# Patient Record
Sex: MALE | Race: WHITE | NOT HISPANIC OR LATINO | Employment: OTHER | URBAN - METROPOLITAN AREA
[De-identification: names, ages, dates, MRNs, and addresses within clinical notes are randomized per-mention and may not be internally consistent; named-entity substitution may affect disease eponyms.]

---

## 2017-01-17 DIAGNOSIS — I10 ESSENTIAL HYPERTENSION: ICD-10-CM

## 2017-01-17 DIAGNOSIS — M54.10 DIABETIC RADICULOPATHY: ICD-10-CM

## 2017-01-17 DIAGNOSIS — E11.49 DIABETIC RADICULOPATHY: ICD-10-CM

## 2017-01-17 RX ORDER — LISINOPRIL 10 MG/1
10 TABLET ORAL DAILY
Qty: 90 TABLET | Refills: 0 | Status: SHIPPED | OUTPATIENT
Start: 2017-01-17 | End: 2017-05-15

## 2017-01-17 RX ORDER — GLIMEPIRIDE 4 MG/1
4 TABLET ORAL
Qty: 90 TABLET | Refills: 3 | Status: SHIPPED | OUTPATIENT
Start: 2017-01-17 | End: 2018-03-10 | Stop reason: SDUPTHER

## 2017-02-08 ENCOUNTER — DOCUMENTATION ONLY (OUTPATIENT)
Dept: FAMILY MEDICINE | Facility: CLINIC | Age: 57
End: 2017-02-08

## 2017-02-08 NOTE — PROGRESS NOTES
Health Maintenance Due   Topic Date Due    Colonoscopy  10/31/2008    Foot Exam  12/04/2016

## 2017-02-09 ENCOUNTER — OFFICE VISIT (OUTPATIENT)
Dept: FAMILY MEDICINE | Facility: CLINIC | Age: 57
End: 2017-02-09
Payer: COMMERCIAL

## 2017-02-09 VITALS
DIASTOLIC BLOOD PRESSURE: 80 MMHG | RESPIRATION RATE: 16 BRPM | TEMPERATURE: 98 F | OXYGEN SATURATION: 100 % | SYSTOLIC BLOOD PRESSURE: 119 MMHG | WEIGHT: 288.38 LBS | BODY MASS INDEX: 40.37 KG/M2 | HEIGHT: 71 IN | HEART RATE: 82 BPM

## 2017-02-09 DIAGNOSIS — F32.A ANXIETY AND DEPRESSION: ICD-10-CM

## 2017-02-09 DIAGNOSIS — F41.9 ANXIETY AND DEPRESSION: ICD-10-CM

## 2017-02-09 DIAGNOSIS — E11.59 HYPERTENSION ASSOCIATED WITH DIABETES: ICD-10-CM

## 2017-02-09 DIAGNOSIS — I15.2 HYPERTENSION ASSOCIATED WITH DIABETES: ICD-10-CM

## 2017-02-09 DIAGNOSIS — E78.5 HYPERLIPIDEMIA ASSOCIATED WITH TYPE 2 DIABETES MELLITUS: ICD-10-CM

## 2017-02-09 DIAGNOSIS — E11.69 HYPERLIPIDEMIA ASSOCIATED WITH TYPE 2 DIABETES MELLITUS: ICD-10-CM

## 2017-02-09 DIAGNOSIS — Z12.11 COLON CANCER SCREENING: ICD-10-CM

## 2017-02-09 PROCEDURE — 3045F PR MOST RECENT HEMOGLOBIN A1C LEVEL 7.0-9.0%: CPT | Mod: S$GLB,,, | Performed by: INTERNAL MEDICINE

## 2017-02-09 PROCEDURE — 99214 OFFICE O/P EST MOD 30 MIN: CPT | Mod: S$GLB,,, | Performed by: INTERNAL MEDICINE

## 2017-02-09 PROCEDURE — 3079F DIAST BP 80-89 MM HG: CPT | Mod: S$GLB,,, | Performed by: INTERNAL MEDICINE

## 2017-02-09 PROCEDURE — 4010F ACE/ARB THERAPY RXD/TAKEN: CPT | Mod: S$GLB,,, | Performed by: INTERNAL MEDICINE

## 2017-02-09 PROCEDURE — 3074F SYST BP LT 130 MM HG: CPT | Mod: S$GLB,,, | Performed by: INTERNAL MEDICINE

## 2017-02-09 PROCEDURE — 2022F DILAT RTA XM EVC RTNOPTHY: CPT | Mod: S$GLB,,, | Performed by: INTERNAL MEDICINE

## 2017-02-09 RX ORDER — DULOXETIN HYDROCHLORIDE 60 MG/1
60 CAPSULE, DELAYED RELEASE ORAL DAILY
Qty: 30 CAPSULE | Refills: 11 | Status: SHIPPED | OUTPATIENT
Start: 2017-02-09 | End: 2017-05-11

## 2017-02-09 NOTE — PROGRESS NOTES
Subjective:       Patient ID: Sarwat Colunga is a 56 y.o. male.    Chief Complaint: Diabetes (labs); Neck Pain; and Foot Pain    HPI       CHIEF COMPLAINT: Depression: yes.  . Anxiety: yes.   HPI:     ONSET/TIMING:  .  ago.  Similar problems in past: .yes.   . # of episodes  of panic per week      0. .    DURATION:  constant    QUALITY/COURSE: unchanged    INTENSITY/SEVERITY: .Severity is # 5 (10 point scale)    CONTEXT/WHEN:  Postpartum: no..  Personal loss: no ..  Stress: yes..    MODIFIERS/TREATMENTS: . Taking medications: yes.  Compliance with taking prescribed medications: yes.  alcohol abuse: no ...  Drug abuse: no .  Chronic disease: no.    SYMPTOMS/RELATED: Possible medication side effects include:  none.    The following symptoms are positive if BOLD, negative otherwise.      REVIEW OF SYSTEMS: Sadness . Weakness . Fatigue . Lack_of _enjoyment_in_life . Sleep_disturbances . Anorexia .  Increased_appetite .  Irritability . Crying_spells .  Guilt .  Lost_concentration . Suicidal_ideation .   During panic attacks:  Chest_pain  . Shortness_of_breath  . dizziness . tingling . palpitations .          CHIEF COMPLAINT: Diabetes.  HPI:     ONSET/TIMING:     QUALITY/COURSE:   unchanged..  Controlled: yes.     INTENSITY/SEVERITY: . Measures blood sugar :  3 times per day.        Lowest recent . Average      CONTEXT/WHEN: last HgbA1c    Lab Results   Component Value Date    HGBA1C 8.1 (H) 12/01/2016      weights:    Wt Readings from Last 1 Encounters:   02/09/17 0852 130.8 kg (288 lb 5.8 oz)         SYMPTOMS/RELATED: . . Possible medication side effects include:     The following symptoms/statements  are present IF IN BOLD, negative otherwise.         MODIFIERS/TREATMENTS: Not_taking_medications:  .  Non-compliance_with_Diabetic_diet  .  No_eye_exam_within_last_year.  Not_practicing_good_foot_care.    REVIEW OF SYMPTOMS: . Weight_gain. Weight_loss. Neuropathy. Recurrent_infections.  Skin_ulcers            CHIEF COMPLAINT: Hypertension  HPI:     ONSET:      QUALITY/COURSE:   Controlled:  yes     INTENSITY/SEVERITY:  Average blood pressure is ? .     MODIFIERS/TREATMENTS:  Taking medications: yes. .High sodium intake: no. alcohol: no      The following symptoms are positive only if BOLDED, otherwise are negative.      SYMPTOMS/RELATED: Possible medication side effects include:   Depression..  . Cough. . Constipation.    REVIEW OF SYMPTOMS: . Weight_loss . Weight_gain . Leg_cramps .Potency_problems .    TARGET ORGAN DAMAGE:: angina/ prior myocardial infarction, chronic kidney disease, heart failure, left ventricular hypertrophy, peripheral artery disease, prior coronary revascularization, retinopathy, stroke. transient ischemic attack.      CHIEF COMPLAINT: Hyperlipidemia. cholesterol screening: no.   HPI:     ONSET:    MODIFIERS/TREATMENTS: . Taking medications: yes. . Non-compliance with following diet: no. .     SYMPTOMS/RELATED:Possible medication side effects include:   Myalgia: no.  .     REVIEW OF SYMPTOMS: past weights:   Wt Readings from Last 1 Encounters:   02/09/17 0852 130.8 kg (288 lb 5.8 oz)                                                     Last lipids: total   Lab Results   Component Value Date    CHOL 145 12/01/2016    CHOL 135 05/30/2016    CHOL 153 02/26/2016                                                                     HDL   Lab Results   Component Value Date    HDL 35 (L) 12/01/2016    HDL 33 (L) 05/30/2016    HDL 32 (L) 02/26/2016                                                                     LDL   Lab Results   Component Value Date    LDLCALC 90.2 12/01/2016    LDLCALC 79.6 05/30/2016    LDLCALC 98.6 02/26/2016                                                                     TRIG   Lab Results   Component Value Date    TRIG 99 12/01/2016    TRIG 112 05/30/2016    TRIG 112 02/26/2016                                                                         Review  "of Systems   Constitutional: Positive for fatigue. Negative for diaphoresis and unexpected weight change.   Eyes: Negative for visual disturbance.   Respiratory: Negative for chest tightness and shortness of breath.    Cardiovascular: Negative for chest pain and leg swelling.   Endocrine: Positive for polyphagia. Negative for polydipsia and polyuria.   Skin: Negative for pallor.   Neurological: Negative for dizziness, tremors, seizures, syncope, speech difficulty, weakness, numbness and headaches.   Psychiatric/Behavioral: Negative for confusion and dysphoric mood. The patient is not nervous/anxious.        Objective:      Vitals:    02/09/17 0852   BP: 119/80   Pulse: 82   Resp: 16   Temp: 97.9 °F (36.6 °C)   TempSrc: Oral   SpO2: 100%   Weight: 130.8 kg (288 lb 5.8 oz)   Height: 5' 11" (1.803 m)   PainSc:   7   PainLoc: Neck     Physical Exam   Constitutional: He appears well-developed and well-nourished.   Cardiovascular: Normal rate, regular rhythm and normal heart sounds.    Pulses:       Dorsalis pedis pulses are 2+ on the right side, and 2+ on the left side.   Pulmonary/Chest: Effort normal and breath sounds normal. No respiratory distress. He has no wheezes.   Abdominal: Soft. Bowel sounds are normal. There is no tenderness.   Musculoskeletal:        Right foot: There is normal range of motion and no deformity.        Left foot: There is normal range of motion and no deformity.   Feet:   Right Foot:   Protective Sensation: 5 sites tested. 5 sites sensed.   Skin Integrity: Negative for ulcer, blister, skin breakdown, erythema, warmth, callus or dry skin.   Left Foot:   Protective Sensation: 5 sites tested. 5 sites sensed.   Skin Integrity: Negative for ulcer, blister, skin breakdown, erythema, warmth, callus or dry skin.         Assessment:       1. Type 2 diabetes, uncontrolled, with neuropathy    2. Anxiety and depression    3. Hypertension associated with diabetes    4. Hyperlipidemia associated with type " 2 diabetes mellitus    5. Colon cancer screening          Plan:     Type 2 diabetes, uncontrolled, with neuropathy  -     canagliflozin (INVOKANA) 100 mg Tab; Take 1 tablet (100 mg total) by mouth once daily.  Dispense: 30 tablet; Refill: 0  -     canagliflozin (INVOKANA) 300 mg Tab tablet; Take 1 tablet (300 mg total) by mouth once daily.  Dispense: 90 tablet; Refill: 1  -     CBC auto differential; Future; Expected date: 2/9/17  -     Hemoglobin A1c; Future; Expected date: 2/9/17    Anxiety and depression  -     duloxetine (CYMBALTA) 60 MG capsule; Take 1 capsule (60 mg total) by mouth once daily.  Dispense: 30 capsule; Refill: 11    Hypertension associated with diabetes  -     Comprehensive metabolic panel; Future; Expected date: 2/9/17    Hyperlipidemia associated with type 2 diabetes mellitus  -     Lipid panel; Future; Expected date: 2/9/17    Colon cancer screening  -     Ambulatory Referral to Gastroenterology      Return in about 3 months (around 5/9/2017).

## 2017-02-09 NOTE — MR AVS SNAPSHOT
[facility]  [address]  [place] [address]  Phone: [phone]  Fax: [phone]                  Sarwat Colunga   2017 8:40 AM   Office Visit    Description:  Male : 1960   Provider:  Rosales Page MD   Department:  [facility]           Reason for Visit     Diabetes     Neck Pain     Foot Pain           Diagnoses this Visit        Comments    Type 2 diabetes, uncontrolled, with neuropathy    -  Primary     Anxiety and depression         Hypertension associated with diabetes         Hyperlipidemia associated with type 2 diabetes mellitus         Colon cancer screening                To Do List           Future Appointments        Provider Department Dept Phone    2017 8:00 AM DEN Darnell [facility] [phone]    2017 8:40 AM Rosales Page MD [facility] [phone]      Goals (5 Years of Data)     None      Follow-Up and Disposition     Return in about 3 months (around 2017).       These Medications        Disp Refills Start End    canagliflozin (INVOKANA) 100 mg Tab 30 tablet 0 2017     Take 1 tablet (100 mg total) by mouth once daily. - Oral    [facility]  [address]  [facility] #: [phone]       duloxetine (CYMBALTA) 60 MG capsule 30 capsule 11 2017 3/11/2017    Take 1 capsule (60 mg total) by mouth once daily. - Oral    Pharmacy: [facility] [address]  [place], [place] -  [address]  [facility] #: [phone]       canagliflozin (INVOKANA) 300 mg Tab tablet 90 tablet 1 3/9/2017     Take 1 tablet (300 mg total) by mouth once daily. - Oral    [facility]: [facility] [address] [facility] [place], [place] -  [address]  [facility] Ph #: [phone]         [facility]     [facility] -  Assistance  Registered nurses in the [facility] On Call Center provide clinical advisement, health  "education, appointment booking, and other advisory services.  Call for this free service at [phone].             Medications           Message regarding Medications     Verify the changes and/or additions to your medication regime listed below are the same as discussed with your clinician today.  If any of these changes or additions are incorrect, please notify your healthcare provider.        START taking these NEW medications        Refills    canagliflozin (INVOKANA) 100 mg Tab 0    Sig: Take 1 tablet (100 mg total) by mouth once daily.    Class: Normal    Route: Oral    duloxetine (CYMBALTA) 60 MG capsule 11    Sig: Take 1 capsule (60 mg total) by mouth once daily.    Class: Normal    Route: Oral    canagliflozin (INVOKANA) 300 mg Tab tablet 1    Starting on: 3/9/2017    Sig: Take 1 tablet (300 mg total) by mouth once daily.    Class: Normal    Route: Oral           Verify that the below list of medications is an accurate representation of the medications you are currently taking.  If none reported, the list may be blank. If incorrect, please contact your healthcare provider. Carry this list with you in case of emergency.           Current Medications     aspirin (ECOTRIN) 81 MG EC tablet Take 81 mg by mouth once daily.    atorvastatin (LIPITOR) 40 MG tablet Take 1 tablet (40 mg total) by mouth nightly.    BD INSULIN PEN NEEDLE UF MINI 31 x 3/16 " Ndle     benzonatate (TESSALON) 200 MG capsule Take 1 capsule (200 mg total) by mouth 3 (three) times daily as needed for Cough.    blood sugar diagnostic Strp 2 strips by Misc.(Non-Drug; Combo Route) route 2 (two) times daily.    chlorthalidone (HYGROTEN) 25 MG Tab Take 1 tablet (25 mg total) by mouth once daily.    esomeprazole (NEXIUM) 40 MG capsule Take 1 capsule (40 mg total) by mouth before breakfast.    exenatide (BYETTA) 10 mcg/dose(250 mcg/mL) 2.4 mL PnIj Inject 0.04 mLs (10 mcg total) into the skin 2 (two) times daily before meals.    finasteride " "(PROSCAR) 5 mg tablet Take 1 tablet (5 mg total) by mouth once daily.    glimepiride (AMARYL) 4 MG tablet Take 1 tablet (4 mg total) by mouth before breakfast.    insulin glargine (LANTUS SOLOSTAR) 100 unit/mL (3 mL) InPn pen 50 u sq qhs, then increase by 2 units every 4 days until fasting sugars are below 130    lamotrigine (LAMICTAL) 150 MG Tab take 1 tablet by mouth twice a day    lisinopril 10 MG tablet Take 1 tablet (10 mg total) by mouth once daily.    meclizine (ANTIVERT) 25 mg tablet Take 1 tablet (25 mg total) by mouth 3 (three) times daily as needed.    metformin (GLUCOPHAGE) 1000 MG tablet Take 1 tablet (1,000 mg total) by mouth 2 (two) times daily with meals.    naproxen sodium (ANAPROX) 550 MG tablet Take 550 mg by mouth 2 (two) times daily.    tamsulosin (FLOMAX) 0.4 mg Cp24 Take 1 capsule (0.4 mg total) by mouth once daily.    canagliflozin (INVOKANA) 100 mg Tab Take 1 tablet (100 mg total) by mouth once daily.    canagliflozin (INVOKANA) 300 mg Tab tablet Starting on Mar 09, 2017. Take 1 tablet (300 mg total) by mouth once daily.    duloxetine (CYMBALTA) 60 MG capsule Take 1 capsule (60 mg total) by mouth once daily.           Clinical Reference Information           Your Vitals Were     BP Pulse Temp Resp    119/80 (BP Location: Right arm, Patient Position: Sitting, BP Method: Automatic) 82 97.9 °F (36.6 °C) (Oral) 16    Height Weight SpO2 BMI    5' 11" (1.803 m) 130.8 kg (288 lb 5.8 oz) 100% 40.22 kg/m2      Blood Pressure          Most Recent Value    BP  119/80      Allergies as of 2/9/2017     Bactrim [Sulfamethoxazole-trimethoprim]    Ciprofloxacin    Doxycycline      Immunizations Administered on Date of Encounter - 2/9/2017     None      Orders Placed During Today's Visit      Normal Orders This Visit    Ambulatory Referral to Gastroenterology     Future Labs/Procedures Expected by Expires    CBC auto differential  2/9/2017 2/10/2018    Comprehensive metabolic panel  2/9/2017 2/10/2018    " Hemoglobin A1c  2/9/2017 2/10/2018    Lipid panel  2/9/2017 2/10/2018      Instructions    Lose 5 pounds.  Suggest [facility]       Language Assistance Services     ATTENTION: Language assistance services are available, free of charge. Please call [phone].      ATENCIÓN: Si habla michelle, tiene a morales disposición servicios gratuitos de asistencia lingüística. Llame al [phone].     CHÚ Ý: N?u b?n nói Ti?ng Vi?t, có các d?ch v? h? tr? ngôn ng? mi?n phí dành cho b?n. G?i s? [phone].         [facility] complies with applicable Federal civil rights laws and does not discriminate on the basis of race, color, national origin, age, disability, or sex.

## 2017-02-18 ENCOUNTER — PATIENT MESSAGE (OUTPATIENT)
Dept: FAMILY MEDICINE | Facility: CLINIC | Age: 57
End: 2017-02-18

## 2017-02-20 DIAGNOSIS — E66.9 DIABETES MELLITUS TYPE 2 IN OBESE: ICD-10-CM

## 2017-02-20 DIAGNOSIS — E11.69 DIABETES MELLITUS TYPE 2 IN OBESE: ICD-10-CM

## 2017-02-20 NOTE — TELEPHONE ENCOUNTER
PA was needed for byetta.  Insurance DENIED this PA.    Reason:  Product is not eligible for coverage under pt benefits.         Also, per pt. Please advise:    Original authorizing provider: MD Sarwat Thompson would like a refill of the following medications:   exenatide (BYETTA) 10 mcg/dose(250 mcg/mL) 2.4 mL PnIj [Rosales Page MD]     Preferred pharmacy: [facility] [address] [place], [place] [address]  [facility]     Comment:   1. Am I supposed to be taking the Byetta now that I started the Invokana?     2. If I am supposed to stay on Byetta then I need a refill.     3. Want to try a visit with an endocrinologist for the diabetes, could I get a referral?   Would like to see:                                Virgilio Gomez MD (in particular)                        [facility], [facility]                             Phone:  [phone].                               Fax: [phone]

## 2017-02-20 NOTE — TELEPHONE ENCOUNTER
PA was needed for byetta.  Insurance DENIED this PA.    Reason:  Product is not eligible for coverage under pt benefits.         Also, per pt. Please advise:      Comment:   1. Am I supposed to be taking the Byetta now that I started the Invokana?     2. If I am supposed to stay on Byetta then I need a refill.     3. Want to try a visit with an endocrinologist for the diabetes, could I get a referral?   Would like to see:                                Virgilio Gomez MD (in particular)                        [facility], [facility]                             Phone:  [phone].                               Fax: [phone]       PLEASE ADVISE

## 2017-03-13 DIAGNOSIS — I10 ESSENTIAL HYPERTENSION: ICD-10-CM

## 2017-03-13 RX ORDER — CHLORTHALIDONE 25 MG/1
25 TABLET ORAL DAILY
Qty: 90 TABLET | Refills: 1 | Status: SHIPPED | OUTPATIENT
Start: 2017-03-13 | End: 2018-02-24 | Stop reason: SDUPTHER

## 2017-03-19 ENCOUNTER — PATIENT MESSAGE (OUTPATIENT)
Dept: FAMILY MEDICINE | Facility: CLINIC | Age: 57
End: 2017-03-19

## 2017-03-20 ENCOUNTER — TELEPHONE (OUTPATIENT)
Dept: FAMILY MEDICINE | Facility: CLINIC | Age: 57
End: 2017-03-20

## 2017-03-20 NOTE — TELEPHONE ENCOUNTER
The rash is getting better.  Contacted the patient is off Lamictal now.  He will see his psychiatrist in 3 days.

## 2017-04-06 ENCOUNTER — DOCUMENTATION ONLY (OUTPATIENT)
Dept: FAMILY MEDICINE | Facility: CLINIC | Age: 57
End: 2017-04-06

## 2017-04-06 NOTE — PROGRESS NOTES
Pre-Visit Chart Review  For Appointment Scheduled on 04/07/2017    Health Maintenance Due   Topic Date Due    Colonoscopy  10/31/2008    Hemoglobin A1c  03/01/2017

## 2017-04-07 ENCOUNTER — OFFICE VISIT (OUTPATIENT)
Dept: FAMILY MEDICINE | Facility: CLINIC | Age: 57
End: 2017-04-07
Payer: COMMERCIAL

## 2017-04-07 VITALS
SYSTOLIC BLOOD PRESSURE: 114 MMHG | TEMPERATURE: 98 F | HEIGHT: 71 IN | HEART RATE: 84 BPM | BODY MASS INDEX: 40.52 KG/M2 | DIASTOLIC BLOOD PRESSURE: 76 MMHG | WEIGHT: 289.44 LBS

## 2017-04-07 DIAGNOSIS — L28.2 PRURITIC RASH: ICD-10-CM

## 2017-04-07 DIAGNOSIS — R23.8 VESICULAR RASH: Primary | ICD-10-CM

## 2017-04-07 DIAGNOSIS — T07.XXXA MULTIPLE EXCORIATIONS: ICD-10-CM

## 2017-04-07 PROCEDURE — 99213 OFFICE O/P EST LOW 20 MIN: CPT | Mod: S$GLB,,, | Performed by: PHYSICIAN ASSISTANT

## 2017-04-07 PROCEDURE — 3074F SYST BP LT 130 MM HG: CPT | Mod: S$GLB,,, | Performed by: PHYSICIAN ASSISTANT

## 2017-04-07 PROCEDURE — 99999 PR PBB SHADOW E&M-EST. PATIENT-LVL III: CPT | Mod: PBBFAC,,, | Performed by: PHYSICIAN ASSISTANT

## 2017-04-07 PROCEDURE — 3078F DIAST BP <80 MM HG: CPT | Mod: S$GLB,,, | Performed by: PHYSICIAN ASSISTANT

## 2017-04-07 RX ORDER — LORAZEPAM 1 MG/1
1 TABLET ORAL 2 TIMES DAILY
COMMUNITY
Start: 2017-02-06 | End: 2021-08-17

## 2017-04-07 RX ORDER — LURASIDONE HYDROCHLORIDE 80 MG/1
80 TABLET, FILM COATED ORAL NIGHTLY
Refills: 0 | COMMUNITY
Start: 2017-04-03 | End: 2020-06-20 | Stop reason: CLARIF

## 2017-04-07 RX ORDER — HYDROXYZINE HYDROCHLORIDE 25 MG/1
25 TABLET, FILM COATED ORAL 2 TIMES DAILY PRN
Qty: 30 TABLET | Refills: 0 | Status: SHIPPED | OUTPATIENT
Start: 2017-04-07 | End: 2017-05-15

## 2017-04-07 NOTE — PATIENT INSTRUCTIONS
Weight Management: Getting Started  Healthy bodies come in all shapes and sizes. Not all bodies are made to be thin. For some people, a healthy weight is higher than the average weight listed on weight charts. Your healthcare provider can help you decide on a healthy weight for you.    Reasons to lose weight  Losing weight can help with some health problems, such as high blood pressure, heart disease, diabetes, sleep apnea, and arthritis. You may also feel more energy.  Set your long-term goal  Your goal doesn't even have to be a specific weight. You may decide on a fitness goal (such as being able to walk 10 miles a week), or a health goal (such as lowering your blood pressure). Choose a goal that is measurable and reasonable, so you know when you've reached it. A goal of reaching a BMI of less than 25 is not always reasonable (or possible).   Make an action plan  Habits dont change overnight. Setting your goals too high can leave you feeling discouraged if you cant reach them. Be realistic. Choose one or two small changes you can make now. Set an action plan for how you are going to make these changes. When you can stick to this plan, keep making a few more small changes. Taking small steps will help you stay on the path to success.  Track your progress  Write down your goals. Then, keep a daily record of your progress. Write down what you eat and how active you are. This record lets you look back on how much youve done. It may also help when youre feeling frustrated. Reward yourself for success. Even if you dont reach every goal, give yourself credit for what you do get done.  Get support  Encouragement from others can help make losing weight easier. Ask your family members and friends for support. They may even want to join you. Also look to your healthcare provider, registered dietitian, and  for help. Your local hospital can give you more information about nutrition, exercise, and  weight loss.  Date Last Reviewed: 1/31/2016 © 2000-2016 [employer]. [address] [place] [address], [place] [address]. All rights reserved. This information is not intended as a substitute for professional medical care. Always follow your healthcare professional's instructions.        Walking for Fitness  Fitness walking has something for everyone, even people who are already fit. Walking is one of the safest ways to condition your body aerobically. It can boost energy, help you lose weight, and reduce stress.    Physical benefits  · Walking strengthens your heart and lungs, and tones your muscles.  · When walking, your feet land with less impact than in other sports. This reduces chances of muscle, bone, and joint injury.  · Regular walking improves your cholesterol levels and lowers your risk of heart disease. And it helps you control your blood sugar if you have diabetes.  · Walking is a weight-bearing activity, which helps maintain bone density. This can help prevent osteoporosis.  Personal rewards  · Taking walks can help you relax and manage stress. And fitness walking may make you feel better about yourself.  · Walking can help you sleep better at night and make you less likely to be depressed.  · Regular walking may help maintain your memory as you get older.  · Walking is a great way to spend extra time with friends and family members. Be sure to invite your dog along!  Q&A about fitness walking  Q: Will walking keep me fit?  A: Yes. Regular walking at the right pace gives you all the benefits of other aerobic activities, such as jogging and swimming.  Q: Will walking help me lose weight and keep it off?  A: Yes. Per mile, walking can burn as many calories as jogging. Your health care provider can help work walking into your weight-loss plan.  Q: Is walking safe for my health?  A: Yes. Walking is safe if you have high blood pressure, diabetes, heart disease, or other conditions. Talk to your health  care provider before you start.  Date Last Reviewed: 5/9/2015  © 7449-3129 [employer]. [address] [place] [address], [place] [address]. All rights reserved. This information is not intended as a substitute for professional medical care. Always follow your healthcare professional's instructions.        Diet: Diabetes  Food is an important tool that you can use to control diabetes and stay healthy. Eating well-balanced meals in the correct amounts will help you control your blood glucose levels and prevent low blood sugar reactions. It will also help you reduce the health risks of diabetes. There is no one specific diet that is right for everyone with diabetes. But there are general guidelines to follow. A registered dietitian (RD) will create a tailored diet approach thats just right for you. He or she will also help you plan healthy meals and snacks. If you have any questions, call your dietitian for advice.    Guidelines for success  Talk with your healthcare provider before starting a diabetes diet or weight loss program. If you haven't talked with a dietitian yet, ask your provider for a referral. The following guidelines can help you succeed:  · Select foods from the 6 food groups below. Your dietitian will help you find food choices within each group. He or she will also show you serving sizes and how many servings you can have at each meal.  ¨ Grains, beans, and starchy vegetables  ¨ Vegetables  ¨ Fruit  ¨ Milk or yogurt  ¨ Meat, poultry, fish, or tofu  ¨ Healthy fats  · Check your blood sugar levels as directed by your provider. Take any medicine as prescribed by your provider.  · Learn to read food labels and pick the right portion sizes.  · Eat only the amount of food in your meal plan. Eat about the same amount of food at regular times each day. Dont skip meals. Eat meals 4 to 5 hours apart, with snacks in between.  · Limit alcohol. It raises blood sugar levels. Drink water or calorie-free diet  drinks that use safe sweeteners.  · Eat less fat to help lower your risk of heart disease. Use nonfat or low-fat dairy products and lean meats. Avoid fried foods. Use cooking oils that are unsaturated, such as olive, canola, or peanut oil.  · Talk with your dietitian about safe sugar substitutes.  · Avoid added salt. It can contribute to high blood pressure, which can cause heart disease. People with diabetes already have a risk of high blood pressure and heart disease.  · Stay at a healthy weight. If you need to lose weight, cut down on your portion sizes. But dont skip meals. Exercise is an important part of any weight management program. Talk with your provider about an exercise program thats right for you.  · For more information about the best diet plan for you, talk with a registered dietitian (RD). To find an RD in your area, contact:  ¨ Academy of Nutrition and Dietetics www.eatright.org  ¨ The American Diabetes Association [phone] www.diabetes.org  Date Last Reviewed: 8/1/2016  © 2691-3588 [employer]. [address] [place] [address]. All rights reserved. This information is not intended as a substitute for professional medical care. Always follow your healthcare professional's instructions.        Nonspecific Dermatitis  Dermatitis is a skin rash caused by something that touches the skin and makes it irritated and inflamed.  Your skin may be red, swollen, dry, and may be cracked. Blisters may form and ooze. The rash will itch.  Dermatitis can form on the face and neck, backs of hands, forearms, genitals, and lower legs. Dermatitis is not passed from person to person.  Talk with your health care provider about what may have caused the rash. Common things that cause skin allergies are metal in jewelry, plants like poison ivy or poison oak, and certain skin care products. You will need to avoid the source of your rash in the future to prevent it from coming back. In some cases,  the cause of the dermatitis may not be found.  Treatment is done to relieve itching and prevent the rash from coming back. The rash should go away in a few days to a few weeks.  Home care  The health care provider may prescribe medications to relieve swelling and itching. Follow all instructions when using these medications.  · Avoid anything that heats up your skin, such as hot showers or baths, or direct sunlight. This can make itching worse.  · Stay away from whatever you think caused the rash.  · Bathe in warm, not hot, water. Apply a moisturizing lotion after bathing to prevent dry skin.  · Avoid skin irritants such as wool or silk clothing, grease, oils, harsh soaps, and detergents.  · Apply cold compresses to soothe your sores to help relieve your symptoms. Do this for 30 minutes 3 to 4 times a day. You can make a cold compress by soaking a cloth in cold water. Squeeze out excess water. You can add colloidal oatmeal to the water to help reduce itching. For severe itching in a small area, apply an ice pack wrapped in a thin towel. Do this for 20 minutes 3 to 4 times a day.  · You can also help relieve large areas of itching by taking a lukewarm bath with colloidal oatmeal added to the water.  · Use hydrocortisone cream for redness and irritation, unless another medicine was prescribed. You can also use benzocaine anesthetic cream or spray.  · Use oral diphenhydramine to help reduce itching. This is an antihistamine you can buy at drug and grocery stores. It can make you sleepy, so use lower doses during the daytime. Or you can use loratadine. This is an antihistamine that will not make you sleepy. Dont use diphenhydramine if you have glaucoma or have trouble urinating because of an enlarged prostate.  · Wash your hands or use an antibacterial gel often to prevent the spread of the rash.  Follow-up care  Follow up with your health care provider. Make an appointment with your health care provider if your  symptoms do not get better in the next 1 to 2 weeks.  When to seek medical advice  Call your health care provider right away if any of these occur:  · Spreading of the rash to other parts of your body  · Severe swelling of your face, eyelids, mouth, throat or tongue  · Trouble urinating due to swelling in the genital area  · Fever of 100.4°F (38°C) or higher  · Redness or swelling that gets worse  · Pain that gets worse  · Foul-smelling fluid leaking from the skin  · Yellow-brown crusts on the open blisters  · Joint pain   Date Last Reviewed: 7/23/2014  © 4915-9143 [employer]. [address] [place] [address], [place] [address]. All rights reserved. This information is not intended as a substitute for professional medical care. Always follow your healthcare professional's instructions.

## 2017-04-07 NOTE — MR AVS SNAPSHOT
[facility]  [address] [facility] [address]  Phone: [phone]  Fax: [phone]                  Sarwat Colunga   2017 8:20 AM   Office Visit    Description:  Male : 1960   Provider:  ALEJANDRINA Navarro   Department:  [facility] Medicine           Reason for Visit     Rash           Diagnoses this Visit        Comments    Vesicular rash    -  Primary     Pruritic rash         Multiple excoriations                To Do List           Future Appointments        Provider Department Dept Phone    2017 8:40 AM Rosales Page MD [facility] [phone]    2017 9:30 AM Jacqueline Haddad MD [facility] [phone]      Goals (5 Years of Data)     None      Follow-Up and Disposition     Return for Follow-Up with PCP.       These Medications        Disp Refills Start End    hydrocortisone-pramoxine (PRAMOSONE) 2.5-1 % Lotn lotion 118 mL 0 2017     Apply topically 3 (three) times daily. - Topical (Top)    Pharmacy: [facility] [place] [address] [facility] Ph #: [phone]       hydrOXYzine HCl (ATARAX) 25 MG tablet 30 tablet 0 2017     Take 1 tablet (25 mg total) by mouth 2 (two) times daily as needed for Itching. - Oral    Ph[facility] [address]  [facility] Ph #: [phone]         [facility]     [facility] Nurse Care Line -  Assistance  Unless otherwise directed by your provider, please contact [facility], our nurse care line that is available for  assistance.     Registered nurses in the [facility] provide: appointment scheduling, clinical advisement, health education, and other advisory services.  Call: [phone] (toll free)               Medications           Message regarding Medications     Verify the changes and/or additions to your medication regime listed below are the same as discussed  with your clinician today.  If any of these changes or additions are incorrect, please notify your healthcare provider.        START taking these NEW medications        Refills    hydrocortisone-pramoxine (PRAMOSONE) 2.5-1 % Lotn lotion 0    Sig: Apply topically 3 (three) times daily.    Class: Normal    Route: Topical (Top)    hydrOXYzine HCl (ATARAX) 25 MG tablet 0    Sig: Take 1 tablet (25 mg total) by mouth 2 (two) times daily as needed for Itching.    Class: Normal    Route: Oral           Verify that the below list of medications is an accurate representation of the medications you are currently taking.  If none reported, the list may be blank. If incorrect, please contact your healthcare provider. Carry this list with you in case of emergency.           Current Medications     aspirin (ECOTRIN) 81 MG EC tablet Take 81 mg by mouth once daily.    atorvastatin (LIPITOR) 40 MG tablet Take 1 tablet (40 mg total) by mouth nightly.    canagliflozin (INVOKANA) 300 mg Tab tablet Take 1 tablet (300 mg total) by mouth once daily.    chlorthalidone (HYGROTEN) 25 MG Tab Take 1 tablet (25 mg total) by mouth once daily.    esomeprazole (NEXIUM) 40 MG capsule Take 1 capsule (40 mg total) by mouth before breakfast.    finasteride (PROSCAR) 5 mg tablet Take 1 tablet (5 mg total) by mouth once daily.    glimepiride (AMARYL) 4 MG tablet Take 1 tablet (4 mg total) by mouth before breakfast.    insulin glargine (LANTUS SOLOSTAR) 100 unit/mL (3 mL) InPn pen 50 u sq qhs, then increase by 2 units every 4 days until fasting sugars are below 130    LATUDA 80 mg Tab tablet     lisinopril 10 MG tablet Take 1 tablet (10 mg total) by mouth once daily.    lorazepam (ATIVAN) 1 MG tablet     meclizine (ANTIVERT) 25 mg tablet Take 1 tablet (25 mg total) by mouth 3 (three) times daily as needed.    metformin (GLUCOPHAGE) 1000 MG tablet Take 1 tablet (1,000 mg total) by mouth 2 (two) times daily with meals.    naproxen sodium (ANAPROX) 550 MG  "tablet Take 550 mg by mouth 2 (two) times daily.    tamsulosin (FLOMAX) 0.4 mg Cp24 Take 1 capsule (0.4 mg total) by mouth once daily.    BD INSULIN PEN NEEDLE UF MINI 31 x 3/16 " Ndle     benzonatate (TESSALON) 200 MG capsule Take 1 capsule (200 mg total) by mouth 3 (three) times daily as needed for Cough.    blood sugar diagnostic Strp 2 strips by Misc.(Non-Drug; Combo Route) route 2 (two) times daily.    duloxetine (CYMBALTA) 60 MG capsule Take 1 capsule (60 mg total) by mouth once daily.    exenatide (BYETTA) 10 mcg/dose(250 mcg/mL) 2.4 mL PnIj Inject 0.04 mLs (10 mcg total) into the skin 2 (two) times daily before meals.    hydrocortisone-pramoxine (PRAMOSONE) 2.5-1 % Lotn lotion Apply topically 3 (three) times daily.    hydrOXYzine HCl (ATARAX) 25 MG tablet Take 1 tablet (25 mg total) by mouth 2 (two) times daily as needed for Itching.    lamotrigine (LAMICTAL) 150 MG Tab take 1 tablet by mouth twice a day           Clinical Reference Information           Your Vitals Were     BP Pulse Temp    114/76 (BP Location: Left arm, Patient Position: Sitting, BP Method: Automatic) 84 98.2 °F (36.8 °C) (Oral)    Height Weight BMI    5' 11" (1.803 m) 131.3 kg (289 lb 7.4 oz) 40.37 kg/m2      Blood Pressure          Most Recent Value    BP  114/76      Allergies as of 4/7/2017     Bactrim [Sulfamethoxazole-trimethoprim]    Ciprofloxacin    Doxycycline    Lamictal [Lamotrigine]      Immunizations Administered on Date of Encounter - 4/7/2017     None      Instructions      Weight Management: Getting Started  Healthy bodies come in all shapes and sizes. Not all bodies are made to be thin. For some people, a healthy weight is higher than the average weight listed on weight charts. Your healthcare provider can help you decide on a healthy weight for you.    Reasons to lose weight  Losing weight can help with some health problems, such as high blood pressure, heart disease, diabetes, sleep apnea, and arthritis. You may also " feel more energy.  Set your long-term goal  Your goal doesn't even have to be a specific weight. You may decide on a fitness goal (such as being able to walk 10 miles a week), or a health goal (such as lowering your blood pressure). Choose a goal that is measurable and reasonable, so you know when you've reached it. A goal of reaching a BMI of less than 25 is not always reasonable (or possible).   Make an action plan  Habits dont change overnight. Setting your goals too high can leave you feeling discouraged if you cant reach them. Be realistic. Choose one or two small changes you can make now. Set an action plan for how you are going to make these changes. When you can stick to this plan, keep making a few more small changes. Taking small steps will help you stay on the path to success.  Track your progress  Write down your goals. Then, keep a daily record of your progress. Write down what you eat and how active you are. This record lets you look back on how much youve done. It may also help when youre feeling frustrated. Reward yourself for success. Even if you dont reach every goal, give yourself credit for what you do get done.  Get support  Encouragement from others can help make losing weight easier. Ask your family members and friends for support. They may even want to join you. Also look to your healthcare provider, registered dietitian, and  for help. Your local hospital can give you more information about nutrition, exercise, and weight loss.  Date Last Reviewed: 1/31/2016 © 2000-2016 [employer]. [address] [place] [address], [place] [address]. All rights reserved. This information is not intended as a substitute for professional medical care. Always follow your healthcare professional's instructions.        Walking for Fitness  Fitness walking has something for everyone, even people who are already fit. Walking is one of the safest ways to condition your body  aerobically. It can boost energy, help you lose weight, and reduce stress.    Physical benefits  · Walking strengthens your heart and lungs, and tones your muscles.  · When walking, your feet land with less impact than in other sports. This reduces chances of muscle, bone, and joint injury.  · Regular walking improves your cholesterol levels and lowers your risk of heart disease. And it helps you control your blood sugar if you have diabetes.  · Walking is a weight-bearing activity, which helps maintain bone density. This can help prevent osteoporosis.  Personal rewards  · Taking walks can help you relax and manage stress. And fitness walking may make you feel better about yourself.  · Walking can help you sleep better at night and make you less likely to be depressed.  · Regular walking may help maintain your memory as you get older.  · Walking is a great way to spend extra time with friends and family members. Be sure to invite your dog along!  Q&A about fitness walking  Q: Will walking keep me fit?  A: Yes. Regular walking at the right pace gives you all the benefits of other aerobic activities, such as jogging and swimming.  Q: Will walking help me lose weight and keep it off?  A: Yes. Per mile, walking can burn as many calories as jogging. Your health care provider can help work walking into your weight-loss plan.  Q: Is walking safe for my health?  A: Yes. Walking is safe if you have high blood pressure, diabetes, heart disease, or other conditions. Talk to your health care provider before you start.  Date Last Reviewed: 5/9/2015  © 9214-0599 [employer]. [address] [place] [address], [place] [address]. All rights reserved. This information is not intended as a substitute for professional medical care. Always follow your healthcare professional's instructions.        Diet: Diabetes  Food is an important tool that you can use to control diabetes and stay healthy. Eating well-balanced meals in the  correct amounts will help you control your blood glucose levels and prevent low blood sugar reactions. It will also help you reduce the health risks of diabetes. There is no one specific diet that is right for everyone with diabetes. But there are general guidelines to follow. A registered dietitian (ZACARIAS) will create a tailored diet approach thats just right for you. He or she will also help you plan healthy meals and snacks. If you have any questions, call your dietitian for advice.    Guidelines for success  Talk with your healthcare provider before starting a diabetes diet or weight loss program. If you haven't talked with a dietitian yet, ask your provider for a referral. The following guidelines can help you succeed:  · Select foods from the 6 food groups below. Your dietitian will help you find food choices within each group. He or she will also show you serving sizes and how many servings you can have at each meal.  ¨ Grains, beans, and starchy vegetables  ¨ Vegetables  ¨ Fruit  ¨ Milk or yogurt  ¨ Meat, poultry, fish, or tofu  ¨ Healthy fats  · Check your blood sugar levels as directed by your provider. Take any medicine as prescribed by your provider.  · Learn to read food labels and pick the right portion sizes.  · Eat only the amount of food in your meal plan. Eat about the same amount of food at regular times each day. Dont skip meals. Eat meals 4 to 5 hours apart, with snacks in between.  · Limit alcohol. It raises blood sugar levels. Drink water or calorie-free diet drinks that use safe sweeteners.  · Eat less fat to help lower your risk of heart disease. Use nonfat or low-fat dairy products and lean meats. Avoid fried foods. Use cooking oils that are unsaturated, such as olive, canola, or peanut oil.  · Talk with your dietitian about safe sugar substitutes.  · Avoid added salt. It can contribute to high blood pressure, which can cause heart disease. People with diabetes already have a risk of high  blood pressure and heart disease.  · Stay at a healthy weight. If you need to lose weight, cut down on your portion sizes. But dont skip meals. Exercise is an important part of any weight management program. Talk with your provider about an exercise program thats right for you.  · For more information about the best diet plan for you, talk with a registered dietitian (RD). To find an RD in your area, contact:  ¨ Academy of Nutrition and Dietetics www.eatright.org  ¨ The American Diabetes Association [phone] www.diabetes.org  Date Last Reviewed: 8/1/2016 © 2000-2016 [employer]. [address] [place] [address], [place] [address]. All rights reserved. This information is not intended as a substitute for professional medical care. Always follow your healthcare professional's instructions.        Nonspecific Dermatitis  Dermatitis is a skin rash caused by something that touches the skin and makes it irritated and inflamed.  Your skin may be red, swollen, dry, and may be cracked. Blisters may form and ooze. The rash will itch.  Dermatitis can form on the face and neck, backs of hands, forearms, genitals, and lower legs. Dermatitis is not passed from person to person.  Talk with your health care provider about what may have caused the rash. Common things that cause skin allergies are metal in jewelry, plants like poison ivy or poison oak, and certain skin care products. You will need to avoid the source of your rash in the future to prevent it from coming back. In some cases, the cause of the dermatitis may not be found.  Treatment is done to relieve itching and prevent the rash from coming back. The rash should go away in a few days to a few weeks.  Home care  The health care provider may prescribe medications to relieve swelling and itching. Follow all instructions when using these medications.  · Avoid anything that heats up your skin, such as hot showers or baths, or direct sunlight. This can make itching  worse.  · Stay away from whatever you think caused the rash.  · Bathe in warm, not hot, water. Apply a moisturizing lotion after bathing to prevent dry skin.  · Avoid skin irritants such as wool or silk clothing, grease, oils, harsh soaps, and detergents.  · Apply cold compresses to soothe your sores to help relieve your symptoms. Do this for 30 minutes 3 to 4 times a day. You can make a cold compress by soaking a cloth in cold water. Squeeze out excess water. You can add colloidal oatmeal to the water to help reduce itching. For severe itching in a small area, apply an ice pack wrapped in a thin towel. Do this for 20 minutes 3 to 4 times a day.  · You can also help relieve large areas of itching by taking a lukewarm bath with colloidal oatmeal added to the water.  · Use hydrocortisone cream for redness and irritation, unless another medicine was prescribed. You can also use benzocaine anesthetic cream or spray.  · Use oral diphenhydramine to help reduce itching. This is an antihistamine you can buy at drug and grocery stores. It can make you sleepy, so use lower doses during the daytime. Or you can use loratadine. This is an antihistamine that will not make you sleepy. Dont use diphenhydramine if you have glaucoma or have trouble urinating because of an enlarged prostate.  · Wash your hands or use an antibacterial gel often to prevent the spread of the rash.  Follow-up care  Follow up with your health care provider. Make an appointment with your health care provider if your symptoms do not get better in the next 1 to 2 weeks.  When to seek medical advice  Call your health care provider right away if any of these occur:  · Spreading of the rash to other parts of your body  · Severe swelling of your face, eyelids, mouth, throat or tongue  · Trouble urinating due to swelling in the genital area  · Fever of 100.4°F (38°C) or higher  · Redness or swelling that gets worse  · Pain that gets worse  · Foul-smelling fluid  leaking from the skin  · Yellow-brown crusts on the open blisters  · Joint pain   Date Last Reviewed: 7/23/2014  © 4648-3056 [employer], [employer]. [address] [place] [address], [place] [address]. All rights reserved. This information is not intended as a substitute for professional medical care. Always follow your healthcare professional's instructions.             Language Assistance Services     ATTENTION: Language assistance services are available, free of charge. Please call [phone].      ATENCIÓN: Si zoraida martinez, tiene a morales disposición servicios gratuitos de asistencia lingüística. Llame al [phone].     LAYLA Ý: N?u b?n nói Ti?ng Vi?t, có các d?ch v? h? tr? ngôn ng? mi?n phí dành cho b?n. G?i s? [phone].         [facility] complies with applicable Federal civil rights laws and does not discriminate on the basis of race, color, national origin, age, disability, or sex.

## 2017-04-07 NOTE — PROGRESS NOTES
Subjective:       Patient ID: Sarwat Colunga is a 56 y.o. male.    Chief Complaint: Rash (medication reaction  x 1 month)    Rash   This is a new problem. The current episode started 1 to 4 weeks ago. The problem has been gradually worsening since onset. The affected locations include the left armleft arm. The rash is characterized by blistering, swelling, itchiness, peeling and scaling. He was exposed to a new medication. Pertinent negatives include no anorexia, congestion, cough, diarrhea, eye pain, facial edema, fatigue, fever, joint pain, nail changes, rhinorrhea, shortness of breath, sore throat or vomiting. Past treatments include anti-itch cream and topical steroids. The treatment provided no relief. There is no history of allergies, asthma, eczema or varicella.     Review of Systems   Constitutional: Negative for fatigue and fever.   HENT: Negative for congestion, rhinorrhea and sore throat.    Eyes: Negative for pain.   Respiratory: Negative for cough and shortness of breath.    Gastrointestinal: Negative for anorexia, diarrhea and vomiting.   Musculoskeletal: Negative for joint pain.   Skin: Positive for rash. Negative for nail changes.       Objective:      Physical Exam   Skin:   Numerous excoriations on the left forearm  No blisters seen few pink papules noted     Vitals reviewed.      Assessment:       1. Vesicular rash    2. Pruritic rash    3. Multiple excoriations        Plan:       Sarwat was seen today for rash.    Diagnoses and all orders for this visit:    Vesicular rash    Pruritic rash    Multiple excoriations    -     hydrocortisone-pramoxine (PRAMOSONE) 2.5-1 % Lotn lotion; Apply topically 3 (three) times daily.  -     hydrOXYzine HCl (ATARAX) 25 MG tablet; Take 1 tablet (25 mg total) by mouth 2 (two) times daily as needed for Itching.    Follow up with derm as scheduled

## 2017-04-24 ENCOUNTER — PATIENT MESSAGE (OUTPATIENT)
Dept: FAMILY MEDICINE | Facility: CLINIC | Age: 57
End: 2017-04-24

## 2017-04-25 ENCOUNTER — TELEPHONE (OUTPATIENT)
Dept: FAMILY MEDICINE | Facility: CLINIC | Age: 57
End: 2017-04-25

## 2017-04-25 RX ORDER — PREGABALIN 25 MG/1
25 CAPSULE ORAL 2 TIMES DAILY
Qty: 60 CAPSULE | Refills: 6 | Status: SHIPPED | OUTPATIENT
Start: 2017-04-25 | End: 2017-05-11 | Stop reason: DRUGHIGH

## 2017-05-03 ENCOUNTER — PATIENT MESSAGE (OUTPATIENT)
Dept: FAMILY MEDICINE | Facility: CLINIC | Age: 57
End: 2017-05-03

## 2017-05-08 ENCOUNTER — TELEPHONE (OUTPATIENT)
Dept: FAMILY MEDICINE | Facility: CLINIC | Age: 57
End: 2017-05-08

## 2017-05-08 DIAGNOSIS — G62.9 PERIPHERAL POLYNEUROPATHY: Primary | ICD-10-CM

## 2017-05-08 RX ORDER — PREGABALIN 50 MG/1
50 CAPSULE ORAL 3 TIMES DAILY
Qty: 90 CAPSULE | Refills: 6 | Status: SHIPPED | OUTPATIENT
Start: 2017-05-08 | End: 2017-11-04 | Stop reason: SDUPTHER

## 2017-05-11 ENCOUNTER — OFFICE VISIT (OUTPATIENT)
Dept: DERMATOLOGY | Facility: CLINIC | Age: 57
End: 2017-05-11
Payer: COMMERCIAL

## 2017-05-11 VITALS — BODY MASS INDEX: 40.46 KG/M2 | HEIGHT: 71 IN | WEIGHT: 289 LBS

## 2017-05-11 DIAGNOSIS — L30.9 ECZEMA, UNSPECIFIED TYPE: ICD-10-CM

## 2017-05-11 DIAGNOSIS — L85.3 XEROSIS CUTIS: ICD-10-CM

## 2017-05-11 DIAGNOSIS — L28.2 PRURIGO: Primary | ICD-10-CM

## 2017-05-11 PROCEDURE — 99999 PR PBB SHADOW E&M-EST. PATIENT-LVL II: CPT | Mod: PBBFAC,,, | Performed by: DERMATOLOGY

## 2017-05-11 PROCEDURE — 1160F RVW MEDS BY RX/DR IN RCRD: CPT | Mod: S$GLB,,, | Performed by: DERMATOLOGY

## 2017-05-11 PROCEDURE — 99203 OFFICE O/P NEW LOW 30 MIN: CPT | Mod: S$GLB,,, | Performed by: DERMATOLOGY

## 2017-05-11 RX ORDER — BUPROPION HYDROCHLORIDE 100 MG/1
100 TABLET, EXTENDED RELEASE ORAL 2 TIMES DAILY
Refills: 0 | COMMUNITY
Start: 2017-05-03 | End: 2020-06-20 | Stop reason: CLARIF

## 2017-05-11 RX ORDER — DIPHENHYDRAMINE HCL 50 MG
50 CAPSULE ORAL 3 TIMES DAILY PRN
Refills: 0 | COMMUNITY
Start: 2017-04-09 | End: 2017-05-15

## 2017-05-11 RX ORDER — OXCARBAZEPINE 300 MG/1
TABLET, FILM COATED ORAL
Refills: 0 | COMMUNITY
Start: 2017-04-17 | End: 2017-05-11

## 2017-05-11 RX ORDER — DEXTROAMPHETAMINE SACCHARATE, AMPHETAMINE ASPARTATE, DEXTROAMPHETAMINE SULFATE AND AMPHETAMINE SULFATE 2.5; 2.5; 2.5; 2.5 MG/1; MG/1; MG/1; MG/1
1 TABLET ORAL 2 TIMES DAILY
Refills: 0 | COMMUNITY
Start: 2017-04-12 | End: 2018-11-07 | Stop reason: ALTCHOICE

## 2017-05-11 RX ORDER — MOMETASONE FUROATE 1 MG/G
CREAM TOPICAL
Qty: 45 G | Refills: 1 | Status: SHIPPED | OUTPATIENT
Start: 2017-05-11 | End: 2017-09-22

## 2017-05-11 NOTE — PROGRESS NOTES
Subjective:       Patient ID:  Sarwat Colunga is a 56 y.o. male who presents for No chief complaint on file.    HPI     Pt here for IV, c/o 2 month history rash on forearms and legs. Attributes to lamictal. + itching after increased dose. Stopped lamictal 2 months ago. Rash has improved. Still with a few itchy areas- predominantly right leg, left forearm. Admits to scratching. Takes hot showers. Trunk clear/uninvolved.     No family or personal history skin cancer     Review of Systems   Skin: Positive for itching, rash and dry skin.   Hematologic/Lymphatic: Does not bruise/bleed easily.        Objective:    Physical Exam   Constitutional: He appears well-developed and well-nourished. He is obese.  No distress.   HENT:   Mouth/Throat: Lips normal.    Eyes: Lids are normal.  No conjunctival no injection.   Cardiovascular: There is no local extremity swelling and no dependent edema.     Neurological: He is alert and oriented to person, place, and time. He is not disoriented.   Psychiatric: He has a normal mood and affect.   Skin:   Areas Examined (abnormalities noted in diagram):   Head / Face Inspection Performed  Neck Inspection Performed  Chest / Axilla Inspection Performed  Abdomen Inspection Performed  Back Inspection Performed  RUE Inspected  LUE Inspection Performed  RLE Inspected  LLE Inspection Performed              Diagram Legend     Erythematous scaling macule/papule c/w actinic keratosis       Vascular papule c/w angioma      Pigmented verrucoid papule/plaque c/w seborrheic keratosis      Yellow umbilicated papule c/w sebaceous hyperplasia      Irregularly shaped tan macule c/w lentigo     1-2 mm smooth white papules consistent with Milia      Movable subcutaneous cyst with punctum c/w epidermal inclusion cyst      Subcutaneous movable cyst c/w pilar cyst      Firm pink to brown papule c/w dermatofibroma      Pedunculated fleshy papule(s) c/w skin tag(s)      Evenly pigmented macule c/w junctional  nevus     Mildly variegated pigmented, slightly irregular-bordered macule c/w mildly atypical nevus      Flesh colored to evenly pigmented papule c/w intradermal nevus       Pink pearly papule/plaque c/w basal cell carcinoma      Erythematous hyperkeratotic cursted plaque c/w SCC      Surgical scar with no sign of skin cancer recurrence      Open and closed comedones      Inflammatory papules and pustules      Verrucoid papule consistent consistent with wart     Erythematous eczematous patches and plaques     Dystrophic onycholytic nail with subungual debris c/w onychomycosis     Umbilicated papule    Erythematous-base heme-crusted tan verrucoid plaque consistent with inflamed seborrheic keratosis     Erythematous Silvery Scaling Plaque c/w Psoriasis     See annotation      Assessment / Plan:        Prurigo  Discussed with patient the importance of not manipulating skin lesions. Trauma often exacerbates condition. Trimming nails is recommended to avoid puncturing skin.       -     mometasone 0.1% (ELOCON) 0.1 % cream; aaa bid prn itching and scaling. Avoid chronic use  Dispense: 45 g; Refill: 1    Xerosis cutis  Discussed the following dry skin tips:    Avoid hot baths and showers, keep showers or baths brief (10-15 min), use a mild soap or cleanser, pat skin dry after showering and apply a moisturizer within 3 minutes of getting out of bath (cetaphil cream, ceraVe, aquaphor) and reapply moisturizers 2-4 times/day.     -     mometasone 0.1% (ELOCON) 0.1 % cream; aaa bid prn itching and scaling. Avoid chronic use  Dispense: 45 g; Refill: 1    Eczema, unspecified type  No evidence drug reaction today  -     mometasone 0.1% (ELOCON) 0.1 % cream; aaa bid prn itching and scaling. Avoid chronic use  Dispense: 45 g; Refill: 1  discussed avoiding chronic use and to use only on affected areas- risk atrophy, striae, ecchymoses, hypopigmentation               Return in about 6 weeks (around 6/22/2017).

## 2017-05-12 ENCOUNTER — LAB VISIT (OUTPATIENT)
Dept: LAB | Facility: HOSPITAL | Age: 57
End: 2017-05-12
Attending: INTERNAL MEDICINE
Payer: COMMERCIAL

## 2017-05-12 ENCOUNTER — OFFICE VISIT (OUTPATIENT)
Dept: GASTROENTEROLOGY | Facility: CLINIC | Age: 57
End: 2017-05-12
Payer: COMMERCIAL

## 2017-05-12 VITALS
WEIGHT: 281.06 LBS | RESPIRATION RATE: 20 BRPM | SYSTOLIC BLOOD PRESSURE: 110 MMHG | HEART RATE: 87 BPM | BODY MASS INDEX: 39.35 KG/M2 | HEIGHT: 71 IN | DIASTOLIC BLOOD PRESSURE: 78 MMHG

## 2017-05-12 DIAGNOSIS — E11.69 HYPERLIPIDEMIA ASSOCIATED WITH TYPE 2 DIABETES MELLITUS: ICD-10-CM

## 2017-05-12 DIAGNOSIS — E11.59 HYPERTENSION ASSOCIATED WITH DIABETES: ICD-10-CM

## 2017-05-12 DIAGNOSIS — E78.5 HYPERLIPIDEMIA ASSOCIATED WITH TYPE 2 DIABETES MELLITUS: ICD-10-CM

## 2017-05-12 DIAGNOSIS — K63.5 POLYP OF COLON, UNSPECIFIED PART OF COLON, UNSPECIFIED TYPE: Primary | ICD-10-CM

## 2017-05-12 DIAGNOSIS — Z12.11 COLON CANCER SCREENING: Primary | ICD-10-CM

## 2017-05-12 DIAGNOSIS — I15.2 HYPERTENSION ASSOCIATED WITH DIABETES: ICD-10-CM

## 2017-05-12 LAB
ALBUMIN SERPL BCP-MCNC: 4.1 G/DL
ALP SERPL-CCNC: 56 U/L
ALT SERPL W/O P-5'-P-CCNC: 39 U/L
ANION GAP SERPL CALC-SCNC: 14 MMOL/L
AST SERPL-CCNC: 28 U/L
BASOPHILS # BLD AUTO: 0 K/UL
BASOPHILS NFR BLD: 0.4 %
BILIRUB SERPL-MCNC: 0.9 MG/DL
BUN SERPL-MCNC: 26 MG/DL
CALCIUM SERPL-MCNC: 9.9 MG/DL
CHLORIDE SERPL-SCNC: 99 MMOL/L
CHOLEST/HDLC SERPL: 5.5 {RATIO}
CO2 SERPL-SCNC: 25 MMOL/L
CREAT SERPL-MCNC: 1.5 MG/DL
DIFFERENTIAL METHOD: ABNORMAL
EOSINOPHIL # BLD AUTO: 0.2 K/UL
EOSINOPHIL NFR BLD: 2 %
ERYTHROCYTE [DISTWIDTH] IN BLOOD BY AUTOMATED COUNT: 13.4 %
EST. GFR  (AFRICAN AMERICAN): 58 ML/MIN/1.73 M^2
EST. GFR  (NON AFRICAN AMERICAN): 51 ML/MIN/1.73 M^2
GLUCOSE SERPL-MCNC: 173 MG/DL
HCT VFR BLD AUTO: 44.6 %
HDL/CHOLESTEROL RATIO: 18.2 %
HDLC SERPL-MCNC: 143 MG/DL
HDLC SERPL-MCNC: 26 MG/DL
HGB BLD-MCNC: 14.9 G/DL
LDLC SERPL CALC-MCNC: 76.4 MG/DL
LYMPHOCYTES # BLD AUTO: 2.3 K/UL
LYMPHOCYTES NFR BLD: 26.7 %
MCH RBC QN AUTO: 30 PG
MCHC RBC AUTO-ENTMCNC: 33.3 %
MCV RBC AUTO: 90 FL
MONOCYTES # BLD AUTO: 0.6 K/UL
MONOCYTES NFR BLD: 7.1 %
NEUTROPHILS # BLD AUTO: 5.6 K/UL
NEUTROPHILS NFR BLD: 63.8 %
NONHDLC SERPL-MCNC: 117 MG/DL
PLATELET # BLD AUTO: 198 K/UL
PMV BLD AUTO: 7.5 FL
POTASSIUM SERPL-SCNC: 4.2 MMOL/L
PROT SERPL-MCNC: 7.1 G/DL
RBC # BLD AUTO: 4.94 M/UL
SODIUM SERPL-SCNC: 138 MMOL/L
TRIGL SERPL-MCNC: 203 MG/DL
WBC # BLD AUTO: 8.7 K/UL

## 2017-05-12 PROCEDURE — 3078F DIAST BP <80 MM HG: CPT | Mod: S$GLB,,, | Performed by: INTERNAL MEDICINE

## 2017-05-12 PROCEDURE — 99999 PR PBB SHADOW E&M-EST. PATIENT-LVL III: CPT | Mod: PBBFAC,,, | Performed by: INTERNAL MEDICINE

## 2017-05-12 PROCEDURE — 1160F RVW MEDS BY RX/DR IN RCRD: CPT | Mod: S$GLB,,, | Performed by: INTERNAL MEDICINE

## 2017-05-12 PROCEDURE — 36415 COLL VENOUS BLD VENIPUNCTURE: CPT

## 2017-05-12 PROCEDURE — 99214 OFFICE O/P EST MOD 30 MIN: CPT | Mod: S$GLB,,, | Performed by: INTERNAL MEDICINE

## 2017-05-12 PROCEDURE — 3074F SYST BP LT 130 MM HG: CPT | Mod: S$GLB,,, | Performed by: INTERNAL MEDICINE

## 2017-05-12 PROCEDURE — 83036 HEMOGLOBIN GLYCOSYLATED A1C: CPT

## 2017-05-12 PROCEDURE — 80053 COMPREHEN METABOLIC PANEL: CPT

## 2017-05-12 PROCEDURE — 85025 COMPLETE CBC W/AUTO DIFF WBC: CPT

## 2017-05-12 PROCEDURE — 80061 LIPID PANEL: CPT

## 2017-05-12 NOTE — PROGRESS NOTES
"[facility]    CC: polyps    HPI 56 y.o. male presents for initial evaluation due to a personal history of small, flat benign colon polyps that were removed on his initial colonoscopy.  His last colonoscopy was 6 years ago and he was given a 5 year follow up surveillance interval.  He denies any abdominal pain, rectal bleeding or unintentional weight loss.  No FH of colon cancer.    Past Medical History:   Diagnosis Date    Arthritis     Benign localized hyperplasia of prostate without urinary obstruction and other lower urinary tract symptoms (LUTS)     Body mass index 37.0-37.9, adult     Diabetes mellitus     Esophageal reflux     Hypertension     Other and unspecified hyperlipidemia     Other testicular hypofunction     Polyneuropathy in diabetes     PONV (postoperative nausea and vomiting)     Rosacea     Sleep apnea     uses CPAP    Type II or unspecified type diabetes mellitus with neurological manifestations, uncontrolled          Review of Systems  General ROS: negative for - chills, fever or weight loss  Cardiovascular ROS: no chest pain or dyspnea on exertion  Gastrointestinal ROS: No abdominal pain, nausea or diarrhea    Physical Examination  /78  Pulse 87  Resp 20  Ht 5' 11" (1.803 m)  Wt 127.5 kg (281 lb 1.4 oz)  BMI 39.2 kg/m2  General appearance: alert, cooperative, no distress  HENT: Normocephalic, atraumatic, neck symmetrical, no nasal discharge   Lungs: clear to auscultation bilaterally, symmetric chest wall expansion bilaterally  Heart: regular rate and rhythm without rub; no displacement of the PMI   Abdomen: soft, NT ND BS present no organomegaly  Extremities: extremities symmetric; no clubbing, cyanosis, or edema  Neurologic: Alert and oriented X 3, normal strength, normal coordination and gait    Labs:  H/H 12/35    Assessment:   55 yo male with a personal h/o colon polyps - due for surveillance colonoscopy.  Last exam 6 years ago with Dr." Paulette.    Plan:  Schedule surveillance colonoscopy.    Carlos Hess MD  [facility]  [address], [address]  [place] [address]  Office: [phone]  Fax: [phone]

## 2017-05-13 LAB
ESTIMATED AVG GLUCOSE: 209 MG/DL
HBA1C MFR BLD HPLC: 8.9 %

## 2017-05-15 ENCOUNTER — OFFICE VISIT (OUTPATIENT)
Dept: FAMILY MEDICINE | Facility: CLINIC | Age: 57
End: 2017-05-15
Payer: COMMERCIAL

## 2017-05-15 VITALS
WEIGHT: 283.94 LBS | HEART RATE: 88 BPM | SYSTOLIC BLOOD PRESSURE: 100 MMHG | RESPIRATION RATE: 16 BRPM | DIASTOLIC BLOOD PRESSURE: 67 MMHG | HEIGHT: 71 IN | OXYGEN SATURATION: 96 % | TEMPERATURE: 98 F | BODY MASS INDEX: 39.75 KG/M2

## 2017-05-15 DIAGNOSIS — I15.2 HYPERTENSION ASSOCIATED WITH DIABETES: ICD-10-CM

## 2017-05-15 DIAGNOSIS — E11.59 HYPERTENSION ASSOCIATED WITH DIABETES: ICD-10-CM

## 2017-05-15 DIAGNOSIS — E78.2 MIXED DIABETIC HYPERLIPIDEMIA ASSOCIATED WITH TYPE 2 DIABETES MELLITUS: ICD-10-CM

## 2017-05-15 DIAGNOSIS — E11.69 MIXED DIABETIC HYPERLIPIDEMIA ASSOCIATED WITH TYPE 2 DIABETES MELLITUS: ICD-10-CM

## 2017-05-15 DIAGNOSIS — M54.10 DIABETIC RADICULOPATHY: ICD-10-CM

## 2017-05-15 DIAGNOSIS — F31.9 BIPOLAR 1 DISORDER: ICD-10-CM

## 2017-05-15 DIAGNOSIS — E11.49 DIABETIC RADICULOPATHY: ICD-10-CM

## 2017-05-15 PROCEDURE — 4010F ACE/ARB THERAPY RXD/TAKEN: CPT | Mod: S$GLB,,, | Performed by: INTERNAL MEDICINE

## 2017-05-15 PROCEDURE — 99214 OFFICE O/P EST MOD 30 MIN: CPT | Mod: S$GLB,,, | Performed by: INTERNAL MEDICINE

## 2017-05-15 PROCEDURE — 3045F PR MOST RECENT HEMOGLOBIN A1C LEVEL 7.0-9.0%: CPT | Mod: S$GLB,,, | Performed by: INTERNAL MEDICINE

## 2017-05-15 PROCEDURE — 1160F RVW MEDS BY RX/DR IN RCRD: CPT | Mod: S$GLB,,, | Performed by: INTERNAL MEDICINE

## 2017-05-15 PROCEDURE — 3078F DIAST BP <80 MM HG: CPT | Mod: S$GLB,,, | Performed by: INTERNAL MEDICINE

## 2017-05-15 PROCEDURE — 3074F SYST BP LT 130 MM HG: CPT | Mod: S$GLB,,, | Performed by: INTERNAL MEDICINE

## 2017-05-15 RX ORDER — LISINOPRIL 5 MG/1
5 TABLET ORAL DAILY
Qty: 90 TABLET | Refills: 3 | Status: SHIPPED | OUTPATIENT
Start: 2017-05-15 | End: 2018-04-22 | Stop reason: SDUPTHER

## 2017-05-15 RX ORDER — INSULIN GLARGINE 100 [IU]/ML
INJECTION, SOLUTION SUBCUTANEOUS
Qty: 36 ML | Refills: 3 | Status: SHIPPED | OUTPATIENT
Start: 2017-05-15 | End: 2017-12-28 | Stop reason: SDUPTHER

## 2017-05-15 RX ORDER — INSULIN LISPRO 100 [IU]/ML
INJECTION, SOLUTION INTRAVENOUS; SUBCUTANEOUS
Qty: 1 BOX | Refills: 5 | Status: SHIPPED | OUTPATIENT
Start: 2017-05-15 | End: 2017-09-22

## 2017-05-15 NOTE — MR AVS SNAPSHOT
[facility]  [address]  [place] [address]  Phone: [phone]  Fax: [phone]                  Sarwat Colunga   5/15/2017 11:20 AM   Office Visit    Description:  Male : 1960   Provider:  Rosales Page MD   Department:  [facility]           Reason for Visit     Diabetes           Diagnoses this Visit        Comments    Type 2 diabetes, uncontrolled, with neuropathy    -  Primary     Diabetic radiculopathy         Hypertension associated with diabetes         Mixed diabetic hyperlipidemia associated with type 2 diabetes mellitus         Bipolar 1 disorder                To Do List           Future Appointments        Provider Department Dept Phone    2017 8:00 AM DEN Darnell [facility] [phone]    2017 11:15 AM Jacqueline Haddad MD [facility] [phone]    8/15/2017 8:00 AM LAB, MOB [address] [facility] [phone]    8/15/2017 8:10 AM LAB, MOB [address] [facility] [phone]    2017 8:00 AM Rosales Page MD [facility] [phone]      Your Future Surgeries/Procedures     May 17, 2017   Surgery with Carlos Hess MD   [facility] ([facility])    [address]  [place] [address] [phone]              Goals (5 Years of Data)     None      Follow-Up and Disposition     Return in about 3 months (around 8/15/2017).    Follow-up and Disposition History       These Medications        Disp Refills Start End    insulin glargine (LANTUS SOLOSTAR) 100 unit/mL (3 mL) InPn pen 36 mL 3 5/15/2017     65 u sq qhs, then increase by 1 units every 4 days until fasting sugars are below 130    Pharmacy: [facility] [facility] [place] [address]  [facility] Ph #: [phone]       lisinopril (PRINIVIL,ZESTRIL) 5 MG tablet 90 tablet 3 5/15/2017  5/15/2018    Take 1 tablet (5 mg total) by mouth once daily. - Oral    Pharmacy: [facility] [place] [address]  [facility] Ph #: [phone]       insulin lispro (HUMALOG KWIKPEN) 100 unit/mL InPn pen 1 Box 5 5/15/2017     10 units subcutaneous before supper    Pharmacy: [facility] [place] [place] [address]  [facility] Ph #: [phone]         [facility]     [facility] Care Line - 24/7 Assistance  Unless otherwise directed by your provider, please contact [facility], our nurse care line that is available for 24/7 assistance.     Registered nurses in the [facility] provide: appointment scheduling, clinical advisement, health education, and other advisory services.  Call: [phone] (toll free)               Medications           Message regarding Medications     Verify the changes and/or additions to your medication regime listed below are the same as discussed with your clinician today.  If any of these changes or additions are incorrect, please notify your healthcare provider.        START taking these NEW medications        Refills    lisinopril (PRINIVIL,ZESTRIL) 5 MG tablet 3    Sig: Take 1 tablet (5 mg total) by mouth once daily.    Class: Normal    Route: Oral    insulin lispro (HUMALOG KWIKPEN) 100 unit/mL InPn pen 5    Sig: 10 units subcutaneous before supper    Class: Normal      CHANGE how you are taking these medications     Start Taking Instead of    insulin glargine (LANTUS SOLOSTAR) 100 unit/mL (3 mL) InPn pen insulin glargine (LANTUS SOLOSTAR) 100 unit/mL (3 mL) InPn pen    Dosage:  65 u sq qhs, then increase by 1 units every 4 days until fasting sugars are below 130 Dosage:  50 u sq qhs, then increase by 2 units every 4 days until fasting sugars are below 130    Reason for Change:  Reorder       STOP taking these medications     benzonatate (TESSALON) 200 MG capsule Take 1 capsule (200 mg total) by mouth 3  "(three) times daily as needed for Cough.    diphenhydrAMINE (BENADRYL) 50 MG capsule Take 50 mg by mouth 3 (three) times daily as needed.    exenatide (BYETTA) 10 mcg/dose(250 mcg/mL) 2.4 mL PnIj Inject 0.04 mLs (10 mcg total) into the skin 2 (two) times daily before meals.    hydrOXYzine HCl (ATARAX) 25 MG tablet Take 1 tablet (25 mg total) by mouth 2 (two) times daily as needed for Itching.           Verify that the below list of medications is an accurate representation of the medications you are currently taking.  If none reported, the list may be blank. If incorrect, please contact your healthcare provider. Carry this list with you in case of emergency.           Current Medications     aspirin (ECOTRIN) 81 MG EC tablet Take 81 mg by mouth once daily.    atorvastatin (LIPITOR) 40 MG tablet Take 1 tablet (40 mg total) by mouth nightly.    BD INSULIN PEN NEEDLE UF MINI 31 x 3/16 " Ndle     blood sugar diagnostic Strp 2 strips by Misc.(Non-Drug; Combo Route) route 2 (two) times daily.    buPROPion (WELLBUTRIN SR) 100 MG TBSR 12 hr tablet Take 100 mg by mouth 2 (two) times daily.     canagliflozin (INVOKANA) 300 mg Tab tablet Take 1 tablet (300 mg total) by mouth once daily.    chlorthalidone (HYGROTEN) 25 MG Tab Take 1 tablet (25 mg total) by mouth once daily.    dextroamphetamine-amphetamine 10 mg Tab Take 1 tablet by mouth 2 (two) times daily.    esomeprazole (NEXIUM) 40 MG capsule Take 1 capsule (40 mg total) by mouth before breakfast.    finasteride (PROSCAR) 5 mg tablet Take 1 tablet (5 mg total) by mouth once daily.    glimepiride (AMARYL) 4 MG tablet Take 1 tablet (4 mg total) by mouth before breakfast.    hydrocortisone-pramoxine (PRAMOSONE) 2.5-1 % Lotn lotion Apply topically 3 (three) times daily.    insulin glargine (LANTUS SOLOSTAR) 100 unit/mL (3 mL) InPn pen 65 u sq qhs, then increase by 1 units every 4 days until fasting sugars are below 130    LATUDA 80 mg Tab tablet Take 80 mg by mouth every " "evening.     lorazepam (ATIVAN) 1 MG tablet Take 1 mg by mouth 2 (two) times daily.     metformin (GLUCOPHAGE) 1000 MG tablet Take 1 tablet (1,000 mg total) by mouth 2 (two) times daily with meals.    mometasone 0.1% (ELOCON) 0.1 % cream aaa bid prn itching and scaling. Avoid chronic use    naproxen sodium (ANAPROX) 550 MG tablet Take 550 mg by mouth 2 (two) times daily.    pregabalin (LYRICA) 50 MG capsule Take 1 capsule (50 mg total) by mouth 3 (three) times daily.    tamsulosin (FLOMAX) 0.4 mg Cp24 Take 1 capsule (0.4 mg total) by mouth once daily.    insulin lispro (HUMALOG KWIKPEN) 100 unit/mL InPn pen 10 units subcutaneous before supper    lisinopril (PRINIVIL,ZESTRIL) 5 MG tablet Take 1 tablet (5 mg total) by mouth once daily.           Clinical Reference Information           Your Vitals Were     BP Pulse Temp Resp    100/67 (BP Location: Right arm, Patient Position: Sitting, BP Method: Automatic) 88 98.4 °F (36.9 °C) (Oral) 16    Height Weight SpO2 BMI    5' 11" (1.803 m) 128.8 kg (283 lb 15.2 oz) 96% 39.6 kg/m2      Blood Pressure          Most Recent Value    BP  100/67      Allergies as of 5/15/2017     Bactrim [Sulfamethoxazole-trimethoprim]    Ciprofloxacin    Doxycycline    Lamictal [Lamotrigine]    Trileptal [Oxcarbazepine]      Immunizations Administered on Date of Encounter - 5/15/2017     None      Orders Placed During Today's Visit     Future Labs/Procedures Expected by Expires    CBC auto differential  5/15/2017 5/16/2018    Comprehensive metabolic panel  5/15/2017 5/16/2018    Hemoglobin A1c  5/15/2017 5/16/2018    Lipid panel  5/15/2017 5/16/2018    Microalbumin/creatinine urine ratio  As directed 5/15/2018      Instructions    Lose 5 pounds.  Suggest Independence diet    Fish oil 1 a day or 1 teaspoon a day       Language Assistance Services     ATTENTION: Language assistance services are available, free of charge. Please call [phone].      ATENCIÓN: Si roel gardner " disposición servicios gratuitos de asistencia lingüística. Chaya al [phone].     LAYLA Ý: N?u b?n nói Ti?ng Vi?t, có các d?ch v? h? tr? ngôn ng? mi?n phí dành cho b?n. G?i s? [phone].         [facility] complies with applicable Federal civil rights laws and does not discriminate on the basis of race, color, national origin, age, disability, or sex.

## 2017-05-15 NOTE — PROGRESS NOTES
Subjective:       Patient ID: Sarwat Colunga is a 56 y.o. male.    Chief Complaint: Diabetes (labs)    HPI       CHIEF COMPLAINT: Diabetes.  HPI:     ONSET/TIMING:     QUALITY/COURSE:   unchanged..  Controlled: yes.     INTENSITY/SEVERITY: . Measures blood sugar :  3 times per day.        Lowest recent . Average .     CONTEXT/WHEN: last HgbA1c    Lab Results   Component Value Date    HGBA1C 8.9 (H) 05/12/2017      weights:    Wt Readings from Last 1 Encounters:   05/15/17 1135 128.8 kg (283 lb 15.2 oz)         SYMPTOMS/RELATED: . . Possible medication side effects include:     The following symptoms/statements  are present IF IN BOLD, negative otherwise.         MODIFIERS/TREATMENTS: Not_taking_medications:  .  Non-compliance_with_Diabetic_diet  .  No_eye_exam_within_last_year.  Not_practicing_good_foot_care.    REVIEW OF SYMPTOMS: . Weight_gain. Weight_loss. Neuropathy. Recurrent_infections. Skin_ulcers            CHIEF COMPLAINT: Hypertension  HPI:     ONSET:      QUALITY/COURSE:   Controlled:  yes     INTENSITY/SEVERITY:  Average blood pressure is .  115/70    MODIFIERS/TREATMENTS:  Taking medications: yes. .High sodium intake: no. alcohol: no      The following symptoms are positive only if BOLDED, otherwise are negative.      SYMPTOMS/RELATED: Possible medication side effects include:   Depression..  . Cough. . Constipation.    REVIEW OF SYMPTOMS: . Weight_loss . Weight_gain . Leg_cramps .Potency_problems .    TARGET ORGAN DAMAGE:: angina/ prior myocardial infarction, chronic kidney disease, heart failure, left ventricular hypertrophy, peripheral artery disease, prior coronary revascularization, retinopathy, stroke. transient ischemic attack.        CHIEF COMPLAINT: Hyperlipidemia. cholesterol screening: no.   HPI:     ONSET:    MODIFIERS/TREATMENTS: . Taking medications: yes. . Non-compliance with following diet: no. .     SYMPTOMS/RELATED:Possible medication side effects include:   Myalgia: no.   .     REVIEW OF SYMPTOMS: past weights:   Wt Readings from Last 1 Encounters:   05/15/17 1135 128.8 kg (283 lb 15.2 oz)                                                     Last lipids: total   Lab Results   Component Value Date    CHOL 143 05/12/2017    CHOL 145 12/01/2016    CHOL 135 05/30/2016                                                                     HDL   Lab Results   Component Value Date    HDL 26 (L) 05/12/2017    HDL 35 (L) 12/01/2016    HDL 33 (L) 05/30/2016                                                                     LDL   Lab Results   Component Value Date    LDLCALC 76.4 05/12/2017    LDLCALC 90.2 12/01/2016    LDLCALC 79.6 05/30/2016                                                                     TRIG   Lab Results   Component Value Date    TRIG 203 (H) 05/12/2017    TRIG 99 12/01/2016    TRIG 112 05/30/2016                                                                         CHIEF COMPLAINT: Depression: yes.  . Anxiety: yes.   HPI: bipolar    ONSET/TIMING:  .  Years  ago.  Similar problems in past: .yes.   . # of episodes  of panic per week      0. .    DURATION:  constant    QUALITY/COURSE: Improving since put on the Latuda    INTENSITY/SEVERITY: .Severity is # 3(10 point scale)    CONTEXT/WHEN:  Postpartum: no..  Personal loss: no ..  Stress: yes..    MODIFIERS/TREATMENTS: . Taking medications: yes.  Compliance with taking prescribed medications: yes.  alcohol abuse: no ...  Drug abuse: no .  Chronic disease: no.      The following symptoms are positive if BOLD, negative otherwise.        SYMPTOMS/RELATED: Possible medication side effects include:  dry mouth, decreased libido, impotence, GI disturbance, headache, insomnia, weight gain and weight loss.    REVIEW OF SYSTEMS: Sadness . Weakness . Fatigue . Lack_of _enjoyment_in_life . Sleep_disturbances . Anorexia .  Increased_appetite .  Irritability . Crying_spells .  Guilt .  Lost_concentration . Suicidal_ideation .   During  "panic attacks:  Chest_pain  . Shortness_of_breath  . dizziness . tingling . palpitations .          Review of Systems   Constitutional: Positive for fatigue.   Eyes: Negative for visual disturbance.   Respiratory: Negative for chest tightness and shortness of breath.    Cardiovascular: Negative for chest pain and leg swelling.   Endocrine: Positive for polyuria.   Skin: Positive for pallor.   Neurological: Positive for dizziness, tremors and speech difficulty. Negative for seizures, syncope, weakness and headaches.   Psychiatric/Behavioral: Positive for dysphoric mood and suicidal ideas (1 mo ago). Negative for decreased concentration. The patient is nervous/anxious.        Objective:      Vitals:    05/15/17 1135   BP: 100/67   Pulse: 88   Resp: 16   Temp: 98.4 °F (36.9 °C)   TempSrc: Oral   SpO2: 96%   Weight: 128.8 kg (283 lb 15.2 oz)   Height: 5' 11" (1.803 m)   PainSc:   7   PainLoc: Foot     Physical Exam   Constitutional: He appears well-developed and well-nourished.   Eyes: Pupils are equal, round, and reactive to light.   Cardiovascular: Normal rate, regular rhythm and normal heart sounds.    Pulmonary/Chest: Effort normal and breath sounds normal.   Abdominal: Soft. There is no tenderness.   Neurological: He is alert.   Skin:   Subungual hematoma on the left fourth toe   Psychiatric: He has a normal mood and affect. His behavior is normal. Thought content normal.   Nursing note and vitals reviewed.        Assessment:       1. Type 2 diabetes, uncontrolled, with neuropathy    2. Diabetic radiculopathy    3. Hypertension associated with diabetes    4. Mixed diabetic hyperlipidemia associated with type 2 diabetes mellitus    5. Bipolar 1 disorder          Plan:     Type 2 diabetes, uncontrolled, with neuropathy  -     insulin lispro (HUMALOG KWIKPEN) 100 unit/mL InPn pen; 10 units subcutaneous before supper  Dispense: 1 Box; Refill: 5  -     CBC auto differential; Future; Expected date: 5/15/17  -     " Hemoglobin A1c; Future; Expected date: 5/15/17  -     Microalbumin/creatinine urine ratio; Future    Diabetic radiculopathy  -     insulin glargine (LANTUS SOLOSTAR) 100 unit/mL (3 mL) InPn pen; 65 u sq qhs, then increase by 1 units every 4 days until fasting sugars are below 130  Dispense: 36 mL; Refill: 3    Hypertension associated with diabetes  -     lisinopril (PRINIVIL,ZESTRIL) 5 MG tablet; Take 1 tablet (5 mg total) by mouth once daily.  Dispense: 90 tablet; Refill: 3  -     Comprehensive metabolic panel; Future; Expected date: 5/15/17    Mixed diabetic hyperlipidemia associated with type 2 diabetes mellitus  -     Lipid panel; Future; Expected date: 5/15/17    Bipolar 1 disorder    Return in about 3 months (around 8/15/2017).

## 2017-05-17 ENCOUNTER — ANESTHESIA (OUTPATIENT)
Dept: ENDOSCOPY | Facility: HOSPITAL | Age: 57
End: 2017-05-17
Payer: COMMERCIAL

## 2017-05-17 ENCOUNTER — ANESTHESIA EVENT (OUTPATIENT)
Dept: ENDOSCOPY | Facility: HOSPITAL | Age: 57
End: 2017-05-17
Payer: COMMERCIAL

## 2017-05-17 ENCOUNTER — HOSPITAL ENCOUNTER (OUTPATIENT)
Facility: HOSPITAL | Age: 57
Discharge: HOME | End: 2017-05-17
Attending: INTERNAL MEDICINE | Admitting: INTERNAL MEDICINE
Payer: COMMERCIAL

## 2017-05-17 ENCOUNTER — SURGERY (OUTPATIENT)
Age: 57
End: 2017-05-17

## 2017-05-17 VITALS
DIASTOLIC BLOOD PRESSURE: 59 MMHG | SYSTOLIC BLOOD PRESSURE: 104 MMHG | TEMPERATURE: 98 F | HEIGHT: 71 IN | HEART RATE: 82 BPM | RESPIRATION RATE: 21 BRPM | OXYGEN SATURATION: 96 % | WEIGHT: 280 LBS | BODY MASS INDEX: 39.2 KG/M2 | RESPIRATION RATE: 10 BRPM

## 2017-05-17 DIAGNOSIS — Z86.010 HISTORY OF COLONIC POLYPS: ICD-10-CM

## 2017-05-17 PROBLEM — Z86.0100 HISTORY OF COLONIC POLYPS: Status: ACTIVE | Noted: 2017-05-17

## 2017-05-17 LAB
GLUCOSE SERPL-MCNC: 147 MG/DL (ref 70–110)
POCT GLUCOSE: 147 MG/DL (ref 70–110)

## 2017-05-17 PROCEDURE — G0105 COLORECTAL SCRN; HI RISK IND: HCPCS | Mod: ,,, | Performed by: INTERNAL MEDICINE

## 2017-05-17 PROCEDURE — 25000003 PHARM REV CODE 250: Performed by: INTERNAL MEDICINE

## 2017-05-17 PROCEDURE — G0105 COLORECTAL SCRN; HI RISK IND: HCPCS | Performed by: INTERNAL MEDICINE

## 2017-05-17 PROCEDURE — 37000009 HC ANESTHESIA EA ADD 15 MINS: Performed by: INTERNAL MEDICINE

## 2017-05-17 PROCEDURE — 25000003 PHARM REV CODE 250: Performed by: NURSE ANESTHETIST, CERTIFIED REGISTERED

## 2017-05-17 PROCEDURE — D9220A PRA ANESTHESIA: Mod: 33,ANES,, | Performed by: ANESTHESIOLOGY

## 2017-05-17 PROCEDURE — D9220A PRA ANESTHESIA: Mod: 33,CRNA,, | Performed by: NURSE ANESTHETIST, CERTIFIED REGISTERED

## 2017-05-17 PROCEDURE — 37000008 HC ANESTHESIA 1ST 15 MINUTES: Performed by: INTERNAL MEDICINE

## 2017-05-17 PROCEDURE — G0121 COLON CA SCRN NOT HI RSK IND: HCPCS | Performed by: INTERNAL MEDICINE

## 2017-05-17 PROCEDURE — 63600175 PHARM REV CODE 636 W HCPCS: Performed by: NURSE ANESTHETIST, CERTIFIED REGISTERED

## 2017-05-17 RX ORDER — LIDOCAINE HYDROCHLORIDE 10 MG/ML
INJECTION, SOLUTION INTRAVENOUS
Status: DISCONTINUED | OUTPATIENT
Start: 2017-05-17 | End: 2017-05-17

## 2017-05-17 RX ORDER — SODIUM CHLORIDE 9 MG/ML
INJECTION, SOLUTION INTRAVENOUS CONTINUOUS
Status: DISCONTINUED | OUTPATIENT
Start: 2017-05-17 | End: 2017-05-17 | Stop reason: HOSPADM

## 2017-05-17 RX ORDER — PROPOFOL 10 MG/ML
VIAL (ML) INTRAVENOUS
Status: DISCONTINUED | OUTPATIENT
Start: 2017-05-17 | End: 2017-05-17

## 2017-05-17 RX ADMIN — PROPOFOL 30 MG: 10 INJECTION, EMULSION INTRAVENOUS at 09:05

## 2017-05-17 RX ADMIN — PROPOFOL 100 MG: 10 INJECTION, EMULSION INTRAVENOUS at 09:05

## 2017-05-17 RX ADMIN — LIDOCAINE HYDROCHLORIDE 50 MG: 10 INJECTION, SOLUTION INTRAVENOUS at 09:05

## 2017-05-17 RX ADMIN — SODIUM CHLORIDE: 0.9 INJECTION, SOLUTION INTRAVENOUS at 08:05

## 2017-05-17 RX ADMIN — PROPOFOL 40 MG: 10 INJECTION, EMULSION INTRAVENOUS at 09:05

## 2017-05-17 NOTE — ANESTHESIA POSTPROCEDURE EVALUATION
"Anesthesia Post Evaluation    Patient: Sarwat Colunga    Procedure(s) Performed: Procedure(s) (LRB):  COLONOSCOPY (N/A)    Final Anesthesia Type: general  Patient location during evaluation: PACU  Patient participation: Yes- Able to Participate  Level of consciousness: awake and alert  Post-procedure vital signs: reviewed and stable  Pain management: adequate  Airway patency: patent  PONV status at discharge: No PONV  Anesthetic complications: no      Cardiovascular status: hemodynamically stable  Respiratory status: unassisted and room air  Hydration status: euvolemic  Follow-up not needed.        Visit Vitals    BP (!) 104/59    Pulse 82    Temp 36.5 °C (97.7 °F)    Resp (!) 21    Ht 5' 11" (1.803 m)    Wt 127 kg (280 lb)    SpO2 96%    BMI 39.05 kg/m2       Pain/Juno Score: Pain Assessment Performed: Yes (5/17/2017 10:28 AM)  Presence of Pain: complains of pain/discomfort (5/17/2017 10:13 AM)  Juno Score: 10 (5/17/2017 10:28 AM)      "

## 2017-05-17 NOTE — IP AVS SNAPSHOT
[address] [place] [address]  Phone: [phone]           Patient Discharge Instructions   Our goal is to set you up for success. This packet includes information on your condition, medications, and your home care.  It will help you care for yourself to prevent having to return to the hospital.     Please ask your nurse if you have any questions.      There are many details to remember when preparing to leave the hospital. Here is what you will need to do:    1. Take your medicine. If you are prescribed medications, review your Medication List on the following pages. You may have new medications to  at the pharmacy and others that you'll need to stop taking. Review the instructions for how and when to take your medications. Talk with your doctor or nurses if you are unsure of what to do.     2. Go to your follow-up appointments. Specific follow-up information is listed in the following pages. Your may be contacted by a nurse or clinical provider about future appointments. Be sure we have all of the phone numbers to reach you. Please contact your provider's office if you are unable to make an appointment.     3. Watch for warning signs. Your doctor or nurse will give you detailed warning signs to watch for and when to call for assistance. These instructions may also include educational information about your condition. If you experience any of warning signs to your health, call your doctor.           [facility]  Unless otherwise directed by your provider, please   contact [facility], our nurse care line   that is available for 24/7 assistance.     [phone] (toll-free)     Registered nurses in the [facility]   provide: appointment scheduling, clinical advisement, health education, and other advisory services.                  ** Verify the list of medication(s) below is accurate and up to date. Carry this with you in case of  "emergency. If your medications have changed, please notify your healthcare provider.             Medication List      ASK your doctor about these medications        Additional Info                      aspirin 81 MG EC tablet   Commonly known as:  ECOTRIN   Refills:  0   Dose:  81 mg    Instructions:  Take 81 mg by mouth once daily.     Begin Date    AM    Noon    PM    Bedtime       ATIVAN 1 MG tablet   Refills:  0   Dose:  1 mg   Generic drug:  lorazepam    Instructions:  Take 1 mg by mouth 2 (two) times daily.     Begin Date    AM    Noon    PM    Bedtime       atorvastatin 40 MG tablet   Commonly known as:  LIPITOR   Quantity:  90 tablet   Refills:  3   Dose:  40 mg    Instructions:  Take 1 tablet (40 mg total) by mouth nightly.     Begin Date    AM    Noon    PM    Bedtime       BD INSULIN PEN NEEDLE UF MINI 31 gauge x 3/16" Ndle   Refills:  0   Generic drug:  pen needle, diabetic      Begin Date    AM    Noon    PM    Bedtime       blood sugar diagnostic Strp   Quantity:  100 each   Refills:  3   Dose:  2 strip    Instructions:  2 strips by Misc.(Non-Drug; Combo Route) route 2 (two) times daily.     Begin Date    AM    Noon    PM    Bedtime       buPROPion 100 MG TBSR 12 hr tablet   Commonly known as:  WELLBUTRIN SR   Refills:  0   Dose:  100 mg    Instructions:  Take 100 mg by mouth 2 (two) times daily.     Begin Date    AM    Noon    PM    Bedtime       canagliflozin 300 mg Tab tablet   Commonly known as:  INVOKANA   Quantity:  90 tablet   Refills:  1   Dose:  300 mg    Instructions:  Take 1 tablet (300 mg total) by mouth once daily.     Begin Date    AM    Noon    PM    Bedtime       chlorthalidone 25 MG Tab   Commonly known as:  HYGROTEN   Quantity:  90 tablet   Refills:  1   Dose:  25 mg    Instructions:  Take 1 tablet (25 mg total) by mouth once daily.     Begin Date    AM    Noon    PM    Bedtime       dextroamphetamine-amphetamine 10 mg Tab   Refills:  0   Dose:  1 tablet    Instructions:  Take 1 " tablet by mouth 2 (two) times daily.     Begin Date    AM    Noon    PM    Bedtime       esomeprazole 40 MG capsule   Commonly known as:  NEXIUM   Quantity:  30 capsule   Refills:  5   Dose:  40 mg    Instructions:  Take 1 capsule (40 mg total) by mouth before breakfast.     Begin Date    AM    Noon    PM    Bedtime       finasteride 5 mg tablet   Commonly known as:  PROSCAR   Quantity:  90 tablet   Refills:  3   Dose:  5 mg    Instructions:  Take 1 tablet (5 mg total) by mouth once daily.     Begin Date    AM    Noon    PM    Bedtime       glimepiride 4 MG tablet   Commonly known as:  AMARYL   Quantity:  90 tablet   Refills:  3   Dose:  4 mg    Instructions:  Take 1 tablet (4 mg total) by mouth before breakfast.     Begin Date    AM    Noon    PM    Bedtime       hydrocortisone-pramoxine 2.5-1 % Lotn lotion   Commonly known as:  PRAMOSONE   Quantity:  118 mL   Refills:  0    Instructions:  Apply topically 3 (three) times daily.     Begin Date    AM    Noon    PM    Bedtime       insulin glargine 100 unit/mL (3 mL) Inpn pen   Commonly known as:  LANTUS SOLOSTAR   Quantity:  36 mL   Refills:  3    Instructions:  65 u sq qhs, then increase by 1 units every 4 days until fasting sugars are below 130     Begin Date    AM    Noon    PM    Bedtime       insulin lispro 100 unit/mL Inpn pen   Commonly known as:  HUMALOG KWIKPEN   Quantity:  1 Box   Refills:  5    Instructions:  10 units subcutaneous before supper     Begin Date    AM    Noon    PM    Bedtime       LATUDA 80 mg Tab tablet   Refills:  0   Dose:  80 mg   Generic drug:  lurasidone    Instructions:  Take 80 mg by mouth every evening.     Begin Date    AM    Noon    PM    Bedtime       lisinopril 5 MG tablet   Commonly known as:  PRINIVIL,ZESTRIL   Quantity:  90 tablet   Refills:  3   Dose:  5 mg    Instructions:  Take 1 tablet (5 mg total) by mouth once daily.     Begin Date    AM    Noon    PM    Bedtime       metformin 1000 MG tablet   Commonly known as:   GLUCOPHAGE   Quantity:  180 tablet   Refills:  3   Dose:  1000 mg    Instructions:  Take 1 tablet (1,000 mg total) by mouth 2 (two) times daily with meals.     Begin Date    AM    Noon    PM    Bedtime       mometasone 0.1% 0.1 % cream   Commonly known as:  ELOCON   Quantity:  45 g   Refills:  1    Instructions:  aaa bid prn itching and scaling. Avoid chronic use     Begin Date    AM    Noon    PM    Bedtime       naproxen sodium 550 MG tablet   Commonly known as:  ANAPROX   Refills:  0   Dose:  550 mg    Instructions:  Take 550 mg by mouth 2 (two) times daily.     Begin Date    AM    Noon    PM    Bedtime       pregabalin 50 MG capsule   Commonly known as:  LYRICA   Quantity:  90 capsule   Refills:  6   Dose:  50 mg    Instructions:  Take 1 capsule (50 mg total) by mouth 3 (three) times daily.     Begin Date    AM    Noon    PM    Bedtime       tamsulosin 0.4 mg Cp24   Commonly known as:  FLOMAX   Quantity:  30 capsule   Refills:  11   Dose:  1 capsule    Instructions:  Take 1 capsule (0.4 mg total) by mouth once daily.     Begin Date    AM    Noon    PM    Bedtime                  Please bring to all follow up appointments:    1. A copy of your discharge instructions.  2. All medicines you are currently taking in their original bottles.  3. Identification and insurance card.    Please arrive 15 minutes ahead of scheduled appointment time.    Please call 24 hours in advance if you must reschedule your appointment and/or time.        Your Scheduled Appointments     Jun 19, 2017  8:00 AM CDT   Established Patient Visit with DEN Darnell   [facility] ([facility])    [address] [address] [address]   [phone]            Jul 19, 2017 11:15 AM CDT   Established Patient Visit with Jacqueline Haddad MD   [facility] ([facility] [place])    [address] [facility] [address] [place] [address]   [phone]            Aug 15, 2017  8:00 AM CDT   Fasting Lab with LAB,  "MOB 1 DRAW STATION   [facility] ([facility] [place] [facility])    [address], [address]  [place] [address]   [phone]            Aug 15, 2017  8:10 AM CDT   Urine with LAB, MOB 1 DRAW STATION   [facility] ([facility] [place] [facility])    [address]  [place] [address]   [phone]            Aug 21, 2017  8:00 AM CDT   Established Patient Visit with Rosales Page MD   [facility] ([facility])    [id number] [place] [address] [place] [id number] [phone]                  Admission Information     Date & Time Provider Department CSN    5/17/2017  8:20 AM Carlos Hess MD [facility] [id number]      Care Providers     Provider Role Specialty Primary office phone    Carlos Hess MD Attending Provider Gastroenterology [phone]    Carlos Hess MD Surgeon  Gastroenterology [phone]      Your Vitals Were     BP Pulse Temp Resp Height Weight    109/55 84 97.7 °F (36.5 °C) 20 5' 11" (1.803 m) 127 kg (280 lb)    SpO2 BMI             98% 39.05 kg/m2         Recent Lab Values        12/1/2015 2/26/2016 5/30/2016 12/1/2016 5/12/2017               8:48 AM 10:06 AM  9:06 AM 10:32 AM  9:54 AM       A1C 9.3 (H) 6.9 (H) 8.0 (H) 8.1 (H) 8.9 (H)        9.3 (H)           Comment for A1C at 10:32 AM on 12/1/2016:  According to ADA guidelines, hemoglobin A1C <7.0% represents  optimal control in non-pregnant diabetic patients.  Different  metrics may apply to specific populations.   Standards of Medical Care in Diabetes - 2016.  For the purpose of screening for the presence of diabetes:  <5.7%     Consistent with the absence of diabetes  5.7-6.4%  Consistent with increasing risk for diabetes   (prediabetes)  >or=6.5%  Consistent with diabetes  Currently no consensus exists for use of hemoglobin A1C  for diagnosis of diabetes for children.      Comment for A1C at  9:54 AM on 5/12/2017:  According to ADA guidelines, " hemoglobin A1C <7.0% represents  optimal control in non-pregnant diabetic patients.  Different  metrics may apply to specific populations.   Standards of Medical Care in Diabetes - 2016.  For the purpose of screening for the presence of diabetes:  <5.7%     Consistent with the absence of diabetes  5.7-6.4%  Consistent with increasing risk for diabetes   (prediabetes)  >or=6.5%  Consistent with diabetes  Currently no consensus exists for use of hemoglobin A1C  for diagnosis of diabetes for children.        Allergies as of 5/17/2017        Reactions    Bactrim [Sulfamethoxazole-trimethoprim] Other (See Comments)    Dizziness,lightheaded, heavy chest    Ciprofloxacin Other (See Comments)    CONFUSED, OFF BALANCE, WOKE UP AT NITE    Doxycycline Hives    Lamictal [Lamotrigine] Rash    Trileptal [Oxcarbazepine]       Advance Directives     An advance directive is a document which, in the event you are no longer able to make decisions for yourself, tells your healthcare team what kind of treatment you do or do not want to receive, or who you would like to make those decisions for you.  If you do not currently have an advance directive, [facility] encourages you to create one.  For more information call:  [phone]), [phone] ([phone]),  or log on to [email or web address].        Language Assistance Services     ATTENTION: Language assistance services are available, free of charge. Please call [phone].      ATENCIÓN: Si habla español, tiene a morales disposición servicios gratuitos de asistencia lingüística. Llame al [phone].     [facility] Ý: N?u b?n nói Ti?ng Vi?t, có các d?ch v? h? tr? ngôn ng? mi?n phí dành cho b?n. G?i s? [phone].        Diabetes Discharge Instructions                                    [facility] complies with applicable Federal civil rights laws and does not discriminate on the basis of race, color, national origin, age, disability, or sex.

## 2017-05-17 NOTE — ANESTHESIA PREPROCEDURE EVALUATION
05/17/2017  Sarwat Colunga is a 56 y.o., male.    Anesthesia Evaluation    I have reviewed the Patient Summary Reports.    I have reviewed the Nursing Notes.      Review of Systems  Anesthesia Hx:  Hx of Anesthetic complications    Social:  Non-Smoker    Hematology/Oncology:  Hematology Normal   Oncology Normal     EENT/Dental:EENT/Dental Normal   Cardiovascular:   Hypertension, well controlled    Pulmonary:   Sleep Apnea    Hepatic/GI:   GERD    Musculoskeletal:   Arthritis     Neurological:   Neuromuscular Disease,   Peripheral Neuropathy    Endocrine:   Diabetes, type 2    Dermatological:  Skin Normal    Psych:  Psychiatric Normal           Physical Exam  General:  Obesity    Airway/Jaw/Neck:  AIRWAY FINDINGS: Normal      Eyes/Ears/Nose:  EYES/EARS/NOSE FINDINGS: Normal   Dental:  DENTAL FINDINGS: Normal   Chest/Lungs:  Chest/Lungs Findings: Clear to auscultation, Normal Respiratory Rate     Heart/Vascular:  Heart Findings: Rate: Normal  Rhythm: Regular Rhythm  Sounds: Normal  Heart murmur: negative Vascular Findings: Normal    Abdomen:  Abdomen Findings: Normal    Musculoskeletal:  Musculoskeletal Findings: Normal   Skin:  Skin Findings: Normal    Mental Status:  Mental Status Findings: Normal        Anesthesia Plan  Type of Anesthesia, risks & benefits discussed:  Anesthesia Type:  general  Patient's Preference:   Intra-op Monitoring Plan:   Intra-op Monitoring Plan Comments:   Post Op Pain Control Plan:   Post Op Pain Control Plan Comments:   Induction:   IV  Beta Blocker:  Patient is not currently on a Beta-Blocker (No further documentation required).       Informed Consent: Patient understands risks and agrees with Anesthesia plan.  Questions answered. Anesthesia consent signed with patient.  ASA Score: 3     Day of Surgery Review of History & Physical:    H&P update referred to the provider.          Ready For Surgery From Anesthesia Perspective.

## 2017-05-17 NOTE — OR NURSING
Have you had a colonoscopy LESS THAN 3 years ago?   * If YES, answer these questions*:No    1. Did patient have a prior colonic polyp in a previous surveillance/diagnostic colonoscopy and is 18 years or older on date of encounter?    2. Documentation of < 3 year interval since the patients last colonoscopy due to medical reasons (eg., last colonoscopy incomplete, last colonoscopy had inadequate prep, piecemeal removal of adenomas, or last colonoscopy found > 10 adenomas) ?

## 2017-05-17 NOTE — TRANSFER OF CARE
"Anesthesia Transfer of Care Note    Patient: Sarwat Colunga    Procedure(s) Performed: Procedure(s) (LRB):  COLONOSCOPY (N/A)    Patient location: PACU    Anesthesia Type: general    Transport from OR: Transported from OR on 2-3 L/min O2 by NC with adequate spontaneous ventilation    Post pain: adequate analgesia    Post assessment: no apparent anesthetic complications and tolerated procedure well    Post vital signs: stable    Level of consciousness: awake, alert and oriented    Nausea/Vomiting: no nausea/vomiting    Complications: none          Last vitals:   Visit Vitals    /74 (BP Location: Left arm, Patient Position: Lying, BP Method: Automatic)    Pulse 82    Temp 36.5 °C (97.7 °F)    Resp (!) 22    Ht 5' 11" (1.803 m)    Wt 127 kg (280 lb)    SpO2 (!) 94%    BMI 39.05 kg/m2     "

## 2017-05-17 NOTE — H&P (VIEW-ONLY)
"[facility]    CC: polyps    HPI 56 y.o. male presents for initial evaluation due to a personal history of small, flat benign colon polyps that were removed on his initial colonoscopy.  His last colonoscopy was 6 years ago and he was given a 5 year follow up surveillance interval.  He denies any abdominal pain, rectal bleeding or unintentional weight loss.  No FH of colon cancer.    Past Medical History:   Diagnosis Date    Arthritis     Benign localized hyperplasia of prostate without urinary obstruction and other lower urinary tract symptoms (LUTS)     Body mass index 37.0-37.9, adult     Diabetes mellitus     Esophageal reflux     Hypertension     Other and unspecified hyperlipidemia     Other testicular hypofunction     Polyneuropathy in diabetes     PONV (postoperative nausea and vomiting)     Rosacea     Sleep apnea     uses CPAP    Type II or unspecified type diabetes mellitus with neurological manifestations, uncontrolled          Review of Systems  General ROS: negative for - chills, fever or weight loss  Cardiovascular ROS: no chest pain or dyspnea on exertion  Gastrointestinal ROS: No abdominal pain, nausea or diarrhea    Physical Examination  /78  Pulse 87  Resp 20  Ht 5' 11" (1.803 m)  Wt 127.5 kg (281 lb 1.4 oz)  BMI 39.2 kg/m2  General appearance: alert, cooperative, no distress  HENT: Normocephalic, atraumatic, neck symmetrical, no nasal discharge   Lungs: clear to auscultation bilaterally, symmetric chest wall expansion bilaterally  Heart: regular rate and rhythm without rub; no displacement of the PMI   Abdomen: soft, NT ND BS present no organomegaly  Extremities: extremities symmetric; no clubbing, cyanosis, or edema  Neurologic: Alert and oriented X 3, normal strength, normal coordination and gait    Labs:  H/H 12/35    Assessment:   55 yo male with a personal h/o colon polyps - due for surveillance colonoscopy.  Last exam 6 years ago with Dr." Paulette.    Plan:  Schedule surveillance colonoscopy.    Carlos Hess MD  [facility]  [address], [address]  [place] [address]  Office: [phone]  Fax: [phone]

## 2017-07-14 ENCOUNTER — LAB VISIT (OUTPATIENT)
Dept: LAB | Facility: HOSPITAL | Age: 57
End: 2017-07-14
Attending: PSYCHIATRY & NEUROLOGY
Payer: COMMERCIAL

## 2017-07-14 DIAGNOSIS — Z79.899 ENCOUNTER FOR LONG-TERM (CURRENT) USE OF OTHER MEDICATIONS: ICD-10-CM

## 2017-07-14 DIAGNOSIS — Z79.899 ENCOUNTER FOR LONG-TERM (CURRENT) USE OF OTHER MEDICATIONS: Primary | ICD-10-CM

## 2017-07-14 LAB
ALBUMIN SERPL BCP-MCNC: 4.4 G/DL
ALP SERPL-CCNC: 77 U/L
ALT SERPL W/O P-5'-P-CCNC: 26 U/L
ANION GAP SERPL CALC-SCNC: 14 MMOL/L
AST SERPL-CCNC: 20 U/L
BASOPHILS # BLD AUTO: 0.03 K/UL
BASOPHILS NFR BLD: 0.3 %
BILIRUB SERPL-MCNC: 0.9 MG/DL
BUN SERPL-MCNC: 26 MG/DL
CALCIUM SERPL-MCNC: 9.9 MG/DL
CHLORIDE SERPL-SCNC: 98 MMOL/L
CO2 SERPL-SCNC: 22 MMOL/L
CREAT SERPL-MCNC: 1.5 MG/DL
DIFFERENTIAL METHOD: NORMAL
EOSINOPHIL # BLD AUTO: 0.2 K/UL
EOSINOPHIL NFR BLD: 1.4 %
ERYTHROCYTE [DISTWIDTH] IN BLOOD BY AUTOMATED COUNT: 13.7 %
EST. GFR  (AFRICAN AMERICAN): 58.5 ML/MIN/1.73 M^2
EST. GFR  (NON AFRICAN AMERICAN): 50.6 ML/MIN/1.73 M^2
GLUCOSE SERPL-MCNC: 74 MG/DL
HCT VFR BLD AUTO: 44.3 %
HGB BLD-MCNC: 14.8 G/DL
LYMPHOCYTES # BLD AUTO: 3 K/UL
LYMPHOCYTES NFR BLD: 25.9 %
MCH RBC QN AUTO: 30.2 PG
MCHC RBC AUTO-ENTMCNC: 33.4 %
MCV RBC AUTO: 90 FL
MONOCYTES # BLD AUTO: 0.8 K/UL
MONOCYTES NFR BLD: 7.3 %
NEUTROPHILS # BLD AUTO: 7.4 K/UL
NEUTROPHILS NFR BLD: 64.8 %
PLATELET # BLD AUTO: 205 K/UL
PMV BLD AUTO: 9.7 FL
POTASSIUM SERPL-SCNC: 4.1 MMOL/L
PROT SERPL-MCNC: 7.7 G/DL
RBC # BLD AUTO: 4.9 M/UL
SODIUM SERPL-SCNC: 134 MMOL/L
WBC # BLD AUTO: 11.45 K/UL

## 2017-07-14 PROCEDURE — 80053 COMPREHEN METABOLIC PANEL: CPT

## 2017-07-14 PROCEDURE — 85025 COMPLETE CBC W/AUTO DIFF WBC: CPT

## 2017-07-14 PROCEDURE — 36415 COLL VENOUS BLD VENIPUNCTURE: CPT | Mod: PO

## 2017-07-23 ENCOUNTER — PATIENT MESSAGE (OUTPATIENT)
Dept: FAMILY MEDICINE | Facility: CLINIC | Age: 57
End: 2017-07-23

## 2017-07-25 ENCOUNTER — LAB VISIT (OUTPATIENT)
Dept: LAB | Facility: HOSPITAL | Age: 57
End: 2017-07-25
Attending: INTERNAL MEDICINE
Payer: COMMERCIAL

## 2017-07-25 DIAGNOSIS — E08.41 DIABETIC MONONEUROPATHY ASSOCIATED WITH DIABETES MELLITUS DUE TO UNDERLYING CONDITION: Primary | ICD-10-CM

## 2017-07-25 DIAGNOSIS — I15.2 HYPERTENSION ASSOCIATED WITH DIABETES: ICD-10-CM

## 2017-07-25 DIAGNOSIS — E11.59 HYPERTENSION ASSOCIATED WITH DIABETES: ICD-10-CM

## 2017-07-25 DIAGNOSIS — E11.00 UNCONTROLLED TYPE 2 DIABETES MELLITUS WITH HYPEROSMOLARITY WITHOUT COMA, WITHOUT LONG-TERM CURRENT USE OF INSULIN: ICD-10-CM

## 2017-07-25 LAB
ALBUMIN SERPL BCP-MCNC: 4.2 G/DL
ALP SERPL-CCNC: 78 U/L
ALT SERPL W/O P-5'-P-CCNC: 22 U/L
ANION GAP SERPL CALC-SCNC: 9 MMOL/L
AST SERPL-CCNC: 19 U/L
BASOPHILS # BLD AUTO: 0.03 K/UL
BASOPHILS NFR BLD: 0.3 %
BILIRUB SERPL-MCNC: 0.6 MG/DL
BUN SERPL-MCNC: 24 MG/DL
CALCIUM SERPL-MCNC: 9.8 MG/DL
CHLORIDE SERPL-SCNC: 97 MMOL/L
CHOLEST/HDLC SERPL: 3.9 {RATIO}
CO2 SERPL-SCNC: 29 MMOL/L
CREAT SERPL-MCNC: 1.4 MG/DL
CREAT UR-MCNC: 118 MG/DL
DIFFERENTIAL METHOD: ABNORMAL
EOSINOPHIL # BLD AUTO: 0.3 K/UL
EOSINOPHIL NFR BLD: 2.6 %
ERYTHROCYTE [DISTWIDTH] IN BLOOD BY AUTOMATED COUNT: 13.7 %
EST. GFR  (AFRICAN AMERICAN): >60 ML/MIN/1.73 M^2
EST. GFR  (NON AFRICAN AMERICAN): 55 ML/MIN/1.73 M^2
GLUCOSE SERPL-MCNC: 65 MG/DL
HCT VFR BLD AUTO: 44.6 %
HDL/CHOLESTEROL RATIO: 25.9 %
HDLC SERPL-MCNC: 135 MG/DL
HDLC SERPL-MCNC: 35 MG/DL
HGB BLD-MCNC: 15.1 G/DL
LDLC SERPL CALC-MCNC: 78 MG/DL
LYMPHOCYTES # BLD AUTO: 3.1 K/UL
LYMPHOCYTES NFR BLD: 27.8 %
MCH RBC QN AUTO: 30.2 PG
MCHC RBC AUTO-ENTMCNC: 33.9 G/DL
MCV RBC AUTO: 89 FL
MICROALBUMIN UR DL<=1MG/L-MCNC: 8 UG/ML
MICROALBUMIN/CREATININE RATIO: 6.8 UG/MG
MONOCYTES # BLD AUTO: 0.9 K/UL
MONOCYTES NFR BLD: 8 %
NEUTROPHILS # BLD AUTO: 6.8 K/UL
NEUTROPHILS NFR BLD: 61.1 %
NONHDLC SERPL-MCNC: 100 MG/DL
PLATELET # BLD AUTO: 206 K/UL
PMV BLD AUTO: 9.1 FL
POTASSIUM SERPL-SCNC: 3.8 MMOL/L
PROT SERPL-MCNC: 7.4 G/DL
RBC # BLD AUTO: 5 M/UL
SODIUM SERPL-SCNC: 135 MMOL/L
TRIGL SERPL-MCNC: 110 MG/DL
WBC # BLD AUTO: 11.06 K/UL

## 2017-07-25 PROCEDURE — 80053 COMPREHEN METABOLIC PANEL: CPT

## 2017-07-25 PROCEDURE — 36415 COLL VENOUS BLD VENIPUNCTURE: CPT | Mod: PO

## 2017-07-25 PROCEDURE — 85025 COMPLETE CBC W/AUTO DIFF WBC: CPT

## 2017-07-25 PROCEDURE — 82570 ASSAY OF URINE CREATININE: CPT

## 2017-07-25 PROCEDURE — 83036 HEMOGLOBIN GLYCOSYLATED A1C: CPT

## 2017-07-25 PROCEDURE — 80061 LIPID PANEL: CPT

## 2017-07-26 LAB
ESTIMATED AVG GLUCOSE: 131 MG/DL
HBA1C MFR BLD HPLC: 6.2 %

## 2017-07-28 ENCOUNTER — DOCUMENTATION ONLY (OUTPATIENT)
Dept: FAMILY MEDICINE | Facility: CLINIC | Age: 57
End: 2017-07-28

## 2017-07-28 ENCOUNTER — OFFICE VISIT (OUTPATIENT)
Dept: FAMILY MEDICINE | Facility: CLINIC | Age: 57
End: 2017-07-28
Payer: COMMERCIAL

## 2017-07-28 VITALS
TEMPERATURE: 99 F | HEART RATE: 85 BPM | SYSTOLIC BLOOD PRESSURE: 120 MMHG | OXYGEN SATURATION: 96 % | BODY MASS INDEX: 38.27 KG/M2 | HEIGHT: 71 IN | RESPIRATION RATE: 16 BRPM | DIASTOLIC BLOOD PRESSURE: 77 MMHG | WEIGHT: 273.38 LBS

## 2017-07-28 DIAGNOSIS — E11.59 HYPERTENSION ASSOCIATED WITH DIABETES: ICD-10-CM

## 2017-07-28 DIAGNOSIS — E11.40 TYPE 2 DIABETES, CONTROLLED, WITH NEUROPATHY: Primary | ICD-10-CM

## 2017-07-28 DIAGNOSIS — I15.2 HYPERTENSION ASSOCIATED WITH DIABETES: ICD-10-CM

## 2017-07-28 DIAGNOSIS — E11.69 HYPERLIPIDEMIA ASSOCIATED WITH TYPE 2 DIABETES MELLITUS: ICD-10-CM

## 2017-07-28 DIAGNOSIS — E78.5 HYPERLIPIDEMIA ASSOCIATED WITH TYPE 2 DIABETES MELLITUS: ICD-10-CM

## 2017-07-28 PROCEDURE — 99214 OFFICE O/P EST MOD 30 MIN: CPT | Mod: S$GLB,,, | Performed by: INTERNAL MEDICINE

## 2017-07-28 PROCEDURE — 3044F HG A1C LEVEL LT 7.0%: CPT | Mod: S$GLB,,, | Performed by: INTERNAL MEDICINE

## 2017-07-28 PROCEDURE — 4010F ACE/ARB THERAPY RXD/TAKEN: CPT | Mod: S$GLB,,, | Performed by: INTERNAL MEDICINE

## 2017-07-28 RX ORDER — PREGABALIN 25 MG/1
1 CAPSULE ORAL 2 TIMES DAILY
Refills: 0 | COMMUNITY
Start: 2017-07-07 | End: 2017-12-18

## 2017-07-28 RX ORDER — FLUTICASONE PROPIONATE 50 UG/1
2 SPRAY, METERED NASAL DAILY
Refills: 1 | COMMUNITY
Start: 2017-07-21 | End: 2017-12-22 | Stop reason: SDUPTHER

## 2017-07-28 RX ORDER — PAROXETINE HYDROCHLORIDE 20 MG/1
2 TABLET, FILM COATED ORAL DAILY
Refills: 0 | COMMUNITY
Start: 2017-07-12 | End: 2017-12-18

## 2017-07-28 RX ORDER — DAPAGLIFLOZIN 5 MG/1
5 TABLET, FILM COATED ORAL DAILY
Qty: 90 TABLET | Refills: 1 | Status: SHIPPED | OUTPATIENT
Start: 2017-07-28 | End: 2017-12-14 | Stop reason: SDUPTHER

## 2017-07-28 NOTE — PATIENT INSTRUCTIONS
When out of . invokana switch to farxiga,.     Lose 5 pounds.  Suggest [place] diet    Avoid white carbohydrates.   Eat  a palm sized protein with each meal.       Walk for 10 minutes after you eat.  This will keep your sugars from going high at that time.

## 2017-07-28 NOTE — PROGRESS NOTES
Subjective:       Patient ID: Sarwat Colunga is a 56 y.o. male.    Chief Complaint: Diabetes (labs)    HPI       CHIEF COMPLAINT: Diabetes.  HPI: only using lantus.  Low sugars are when he doesn't eat.     ONSET/TIMING:     QUALITY/COURSE:   unchanged..  Controlled: yes.     INTENSITY/SEVERITY: . Measures blood sugar :  3 times per day.        Lowest recent BS 65. Average .     CONTEXT/WHEN: last HgbA1c    Lab Results   Component Value Date    HGBA1C 6.2 (H) 07/25/2017      weights:    Wt Readings from Last 1 Encounters:   07/28/17 1622 124 kg (273 lb 5.9 oz)         SYMPTOMS/RELATED: . . Possible medication side effects include:     The following symptoms/statements  are present IF IN BOLD, negative otherwise.         MODIFIERS/TREATMENTS: Not_taking_medications:  .  Non-compliance_with_Diabetic_diet  .  No_eye_exam_within_last_year.  Not_practicing_good_foot_care.    REVIEW OF SYMPTOMS: . Weight_gain. Weight_loss. Neuropathy. Recurrent_infections. Skin_ulcers          CHIEF COMPLAINT: Hypertension  HPI:     ONSET:      QUALITY/COURSE:   Controlled:  yes     INTENSITY/SEVERITY:  Average blood pressure is  1250/70.     MODIFIERS/TREATMENTS:  Taking medications: yes. .High sodium intake: no. alcohol: no      The following symptoms are positive only if BOLDED, otherwise are negative.      SYMPTOMS/RELATED: Possible medication side effects include:   Depression..  . Cough. . Constipation.    REVIEW OF SYMPTOMS: . Weight_loss . Weight_gain . Leg_cramps .Potency_problems .    TARGET ORGAN DAMAGE:: angina/ prior myocardial infarction, chronic kidney disease, heart failure, left ventricular hypertrophy, peripheral artery disease, prior coronary revascularization, retinopathy, stroke. transient ischemic attack.        CHIEF COMPLAINT: Hyperlipidemia. cholesterol screening: no.   HPI:     ONSET:    MODIFIERS/TREATMENTS: . Taking medications: yes. . Non-compliance with following diet: no. .      "SYMPTOMS/RELATED:Possible medication side effects include:   Myalgia: no.  .     REVIEW OF SYMPTOMS: past weights:   Wt Readings from Last 1 Encounters:   07/28/17 1622 124 kg (273 lb 5.9 oz)                                                     Last lipids: total   Lab Results   Component Value Date    CHOL 135 07/25/2017    CHOL 143 05/12/2017    CHOL 145 12/01/2016                                                                     HDL   Lab Results   Component Value Date    HDL 35 (L) 07/25/2017    HDL 26 (L) 05/12/2017    HDL 35 (L) 12/01/2016                                                                     LDL   Lab Results   Component Value Date    LDLCALC 78.0 07/25/2017    LDLCALC 76.4 05/12/2017    LDLCALC 90.2 12/01/2016                                                                     TRIG   Lab Results   Component Value Date    TRIG 110 07/25/2017    TRIG 203 (H) 05/12/2017    TRIG 99 12/01/2016                                                                           Review of Systems    Objective:      Vitals:    07/28/17 1622   BP: 120/77   Pulse: 85   Resp: 16   Temp: 98.5 °F (36.9 °C)   TempSrc: Oral   SpO2: 96%   Weight: 124 kg (273 lb 5.9 oz)   Height: 5' 11" (1.803 m)   PainSc: 0-No pain     Physical Exam   Constitutional: He appears well-developed and well-nourished.   Cardiovascular: Normal rate, regular rhythm and normal heart sounds.    Pulses:       Dorsalis pedis pulses are 2+ on the right side, and 2+ on the left side.   Pulmonary/Chest: Effort normal and breath sounds normal. No respiratory distress. He has no wheezes.   Abdominal: Soft. Bowel sounds are normal. There is no tenderness.   Musculoskeletal:        Right foot: There is normal range of motion and no deformity.        Left foot: There is normal range of motion and no deformity.   Feet:   Right Foot:   Protective Sensation: 5 sites tested. 1 site sensed.  Skin Integrity: Negative for ulcer, blister, skin breakdown, erythema, " warmth, callus or dry skin.   Left Foot:   Protective Sensation: 5 sites tested. 1 site sensed.  Skin Integrity: Negative for ulcer, blister, skin breakdown, erythema, warmth, callus or dry skin.         Assessment:       No diagnosis found.      Plan:     There are no diagnoses linked to this encounter.  No Follow-up on file.

## 2017-08-31 DIAGNOSIS — E78.5 HYPERLIPIDEMIA ASSOCIATED WITH TYPE 2 DIABETES MELLITUS: ICD-10-CM

## 2017-08-31 DIAGNOSIS — Z91.89 CARDIOVASCULAR EVENT RISK: ICD-10-CM

## 2017-08-31 DIAGNOSIS — E11.69 HYPERLIPIDEMIA ASSOCIATED WITH TYPE 2 DIABETES MELLITUS: ICD-10-CM

## 2017-08-31 RX ORDER — ATORVASTATIN CALCIUM 40 MG/1
TABLET, FILM COATED ORAL
Qty: 90 TABLET | Refills: 3 | Status: SHIPPED | OUTPATIENT
Start: 2017-08-31 | End: 2018-08-30 | Stop reason: SDUPTHER

## 2017-09-22 ENCOUNTER — OFFICE VISIT (OUTPATIENT)
Dept: DERMATOLOGY | Facility: CLINIC | Age: 57
End: 2017-09-22
Payer: COMMERCIAL

## 2017-09-22 VITALS — WEIGHT: 273 LBS | BODY MASS INDEX: 38.22 KG/M2 | HEIGHT: 71 IN

## 2017-09-22 DIAGNOSIS — D22.9 MULTIPLE BENIGN NEVI: ICD-10-CM

## 2017-09-22 DIAGNOSIS — L64.9 MALE PATTERN ALOPECIA: Primary | ICD-10-CM

## 2017-09-22 DIAGNOSIS — D36.9 ANGIOFIBROMA: ICD-10-CM

## 2017-09-22 PROCEDURE — 3008F BODY MASS INDEX DOCD: CPT | Mod: S$GLB,,, | Performed by: DERMATOLOGY

## 2017-09-22 PROCEDURE — 99999 PR PBB SHADOW E&M-EST. PATIENT-LVL II: CPT | Mod: PBBFAC,,, | Performed by: DERMATOLOGY

## 2017-09-22 PROCEDURE — 99213 OFFICE O/P EST LOW 20 MIN: CPT | Mod: S$GLB,,, | Performed by: DERMATOLOGY

## 2017-09-22 NOTE — PROGRESS NOTES
Subjective:       Patient ID:  Sarwat Colunga is a 56 y.o. male who presents for   Chief Complaint   Patient presents with    Skin Check     TBSE    Hair/Scalp Problem     hait thining, scalp     Initial visit with me  Last seen by Davie Haddad 2017 with rash, now resolved, attributed to Lamictal  Requests TBSE and c/o hair thinning  Brother is bald, father  in 70s, no hair issue  Balding in males on mother's side        Hair/Scalp Problem  - Initial  Affected locations: scalp  Duration: unsure.  Signs and Symptoms: thinning.  Severity: mild  Timing: constant  Aggravated by: nothing  Relieving factors/Treatments tried: nothing        Review of Systems   Constitutional: Negative for fever, chills, weight loss, weight gain and night sweats.   Skin: Positive for activity-related sunscreen use and wears hat. Negative for sun sensitivity and daily sunscreen use.   Hematologic/Lymphatic: Bruises/bleeds easily.        Objective:    Physical Exam   Constitutional: He appears well-developed and well-nourished. He is obese.  No distress.   HENT:   Mouth/Throat: Lips normal.    Eyes: Lids are normal.  No conjunctival no injection.   Cardiovascular: There is no local extremity swelling and no dependent edema.     Neurological: He is alert and oriented to person, place, and time. He is not disoriented.   Psychiatric: He has a normal mood and affect.   Skin:   Areas Examined (abnormalities noted in diagram):   Scalp / Hair Palpated and Inspected  Head / Face Inspection Performed  Neck Inspection Performed  Chest / Axilla Inspection Performed  Abdomen Inspection Performed  Genitals / Buttocks / Groin Inspection Performed  Back Inspection Performed  RUE Inspected  LUE Inspection Performed  RLE Inspected  LLE Inspection Performed  Nails and Digits Inspection Performed                   Diagram Legend     Erythematous scaling macule/papule c/w actinic keratosis       Vascular papule c/w angioma      Pigmented  verrucoid papule/plaque c/w seborrheic keratosis      Yellow umbilicated papule c/w sebaceous hyperplasia      Irregularly shaped tan macule c/w lentigo     1-2 mm smooth white papules consistent with Milia      Movable subcutaneous cyst with punctum c/w epidermal inclusion cyst      Subcutaneous movable cyst c/w pilar cyst      Firm pink to brown papule c/w dermatofibroma      Pedunculated fleshy papule(s) c/w skin tag(s)      Evenly pigmented macule c/w junctional nevus     Mildly variegated pigmented, slightly irregular-bordered macule c/w mildly atypical nevus      Flesh colored to evenly pigmented papule c/w intradermal nevus       Pink pearly papule/plaque c/w basal cell carcinoma      Erythematous hyperkeratotic cursted plaque c/w SCC      Surgical scar with no sign of skin cancer recurrence      Open and closed comedones      Inflammatory papules and pustules      Verrucoid papule consistent consistent with wart     Erythematous eczematous patches and plaques     Dystrophic onycholytic nail with subungual debris c/w onychomycosis     Umbilicated papule    Erythematous-base heme-crusted tan verrucoid plaque consistent with inflamed seborrheic keratosis     Erythematous Silvery Scaling Plaque c/w Psoriasis     See annotation      Assessment / Plan:        Male pattern alopecia  + FH  Discussed finasteride, declines at this time  Trial rogaine foam 5%    Multiple benign nevi  Discussed ABCDE's of nevi.  Monitor for new mole or moles that are becoming bigger, darker, irritated, or developing irregular borders. Brochure provided.    Angiofibroma, forehead  Reassurance given to patient. No treatment is necessary.   Treatment of benign, asymptomatic lesions may be considered cosmetic.             Return in about 1 year (around 9/22/2018), or if symptoms worsen or fail to improve.

## 2017-10-20 RX ORDER — TAMSULOSIN HYDROCHLORIDE 0.4 MG/1
CAPSULE ORAL
Qty: 30 CAPSULE | Refills: 11 | Status: SHIPPED | OUTPATIENT
Start: 2017-10-20 | End: 2017-12-14 | Stop reason: SDUPTHER

## 2017-11-04 DIAGNOSIS — G62.9 PERIPHERAL POLYNEUROPATHY: ICD-10-CM

## 2017-11-06 RX ORDER — PREGABALIN 50 MG/1
CAPSULE ORAL
Qty: 90 CAPSULE | Refills: 5 | Status: SHIPPED | OUTPATIENT
Start: 2017-11-06 | End: 2018-02-14 | Stop reason: SDUPTHER

## 2017-11-21 DIAGNOSIS — E11.49 DIABETIC RADICULOPATHY: ICD-10-CM

## 2017-11-21 DIAGNOSIS — M54.10 DIABETIC RADICULOPATHY: ICD-10-CM

## 2017-11-26 DIAGNOSIS — E11.49 DIABETIC RADICULOPATHY: ICD-10-CM

## 2017-11-26 DIAGNOSIS — M54.10 DIABETIC RADICULOPATHY: ICD-10-CM

## 2017-11-27 RX ORDER — METFORMIN HYDROCHLORIDE 1000 MG/1
TABLET ORAL
Qty: 180 TABLET | Refills: 3 | Status: SHIPPED | OUTPATIENT
Start: 2017-11-27 | End: 2017-12-18 | Stop reason: SDUPTHER

## 2017-11-27 RX ORDER — METFORMIN HYDROCHLORIDE 1000 MG/1
1000 TABLET ORAL 2 TIMES DAILY WITH MEALS
Qty: 180 TABLET | Refills: 3 | Status: SHIPPED | OUTPATIENT
Start: 2017-11-27 | End: 2018-11-29 | Stop reason: SDUPTHER

## 2017-12-14 ENCOUNTER — PATIENT MESSAGE (OUTPATIENT)
Dept: FAMILY MEDICINE | Facility: CLINIC | Age: 57
End: 2017-12-14

## 2017-12-14 DIAGNOSIS — E11.40 TYPE 2 DIABETES, CONTROLLED, WITH NEUROPATHY: ICD-10-CM

## 2017-12-14 RX ORDER — DAPAGLIFLOZIN 5 MG/1
5 TABLET, FILM COATED ORAL DAILY
Qty: 90 TABLET | Refills: 1 | Status: SHIPPED | OUTPATIENT
Start: 2017-12-14 | End: 2018-04-18 | Stop reason: SDUPTHER

## 2017-12-14 RX ORDER — TAMSULOSIN HYDROCHLORIDE 0.4 MG/1
1 CAPSULE ORAL DAILY
Qty: 90 CAPSULE | Refills: 0 | Status: SHIPPED | OUTPATIENT
Start: 2017-12-14 | End: 2017-12-18 | Stop reason: SDUPTHER

## 2017-12-18 ENCOUNTER — PATIENT MESSAGE (OUTPATIENT)
Dept: UROLOGY | Facility: CLINIC | Age: 57
End: 2017-12-18

## 2017-12-18 ENCOUNTER — LAB VISIT (OUTPATIENT)
Dept: LAB | Facility: HOSPITAL | Age: 57
End: 2017-12-18
Attending: INTERNAL MEDICINE
Payer: COMMERCIAL

## 2017-12-18 ENCOUNTER — OFFICE VISIT (OUTPATIENT)
Dept: FAMILY MEDICINE | Facility: CLINIC | Age: 57
End: 2017-12-18
Payer: COMMERCIAL

## 2017-12-18 ENCOUNTER — OFFICE VISIT (OUTPATIENT)
Dept: UROLOGY | Facility: CLINIC | Age: 57
End: 2017-12-18
Payer: COMMERCIAL

## 2017-12-18 ENCOUNTER — DOCUMENTATION ONLY (OUTPATIENT)
Dept: FAMILY MEDICINE | Facility: CLINIC | Age: 57
End: 2017-12-18

## 2017-12-18 VITALS
OXYGEN SATURATION: 97 % | TEMPERATURE: 98 F | DIASTOLIC BLOOD PRESSURE: 73 MMHG | WEIGHT: 275.81 LBS | RESPIRATION RATE: 16 BRPM | SYSTOLIC BLOOD PRESSURE: 113 MMHG | HEIGHT: 71 IN | HEART RATE: 88 BPM | BODY MASS INDEX: 38.61 KG/M2

## 2017-12-18 VITALS
WEIGHT: 275 LBS | SYSTOLIC BLOOD PRESSURE: 136 MMHG | TEMPERATURE: 99 F | BODY MASS INDEX: 38.5 KG/M2 | DIASTOLIC BLOOD PRESSURE: 80 MMHG | HEART RATE: 117 BPM | HEIGHT: 71 IN

## 2017-12-18 DIAGNOSIS — E11.59 HYPERTENSION ASSOCIATED WITH DIABETES: ICD-10-CM

## 2017-12-18 DIAGNOSIS — E11.40 TYPE 2 DIABETES, CONTROLLED, WITH NEUROPATHY: ICD-10-CM

## 2017-12-18 DIAGNOSIS — I15.2 HYPERTENSION ASSOCIATED WITH DIABETES: ICD-10-CM

## 2017-12-18 DIAGNOSIS — E78.5 HYPERLIPIDEMIA ASSOCIATED WITH TYPE 2 DIABETES MELLITUS: ICD-10-CM

## 2017-12-18 DIAGNOSIS — N40.1 BENIGN LOCALIZED PROSTATIC HYPERPLASIA WITH LOWER URINARY TRACT SYMPTOMS (LUTS): ICD-10-CM

## 2017-12-18 DIAGNOSIS — R97.20 ELEVATED PSA: Primary | ICD-10-CM

## 2017-12-18 DIAGNOSIS — R97.20 ELEVATED PSA: ICD-10-CM

## 2017-12-18 DIAGNOSIS — N39.41 URGE URINARY INCONTINENCE: Primary | ICD-10-CM

## 2017-12-18 DIAGNOSIS — E11.69 HYPERLIPIDEMIA ASSOCIATED WITH TYPE 2 DIABETES MELLITUS: ICD-10-CM

## 2017-12-18 DIAGNOSIS — M21.612 ASYMPTOMATIC BUNION OF LEFT FOOT: ICD-10-CM

## 2017-12-18 DIAGNOSIS — N39.41 URGE INCONTINENCE OF URINE: Primary | ICD-10-CM

## 2017-12-18 LAB
ALBUMIN SERPL BCP-MCNC: 4 G/DL
ALP SERPL-CCNC: 83 U/L
ALT SERPL W/O P-5'-P-CCNC: 28 U/L
ANION GAP SERPL CALC-SCNC: 10 MMOL/L
AST SERPL-CCNC: 21 U/L
BILIRUB SERPL-MCNC: 0.8 MG/DL
BUN SERPL-MCNC: 21 MG/DL
CALCIUM SERPL-MCNC: 9.4 MG/DL
CHLORIDE SERPL-SCNC: 99 MMOL/L
CHOLEST SERPL-MCNC: 164 MG/DL
CHOLEST/HDLC SERPL: 4.7 {RATIO}
CO2 SERPL-SCNC: 29 MMOL/L
COMPLEXED PSA SERPL-MCNC: 1.1 NG/ML
CREAT SERPL-MCNC: 1.4 MG/DL
EST. GFR  (AFRICAN AMERICAN): >60 ML/MIN/1.73 M^2
EST. GFR  (NON AFRICAN AMERICAN): 54.6 ML/MIN/1.73 M^2
ESTIMATED AVG GLUCOSE: 157 MG/DL
GLUCOSE SERPL-MCNC: 188 MG/DL
HBA1C MFR BLD HPLC: 7.1 %
HDLC SERPL-MCNC: 35 MG/DL
HDLC SERPL: 21.3 %
LDLC SERPL CALC-MCNC: 102 MG/DL
NONHDLC SERPL-MCNC: 129 MG/DL
POTASSIUM SERPL-SCNC: 4.3 MMOL/L
PROT SERPL-MCNC: 7.4 G/DL
SODIUM SERPL-SCNC: 138 MMOL/L
TRIGL SERPL-MCNC: 135 MG/DL

## 2017-12-18 PROCEDURE — 83036 HEMOGLOBIN GLYCOSYLATED A1C: CPT

## 2017-12-18 PROCEDURE — 81002 URINALYSIS NONAUTO W/O SCOPE: CPT | Mod: S$GLB,,, | Performed by: UROLOGY

## 2017-12-18 PROCEDURE — 99214 OFFICE O/P EST MOD 30 MIN: CPT | Mod: 25,S$GLB,, | Performed by: UROLOGY

## 2017-12-18 PROCEDURE — 81002 URINALYSIS NONAUTO W/O SCOPE: CPT | Mod: S$GLB,,, | Performed by: INTERNAL MEDICINE

## 2017-12-18 PROCEDURE — 80053 COMPREHEN METABOLIC PANEL: CPT

## 2017-12-18 PROCEDURE — 36415 COLL VENOUS BLD VENIPUNCTURE: CPT | Mod: PO

## 2017-12-18 PROCEDURE — 99999 PR PBB SHADOW E&M-EST. PATIENT-LVL III: CPT | Mod: PBBFAC,,, | Performed by: UROLOGY

## 2017-12-18 PROCEDURE — 80061 LIPID PANEL: CPT

## 2017-12-18 PROCEDURE — 84153 ASSAY OF PSA TOTAL: CPT

## 2017-12-18 PROCEDURE — 99214 OFFICE O/P EST MOD 30 MIN: CPT | Mod: 25,S$GLB,, | Performed by: INTERNAL MEDICINE

## 2017-12-18 RX ORDER — TAMSULOSIN HYDROCHLORIDE 0.4 MG/1
0.8 CAPSULE ORAL DAILY
Qty: 180 CAPSULE | Refills: 1 | Status: SHIPPED | OUTPATIENT
Start: 2017-12-18 | End: 2018-09-11 | Stop reason: SDUPTHER

## 2017-12-18 RX ORDER — PAROXETINE 30 MG/1
1 TABLET, FILM COATED ORAL DAILY
Refills: 0 | COMMUNITY
Start: 2017-12-13 | End: 2020-06-20 | Stop reason: CLARIF

## 2017-12-18 RX ORDER — NAPROXEN 500 MG/1
1 TABLET ORAL DAILY PRN
Refills: 0 | Status: ON HOLD | COMMUNITY
Start: 2017-09-19 | End: 2021-09-29 | Stop reason: HOSPADM

## 2017-12-18 RX ORDER — TAMSULOSIN HYDROCHLORIDE 0.4 MG/1
0.8 CAPSULE ORAL DAILY
Qty: 90 CAPSULE | Refills: 0 | Status: SHIPPED | OUTPATIENT
Start: 2017-12-18 | End: 2017-12-18 | Stop reason: SDUPTHER

## 2017-12-18 NOTE — PATIENT INSTRUCTIONS
Wear toe splints at night    Lose 5 pounds.  Suggest [place]ett diet    Avoid white carbohydrates.   Eat  a palm sized protein with each meal.       Walk for 10 minutes after you eat.  This will keep your sugars from going high at that time.

## 2017-12-18 NOTE — PROGRESS NOTES
Subjective:       Patient ID: Sarwat Colunga is a 57 y.o. male.    Chief Complaint: Urinary Incontinence (labs at [place] this morning)    HPI        CHIEF COMPLAINT: urinary incontinence  HPI: drinks 3 mugs of unsweetened tea in morning.  nocturia 3x/night. Dribbling.     ONSET/TIMING: Onset   6 mo   ago. Sudden:: no .     DURATION:  continuous    QUALITY/COURSE:   unchanged     LOCATION: pain/pressure: suprapubic    .     INTENSITY/SEVERITY: # 7   /10 (on a 1 to 10 scale).    CONTEXT/WHEN: --Similar problems: yes. .  Past_treatments: no . Past_evaluations: no    MODIFIERS/TREATMENTS:  .     The following symptoms are positive if BOLD, negative otherwise.       REVIEW OF SYMPTOMS:Urine_Frequency . Urine_Urgency . Dysuria . Suprapubic_Pain . Hematuria . Urine_Incontinence . . Fever . Chills . Nausea . Vomiting . Perineal Pain .  Sharp_pain . Dull_pain . Burning_pain .Tenesmus . Back_pain .   Urinalysis  @[id number](coloru,clarityu,specgrav,phur,proteinua,glucoseu,bilirubincon,bloodu,wbcu,rbcu,bacteria,mucus,nitrite,leukocytesur,urobilinogen,hyalinecasts)@            CHIEF COMPLAINT: Diabetes.  HPI:     ONSET/TIMING:     QUALITY/COURSE:   unchanged..      INTENSITY/SEVERITY: . Measures blood sugar :  3 times per day.        Lowest recent . Average .     CONTEXT/WHEN: last HgbA1c    Lab Results   Component Value Date    HGBA1C 6.2 (H) 07/25/2017      weights:    Wt Readings from Last 1 Encounters:   12/18/17 1027 125.1 kg (275 lb 12.7 oz)         SYMPTOMS/RELATED: . . Possible medication side effects include:     The following symptoms/statements  are present IF IN BOLD, negative otherwise.         MODIFIERS/TREATMENTS: Not_taking_medications:  .  Non-compliance_with_Diabetic_diet  .  No_eye_exam_within_last_year.  Not_practicing_good_foot_care.    REVIEW OF SYMPTOMS: . Weight_gain. Weight_loss. Neuropathy. Recurrent_infections. Skin_ulcers                 Review of Systems   Constitutional: Positive  "for activity change. Negative for unexpected weight change.   HENT: Positive for hearing loss and rhinorrhea. Negative for trouble swallowing.    Eyes: Negative for discharge and visual disturbance.   Respiratory: Negative for chest tightness and wheezing.    Cardiovascular: Negative for chest pain and palpitations.   Gastrointestinal: Negative for blood in stool, constipation, diarrhea and vomiting.   Endocrine: Positive for polyuria. Negative for polydipsia.   Genitourinary: Positive for urgency. Negative for difficulty urinating and hematuria.   Musculoskeletal: Positive for neck pain. Negative for arthralgias and joint swelling.   Neurological: Negative for weakness and headaches.   Psychiatric/Behavioral: Negative for confusion and dysphoric mood.       Objective:      Vitals:    12/18/17 1027   BP: 113/73   Pulse: 88   Resp: 16   Temp: 98.1 °F (36.7 °C)   TempSrc: Oral   SpO2: 97%   Weight: 125.1 kg (275 lb 12.7 oz)   Height: 5' 11" (1.803 m)   PainSc: 0-No pain     Physical Exam   Constitutional: He appears well-developed and well-nourished.   Cardiovascular: Normal rate, regular rhythm and normal heart sounds.    Pulmonary/Chest: Effort normal and breath sounds normal. No respiratory distress. He has no wheezes.   Abdominal: Soft. Bowel sounds are normal. There is no tenderness.   Musculoskeletal:        Right foot: There is normal range of motion and no deformity.        Left foot: There is normal range of motion and no deformity (Bunion and hammertoes left foot).   Feet:   Right Foot:   Protective Sensation: 5 sites tested. 0 sites sensed.   Skin Integrity: Negative for ulcer, blister, skin breakdown, erythema, warmth, callus or dry skin.   Left Foot:   Protective Sensation: 5 sites tested. 0 sites sensed.   Skin Integrity: Negative for ulcer, blister, skin breakdown, erythema, warmth, callus or dry skin.    urinalysis shows 4+ sugar      Assessment:       1. Urge incontinence of urine    2. Benign " localized prostatic hyperplasia with lower urinary tract symptoms (LUTS)    3. Type 2 diabetes, controlled, with neuropathy    4. Asymptomatic bunion of left foot    5. Hypertension associated with diabetes          Plan:   Labs were done this morning.  He has an appointment with Dr. Garsia for urology  Urge incontinence of urine  -     tamsulosin (FLOMAX) 0.4 mg Cp24; Take 2 capsules (0.8 mg total) by mouth once daily.  Dispense: 90 capsule; Refill: 0  -     US Pelvis Limited Non OB; Future; Expected date: 12/18/2017  -     POCT urine dipstick without microscope    Benign localized prostatic hyperplasia with lower urinary tract symptoms (LUTS)    Type 2 diabetes, controlled, with neuropathy  -     DIABETIC SHOES FOR HOME USE  -     Hemoglobin A1c; Future; Expected date: 12/18/2017  -     Lipid panel; Future; Expected date: 12/18/2017  -     Microalbumin/creatinine urine ratio; Future  -     Ambulatory Referral to Ophthalmology    Asymptomatic bunion of left foot    Hypertension associated with diabetes  -     Comprehensive metabolic panel; Future; Expected date: 12/18/2017      Return in about 3 months (around 3/18/2018).

## 2017-12-18 NOTE — PROGRESS NOTES
Subjective:       Patient ID: Sarwat Colunga is a 57 y.o. male.    Chief Complaint:   OFFICE NOTE    CHIEF COMPLAINT:  Urge incontinence.    Mr. Colunga refers that for the last six months is experiencing increasing   difficulty in holding the urine when he feels that he needs to urinate.  The   patient refers that before he makes it to the bathroom in occasions starts   leaking.  He had a very good flow, no dysuria and no hematuria and at the end,   he feels that he empties the bladder satisfactorily.  He has nocturia x3.    His past  history in the past, the patient had elevated PSA and symptoms of   BPH and in 2015, 2-1/2 years ago, his PSA was 7.3.  The prostate volume of 50 mL   and the patient's prostate biopsy failed to show evidence of carcinoma.  The   patient was taking Flomax and finasteride, but he stopped the finasteride and   takes only Flomax.  It is to be noted that the last PSA was today on 12/18/2017,   1.1.  It is also to be noted that the patient is taking several medications for   his diabetes including Farxiga, Amaryl, and insulin.  The patient takes   lisinopril and chlorthalidone.    FAMILY HISTORY:  Negative for prostate cancer.    The past medical and surgical history is well documented in the medical history   including medications and allergies.  The patient is an obese patient, diabetic.    The postvoid residual urine was measured and found to be at 27 mL.  The   urinalysis today is clear except for 4+ glucose.      EOR/HN  dd: 12/18/2017 16:12:09 (CST)  td: 12/19/2017 01:28:50 (CST)  Doc ID   #[id number]  Job ID #[id number]    CC: Rosales Page M.D.      hospitals  Review of Systems   Constitutional: Negative for activity change and appetite change.   HENT: Negative.    Eyes: Negative for discharge.   Respiratory: Negative for cough and shortness of breath.    Cardiovascular: Negative for chest pain and palpitations.   Gastrointestinal: Negative for abdominal distention, abdominal pain, constipation  and vomiting.   Genitourinary: Positive for frequency and urgency. Negative for discharge, dysuria, flank pain, hematuria and testicular pain.   Musculoskeletal: Negative for arthralgias.   Skin: Negative for rash.   Neurological: Negative for dizziness.   Psychiatric/Behavioral: The patient is not nervous/anxious.        Objective:      Physical Exam   Constitutional: He appears well-developed and well-nourished.   HENT:   Head: Normocephalic.   Eyes: Pupils are equal, round, and reactive to light.   Neck: Normal range of motion.   Cardiovascular: Normal rate.    Pulmonary/Chest: Effort normal.   Abdominal: Soft. He exhibits no distension and no mass. There is no tenderness.   Genitourinary: Rectum normal, prostate normal and penis normal. Rectal exam shows no external hemorrhoid, no mass and no tenderness. Prostate is not enlarged and not tender. Right testis shows no mass and no tenderness. Left testis shows no mass and no tenderness. No discharge found.   Musculoskeletal: Normal range of motion.   Neurological: He is alert.   Skin: Skin is warm.     Psychiatric: He has a normal mood and affect.       Assessment:       1. Urge urinary incontinence        Plan:       Urge urinary incontinence    Other orders  -     mirabegron 50 mg Tb24; Take 1 tablet (50 mg total) by mouth once daily.  Dispense: 30 tablet; Refill: 11    Trial on Myrbetriq with new PVR next visit. RTC  6 weeks

## 2017-12-19 LAB
BILIRUB SERPL-MCNC: ABNORMAL MG/DL
BILIRUB SERPL-MCNC: NORMAL MG/DL
BLOOD URINE, POC: ABNORMAL
BLOOD URINE, POC: NORMAL
COLOR, POC UA: ABNORMAL
COLOR, POC UA: YELLOW
GLUCOSE UR QL STRIP: 1000
GLUCOSE UR QL STRIP: ABNORMAL
KETONES UR QL STRIP: ABNORMAL
KETONES UR QL STRIP: NORMAL
LEUKOCYTE ESTERASE URINE, POC: ABNORMAL
LEUKOCYTE ESTERASE URINE, POC: NORMAL
NITRITE, POC UA: ABNORMAL
NITRITE, POC UA: NORMAL
PH, POC UA: 5
PH, POC UA: 6
PROTEIN, POC: ABNORMAL
PROTEIN, POC: NORMAL
SPECIFIC GRAVITY, POC UA: 1.01
SPECIFIC GRAVITY, POC UA: 1.01
UROBILINOGEN, POC UA: ABNORMAL
UROBILINOGEN, POC UA: NORMAL

## 2017-12-22 ENCOUNTER — PATIENT MESSAGE (OUTPATIENT)
Dept: FAMILY MEDICINE | Facility: CLINIC | Age: 57
End: 2017-12-22

## 2017-12-22 ENCOUNTER — PATIENT MESSAGE (OUTPATIENT)
Dept: UROLOGY | Facility: CLINIC | Age: 57
End: 2017-12-22

## 2017-12-22 RX ORDER — FINASTERIDE 5 MG/1
5 TABLET, FILM COATED ORAL DAILY
Qty: 90 TABLET | Refills: 3 | Status: SHIPPED | OUTPATIENT
Start: 2017-12-22 | End: 2018-09-11 | Stop reason: SDUPTHER

## 2017-12-22 NOTE — TELEPHONE ENCOUNTER
Patient advised that the insurance denied the Myrbetriq.  Will send to Dr. White to send alternate medication.

## 2017-12-26 RX ORDER — FLUTICASONE PROPIONATE 50 UG/1
2 SPRAY, METERED NASAL DAILY
Qty: 1 BOTTLE | Refills: 5 | Status: SHIPPED | OUTPATIENT
Start: 2017-12-26 | End: 2019-12-10

## 2017-12-28 DIAGNOSIS — M54.10 DIABETIC RADICULOPATHY: ICD-10-CM

## 2017-12-28 DIAGNOSIS — E11.49 DIABETIC RADICULOPATHY: ICD-10-CM

## 2017-12-29 RX ORDER — INSULIN GLARGINE 100 [IU]/ML
INJECTION, SOLUTION SUBCUTANEOUS
Qty: 36 ML | Refills: 0 | Status: SHIPPED | OUTPATIENT
Start: 2017-12-29 | End: 2018-05-08 | Stop reason: SDUPTHER

## 2018-01-11 ENCOUNTER — PATIENT MESSAGE (OUTPATIENT)
Dept: UROLOGY | Facility: CLINIC | Age: 58
End: 2018-01-11

## 2018-01-24 ENCOUNTER — PATIENT MESSAGE (OUTPATIENT)
Dept: UROLOGY | Facility: CLINIC | Age: 58
End: 2018-01-24

## 2018-02-12 ENCOUNTER — PATIENT MESSAGE (OUTPATIENT)
Dept: UROLOGY | Facility: CLINIC | Age: 58
End: 2018-02-12

## 2018-02-12 ENCOUNTER — PATIENT MESSAGE (OUTPATIENT)
Dept: FAMILY MEDICINE | Facility: CLINIC | Age: 58
End: 2018-02-12

## 2018-02-14 ENCOUNTER — PATIENT MESSAGE (OUTPATIENT)
Dept: UROLOGY | Facility: CLINIC | Age: 58
End: 2018-02-14

## 2018-02-14 DIAGNOSIS — G62.9 PERIPHERAL POLYNEUROPATHY: ICD-10-CM

## 2018-02-14 RX ORDER — PREGABALIN 50 MG/1
CAPSULE ORAL
Qty: 450 CAPSULE | Refills: 1 | Status: SHIPPED | OUTPATIENT
Start: 2018-02-14 | End: 2018-10-26 | Stop reason: SDUPTHER

## 2018-02-24 DIAGNOSIS — I10 ESSENTIAL HYPERTENSION: ICD-10-CM

## 2018-02-26 RX ORDER — CHLORTHALIDONE 25 MG/1
TABLET ORAL
Qty: 90 TABLET | Refills: 1 | Status: SHIPPED | OUTPATIENT
Start: 2018-02-26 | End: 2018-08-30 | Stop reason: SDUPTHER

## 2018-03-01 DIAGNOSIS — Z13.5 DIABETIC RETINOPATHY SCREENING: ICD-10-CM

## 2018-03-10 DIAGNOSIS — E11.49 DIABETIC RADICULOPATHY: ICD-10-CM

## 2018-03-10 DIAGNOSIS — M54.10 DIABETIC RADICULOPATHY: ICD-10-CM

## 2018-03-12 RX ORDER — GLIMEPIRIDE 4 MG/1
TABLET ORAL
Qty: 90 TABLET | Refills: 3 | Status: SHIPPED | OUTPATIENT
Start: 2018-03-12 | End: 2018-12-18 | Stop reason: SDUPTHER

## 2018-03-16 ENCOUNTER — PATIENT MESSAGE (OUTPATIENT)
Dept: FAMILY MEDICINE | Facility: CLINIC | Age: 58
End: 2018-03-16

## 2018-03-23 ENCOUNTER — DOCUMENTATION ONLY (OUTPATIENT)
Dept: FAMILY MEDICINE | Facility: CLINIC | Age: 58
End: 2018-03-23

## 2018-03-23 NOTE — PROGRESS NOTES
Health Maintenance Due   Topic Date Due    Eye Exam  01/26/2018    Hemoglobin A1c  03/18/2018

## 2018-03-27 ENCOUNTER — DOCUMENTATION ONLY (OUTPATIENT)
Dept: FAMILY MEDICINE | Facility: CLINIC | Age: 58
End: 2018-03-27

## 2018-03-28 ENCOUNTER — TELEPHONE (OUTPATIENT)
Dept: FAMILY MEDICINE | Facility: CLINIC | Age: 58
End: 2018-03-28

## 2018-03-28 DIAGNOSIS — Z02.89 ENCOUNTER FOR PHYSICAL EXAMINATION RELATED TO EMPLOYMENT: Primary | ICD-10-CM

## 2018-04-18 DIAGNOSIS — E11.40 TYPE 2 DIABETES, CONTROLLED, WITH NEUROPATHY: ICD-10-CM

## 2018-04-18 RX ORDER — DAPAGLIFLOZIN 5 MG/1
TABLET, FILM COATED ORAL
Qty: 90 TABLET | Refills: 1 | Status: SHIPPED | OUTPATIENT
Start: 2018-04-18 | End: 2018-07-09 | Stop reason: SDUPTHER

## 2018-04-22 DIAGNOSIS — I15.2 HYPERTENSION ASSOCIATED WITH DIABETES: ICD-10-CM

## 2018-04-22 DIAGNOSIS — E11.59 HYPERTENSION ASSOCIATED WITH DIABETES: ICD-10-CM

## 2018-04-23 RX ORDER — LISINOPRIL 5 MG/1
TABLET ORAL
Qty: 30 TABLET | Refills: 0 | Status: SHIPPED | OUTPATIENT
Start: 2018-04-23 | End: 2018-09-07

## 2018-05-08 DIAGNOSIS — M54.10 DIABETIC RADICULOPATHY: ICD-10-CM

## 2018-05-08 DIAGNOSIS — E11.49 DIABETIC RADICULOPATHY: ICD-10-CM

## 2018-05-09 RX ORDER — INSULIN GLARGINE 100 [IU]/ML
INJECTION, SOLUTION SUBCUTANEOUS
Qty: 36 ML | Refills: 0 | Status: SHIPPED | OUTPATIENT
Start: 2018-05-09 | End: 2018-07-06 | Stop reason: SDUPTHER

## 2018-05-22 DIAGNOSIS — I10 HYPERTENSION, UNSPECIFIED TYPE: Primary | ICD-10-CM

## 2018-05-25 DIAGNOSIS — I10 ESSENTIAL HYPERTENSION: ICD-10-CM

## 2018-05-28 RX ORDER — CHLORTHALIDONE 25 MG/1
TABLET ORAL
Qty: 90 TABLET | Refills: 1 | Status: SHIPPED | OUTPATIENT
Start: 2018-05-28 | End: 2018-08-30 | Stop reason: SDUPTHER

## 2018-07-06 DIAGNOSIS — I15.2 HYPERTENSION ASSOCIATED WITH DIABETES: ICD-10-CM

## 2018-07-06 DIAGNOSIS — M54.10 DIABETIC RADICULOPATHY: ICD-10-CM

## 2018-07-06 DIAGNOSIS — E11.59 HYPERTENSION ASSOCIATED WITH DIABETES: ICD-10-CM

## 2018-07-06 DIAGNOSIS — E11.49 DIABETIC RADICULOPATHY: ICD-10-CM

## 2018-07-08 RX ORDER — LISINOPRIL 5 MG/1
TABLET ORAL
Qty: 90 TABLET | Refills: 0 | Status: SHIPPED | OUTPATIENT
Start: 2018-07-08 | End: 2018-09-07

## 2018-07-08 RX ORDER — INSULIN GLARGINE 100 [IU]/ML
INJECTION, SOLUTION SUBCUTANEOUS
Qty: 36 ML | Refills: 0 | Status: SHIPPED | OUTPATIENT
Start: 2018-07-08 | End: 2018-09-19 | Stop reason: SDUPTHER

## 2018-07-09 DIAGNOSIS — E11.40 TYPE 2 DIABETES, CONTROLLED, WITH NEUROPATHY: ICD-10-CM

## 2018-07-09 RX ORDER — DAPAGLIFLOZIN 5 MG/1
TABLET, FILM COATED ORAL
Qty: 90 TABLET | Refills: 0 | Status: SHIPPED | OUTPATIENT
Start: 2018-07-09 | End: 2018-10-08 | Stop reason: SDUPTHER

## 2018-08-24 ENCOUNTER — OFFICE VISIT (OUTPATIENT)
Dept: DERMATOLOGY | Facility: CLINIC | Age: 58
End: 2018-08-24
Payer: COMMERCIAL

## 2018-08-24 ENCOUNTER — LAB VISIT (OUTPATIENT)
Dept: LAB | Facility: HOSPITAL | Age: 58
End: 2018-08-24
Attending: INTERNAL MEDICINE
Payer: COMMERCIAL

## 2018-08-24 DIAGNOSIS — L81.4 SOLAR LENTIGO: ICD-10-CM

## 2018-08-24 DIAGNOSIS — E11.59 HYPERTENSION ASSOCIATED WITH DIABETES: ICD-10-CM

## 2018-08-24 DIAGNOSIS — E11.40 TYPE 2 DIABETES, CONTROLLED, WITH NEUROPATHY: ICD-10-CM

## 2018-08-24 DIAGNOSIS — Z12.83 SKIN CANCER SCREENING: ICD-10-CM

## 2018-08-24 DIAGNOSIS — D22.9 MULTIPLE BENIGN NEVI: Primary | ICD-10-CM

## 2018-08-24 DIAGNOSIS — I15.2 HYPERTENSION ASSOCIATED WITH DIABETES: ICD-10-CM

## 2018-08-24 DIAGNOSIS — D23.9 DERMATOFIBROMA: ICD-10-CM

## 2018-08-24 DIAGNOSIS — L82.1 SEBORRHEIC KERATOSES: ICD-10-CM

## 2018-08-24 LAB
ALBUMIN SERPL BCP-MCNC: 4.2 G/DL
ALP SERPL-CCNC: 76 U/L
ALT SERPL W/O P-5'-P-CCNC: 27 U/L
ANION GAP SERPL CALC-SCNC: 13 MMOL/L
AST SERPL-CCNC: 21 U/L
BILIRUB SERPL-MCNC: 0.8 MG/DL
BUN SERPL-MCNC: 21 MG/DL
CALCIUM SERPL-MCNC: 9.9 MG/DL
CHLORIDE SERPL-SCNC: 97 MMOL/L
CHOLEST SERPL-MCNC: 127 MG/DL
CHOLEST/HDLC SERPL: 4.5 {RATIO}
CO2 SERPL-SCNC: 27 MMOL/L
CREAT SERPL-MCNC: 1.5 MG/DL
EST. GFR  (AFRICAN AMERICAN): 58.1 ML/MIN/1.73 M^2
EST. GFR  (NON AFRICAN AMERICAN): 50.2 ML/MIN/1.73 M^2
ESTIMATED AVG GLUCOSE: 169 MG/DL
GLUCOSE SERPL-MCNC: 162 MG/DL
HBA1C MFR BLD HPLC: 7.5 %
HDLC SERPL-MCNC: 28 MG/DL
HDLC SERPL: 22 %
LDLC SERPL CALC-MCNC: 71.4 MG/DL
NONHDLC SERPL-MCNC: 99 MG/DL
POTASSIUM SERPL-SCNC: 4.2 MMOL/L
PROT SERPL-MCNC: 7.2 G/DL
SODIUM SERPL-SCNC: 137 MMOL/L
TRIGL SERPL-MCNC: 138 MG/DL

## 2018-08-24 PROCEDURE — 36415 COLL VENOUS BLD VENIPUNCTURE: CPT | Mod: PO

## 2018-08-24 PROCEDURE — 99213 OFFICE O/P EST LOW 20 MIN: CPT | Mod: S$GLB,,, | Performed by: DERMATOLOGY

## 2018-08-24 PROCEDURE — 80053 COMPREHEN METABOLIC PANEL: CPT

## 2018-08-24 PROCEDURE — 83036 HEMOGLOBIN GLYCOSYLATED A1C: CPT

## 2018-08-24 PROCEDURE — 99999 PR PBB SHADOW E&M-EST. PATIENT-LVL II: CPT | Mod: PBBFAC,,, | Performed by: DERMATOLOGY

## 2018-08-24 PROCEDURE — 80061 LIPID PANEL: CPT

## 2018-08-24 NOTE — PROGRESS NOTES
"  Subjective:       Patient ID:  Sarwat Colunga is a 57 y.o. male who presents for   Chief Complaint   Patient presents with    Skin Check     TBSE    Alopecia     follow up     Patient last seen 09/2017, reassuring TBSE  No h/o skin cancer  Requests TBSE for cancer screening  C/o spot on scalp, "bump like", picked at lesion, seems to have resolved      Current Outpatient Medications:     aspirin (ECOTRIN) 81 MG EC tablet, Take 81 mg by mouth once daily., Disp: , Rfl:     atorvastatin (LIPITOR) 40 MG tablet, take 1 tablet by mouth at bedtime, Disp: 90 tablet, Rfl: 3    BD INSULIN PEN NEEDLE UF MINI 31 x 3/16 " Ndle, , Disp: , Rfl: 0    blood sugar diagnostic Strp, 2 strips by Misc.(Non-Drug; Combo Route) route 2 (two) times daily., Disp: 100 each, Rfl: 3    buPROPion (WELLBUTRIN SR) 100 MG TBSR 12 hr tablet, Take 100 mg by mouth 2 (two) times daily. , Disp: , Rfl: 0    chlorthalidone (HYGROTEN) 25 MG Tab, take 1 tablet by mouth once daily, Disp: 90 tablet, Rfl: 1    chlorthalidone (HYGROTEN) 25 MG Tab, take 1 tablet by mouth once daily, Disp: 90 tablet, Rfl: 1    dextroamphetamine-amphetamine (ADDERALL) 15 mg tablet, Take 15 mg by mouth 2 (two) times daily., Disp: , Rfl: 0    dextroamphetamine-amphetamine 10 mg Tab, Take 1 tablet by mouth 2 (two) times daily., Disp: , Rfl: 0    esomeprazole (NEXIUM) 40 MG capsule, Take 1 capsule (40 mg total) by mouth before breakfast. (Patient taking differently: Take 20 mg by mouth before breakfast. ), Disp: 30 capsule, Rfl: 5    FARXIGA 5 mg Tab tablet, take 1 tablet by mouth once daily, Disp: 90 tablet, Rfl: 0    finasteride (PROSCAR) 5 mg tablet, Take 1 tablet (5 mg total) by mouth once daily., Disp: 90 tablet, Rfl: 3    FLONASE ALLERGY RELIEF 50 mcg/actuation nasal spray, 2 sprays by Each Nare route once daily., Disp: 1 Bottle, Rfl: 5    glimepiride (AMARYL) 4 MG tablet, take 1 tablet by mouth BEFORE BREAKFAST, Disp: 90 tablet, Rfl: 3    LANTUS SOLOSTAR " U-100 INSULIN 100 unit/mL (3 mL) InPn pen, inject 65 units subcutaneously at bedtime -INCREASE BY 1 UNIT EVERY 4 DAYS UNTIL FASTING SUGARS ARE BELOW 130, Disp: 36 mL, Rfl: 0    LATUDA 80 mg Tab tablet, Take 80 mg by mouth every evening. , Disp: , Rfl: 0    lisinopril (PRINIVIL,ZESTRIL) 5 MG tablet, take 1 tablet by mouth once daily, Disp: 30 tablet, Rfl: 0    lisinopril (PRINIVIL,ZESTRIL) 5 MG tablet, take 1 tablet by mouth once daily, Disp: 90 tablet, Rfl: 0    lorazepam (ATIVAN) 1 MG tablet, Take 1 mg by mouth 2 (two) times daily. , Disp: , Rfl:     metFORMIN (GLUCOPHAGE) 1000 MG tablet, Take 1 tablet (1,000 mg total) by mouth 2 (two) times daily with meals., Disp: 180 tablet, Rfl: 3    mirabegron 50 mg Tb24, Take 1 tablet (50 mg total) by mouth once daily., Disp: 30 tablet, Rfl: 11    naproxen (NAPROSYN) 500 MG tablet, Take 1 tablet by mouth daily as needed., Disp: , Rfl: 0    paroxetine (PAXIL) 30 MG tablet, Take 1 tablet by mouth once daily., Disp: , Rfl: 0    pregabalin (LYRICA) 50 MG capsule, 2 by mouth in the morning, 1 by mouth in the afternoon and 2 by mouth at night, Disp: 450 capsule, Rfl: 1    tamsulosin (FLOMAX) 0.4 mg Cp24, Take 2 capsules (0.8 mg total) by mouth once daily., Disp: 180 capsule, Rfl: 1        Alopecia  - Follow-up  Diagnosis: Male pattern alopeica.  Symptom course: stable  Currently using: n/a.  Affected locations: scalp  Severity: mild        Review of Systems   Constitutional: Negative for fever, chills and fatigue.   Skin: Positive for activity-related sunscreen use and wears hat.   Hematologic/Lymphatic: Bruises/bleeds easily.        Objective:    Physical Exam   Constitutional: He appears well-developed and well-nourished. He is obese.  No distress.   HENT:   Mouth/Throat: Lips normal.    Eyes: Lids are normal.  No conjunctival no injection.   Cardiovascular: There is no local extremity swelling and no dependent edema.     Neurological: He is alert and oriented to  person, place, and time. He is not disoriented.   Psychiatric: He has a normal mood and affect.   Skin:   Areas Examined (abnormalities noted in diagram):   Scalp / Hair Palpated and Inspected  Head / Face Inspection Performed  Neck Inspection Performed  Chest / Axilla Inspection Performed  Abdomen Inspection Performed  Genitals / Buttocks / Groin Inspection Performed  Back Inspection Performed  RUE Inspected  LUE Inspection Performed  RLE Inspected  LLE Inspection Performed  Nails and Digits Inspection Performed                   Diagram Legend     Erythematous scaling macule/papule c/w actinic keratosis       Vascular papule c/w angioma      Pigmented verrucoid papule/plaque c/w seborrheic keratosis      Yellow umbilicated papule c/w sebaceous hyperplasia      Irregularly shaped tan macule c/w lentigo     1-2 mm smooth white papules consistent with Milia      Movable subcutaneous cyst with punctum c/w epidermal inclusion cyst      Subcutaneous movable cyst c/w pilar cyst      Firm pink to brown papule c/w dermatofibroma      Pedunculated fleshy papule(s) c/w skin tag(s)      Evenly pigmented macule c/w junctional nevus     Mildly variegated pigmented, slightly irregular-bordered macule c/w mildly atypical nevus      Flesh colored to evenly pigmented papule c/w intradermal nevus       Pink pearly papule/plaque c/w basal cell carcinoma      Erythematous hyperkeratotic cursted plaque c/w SCC      Surgical scar with no sign of skin cancer recurrence      Open and closed comedones      Inflammatory papules and pustules      Verrucoid papule consistent consistent with wart     Erythematous eczematous patches and plaques     Dystrophic onycholytic nail with subungual debris c/w onychomycosis     Umbilicated papule    Erythematous-base heme-crusted tan verrucoid plaque consistent with inflamed seborrheic keratosis     Erythematous Silvery Scaling Plaque c/w Psoriasis     See annotation      Assessment / Plan:         Multiple benign nevi  Discussed ABCDE's of nevi.  Monitor for new mole or moles that are becoming bigger, darker, irritated, or developing irregular borders. Brochure provided.    Seborrheic keratoses  These are benign inherited growths without a malignant potential. Reassurance given to patient. No treatment is necessary.     Solar lentigo  This is a benign hyperpigmented sun induced lesion. Daily sun protection will reduce the number of new lesions. Treatment of these benign lesions are considered cosmetic.    Dermatofibroma  Reassurance    Skin cancer screening  Total body skin examination performed today including at least 12 points as noted in physical examination. No lesions suspicious for malignancy noted.    Patient instructed in importance in daily sun protection of at least spf 30. Sun avoidance and topical protection discussed.   Recommend Elta MD (physician office) COTZ sensitive (available online) for daily use on face and neck.  Patient encouraged to wear hat for all outdoor exposure.   Also discussed sun protective clothing.             Follow-up if symptoms worsen or fail to improve.

## 2018-08-30 DIAGNOSIS — E78.5 HYPERLIPIDEMIA ASSOCIATED WITH TYPE 2 DIABETES MELLITUS: ICD-10-CM

## 2018-08-30 DIAGNOSIS — Z91.89 CARDIOVASCULAR EVENT RISK: ICD-10-CM

## 2018-08-30 DIAGNOSIS — E11.69 HYPERLIPIDEMIA ASSOCIATED WITH TYPE 2 DIABETES MELLITUS: ICD-10-CM

## 2018-08-30 DIAGNOSIS — I10 ESSENTIAL HYPERTENSION: ICD-10-CM

## 2018-08-30 RX ORDER — ATORVASTATIN CALCIUM 40 MG/1
40 TABLET, FILM COATED ORAL NIGHTLY
Qty: 30 TABLET | Refills: 0 | Status: SHIPPED | OUTPATIENT
Start: 2018-08-30 | End: 2018-09-23 | Stop reason: SDUPTHER

## 2018-08-30 RX ORDER — CHLORTHALIDONE 25 MG/1
25 TABLET ORAL DAILY
Qty: 30 TABLET | Refills: 0 | Status: SHIPPED | OUTPATIENT
Start: 2018-08-30 | End: 2018-09-23 | Stop reason: SDUPTHER

## 2018-09-07 RX ORDER — LISINOPRIL 5 MG/1
TABLET ORAL
Qty: 90 TABLET | Refills: 3 | Status: SHIPPED | OUTPATIENT
Start: 2018-09-07 | End: 2019-08-17 | Stop reason: SDUPTHER

## 2018-09-11 ENCOUNTER — OFFICE VISIT (OUTPATIENT)
Dept: UROLOGY | Facility: CLINIC | Age: 58
End: 2018-09-11
Payer: COMMERCIAL

## 2018-09-11 VITALS
WEIGHT: 280.44 LBS | DIASTOLIC BLOOD PRESSURE: 73 MMHG | HEIGHT: 71 IN | RESPIRATION RATE: 18 BRPM | SYSTOLIC BLOOD PRESSURE: 120 MMHG | HEART RATE: 99 BPM | BODY MASS INDEX: 39.26 KG/M2 | TEMPERATURE: 99 F

## 2018-09-11 DIAGNOSIS — N39.41 URGE INCONTINENCE: ICD-10-CM

## 2018-09-11 DIAGNOSIS — R33.9 INCOMPLETE BLADDER EMPTYING: ICD-10-CM

## 2018-09-11 DIAGNOSIS — N13.8 BPH WITH URINARY OBSTRUCTION: Primary | ICD-10-CM

## 2018-09-11 DIAGNOSIS — N40.1 BPH WITH URINARY OBSTRUCTION: Primary | ICD-10-CM

## 2018-09-11 LAB
BILIRUB SERPL-MCNC: ABNORMAL MG/DL
BLOOD URINE, POC: ABNORMAL
COLOR, POC UA: ABNORMAL
GLUCOSE UR QL STRIP: 1000
KETONES UR QL STRIP: ABNORMAL
LEUKOCYTE ESTERASE URINE, POC: ABNORMAL
NITRITE, POC UA: ABNORMAL
PH, POC UA: 5
POC RESIDUAL URINE VOLUME: 117 ML (ref 0–100)
PROTEIN, POC: ABNORMAL
SPECIFIC GRAVITY, POC UA: 1
UROBILINOGEN, POC UA: ABNORMAL

## 2018-09-11 PROCEDURE — 3074F SYST BP LT 130 MM HG: CPT | Mod: CPTII,S$GLB,, | Performed by: UROLOGY

## 2018-09-11 PROCEDURE — 99358 PROLONG SERVICE W/O CONTACT: CPT | Mod: S$GLB,,, | Performed by: UROLOGY

## 2018-09-11 PROCEDURE — 99215 OFFICE O/P EST HI 40 MIN: CPT | Mod: 25,S$GLB,, | Performed by: UROLOGY

## 2018-09-11 PROCEDURE — 81002 URINALYSIS NONAUTO W/O SCOPE: CPT | Mod: S$GLB,,, | Performed by: UROLOGY

## 2018-09-11 PROCEDURE — 51798 US URINE CAPACITY MEASURE: CPT | Mod: S$GLB,,, | Performed by: UROLOGY

## 2018-09-11 PROCEDURE — 3008F BODY MASS INDEX DOCD: CPT | Mod: CPTII,S$GLB,, | Performed by: UROLOGY

## 2018-09-11 PROCEDURE — 99999 PR PBB SHADOW E&M-EST. PATIENT-LVL IV: CPT | Mod: PBBFAC,,, | Performed by: UROLOGY

## 2018-09-11 PROCEDURE — 87086 URINE CULTURE/COLONY COUNT: CPT

## 2018-09-11 PROCEDURE — 3078F DIAST BP <80 MM HG: CPT | Mod: CPTII,S$GLB,, | Performed by: UROLOGY

## 2018-09-11 RX ORDER — TAMSULOSIN HYDROCHLORIDE 0.4 MG/1
0.8 CAPSULE ORAL DAILY
Qty: 180 CAPSULE | Refills: 1 | Status: ON HOLD | OUTPATIENT
Start: 2018-09-11 | End: 2018-11-15 | Stop reason: SDUPTHER

## 2018-09-11 RX ORDER — FINASTERIDE 5 MG/1
5 TABLET, FILM COATED ORAL DAILY
Qty: 90 TABLET | Refills: 3 | Status: SHIPPED | OUTPATIENT
Start: 2018-09-11 | End: 2018-12-31

## 2018-09-11 NOTE — PATIENT INSTRUCTIONS
Discussed conservative measures to control urgency and frequency including   1. avoiding bladder irritants (see below) and increase water intake    2. timed voiding - empty on a schedule (approx ~2-3 hours)    3. dont postponing voiding - dont hold it on purpose - go when you need to go    4. bowel regimen as distended bowel has extrinsic compressive effect on bladder.   - any or all of the following in any combination, titrate to soft daily bowel movement without pushing or straining  - colace/stool softener capsule - once to twice daily  - miralax - 1 capful daily to start, can increase to 2x daily (or decrease to 1/2 cap daily)  - increase dietary fibery  - fiber supplements, such as metamucil, fiber gummies, etc    ** Discussed bladder irritants include coffe (even decaf), tea, alcohol, soda, spicy foods, acidic juices (orange, tomato), vinegar, and artificial sweeteners/sugary beverages.    Good glucose control is very important - control blood sugars and watch diet as glucose is irritating to bladder. (today had 1000 glucose in urine)    Continue flomax 0.8mg (2 capsules at once) daily, finasteride, myrbetriq.    Adhere to all recommendations for 4-6 weeks then will re-evaluate obstruction with cystoscopy and prostate ultrasound (for measurement of volume only)  - 10/23/18 at [facility]

## 2018-09-11 NOTE — PROGRESS NOTES
[facility] Urology:    Sarwat Colunga is a 57 y.o. male who presents for follow up of urge incontinence    He has previously been followed by Dr Garsia for BPH and urgency/UUI  He was last seen in Dec 2017 noting 6 mos of urgency with UUI on way before he can get there. Endorsed good flow, no dysuria, no hematuria, and satisfactory emptying, and NTFx3.  He does have DANNY and is on CPAP, and is on multiple agents for DM including insulin  Noted to have history of BPH and elevated psa in 2015 when psa was 7.3 and biopsy negative with prostate volume of 50mL  He was taking Flomax and finasteride, but he stopped the finasteride and was only taking flomax at time of last eval, and psa 12/18/17 was 1.1. PVR 27cc. Started myrbetriq    He established care with Dr Garsia in May 2013 2/2 obstructive LUTS, NTF x2-3, terminal dribbling, hesitancy, intermittency, and urgency. Occ good flow, occ restricted flow. Was given a trial of rapaflo, which by 4/2014 was noted to be controlling him well with good flow ans NTF x1, though in interim he had been started on TRT by Dr Page.  PSA 6/5/13 was 1.84 and after starting TRT fanny to 5.7 in July 2014.   He returned to Dr Garsia in May 2015 having been off TRT for some time, and noted to have boggy prostate and was given 3 week course of bactrim. Repeat psa was 7.3 and his hormone profile was within normal limits so advised against TRT and planned prostate biopsy.  7/7/15 had cysto and prostate biopsy. TRUS volume 49.1g (H:  34.5, W:  47.5, L:  57.4). 12 core biopsy benign.  Cysto noted 6 cm enlarged with NÚÑEZ, bladder with g2 trabeculations and areas concerning for mild IC  After reviewing negative biopsy and discussion of medical and surgical options for BPH (was already on flomax with NTF 2-4x), started dual medical therapy with addition of finasteride 5mg.  3/4/16 was still on both medications and well controlled and PSA 12/2015 was 1.1.  He then returned in 12/2017 as above with stable  "psa at 1.1 though was off of finasteride, though had 6 mos of urgency with UUI.    He returns today noting.  Stream is good since on meds  Feels like he empties though stream drips forever at end of voiding.  Has continued to have significant urgency with enormous pressure he cant even make it to bathroom sometimes  DTF 10-12x. Drinks a lot of tea - 3 large mugs in morning. Used to drink similar tea intake at night up until bedtime and has switched to water.  NTF 1-2x. 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   All day sig amount tea and soda. 1 gallon iced tea per day  + constipation - every day though has to push or strain and has hard stool balls. He notes he "craps toilet plugs" necessitating plunging toilet almost every time.  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 Does try to go every few hours if he can.  UUI episodes 1x/day. Wears diaper for safety because   A1C 7.5 8/24/18 from 7.2 12/17 from 6.2 7/17. Doesn't check sugars.  Las psa 1.1 in dec 2017.  No hematuria, dysuria.  Urine stream is not a laminar flow, is rough flow and squirts all around.  Has had nonforceful dripping stream previously and stream now has force to it with meds.  Has had no change in urgency since starting myrbetriq except that used to have full loss of urinary control and now just wets diaper and can get the rest to the urinal.  Unsure if taking flomax at 0.4 or 0.8mg dose. Thinks he is on flomax, finast, myrbetriq - not totally sure of his meds noting his wife prepares his meds.  AUA symptom score: 9/4 (5 - urgency, 2 - frequency, sleeping) - meds help 2/10    Udip:trc prot, 1000 gluc  PVR by bladder scan on arrival (after voiding just PTA 5 mins prior) 275cc. Patient was asked to void again and PVR by bladder scan 117cc       Past Medical History:   Diagnosis Date    Arthritis     Benign localized hyperplasia of " prostate without urinary obstruction and other lower urinary tract symptoms (LUTS)     Body mass index 37.0-37.9, adult     Diabetes mellitus     Esophageal reflux     Hypertension     Other and unspecified hyperlipidemia     Other testicular hypofunction     Polyneuropathy in diabetes(357.2)     PONV (postoperative nausea and vomiting)     Rosacea     Sleep apnea     uses CPAP    Type II or unspecified type diabetes mellitus with neurological manifestations, uncontrolled(250.62)        Past Surgical History:   Procedure Laterality Date    ANKLE SURGERY Right     COLONOSCOPY N/A 5/17/2017    Procedure: COLONOSCOPY;  Surgeon: Carlos Hess MD;  Location: [facility] ENDO;  Service: Endoscopy;  Laterality: N/A;    COLONOSCOPY N/A 5/17/2017    Performed by Carlos [facility] at [facility] ENDO    COLONOSCOPY W/ POLYPECTOMY      CYSTOSCOPY      CYSTOSCOPY N/A 7/7/2015    Per[facility] by [facility] at [facility] OR    DEBRIDEMENT TENNIS ELBOW      right    EXCISION, SOFT TISSUE N/A 3/7/2014    [facility]    FOREARM SURGERY Right     tendon repair    FRACTURE SURGERY      right ankle orif    HERNIA REPAIR      umbillical    PROSTATE BIOPSY      QUADRICEPS REPAIR      right    TONSILLECTOMY      TRANSRECTAL ULTRASOUND GUIDED PROSTATE BIOPSY N/A 7/7/2015    [facility] at [facility] OR    TRICEPS TENDON RELEASE      tendon repair left tricep    VASECTOMY         Family History   Problem Relation Age of Onset    Pulmonary embolism Mother     No Known Problems Father     Heart disease Other     Heart attack Other     Hypertension Other     Hyperlipidemia Other     Arthritis Other     Clotting disorder Other     Mental illness Other     Diabetes Other     Cancer Other     Melanoma Neg Hx     Psoriasis Neg Hx     Lupus Neg Hx     Eczema Neg Hx        Social History     Socioeconomic History    Marital status:      Spouse name: Not on  file    Number of children: Not on file    Years of education: Not on file    Highest education level: Not on file   Social Needs    Financial resource strain: Not on file    Food insecurity - worry: Not on file    Food insecurity - inability: Not on file    Transportation needs - medical: Not on file    Transportation needs - non-medical: Not on file   Occupational History    Occupation:    Tobacco Use    Smoking status: Never Smoker    Smokeless tobacco: Never Used   Substance and Sexual Activity    Alcohol use: No    Drug use: No    Sexual activity: Not on file   Other Topics Concern    Not on file   Social History Narrative    Not on file       Review of patient's allergies indicates:   Allergen Reactions    Bactrim [sulfamethoxazole-trimethoprim] Other (See Comments)     Dizziness,lightheaded, heavy chest    Ciprofloxacin Other (See Comments)     CONFUSED, OFF BALANCE, WOKE UP AT NITE    Doxycycline Hives    Lamictal [lamotrigine] Rash    Trileptal [oxcarbazepine]        Medications Reviewed: see MAR    ROS:    Constitutional: denies fevers, chills, night sweats, fatigue, malaise  Respiratory: negative for cough, shortness of breath, wheezing, dyspnea.  Cardiovascular: + for high blood pressure, negative for chest pain, varicose veins, ankle swelling, palpitations, syncope.  GI: + gerd, constipation - negative for abdominal pain, nausea, vomiting, diarrhea, blood in stool.   Urology: as noted above in HPI  Endocrinology: negative for cold intolerance, excessive thirst, not feeling tired/sluggish, no heat intolerance.   Hematology/Lymph: negative for easy bleeding, easy bruising, swollen glands.  Musculoskeletal: negative for back pain, joint pain, joint swelling, neck pain.  Allergy-Immunology: negative for seasonal allergies, negative for unusual infections.   Skin: negative for boils, breast lumps, hives, itching, rash.   Neurology: negative for, dizziness, headache,  "tingling/numbness, tremors.   Psych: satisfied with life; negative for, anxiety, depression, suicidal thoughts.     PHYSICAL EXAM:    Vitals:    09/11/18 0854   BP: 120/73   Pulse: 99   Resp: 18   Temp: 98.7 °F (37.1 °C)     Body mass index is 39.11 kg/m². Weight: 127.2 kg (280 lb 6.8 oz) Height: 5' 11" (180.3 cm)       General: Alert, cooperative, no distress, appears stated age  Head: Normocephalic, without obvious abnormality, atraumatic  Neck: no masses, no thyromegaly, no lymphadenopathy  Eyes: PERRL, conjunctiva/corneas clear  Lungs: Respirations unlabored, normal effort, no accessory muscle use  CV: Warm and well perfused extremities  Abdomen: Soft, non-tender, no CVA tenderness, no hepatosplenomegaly, no hernia  Penis: phallus normal, circumcised, well cared for, no plaques or lesions.   Scrotum: no cysts, no lesions, no rash, no hydrocele.   Epididymes: normal, nontender, symmetrical, no masses or cysts.   Testes: normal, both descended, no masses.   Urethra: palpably normal with orthotopic meatus of normal size    BEVERLY: normal sphincter tone, no masses, no hemmorrhoids   PROSTATE: 15-35g, no nodules, non-tender, symmetrical.   Extremities: Extremities normal, atraumatic, no cyanosis or edema  Skin: Normal color, texture, and turgor, no rashes or lesions  Psych: Appropriate, well oriented, normal affect, normal mood  Neuro: Non-focal    Lab Results   Component Value Date    PSA 1.84 06/05/2013       LABS:    Recent Results (from the past 336 hour(s))   Urine culture    Collection Time: 09/11/18  9:53 AM   Result Value Ref Range    Urine Culture, Routine No growth    POCT URINE DIPSTICK WITHOUT MICROSCOPE    Collection Time: 09/11/18 10:16 AM   Result Value Ref Range    Color, UA yellow,clear     Spec Grav UA 1.005     pH, UA 5.0     WBC, UA neg     Nitrite, UA neg     Protein trace     Glucose, UA 1,000     Ketones, UA neg     Urobilinogen, UA neg     Bilirubin neg     Blood, UA neg    POCT Bladder Scan    " Collection Time: 09/11/18 10:16 AM   Result Value Ref Range    POC Residual Urine Volume 117 (A) 0 - 100 mL   *after 2nd void, initially 275cc      Assessment/Diagnosis:    1. BPH with urinary obstruction  POCT URINE DIPSTICK WITHOUT MICROSCOPE    POCT Bladder Scan    Case Request Operating Room: CYSTOSCOPY, ULTRASOUND, TRANSRECTAL   2. Urge incontinence  Urine culture   3. Incomplete bladder emptying  tamsulosin (FLOMAX) 0.4 mg Cap       Plans:  He has a long history of BPH, proven on pathology from prostate biopsy, and concurrent cystoscopy at that time demonstrated significant bladder outlet obstruction with trabeculated bladder. Cystoscopy did also note some bladder findings concerning for possible interstitial cystitis or chronic cystitis.  While those conditions can cause urgency, given his history I a.m. most suspicious of longstanding obstruction causing urgency given bladder changes consistent with obstruction seen on previous exam.    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    Discussed conservative measures to control urgency and frequency including avoiding bladder irritants, increasing water intake, timed voiding, not postponing voiding, and bowel regimen (as distended bowel has extrinsic compressive effect on bladder. Discussed bladder irritants include coffe (even decaf), tea, alcohol, soda, spicy foods, acidic juices (orange, tomato), vinegar, and artificial sweeteners.  Detailed OTC bowel regimen provided.  Discussed the importance of tight glucose control and advised to control blood sugars and watch diet as glucose is irritating to bladder. (today had  1000 glucose in urine)    Continue flomax 0.8mg (2 capsules at once) daily, finasteride, myrbetriq.  He is unsure about the actual medications or dosing he is receiving as he does not prepare his meds and therefore these have all been written down for him to ensure he continues all at this time.  He should adhere to all recommendations for 4-6 weeks then will re-evaluate lower urinary tract for obstruction, as well as bladder for any abnormalities, with cystoscopy and prostate ultrasound (for accurarte measurement of volume only given previous negative biopsy and stable psa).  Both procedures in detail were discussed with the patient and all questions answered.  Patient agreeable to treatment plan.     Cysto/Trus scheduled 10/23/18 at [facility]    50 min spent in direct face-to-face patient encounter, prior to which approximately 60 min were spent an extensive review of the patient's medical record urologic history as documented above in the HPI

## 2018-09-12 LAB — BACTERIA UR CULT: NO GROWTH

## 2018-09-19 DIAGNOSIS — E11.49 DIABETIC RADICULOPATHY: ICD-10-CM

## 2018-09-19 DIAGNOSIS — M54.10 DIABETIC RADICULOPATHY: ICD-10-CM

## 2018-09-20 RX ORDER — INSULIN GLARGINE 100 [IU]/ML
INJECTION, SOLUTION SUBCUTANEOUS
Qty: 36 ML | Refills: 0 | Status: SHIPPED | OUTPATIENT
Start: 2018-09-20 | End: 2018-11-07 | Stop reason: SDUPTHER

## 2018-09-23 DIAGNOSIS — E11.69 HYPERLIPIDEMIA ASSOCIATED WITH TYPE 2 DIABETES MELLITUS: ICD-10-CM

## 2018-09-23 DIAGNOSIS — E78.5 HYPERLIPIDEMIA ASSOCIATED WITH TYPE 2 DIABETES MELLITUS: ICD-10-CM

## 2018-09-23 DIAGNOSIS — I10 ESSENTIAL HYPERTENSION: ICD-10-CM

## 2018-09-23 DIAGNOSIS — Z91.89 CARDIOVASCULAR EVENT RISK: ICD-10-CM

## 2018-09-24 RX ORDER — CHLORTHALIDONE 25 MG/1
TABLET ORAL
Qty: 30 TABLET | Refills: 0 | Status: SHIPPED | OUTPATIENT
Start: 2018-09-24 | End: 2018-11-29 | Stop reason: SDUPTHER

## 2018-09-24 RX ORDER — ATORVASTATIN CALCIUM 40 MG/1
TABLET, FILM COATED ORAL
Qty: 30 TABLET | Refills: 0 | Status: SHIPPED | OUTPATIENT
Start: 2018-09-24 | End: 2018-11-29 | Stop reason: SDUPTHER

## 2018-10-07 DIAGNOSIS — I10 ESSENTIAL HYPERTENSION: ICD-10-CM

## 2018-10-08 DIAGNOSIS — E11.40 TYPE 2 DIABETES, CONTROLLED, WITH NEUROPATHY: ICD-10-CM

## 2018-10-08 RX ORDER — CHLORTHALIDONE 25 MG/1
TABLET ORAL
Qty: 30 TABLET | Refills: 0 | OUTPATIENT
Start: 2018-10-08

## 2018-10-14 RX ORDER — DAPAGLIFLOZIN 5 MG/1
5 TABLET, FILM COATED ORAL DAILY
Qty: 30 TABLET | Refills: 0 | Status: SHIPPED | OUTPATIENT
Start: 2018-10-14 | End: 2018-11-16 | Stop reason: SDUPTHER

## 2018-10-21 DIAGNOSIS — Z91.89 CARDIOVASCULAR EVENT RISK: ICD-10-CM

## 2018-10-21 DIAGNOSIS — E78.5 HYPERLIPIDEMIA ASSOCIATED WITH TYPE 2 DIABETES MELLITUS: ICD-10-CM

## 2018-10-21 DIAGNOSIS — E11.69 HYPERLIPIDEMIA ASSOCIATED WITH TYPE 2 DIABETES MELLITUS: ICD-10-CM

## 2018-10-22 RX ORDER — ATORVASTATIN CALCIUM 40 MG/1
TABLET, FILM COATED ORAL
Qty: 30 TABLET | Refills: 0 | OUTPATIENT
Start: 2018-10-22

## 2018-10-23 ENCOUNTER — HOSPITAL ENCOUNTER (OUTPATIENT)
Facility: AMBULARY SURGERY CENTER | Age: 58
Discharge: HOME | End: 2018-10-23
Attending: UROLOGY | Admitting: UROLOGY
Payer: COMMERCIAL

## 2018-10-23 DIAGNOSIS — N13.8 BPH WITH URINARY OBSTRUCTION: ICD-10-CM

## 2018-10-23 DIAGNOSIS — N40.1 BPH WITH URINARY OBSTRUCTION: ICD-10-CM

## 2018-10-23 PROCEDURE — 52000 CYSTOURETHROSCOPY: CPT | Mod: ,,, | Performed by: UROLOGY

## 2018-10-23 PROCEDURE — 76872 US TRANSRECTAL: CPT | Performed by: UROLOGY

## 2018-10-23 PROCEDURE — 52000 CYSTOURETHROSCOPY: CPT | Performed by: UROLOGY

## 2018-10-23 PROCEDURE — 76872 US TRANSRECTAL: CPT | Mod: 26,,, | Performed by: UROLOGY

## 2018-10-23 RX ORDER — CEFDINIR 300 MG/1
300 CAPSULE ORAL 2 TIMES DAILY
Qty: 6 CAPSULE | Refills: 0 | Status: SHIPPED | OUTPATIENT
Start: 2018-10-23 | End: 2018-10-26

## 2018-10-23 RX ORDER — LIDOCAINE HYDROCHLORIDE 20 MG/ML
JELLY TOPICAL ONCE
Status: COMPLETED | OUTPATIENT
Start: 2018-10-23 | End: 2018-10-23

## 2018-10-23 RX ORDER — LIDOCAINE HYDROCHLORIDE 20 MG/ML
JELLY TOPICAL
Status: DISCONTINUED
Start: 2018-10-23 | End: 2018-10-23 | Stop reason: HOSPADM

## 2018-10-23 NOTE — H&P (VIEW-ONLY)
[facility]:     Sarwat Colunga is a 57 y.o. male who presents for follow up of urge incontinence     He has previously been followed by Dr Garsia for BPH and urgency/UUI  He was last seen in Dec 2017 noting 6 mos of urgency with UUI on way before he can get there. Endorsed good flow, no dysuria, no hematuria, and satisfactory emptying, and NTFx3.  He does have DANNY and is on CPAP, and is on multiple agents for DM including insulin  Noted to have history of BPH and elevated psa in 2015 when psa was 7.3 and biopsy negative with prostate volume of 50mL  He was taking Flomax and finasteride, but he stopped the finasteride and was only taking flomax at time of last eval, and psa 12/18/17 was 1.1. PVR 27cc. Started myrbetriq     He established care with Dr Garsia in May 2013 2/2 obstructive LUTS, NTF x2-3, terminal dribbling, hesitancy, intermittency, and urgency. Occ good flow, occ restricted flow. Was given a trial of rapaflo, which by 4/2014 was noted to be controlling him well with good flow ans NTF x1, though in interim he had been started on TRT by Dr Page.  PSA 6/5/13 was 1.84 and after starting TRT fanny to 5.7 in July 2014.   He returned to Dr Garsia in May 2015 having been off TRT for some time, and noted to have boggy prostate and was given 3 week course of bactrim. Repeat psa was 7.3 and his hormone profile was within normal limits so advised against TRT and planned prostate biopsy.  7/7/15 had cysto and prostate biopsy. TRUS volume 49.1g (H:  34.5, W:  47.5, L:  57.4). 12 core biopsy benign.  Cysto noted 6 cm enlarged with NÚÑEZ, bladder with g2 trabeculations and areas concerning for mild IC  After reviewing negative biopsy and discussion of medical and surgical options for BPH (was already on flomax with NTF 2-4x), started dual medical therapy with addition of finasteride 5mg.  3/4/16 was still on both medications and well controlled and PSA 12/2015 was 1.1.  He then returned in 12/2017 as above with  "stable psa at 1.1 though was off of finasteride, though had 6 mos of urgency with UUI.     He returns today noting.  Stream is good since on meds  Feels like he empties though stream drips forever at end of voiding.  Has continued to have significant urgency with enormous pressure he cant even make it to bathroom sometimes  DTF 10-12x. Drinks a lot of tea - 3 large mugs in morning. Used to drink similar tea intake at night up until bedtime and has switched to water.  NTF 1-2x. 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   All day sig amount tea and soda. 1 gallon iced tea per day  + constipation - every day though has to push or strain and has hard stool balls. He notes he "craps toilet plugs" necessitating plunging toilet almost every time.  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 Does try to go every few hours if he can.  UUI episodes 1x/day. Wears diaper for safety because   A1C 7.5 8/24/18 from 7.2 12/17 from 6.2 7/17. Doesn't check sugars.  Las psa 1.1 in dec 2017.  No hematuria, dysuria.  Urine stream is not a laminar flow, is rough flow and squirts all around.  Has had nonforceful dripping stream previously and stream now has force to it with meds.  Has had no change in urgency since starting myrbetriq except that used to have full loss of urinary control and now just wets diaper and can get the rest to the urinal.  Unsure if taking flomax at 0.4 or 0.8mg dose. Thinks he is on flomax, finast, myrbetriq - not totally sure of his meds noting his wife prepares his meds.  AUA symptom score: 9/4 (5 - urgency, 2 - frequency, sleeping) - meds help 2/10     Udip:trc prot, 1000 gluc  PVR by bladder scan on arrival (after voiding just PTA 5 mins prior) 275cc. Patient was asked to void again and PVR by bladder scan 117cc              Past Medical History:   Diagnosis Date    Arthritis      Benign " localized hyperplasia of prostate without urinary obstruction and other lower urinary tract symptoms (LUTS)      Body mass index 37.0-37.9, adult      Diabetes mellitus      Esophageal reflux      Hypertension      Other and unspecified hyperlipidemia      Other testicular hypofunction      Polyneuropathy in diabetes(357.2)      PONV (postoperative nausea and vomiting)      Rosacea      Sleep apnea       uses CPAP    Type II or unspecified type diabetes mellitus with neurological manifestations, uncontrolled(250.62)                 Past Surgical History:   Procedure Laterality Date    ANKLE SURGERY Right      COLONOSCOPY N/A 5/17/2017     Procedure: COLONOSCOPY;  Surgeon: Carlos Hess MD;  [facility];  Service: Endoscopy;  Laterality: N/A;    COLONOSCOPY N/A 5/17/2017     Performed by [facility] at [facility] ENDO    COLONOSCOPY W/ POLYPECTOMY        CYSTOSCOPY        CYSTOSCOPY N/A 7/7/2015     Performed by [facility] at [facility] OR    DEBRIDEMENT TENNIS ELBOW         right    EXCISION, SOFT TISSUE N/A 3/7/2014     [facility] at [facility]    FOREARM SURGERY Right       tendon repair    FRACTURE SURGERY         right ankle orif    HERNIA REPAIR         umbillical    PROSTATE BIOPSY        QUADRICEPS REPAIR         right    TONSILLECTOMY        TRANSRECTAL ULTRASOUND GUIDED PROSTATE BIOPSY N/A 7/7/2015     Per[facility] by [facility] at [facility] OR    TRICEPS TENDON RELEASE         tendon repair left tricep    VASECTOMY                   Family History   Problem Relation Age of Onset    Pulmonary embolism Mother      No Known Problems Father      Heart disease Other      Heart attack Other      Hypertension Other      Hyperlipidemia Other      Arthritis Other      Clotting disorder Other      Mental illness Other      Diabetes Other      Cancer Other      Melanoma Neg Hx      Psoriasis Neg Hx      Lupus Neg Hx      Eczema Neg Hx            Social History               Socioeconomic History    Marital status:        Spouse name: Not on file    Number of children: Not on file    Years of education: Not on file    Highest education level: Not on file   Social Needs    Financial resource strain: Not on file    Food insecurity - worry: Not on file    Food insecurity - inability: Not on file    Transportation needs - medical: Not on file    Transportation needs - non-medical: Not on file   Occupational History    Occupation:    Tobacco Use    Smoking status: Never Smoker    Smokeless tobacco: Never Used   Substance and Sexual Activity    Alcohol use: No    Drug use: No    Sexual activity: Not on file   Other Topics Concern    Not on file   Social History Narrative    Not on file                  Review of patient's allergies indicates:   Allergen Reactions    Bactrim [sulfamethoxazole-trimethoprim] Other (See Comments)       Dizziness,lightheaded, heavy chest    Ciprofloxacin Other (See Comments)       CONFUSED, OFF BALANCE, WOKE UP AT NITE    Doxycycline Hives    Lamictal [lamotrigine] Rash    Trileptal [oxcarbazepine]           Medications Reviewed: see MAR     ROS:     Constitutional: denies fevers, chills, night sweats, fatigue, malaise  Respiratory: negative for cough, shortness of breath, wheezing, dyspnea.  Cardiovascular: + for high blood pressure, negative for chest pain, varicose veins, ankle swelling, palpitations, syncope.  GI: + gerd, constipation - negative for abdominal pain, nausea, vomiting, diarrhea, blood in stool.   Urology: as noted above in HPI  Endocrinology: negative for cold intolerance, excessive thirst, not feeling tired/sluggish, no heat intolerance.   Hematology/Lymph: negative for easy bleeding, easy bruising, swollen glands.  Musculoskeletal: negative for back pain, joint pain, joint swelling, neck pain.  Allergy-Immunology: negative for seasonal allergies, negative for unusual  "infections.   Skin: negative for boils, breast lumps, hives, itching, rash.   Neurology: negative for, dizziness, headache, tingling/numbness, tremors.   Psych: satisfied with life; negative for, anxiety, depression, suicidal thoughts.      PHYSICAL EXAM:         Vitals:     09/11/18 0854   BP: 120/73   Pulse: 99   Resp: 18   Temp: 98.7 °F (37.1 °C)      Body mass index is 39.11 kg/m². Weight: 127.2 kg (280 lb 6.8 oz) Height: 5' 11" (180.3 cm)         General: Alert, cooperative, no distress, appears stated age  Head: Normocephalic, without obvious abnormality, atraumatic  Neck: no masses, no thyromegaly, no lymphadenopathy  Eyes: PERRL, conjunctiva/corneas clear  Lungs: Respirations unlabored, normal effort, no accessory muscle use  CV: Warm and well perfused extremities  Abdomen: Soft, non-tender, no CVA tenderness, no hepatosplenomegaly, no hernia  Penis: phallus normal, circumcised, well cared for, no plaques or lesions.   Scrotum: no cysts, no lesions, no rash, no hydrocele.   Epididymes: normal, nontender, symmetrical, no masses or cysts.   Testes: normal, both descended, no masses.   Urethra: palpably normal with orthotopic meatus of normal size    BEVERLY: normal sphincter tone, no masses, no hemmorrhoids   PROSTATE: 15-35g, no nodules, non-tender, symmetrical.   Extremities: Extremities normal, atraumatic, no cyanosis or edema  Skin: Normal color, texture, and turgor, no rashes or lesions  Psych: Appropriate, well oriented, normal affect, normal mood  Neuro: Non-focal           Lab Results   Component Value Date     PSA 1.84 06/05/2013         LABS:     Recent Results         Recent Results (from the past 336 hour(s))   Urine culture     Collection Time: 09/11/18  9:53 AM   Result Value Ref Range     Urine Culture, Routine No growth     POCT URINE DIPSTICK WITHOUT MICROSCOPE     Collection Time: 09/11/18 10:16 AM   Result Value Ref Range     Color, UA yellow,clear       Spec Grav UA 1.005       pH, UA 5.0       " WBC, UA neg       Nitrite, UA neg       Protein trace       Glucose, UA 1,000       Ketones, UA neg       Urobilinogen, UA neg       Bilirubin neg       Blood, UA neg     POCT Bladder Scan     Collection Time: 09/11/18 10:16 AM   Result Value Ref Range     POC Residual Urine Volume 117 (A) 0 - 100 mL      *after 2nd void, initially 275cc        Assessment/Diagnosis:     1. BPH with urinary obstruction  POCT URINE DIPSTICK WITHOUT MICROSCOPE     POCT Bladder Scan     Case Request Operating Room: CYSTOSCOPY, ULTRASOUND, TRANSRECTAL   2. Urge incontinence  Urine culture   3. Incomplete bladder emptying  tamsulosin (FLOMAX) 0.4 mg Cap         Plans:  He has a long history of BPH, proven on pathology from prostate biopsy, and concurrent cystoscopy at that time demonstrated significant bladder outlet obstruction with trabeculated bladder. Cystoscopy did also note some bladder findings concerning for possible interstitial cystitis or chronic cystitis.  While those conditions can cause urgency, given his history I a.m. most suspicious of longstanding obstruction causing urgency given bladder changes consistent with obstruction seen on previous exam.     In addition to his longstanding obstruction, he does also has significant risk factors for worsening urgency and lower urinary tract symptoms including postponing urination (of which he uses the excuse of lack of public restroom in [place] well driving for [employer]), significant intake of bladder irritants especially tea, significant constipation, as well as poor control of his diabetes.     Discussed conservative measures to control urgency and frequency including avoiding bladder irritants, increasing water intake, timed voiding, not postponing voiding, and bowel regimen (as distended bowel has extrinsic compressive effect on bladder. Discussed bladder irritants include coffe (even decaf), tea, alcohol, soda, spicy foods, acidic juices (orange, tomato), vinegar, and  artificial sweeteners.  Detailed OTC bowel regimen provided.  Discussed the importance of tight glucose control and advised to control blood sugars and watch diet as glucose is irritating to bladder. (today had 1000 glucose in urine)     Continue flomax 0.8mg (2 capsules at once) daily, finasteride, myrbetriq.  He is unsure about the actual medications or dosing he is receiving as he does not prepare his meds and therefore these have all been written down for him to ensure he continues all at this time.  He should adhere to all recommendations for 4-6 weeks then will re-evaluate lower urinary tract for obstruction, as well as bladder for any abnormalities, with cystoscopy and prostate ultrasound (for accurarte measurement of volume only given previous negative biopsy and stable psa).  Both procedures in detail were discussed with the patient and all questions answered.  Patient agreeable to treatment plan.     Cysto/Trus scheduled 10/23/18 at [facility]     50 min spent in direct face-to-face patient encounter, prior to which approximately 60 min were spent an extensive review of the patient's medical record urologic history as documented above in the HPI         Pt is acceptable candidate for procedure at [facility]

## 2018-10-23 NOTE — DISCHARGE INSTRUCTIONS
Prostate Ultrasound    An ultrasound is a type of imaging test. It can be used to look at the prostate. Its a safe procedure that does not use radiation. It uses high-frequency sound waves.  When ultrasound is used  You may need ultrasound on your prostate if your health care provider thinks you may have prostate cancer. An ultrasound is most often done after a digital rectal exam (BEVERLY) or a PSA blood test. These are screening tests for prostate cancer.  How ultrasound helps  Ultrasound uses sound waves to create an image of the prostate gland. It helps your health care provider see if the gland looks abnormal. It can also be used to help guide a biopsy. This is a procedure that removes tiny pieces of tissue to test. A prostate biopsy is done with a needle. Ultrasound helps your health care provider see where to put the needle.  Before the procedure  You may be told to use an enema or suppository the night or morning before. This is to clear your rectum of stool. The test is often done in your health care provider's office. It usually takes less than 15 minutes. If a biopsy may be done, youll be given antibiotics before and after the test.  During the procedure  What happens during the procedure:  · You lie on your side with your knees bent or with your feet in stirrups.   · A disposable cover is put on the ultrasound probe. The probe is tube-shaped and a bit larger than a thumb. A jelly is put on the probe to lubricate it.  · Your health care provider gently inserts the probe into your rectum. This may cause some discomfort.  · The probe emits sound waves. These create an image of your prostate on a computer screen. Your health care provider looks at the image. The size and shape of your prostate are checked for problems.  · When the test is done, the probe is removed.  · You can go home the same day, and go back to your normal activities.  Date Last Reviewed: 3/24/2015  © 9138-4480 [employer]  [address] [place] [address]. All rights reserved. This information is not intended as a substitute for professional medical care. Always follow your healthcare professional's instructions.        Cystoscopy    Cystoscopy is a procedure that lets your doctor look directly inside your urethra and bladder. It can be used to:  · Help diagnose a problem with your urethra, bladder, or kidneys.  · Take a sample (biopsy) of bladder or urethral tissue.  · Treat certain problems (such as removing kidney stones).  · Place a stent to bypass an obstruction.  · Take special X-rays of the kidneys.  Based on the findings, your doctor may recommend other tests or treatments.  What is a cystoscope?  A cystoscope is a telescope-like instrument that contains lenses and fiberoptics (small glass wires that make bright light). The cystoscope may be straight and rigid, or flexible to bend around curves in the urethra. The doctor may look directly into the cystoscope, or project the image onto a monitor.  Getting ready  · Ask your doctor if you should stop taking any medicines before the procedure.  · Ask whether you should avoid eating or drinking anything after midnight before the procedure.  · Follow any other instructions your doctor gives you.  Tell your doctor before the exam if you:  · Take any medicines, such as aspirin or blood thinners  · Have allergies to any medicines  · Are pregnant   The procedure  Cystoscopy is done in the doctors office, surgery center, or hospital. The doctor and a nurse are present during the procedure. It takes only a few minutes, longer if a biopsy, X-ray, or treatment needs to be done.  During the procedure:  · You lie on an exam table on your back, knees bent and legs apart. You are covered with a drape.  · Your urethra and the area around it are washed. Anesthetic jelly may be applied to numb the urethra. Other pain medicine is usually not needed. In some cases, you may be offered a  mild sedative to help you relax. If a more extensive procedure is to be done, such as a biopsy or kidney stone removal, general anesthesia may be needed.  · The cystoscope is inserted. A sterile fluid is put into the bladder to expand it. You may feel pressure from this fluid.  · When the procedure is done, the cystoscope is removed.  After the procedure  If you had a sedative, general anesthesia, or spinal anesthesia, you must have someone drive you home. Once youre home:  · Drink plenty of fluids.  · You may have burning or light bleeding when you urinate--this is normal.  · Medicines may be prescribed to ease any discomfort or prevent infection. Take these as directed.  · Call your doctor if you have heavy bleeding or blood clots, burning that lasts more than a day, a fever over 100°F  (38° C), or trouble urinating.  Date Last Reviewed: 1/1/2017  © 0485-9729 [employer]. [address] [place] [address], [place] [address]. All rights reserved. This information is not intended as a substitute for professional medical care. Always follow your healthcare professional's instructions.

## 2018-10-23 NOTE — H&P
[facility]:     Sarwat Colunga is a 57 y.o. male who presents for follow up of urge incontinence     He has previously been followed by Dr Garsia for BPH and urgency/UUI  He was last seen in Dec 2017 noting 6 mos of urgency with UUI on way before he can get there. Endorsed good flow, no dysuria, no hematuria, and satisfactory emptying, and NTFx3.  He does have DANNY and is on CPAP, and is on multiple agents for DM including insulin  Noted to have history of BPH and elevated psa in 2015 when psa was 7.3 and biopsy negative with prostate volume of 50mL  He was taking Flomax and finasteride, but he stopped the finasteride and was only taking flomax at time of last eval, and psa 12/18/17 was 1.1. PVR 27cc. Started myrbetriq     He established care with Dr Garsia in May 2013 2/2 obstructive LUTS, NTF x2-3, terminal dribbling, hesitancy, intermittency, and urgency. Occ good flow, occ restricted flow. Was given a trial of rapaflo, which by 4/2014 was noted to be controlling him well with good flow ans NTF x1, though in interim he had been started on TRT by Dr Page.  PSA 6/5/13 was 1.84 and after starting TRT fanny to 5.7 in July 2014.   He returned to Dr Garsia in May 2015 having been off TRT for some time, and noted to have boggy prostate and was given 3 week course of bactrim. Repeat psa was 7.3 and his hormone profile was within normal limits so advised against TRT and planned prostate biopsy.  7/7/15 had cysto and prostate biopsy. TRUS volume 49.1g (H:  34.5, W:  47.5, L:  57.4). 12 core biopsy benign.  Cysto noted 6 cm enlarged with NÚÑEZ, bladder with g2 trabeculations and areas concerning for mild IC  After reviewing negative biopsy and discussion of medical and surgical options for BPH (was already on flomax with NTF 2-4x), started dual medical therapy with addition of finasteride 5mg.  3/4/16 was still on both medications and well controlled and PSA 12/2015 was 1.1.  He then returned in 12/2017 as above with  "stable psa at 1.1 though was off of finasteride, though had 6 mos of urgency with UUI.     He returns today noting.  Stream is good since on meds  Feels like he empties though stream drips forever at end of voiding.  Has continued to have significant urgency with enormous pressure he cant even make it to bathroom sometimes  DTF 10-12x. Drinks a lot of tea - 3 large mugs in morning. Used to drink similar tea intake at night up until bedtime and has switched to water.  NTF 1-2x. 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   All day sig amount tea and soda. 1 gallon iced tea per day  + constipation - every day though has to push or strain and has hard stool balls. He notes he "craps toilet plugs" necessitating plunging toilet almost every time.  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 Does try to go every few hours if he can.  UUI episodes 1x/day. Wears diaper for safety because   A1C 7.5 8/24/18 from 7.2 12/17 from 6.2 7/17. Doesn't check sugars.  Las psa 1.1 in dec 2017.  No hematuria, dysuria.  Urine stream is not a laminar flow, is rough flow and squirts all around.  Has had nonforceful dripping stream previously and stream now has force to it with meds.  Has had no change in urgency since starting myrbetriq except that used to have full loss of urinary control and now just wets diaper and can get the rest to the urinal.  Unsure if taking flomax at 0.4 or 0.8mg dose. Thinks he is on flomax, finast, myrbetriq - not totally sure of his meds noting his wife prepares his meds.  AUA symptom score: 9/4 (5 - urgency, 2 - frequency, sleeping) - meds help 2/10     Udip:trc prot, 1000 gluc  PVR by bladder scan on arrival (after voiding just PTA 5 mins prior) 275cc. Patient was asked to void again and PVR by bladder scan 117cc              Past Medical History:   Diagnosis Date    Arthritis      Benign " localized hyperplasia of prostate without urinary obstruction and other lower urinary tract symptoms (LUTS)      Body mass index 37.0-37.9, adult      Diabetes mellitus      Esophageal reflux      Hypertension      Other and unspecified hyperlipidemia      Other testicular hypofunction      Polyneuropathy in diabetes(357.2)      PONV (postoperative nausea and vomiting)      Rosacea      Sleep apnea       uses CPAP    Type II or unspecified type diabetes mellitus with neurological manifestations, uncontrolled(250.62)                 Past Surgical History:   Procedure Laterality Date    ANKLE SURGERY Right      COLONOSCOPY N/A 5/17/2017     Procedure: COLONOSCOPY;  Surgeon: Carlos Hess MD;  Location: [facility];  Service: Endoscopy;  Laterality: N/A;    COLONOSCOPY N/A 5/17/2017     Performed by [facility] Hess [facility] at [facility] ENDO    COLONOSCOPY W/ POLYPECTOMY        CYSTOSCOPY        CYSTOSCOPY N/A 7/7/2015     Per[facility] by [facility] at [facility] OR    DEBRIDEMENT TENNIS ELBOW         right    EXCISION, SOFT TISSUE N/A 3/7/2014     [facility]    FOREARM SURGERY Right       tendon repair    FRACTURE SURGERY         right ankle orif    HERNIA REPAIR         umbillical    PROSTATE BIOPSY        QUADRICEPS REPAIR         right    TONSILLECTOMY        TRANSRECTAL ULTRASOUND GUIDED PROSTATE BIOPSY N/A 7/7/2015     [facility] at [facility] OR    TRICEPS TENDON RELEASE         tendon repair left tricep    VASECTOMY                   Family History   Problem Relation Age of Onset    Pulmonary embolism Mother      No Known Problems Father      Heart disease Other      Heart attack Other      Hypertension Other      Hyperlipidemia Other      Arthritis Other      Clotting disorder Other      Mental illness Other      Diabetes Other      Cancer Other      Melanoma Neg Hx      Psoriasis Neg Hx      Lupus Neg Hx      Eczema Neg Hx            Social History               Socioeconomic History    Marital status:        Spouse name: Not on file    Number of children: Not on file    Years of education: Not on file    Highest education level: Not on file   Social Needs    Financial resource strain: Not on file    Food insecurity - worry: Not on file    Food insecurity - inability: Not on file    Transportation needs - medical: Not on file    Transportation needs - non-medical: Not on file   Occupational History    Occupation:    Tobacco Use    Smoking status: Never Smoker    Smokeless tobacco: Never Used   Substance and Sexual Activity    Alcohol use: No    Drug use: No    Sexual activity: Not on file   Other Topics Concern    Not on file   Social History Narrative    Not on file                  Review of patient's allergies indicates:   Allergen Reactions    Bactrim [sulfamethoxazole-trimethoprim] Other (See Comments)       Dizziness,lightheaded, heavy chest    Ciprofloxacin Other (See Comments)       CONFUSED, OFF BALANCE, WOKE UP AT NITE    Doxycycline Hives    Lamictal [lamotrigine] Rash    Trileptal [oxcarbazepine]           Medications Reviewed: see MAR     ROS:     Constitutional: denies fevers, chills, night sweats, fatigue, malaise  Respiratory: negative for cough, shortness of breath, wheezing, dyspnea.  Cardiovascular: + for high blood pressure, negative for chest pain, varicose veins, ankle swelling, palpitations, syncope.  GI: + gerd, constipation - negative for abdominal pain, nausea, vomiting, diarrhea, blood in stool.   Urology: as noted above in HPI  Endocrinology: negative for cold intolerance, excessive thirst, not feeling tired/sluggish, no heat intolerance.   Hematology/Lymph: negative for easy bleeding, easy bruising, swollen glands.  Musculoskeletal: negative for back pain, joint pain, joint swelling, neck pain.  Allergy-Immunology: negative for seasonal allergies, negative for unusual  "infections.   Skin: negative for boils, breast lumps, hives, itching, rash.   Neurology: negative for, dizziness, headache, tingling/numbness, tremors.   Psych: satisfied with life; negative for, anxiety, depression, suicidal thoughts.      PHYSICAL EXAM:         Vitals:     09/11/18 0854   BP: 120/73   Pulse: 99   Resp: 18   Temp: 98.7 °F (37.1 °C)      Body mass index is 39.11 kg/m². Weight: 127.2 kg (280 lb 6.8 oz) Height: 5' 11" (180.3 cm)         General: Alert, cooperative, no distress, appears stated age  Head: Normocephalic, without obvious abnormality, atraumatic  Neck: no masses, no thyromegaly, no lymphadenopathy  Eyes: PERRL, conjunctiva/corneas clear  Lungs: Respirations unlabored, normal effort, no accessory muscle use  CV: Warm and well perfused extremities  Abdomen: Soft, non-tender, no CVA tenderness, no hepatosplenomegaly, no hernia  Penis: phallus normal, circumcised, well cared for, no plaques or lesions.   Scrotum: no cysts, no lesions, no rash, no hydrocele.   Epididymes: normal, nontender, symmetrical, no masses or cysts.   Testes: normal, both descended, no masses.   Urethra: palpably normal with orthotopic meatus of normal size    BEVERLY: normal sphincter tone, no masses, no hemmorrhoids   PROSTATE: 15-35g, no nodules, non-tender, symmetrical.   Extremities: Extremities normal, atraumatic, no cyanosis or edema  Skin: Normal color, texture, and turgor, no rashes or lesions  Psych: Appropriate, well oriented, normal affect, normal mood  Neuro: Non-focal           Lab Results   Component Value Date     PSA 1.84 06/05/2013         LABS:     Recent Results         Recent Results (from the past 336 hour(s))   Urine culture     Collection Time: 09/11/18  9:53 AM   Result Value Ref Range     Urine Culture, Routine No growth     POCT URINE DIPSTICK WITHOUT MICROSCOPE     Collection Time: 09/11/18 10:16 AM   Result Value Ref Range     Color, UA yellow,clear       Spec Grav UA 1.005       pH, UA 5.0       " WBC, UA neg       Nitrite, UA neg       Protein trace       Glucose, UA 1,000       Ketones, UA neg       Urobilinogen, UA neg       Bilirubin neg       Blood, UA neg     POCT Bladder Scan     Collection Time: 09/11/18 10:16 AM   Result Value Ref Range     POC Residual Urine Volume 117 (A) 0 - 100 mL      *after 2nd void, initially 275cc        Assessment/Diagnosis:     1. BPH with urinary obstruction  POCT URINE DIPSTICK WITHOUT MICROSCOPE     POCT Bladder Scan     Case Request Operating Room: CYSTOSCOPY, ULTRASOUND, TRANSRECTAL   2. Urge incontinence  Urine culture   3. Incomplete bladder emptying  tamsulosin (FLOMAX) 0.4 mg Cap         Plans:  He has a long history of BPH, proven on pathology from prostate biopsy, and concurrent cystoscopy at that time demonstrated significant bladder outlet obstruction with trabeculated bladder. Cystoscopy did also note some bladder findings concerning for possible interstitial cystitis or chronic cystitis.  While those conditions can cause urgency, given his history I a.m. most suspicious of longstanding obstruction causing urgency given bladder changes consistent with obstruction seen on previous exam.     In addition to his longstanding obstruction, he does also has significant risk factors for worsening urgency and lower urinary tract symptoms including postponing urination (of which he uses the excuse of lack of public restroom in [place] well driving for [employer]), significant intake of bladder irritants especially tea, significant constipation, as well as poor control of his diabetes.     Discussed conservative measures to control urgency and frequency including avoiding bladder irritants, increasing water intake, timed voiding, not postponing voiding, and bowel regimen (as distended bowel has extrinsic compressive effect on bladder. Discussed bladder irritants include coffe (even decaf), tea, alcohol, soda, spicy foods, acidic juices (orange, tomato), vinegar, and  artificial sweeteners.  Detailed OTC bowel regimen provided.  Discussed the importance of tight glucose control and advised to control blood sugars and watch diet as glucose is irritating to bladder. (today had 1000 glucose in urine)     Continue flomax 0.8mg (2 capsules at once) daily, finasteride, myrbetriq.  He is unsure about the actual medications or dosing he is receiving as he does not prepare his meds and therefore these have all been written down for him to ensure he continues all at this time.  He should adhere to all recommendations for 4-6 weeks then will re-evaluate lower urinary tract for obstruction, as well as bladder for any abnormalities, with cystoscopy and prostate ultrasound (for accurarte measurement of volume only given previous negative biopsy and stable psa).  Both procedures in detail were discussed with the patient and all questions answered.  Patient agreeable to treatment plan.     Cysto/Trus scheduled 10/23/18 at [facility]     50 min spent in direct face-to-face patient encounter, prior to which approximately 60 min were spent an extensive review of the patient's medical record urologic history as documented above in the HPI         Pt is acceptable candidate for procedure at [facility]

## 2018-10-24 ENCOUNTER — TELEPHONE (OUTPATIENT)
Dept: FAMILY MEDICINE | Facility: CLINIC | Age: 58
End: 2018-10-24

## 2018-10-24 VITALS
BODY MASS INDEX: 39.2 KG/M2 | SYSTOLIC BLOOD PRESSURE: 116 MMHG | HEIGHT: 71 IN | HEART RATE: 81 BPM | OXYGEN SATURATION: 93 % | WEIGHT: 280 LBS | RESPIRATION RATE: 18 BRPM | TEMPERATURE: 98 F | DIASTOLIC BLOOD PRESSURE: 64 MMHG

## 2018-10-24 NOTE — BRIEF OP NOTE
[facility]  Brief Operative/Discharge Note     Date: 10/23/2018     Staff Surgeon: Sarwat Viveros MD     Pre-Op Diagnosis:   BPH with refractory LUTS     Post-Op Diagnosis: same     Procedure(s) Performed:   1. Cystoscopy, flexible  2. Transrectal ultrasound with volumetric prostate measurement     Hx TRT. Bx neg 7/2015 with psa 7.3 and 41g gland. FU psas normal. NÚÑEZ on cysto at that time. Added finasteride to flomax. Progressive urgency, added mirabegron.  PVR 117cc, AUA SS 9/4 on all 3 meds.  Did eliminate soda, make scheduled bathroom breaks, and start stool softener and urgency has sig improved    Specimen(s): none     Anesthesia: Local: 2% xylocaine jelly per urethra (urojet)     Findings:   US: volume 50.1cm3 (H: 35.8mm, W 49.1mm, L 54.5mm), small intravesical extension  Cysto: Bilateral lateral lobe ingrowth causing significant obstruction especially when viewed from verumontanum, with kissing lobes centrally. wide flat hypervascular nonobstucting median lobe  :   Estimated Blood Loss: none     Drains: none     Complications: none      Discharge home today status post uncomplicated procedure as above  Diet - resume home diet  Follow up: Urolift 11/15/18  - med clear DRE Cedeno with Ucx 2 weeks prior  Instructions: drink plenty of water, may see blood in urine, take abx as directed  Meds:     Medication List      START taking these medications    cefdinir 300 MG capsule  Commonly known as:  OMNICEF  Take 1 capsule (300 mg total) by mouth 2 (two) times daily. for 3 days        CHANGE how you take these medications    esomeprazole 40 MG capsule  Commonly known as:  NEXIUM  Take 1 capsule (40 mg total) by mouth before breakfast.  What changed:  how much to take        CONTINUE taking these medications    aspirin 81 MG EC tablet  Commonly known as:  ECOTRIN     ATIVAN 1 MG tablet  Generic drug:  LORazepam     atorvastatin 40 MG tablet  Commonly known as:  LIPITOR  TAKE 1 TABLET(40 MG) BY MOUTH EVERY  "EVENING     BD ULTRA-FINE MINI PEN NEEDLE 31 gauge x 3/16" Ndle  Generic drug:  pen needle, diabetic     blood sugar diagnostic Strp  2 strips by Misc.(Non-Drug; Combo Route) route 2 (two) times daily.     buPROPion 100 MG TBSR 12 hr tablet  Commonly known as:  WELLBUTRIN SR     chlorthalidone 25 MG Tab  Commonly known as:  HYGROTEN  TAKE 1 TABLET(25 MG) BY MOUTH EVERY DAY     dapagliflozin 5 mg Tab tablet  Commonly known as:  FARXIGA  Take 1 tablet (5 mg total) by mouth once daily.     * dextroamphetamine-amphetamine 10 mg Tab     * dextroamphetamine-amphetamine 15 mg tablet  Commonly known as:  ADDERALL     finasteride 5 mg tablet  Commonly known as:  PROSCAR  Take 1 tablet (5 mg total) by mouth once daily.     FLONASE ALLERGY RELIEF 50 mcg/actuation nasal spray  Generic drug:  fluticasone  2 sprays by Each Nare route once daily.     glimepiride 4 MG tablet  Commonly known as:  AMARYL  take 1 tablet by mouth BEFORE BREAKFAST     insulin glargine 100 unit/mL (3 mL) Inpn pen  Commonly known as:  LANTUS SOLOSTAR U-100 INSULIN  inject 65 units subcutaneously at bedtime -INCREASE BY 1 UNIT EVERY 4 DAYS UNTIL FASTING SUGARS ARE BELOW 130     LATUDA 80 mg Tab tablet  Generic drug:  lurasidone     lisinopril 5 MG tablet  Commonly known as:  PRINIVIL,ZESTRIL  TAKE 1 TABLET BY MOUTH ONCE DAILY     metFORMIN 1000 MG tablet  Commonly known as:  GLUCOPHAGE  Take 1 tablet (1,000 mg total) by mouth 2 (two) times daily with meals.     mirabegron 50 mg Tb24  Commonly known as:  MYRBETRIQ  Take 1 tablet (50 mg total) by mouth once daily.     naproxen 500 MG tablet  Commonly known as:  NAPROSYN     paroxetine 30 MG tablet  Commonly known as:  PAXIL     pregabalin 50 MG capsule  Commonly known as:  LYRICA  2 by mouth in the morning, 1 by mouth in the afternoon and 2 by mouth at night     tamsulosin 0.4 mg Cap  Commonly known as:  FLOMAX  Take 2 capsules (0.8 mg total) by mouth once daily.         * This list has 2 medication(s) that " are the same as other medications prescribed for you. Read the directions carefully, and ask your doctor or other care provider to review them with you.               Where to Get Your Medications      These medications were sent to [id number] - [place] - [address] AT [facility] [address]  [address] [address]    Phone:  [phone]   · cefdinir 300 MG capsule

## 2018-10-24 NOTE — TELEPHONE ENCOUNTER
----- Message from Sarwat Viveros MD sent at 10/23/2018  9:18 PM CDT -----  Regarding: preop clearance  Patient scheduled for Urolift with general LMA vs MAC anesthesia in OR on 11/15/18.    Patient with DANNY/CPAP, DM, HTN.  Needs medical clearance for surgery  Looks like last seen 12/17 largely for his urinary issues.  Please evaluate/optimize/provide any recs/clear as soon as possible.     Thanks  Sarwat

## 2018-10-24 NOTE — TELEPHONE ENCOUNTER
----- Message from DEN Darnell sent at 10/24/2018  8:15 AM CDT -----  Regarding: FW: preop clearance  Please call patient, have him come in and see Dr. Page for the preop  ----- Message -----  From: Sarwat Viveros MD  Sent: 10/23/2018   9:18 PM  To: Carmelita Bush LPN, Rosales Page MD, #  Subject: preop clearance                                  Patient scheduled for Urolift with general LMA vs MAC anesthesia in OR on 11/15/18.    Patient with DANNY/CPAP, DM, HTN.  Needs medical clearance for surgery  Looks like last seen 12/17 largely for his urinary issues.  Please evaluate/optimize/provide any recs/clear as soon as possible.     Thanks  Sarwat

## 2018-10-25 ENCOUNTER — OFFICE VISIT (OUTPATIENT)
Dept: FAMILY MEDICINE | Facility: CLINIC | Age: 58
End: 2018-10-25
Payer: COMMERCIAL

## 2018-10-25 ENCOUNTER — DOCUMENTATION ONLY (OUTPATIENT)
Dept: FAMILY MEDICINE | Facility: CLINIC | Age: 58
End: 2018-10-25

## 2018-10-25 VITALS
HEART RATE: 83 BPM | SYSTOLIC BLOOD PRESSURE: 136 MMHG | BODY MASS INDEX: 38.46 KG/M2 | RESPIRATION RATE: 16 BRPM | HEIGHT: 71 IN | TEMPERATURE: 98 F | DIASTOLIC BLOOD PRESSURE: 76 MMHG | WEIGHT: 274.69 LBS | OXYGEN SATURATION: 95 %

## 2018-10-25 DIAGNOSIS — Z23 NEEDS FLU SHOT: ICD-10-CM

## 2018-10-25 DIAGNOSIS — Z01.818 PREOPERATIVE CLEARANCE: Primary | ICD-10-CM

## 2018-10-25 DIAGNOSIS — E11.49 TYPE 2 DIABETES MELLITUS WITH NEUROLOGICAL MANIFESTATIONS, CONTROLLED: ICD-10-CM

## 2018-10-25 PROCEDURE — 90686 IIV4 VACC NO PRSV 0.5 ML IM: CPT | Mod: S$GLB,,, | Performed by: INTERNAL MEDICINE

## 2018-10-25 PROCEDURE — 3045F PR MOST RECENT HEMOGLOBIN A1C LEVEL 7.0-9.0%: CPT | Mod: CPTII,S$GLB,, | Performed by: INTERNAL MEDICINE

## 2018-10-25 PROCEDURE — 93010 ELECTROCARDIOGRAM REPORT: CPT | Mod: S$GLB,,, | Performed by: INTERNAL MEDICINE

## 2018-10-25 PROCEDURE — 3008F BODY MASS INDEX DOCD: CPT | Mod: CPTII,S$GLB,, | Performed by: INTERNAL MEDICINE

## 2018-10-25 PROCEDURE — 93005 ELECTROCARDIOGRAM TRACING: CPT | Mod: S$GLB,,, | Performed by: INTERNAL MEDICINE

## 2018-10-25 PROCEDURE — 3078F DIAST BP <80 MM HG: CPT | Mod: CPTII,S$GLB,, | Performed by: INTERNAL MEDICINE

## 2018-10-25 PROCEDURE — 90471 IMMUNIZATION ADMIN: CPT | Mod: S$GLB,,, | Performed by: INTERNAL MEDICINE

## 2018-10-25 PROCEDURE — 3075F SYST BP GE 130 - 139MM HG: CPT | Mod: CPTII,S$GLB,, | Performed by: INTERNAL MEDICINE

## 2018-10-25 PROCEDURE — 99213 OFFICE O/P EST LOW 20 MIN: CPT | Mod: 25,S$GLB,, | Performed by: INTERNAL MEDICINE

## 2018-10-25 NOTE — PROGRESS NOTES
Health Maintenance Due   Topic Date Due    Influenza Vaccine  08/01/2018    Foot Exam  12/18/2018

## 2018-10-25 NOTE — PROGRESS NOTES
"Subjective:       Patient ID: Sarwat Colunga is a 57 y.o. male.    Chief Complaint: surgical clearance    HPI       C.C..  Surgical Clearance    Surgery:   urolift    Date of Surgery:  11/15/18 Dr. Viveros        Patient has  tolerated anesthesia without problems in the past   Yes    Hx of cad:   No , had cp 2 y ago, negative stress echo.   Chest pain in last 6 mo: no  Hx of lung disease :no   Pt is a smoker this same: no    Functional status:  Good.       Review of Systems      Objective:      Vitals:    10/25/18 0911   BP: 136/76   Pulse: 83   Resp: 16   Temp: 98.1 °F (36.7 °C)   TempSrc: Oral   SpO2: 95%   Weight: 124.6 kg (274 lb 11.1 oz)   Height: 5' 11" (1.803 m)   PainSc: 0-No pain     Physical Exam   Constitutional: He appears well-developed and well-nourished.   Cardiovascular: Normal rate, regular rhythm and normal heart sounds.   Pulmonary/Chest: Effort normal and breath sounds normal.   Abdominal: Soft. There is no tenderness.   Neurological: He is alert.   Psychiatric: He has a normal mood and affect. His behavior is normal. Thought content normal.   Nursing note and vitals reviewed.      ekg nl   Assessment:       1. Preoperative clearance    2. Type 2 diabetes mellitus with neurological manifestations, controlled    3. Needs flu shot          Plan:   Patient is medically cleared for surgery.  Will send a letter to .  The patient is a well controlled diabetic and will need attention to that during the surgery.  Preoperative clearance    Type 2 diabetes mellitus with neurological manifestations, controlled  -     Comprehensive metabolic panel; Future; Expected date: 10/25/2018  -     Hemoglobin A1c; Future; Expected date: 10/25/2018  -     Lipid panel; Future; Expected date: 10/25/2018  -     Microalbumin/creatinine urine ratio; Future    Needs flu shot  -     Influenza - Quadrivalent (3 years & older) (PF)      Follow-up in about 3 months (around 1/25/2019).      "

## 2018-10-26 DIAGNOSIS — G62.9 PERIPHERAL POLYNEUROPATHY: ICD-10-CM

## 2018-10-26 RX ORDER — PREGABALIN 50 MG/1
CAPSULE ORAL
Qty: 450 CAPSULE | Refills: 1 | Status: SHIPPED | OUTPATIENT
Start: 2018-10-26 | End: 2019-04-12 | Stop reason: SDUPTHER

## 2018-10-27 NOTE — OP NOTE
[facility] Urology Operative Report     Date: 10/23/2018     Staff Surgeon: Sarwat Viveros MD     Pre-Op Diagnosis:   BPH with refractory LUTS and incomplete emptying     Post-Op Diagnosis: same     Procedure(s) Performed:   1. Cystoscopy, flexible  2. Transrectal ultrasound with volumetric prostate measurement     Specimen(s): none     Anesthesia: Local: 2% xylocaine jelly per urethra (urojet)     Findings:   US: volume 50.1cm3 (H: 35.8mm, W 49.1mm, L 54.5mm), small intravesical extension  Cysto: Bilateral lateral lobe ingrowth causing significant obstruction especially when viewed from verumontanum, with kissing lobes centrally. wide flat hypervascular nonobstucting median lobe  :   Estimated Blood Loss: none     Drains: none     Complications: none     Indications for procedure:  58 yo M with long history of BPH with obstructive LUTS, previously on TRT. Negative prostate biopsy in 7/2015 with psa 7.3 and 41g gland. All follow up PSAs have been normal. He also had visual evidence of NÚÑEZ on cysto at that time, and finasteride was added to his flomax. LUTS persisted and finasteride was added to his flomax. He had progressive urgency and mirabegron added. PVR 117cc, AUA SS 9/4 on all 3 meds.  Did eliminate soda, make scheduled bathroom breaks, and start stool softener and urgency has significantly improved. Here today for lower tract evaluation with cystoscopy and transrectal ultrasound to help guide further recommendations.     Procedure in detail:  After informed consent, the patient was placed in left lateral decubitus position. Transrectal ultrasound probe was passed into the rectum and the prostate was visualized on the screen.  Three-dimensional measurements taken as above with a total prostate volume of 50.1g.  On sagittal view there was only small area of intravesical extension without gross median lobe.  No ultrasonic abnormalities of prostate visualized. Saggital image captured.  The ultrasound probe was  removed       He was then placed in supine position and prepped and drapped in standard cystoscopic fashion and 2% xylocaine jelly was instilled into the urethra. A flexible cystoscope was passed into the bladder via the urethra.      Anterior urethra appeared normal without any lesions or narrowings. The prostatic urethra demonstrated significant obstructing ingrowth of bilateral lateral lobes with visual obstruction, especially when viewed from verumontanum, as well as centrally with kissing lateral lobes with additional findings as noted above.  On retroflexion only had small early flat median lobe without significant intravesical extension and without obstruction, and open bladder neck.     Bladder otherwise systematically inspected and no mucosal lesions or tumors seen. He did have some moderate trabeculations. Bilateral ureteral orifices seen in orthotopic position on trigone bilaterally with clear efflux.     Patient tolerated the procedure well. No complications     Disposition:  We did discuss options for his obstructing prostate.  He has failed medical therapy, has significant visual obstruction, and his urgency did improve with conservative efforts. He would like to proceed with intervention due to his refractory LUTS on medical therapy. After extensive discussion of both procedures and medical therapy, he elected to proceed with Urolift.      Procedure in detail discussed, including risks benefits and alternatives (such as TURP and continued medical management). Discussed risks including but not limited to hematuria, postop retention, pain, infection, injury to bladder or urethra, and worsening urgency, latent pelvic pain, no guarantee of symptomatic relief, prostate regrowth, need for future procedures. We did discuss the non-incidence of ejaculatory dysfunction, as well as the 1-2% retreatment rate in 5 year studies. Also discussed about 20% of people may need a catheter after (due to retention or  hematuria) but not required if voiding postop.      We did have a rachell discussion about his mild baseline urinary urgency as well. Though urgency can be present with significant obstruction and be relieved with relief of obstruction, there can be a transient worsening postoperatively. If persists, can be worked up and treated in the future as he prefers to relieve obstruction first which is most reasonable. All questions answered, and appropriate informed consent obtained.      UroLift scheduled in OR for 11/15/18.  Will CC Dr Arreola for preop medical clearance/optimization  Will need PAT prior with Ucx at least 1-2 weeks prior with Ucx

## 2018-10-30 ENCOUNTER — OCCUPATIONAL HEALTH (OUTPATIENT)
Dept: URGENT CARE | Facility: CLINIC | Age: 58
End: 2018-10-30

## 2018-10-30 VITALS
TEMPERATURE: 98 F | SYSTOLIC BLOOD PRESSURE: 122 MMHG | DIASTOLIC BLOOD PRESSURE: 70 MMHG | BODY MASS INDEX: 38.92 KG/M2 | HEIGHT: 71 IN | HEART RATE: 102 BPM | WEIGHT: 278 LBS

## 2018-10-30 DIAGNOSIS — Z00.00 ENCOUNTER FOR PHYSICAL EXAMINATION: Primary | ICD-10-CM

## 2018-10-30 LAB
BILIRUB UR QL STRIP: NEGATIVE
GLUCOSE UR QL STRIP: POSITIVE
KETONES UR QL STRIP: NEGATIVE
LEUKOCYTE ESTERASE UR QL STRIP: NEGATIVE
PH, POC UA: 5.5 (ref 5–8)
POC BLOOD, URINE: NEGATIVE
POC NITRATES, URINE: NEGATIVE
PROT UR QL STRIP: NEGATIVE
SP GR UR STRIP: 1.01 (ref 1–1.03)
UROBILINOGEN UR STRIP-ACNC: NORMAL (ref 0.3–2.2)

## 2018-10-30 PROCEDURE — 99499 UNLISTED E&M SERVICE: CPT | Mod: S$GLB,,, | Performed by: PHYSICIAN ASSISTANT

## 2018-11-02 ENCOUNTER — PATIENT MESSAGE (OUTPATIENT)
Dept: FAMILY MEDICINE | Facility: CLINIC | Age: 58
End: 2018-11-02

## 2018-11-07 ENCOUNTER — HOSPITAL ENCOUNTER (OUTPATIENT)
Dept: PREADMISSION TESTING | Facility: HOSPITAL | Age: 58
Discharge: HOME | End: 2018-11-07
Attending: UROLOGY
Payer: COMMERCIAL

## 2018-11-07 VITALS — BODY MASS INDEX: 38.36 KG/M2 | HEIGHT: 71 IN | WEIGHT: 274 LBS

## 2018-11-07 DIAGNOSIS — E11.49 DIABETIC RADICULOPATHY: ICD-10-CM

## 2018-11-07 DIAGNOSIS — N13.8 BPH WITH URINARY OBSTRUCTION: Primary | ICD-10-CM

## 2018-11-07 DIAGNOSIS — M54.10 DIABETIC RADICULOPATHY: ICD-10-CM

## 2018-11-07 DIAGNOSIS — N40.1 BPH WITH URINARY OBSTRUCTION: Primary | ICD-10-CM

## 2018-11-07 PROCEDURE — 99900104 DSU ONLY-NO CHARGE-EA ADD'L HR (STAT)

## 2018-11-07 PROCEDURE — 99900103 DSU ONLY-NO CHARGE-INITIAL HR (STAT)

## 2018-11-07 RX ORDER — INSULIN GLARGINE 100 [IU]/ML
INJECTION, SOLUTION SUBCUTANEOUS
Qty: 36 ML | Refills: 0 | Status: SHIPPED | OUTPATIENT
Start: 2018-11-07 | End: 2018-11-20 | Stop reason: SDUPTHER

## 2018-11-07 RX ORDER — PEN NEEDLE, DIABETIC 30 GX3/16"
1 NEEDLE, DISPOSABLE MISCELLANEOUS 4 TIMES DAILY
Qty: 100 EACH | Refills: 11 | Status: SHIPPED | OUTPATIENT
Start: 2018-11-07 | End: 2019-11-25 | Stop reason: SDUPTHER

## 2018-11-07 NOTE — DISCHARGE INSTRUCTIONS
To confirm, Your doctor has instructed you that surgery is scheduled for:     Please report to [facility], Registration the morning of surgery. You must check-in and receive a wristband before going to your procedure.    Pre-Op will call the afternoon prior to surgery between 1:00 and 6:00 PM with the final arrival time.  Phone number: [phone]    PLEASE NOTE:  The surgery schedule has many variables which may affect the time of your surgery case.  Family members should be available if your surgery time changes.  Plan to be here the day of your procedure between 4-6 hours.    MEDICATIONS:  TAKE ONLY THESE MEDICATIONS WITH A SMALL SIP OF WATER THE MORNING OF YOUR PROCEDURE:    Wellbutrin, nexium, flonase, ativan, paxil, lyrica,     1/2 dose Insulin the night before surgery.    DO NOT TAKE THESE MEDICATIONS 5-7 DAYS PRIOR to your procedure or per your surgeon's request: ASPIRIN, ALEVE, ADVIL, IBUPROFEN, FISH OIL VITAMIN E, HERBALS  (May take Tylenol)  No adderal X3 days before surgery  Last dose 11/11/18;  No Lisinopril AM of surgery; No metformin AM or PM before surgery;  Naprosyn Last dose 11/10/18  ONLY if you are prescribed any types of blood thinners such as:  Aspirin, Coumadin, Plavix, Pradaxa, Xarelto, Aggrenox, Effient, Eliquis, Savasya, Brilinta, or any other, ask your surgeon whether you should stop taking them and how long before surgery you should stop.  You may also need to verify with the prescribing physician if it is ok to stop your medication.      INSTRUCTIONS IMPORTANT!!  · Do not eat or drink anything between midnight and the time of your procedure- this includes gum, mints, and candy.  · Do not smoke or drink alcoholic beverages 24 hours prior to your procedure.  · Shower the night before AND the morning of your procedure with a Chlorhexidine wash such as Hibiclens or Dial antibacterial soap from the neck down.  Do not get it on your face or in your eyes.  You may use  your own shampoo and face wash. This helps your skin to be as bacteria free as possible.    · If you wear contact lenses, dentures, hearing aids or glasses, bring a container to put them in during surgery and give to a family member for safe keeping.  Please leave all jewelry, piercing's and valuables at home.   · DO NOT remove hair from the surgery site.  Do not shave the incision site unless you are given specific instructions to do so.    · ONLY if you have been diagnosed with sleep apnea please bring your C-PAP machine.  · ONLY if you wear home oxygen please bring your portable oxygen tank the day of your procedure.  · ONLY if you have a history of OPEN HEART SURGERY you will need a clearance from your Cardiologist per Anesthesia.      · ONLY for patients requiring bowel prep, written instructions will be given by your doctor's office.  · ONLY if you have a neuro stimulator, please bring the controller with you the morning of surgery  · ONLY if a type and screen test is needed before surgery, please return:  · If your doctor has scheduled you for an overnight stay, bring a small overnight bag with any personal items you need.  · Make arrangements in advance for transportation home by a responsible adult.  It is not safe to drive a vehicle during the 24 hours after anesthesia.      · Visiting hours are 10:00AM to 8:30PM.  For the safety of all patients, visitors under the age of 12 are not allowed above the first floor of the hospital.    · All [facility] facilities and properties are tobacco free.  Smoking is NOT allowed.       If you have any questions about these instructions, call Pre-Op Admit  Nursing at [phone] or the Pre-Op Day Surgery Unit at [phone].

## 2018-11-13 ENCOUNTER — CLINICAL SUPPORT (OUTPATIENT)
Dept: UROLOGY | Facility: CLINIC | Age: 58
End: 2018-11-13
Payer: COMMERCIAL

## 2018-11-13 ENCOUNTER — APPOINTMENT (OUTPATIENT)
Dept: LAB | Facility: HOSPITAL | Age: 58
End: 2018-11-13
Attending: UROLOGY
Payer: COMMERCIAL

## 2018-11-13 DIAGNOSIS — N40.1 BPH WITH URINARY OBSTRUCTION: Primary | ICD-10-CM

## 2018-11-13 DIAGNOSIS — N13.8 BPH WITH URINARY OBSTRUCTION: Primary | ICD-10-CM

## 2018-11-13 LAB
GRAM STN SPEC: NORMAL
GRAM STN SPEC: NORMAL

## 2018-11-13 PROCEDURE — 87086 URINE CULTURE/COLONY COUNT: CPT

## 2018-11-13 PROCEDURE — 99999 PR PBB SHADOW E&M-EST. PATIENT-LVL I: CPT | Mod: PBBFAC,,,

## 2018-11-13 PROCEDURE — 87205 SMEAR GRAM STAIN: CPT

## 2018-11-14 ENCOUNTER — ANESTHESIA EVENT (OUTPATIENT)
Dept: SURGERY | Facility: HOSPITAL | Age: 58
End: 2018-11-14
Payer: COMMERCIAL

## 2018-11-15 ENCOUNTER — ANESTHESIA (OUTPATIENT)
Dept: SURGERY | Facility: HOSPITAL | Age: 58
End: 2018-11-15
Payer: COMMERCIAL

## 2018-11-15 ENCOUNTER — HOSPITAL ENCOUNTER (OUTPATIENT)
Facility: HOSPITAL | Age: 58
Discharge: HOME | End: 2018-11-15
Attending: UROLOGY | Admitting: UROLOGY
Payer: COMMERCIAL

## 2018-11-15 VITALS
DIASTOLIC BLOOD PRESSURE: 66 MMHG | SYSTOLIC BLOOD PRESSURE: 119 MMHG | HEART RATE: 71 BPM | WEIGHT: 275 LBS | BODY MASS INDEX: 38.5 KG/M2 | HEIGHT: 71 IN | OXYGEN SATURATION: 97 % | TEMPERATURE: 98 F | RESPIRATION RATE: 16 BRPM

## 2018-11-15 DIAGNOSIS — R33.9 INCOMPLETE BLADDER EMPTYING: ICD-10-CM

## 2018-11-15 DIAGNOSIS — N40.0 BPH (BENIGN PROSTATIC HYPERPLASIA): ICD-10-CM

## 2018-11-15 LAB
ANION GAP SERPL CALC-SCNC: 10 MMOL/L
BACTERIA UR CULT: NO GROWTH
BASOPHILS # BLD AUTO: 0 K/UL
BASOPHILS NFR BLD: 0.4 %
BUN SERPL-MCNC: 23 MG/DL
CALCIUM SERPL-MCNC: 10 MG/DL
CHLORIDE SERPL-SCNC: 97 MMOL/L
CO2 SERPL-SCNC: 33 MMOL/L
CREAT SERPL-MCNC: 1.6 MG/DL
DIFFERENTIAL METHOD: ABNORMAL
EOSINOPHIL # BLD AUTO: 0.2 K/UL
EOSINOPHIL NFR BLD: 2.1 %
ERYTHROCYTE [DISTWIDTH] IN BLOOD BY AUTOMATED COUNT: 14.1 %
EST. GFR  (AFRICAN AMERICAN): 53 ML/MIN/1.73 M^2
EST. GFR  (NON AFRICAN AMERICAN): 46 ML/MIN/1.73 M^2
GLUCOSE SERPL-MCNC: 184 MG/DL
HCT VFR BLD AUTO: 47 %
HGB BLD-MCNC: 15.8 G/DL
LYMPHOCYTES # BLD AUTO: 3.1 K/UL
LYMPHOCYTES NFR BLD: 36.5 %
MCH RBC QN AUTO: 30.7 PG
MCHC RBC AUTO-ENTMCNC: 33.6 G/DL
MCV RBC AUTO: 91 FL
MONOCYTES # BLD AUTO: 0.8 K/UL
MONOCYTES NFR BLD: 9 %
NEUTROPHILS # BLD AUTO: 4.4 K/UL
NEUTROPHILS NFR BLD: 52 %
PLATELET # BLD AUTO: 238 K/UL
PMV BLD AUTO: 7.1 FL
POTASSIUM SERPL-SCNC: 3.9 MMOL/L
RBC # BLD AUTO: 5.15 M/UL
SODIUM SERPL-SCNC: 140 MMOL/L
WBC # BLD AUTO: 8.6 K/UL

## 2018-11-15 PROCEDURE — 99900104 DSU ONLY-NO CHARGE-EA ADD'L HR (STAT): Performed by: UROLOGY

## 2018-11-15 PROCEDURE — 63600175 PHARM REV CODE 636 W HCPCS: Performed by: NURSE ANESTHETIST, CERTIFIED REGISTERED

## 2018-11-15 PROCEDURE — 25000003 PHARM REV CODE 250: Performed by: ANESTHESIOLOGY

## 2018-11-15 PROCEDURE — 37000009 HC ANESTHESIA EA ADD 15 MINS: Performed by: UROLOGY

## 2018-11-15 PROCEDURE — 36415 COLL VENOUS BLD VENIPUNCTURE: CPT

## 2018-11-15 PROCEDURE — 80048 BASIC METABOLIC PNL TOTAL CA: CPT

## 2018-11-15 PROCEDURE — 99900103 DSU ONLY-NO CHARGE-INITIAL HR (STAT): Performed by: UROLOGY

## 2018-11-15 PROCEDURE — D9220A PRA ANESTHESIA: Mod: ANES,,, | Performed by: ANESTHESIOLOGY

## 2018-11-15 PROCEDURE — 63600175 PHARM REV CODE 636 W HCPCS: Performed by: UROLOGY

## 2018-11-15 PROCEDURE — D9220A PRA ANESTHESIA: Mod: CRNA,,, | Performed by: NURSE ANESTHETIST, CERTIFIED REGISTERED

## 2018-11-15 PROCEDURE — 37000008 HC ANESTHESIA 1ST 15 MINUTES: Performed by: UROLOGY

## 2018-11-15 PROCEDURE — 36000707: Performed by: UROLOGY

## 2018-11-15 PROCEDURE — 71000033 HC RECOVERY, INTIAL HOUR: Performed by: UROLOGY

## 2018-11-15 PROCEDURE — 25000003 PHARM REV CODE 250: Performed by: NURSE ANESTHETIST, CERTIFIED REGISTERED

## 2018-11-15 PROCEDURE — 52442 CYSTO INS TRNSPRSTC IMPLT EA: CPT | Mod: ,,, | Performed by: UROLOGY

## 2018-11-15 PROCEDURE — 71000015 HC POSTOP RECOV 1ST HR: Performed by: UROLOGY

## 2018-11-15 PROCEDURE — C1729 CATH, DRAINAGE: HCPCS | Performed by: UROLOGY

## 2018-11-15 PROCEDURE — 85025 COMPLETE CBC W/AUTO DIFF WBC: CPT

## 2018-11-15 PROCEDURE — L8699 PROSTHETIC IMPLANT NOS: HCPCS | Performed by: UROLOGY

## 2018-11-15 PROCEDURE — 36000706: Performed by: UROLOGY

## 2018-11-15 PROCEDURE — 52441 CYSTO INSJ TRNSPRSTC 1 IMPLT: CPT | Mod: ,,, | Performed by: UROLOGY

## 2018-11-15 DEVICE — SYS UROLIFT: Type: IMPLANTABLE DEVICE | Site: PROSTATE | Status: FUNCTIONAL

## 2018-11-15 RX ORDER — LIDOCAINE HYDROCHLORIDE 10 MG/ML
1 INJECTION, SOLUTION EPIDURAL; INFILTRATION; INTRACAUDAL; PERINEURAL ONCE
Status: COMPLETED | OUTPATIENT
Start: 2018-11-15 | End: 2018-11-15

## 2018-11-15 RX ORDER — ONDANSETRON 2 MG/ML
4 INJECTION INTRAMUSCULAR; INTRAVENOUS ONCE AS NEEDED
Status: DISCONTINUED | OUTPATIENT
Start: 2018-11-15 | End: 2018-11-15 | Stop reason: HOSPADM

## 2018-11-15 RX ORDER — PROPOFOL 10 MG/ML
VIAL (ML) INTRAVENOUS
Status: DISCONTINUED | OUTPATIENT
Start: 2018-11-15 | End: 2018-11-15

## 2018-11-15 RX ORDER — ONDANSETRON HYDROCHLORIDE 2 MG/ML
INJECTION, SOLUTION INTRAMUSCULAR; INTRAVENOUS
Status: DISCONTINUED | OUTPATIENT
Start: 2018-11-15 | End: 2018-11-15

## 2018-11-15 RX ORDER — SODIUM CHLORIDE, SODIUM LACTATE, POTASSIUM CHLORIDE, CALCIUM CHLORIDE 600; 310; 30; 20 MG/100ML; MG/100ML; MG/100ML; MG/100ML
125 INJECTION, SOLUTION INTRAVENOUS CONTINUOUS
Status: DISCONTINUED | OUTPATIENT
Start: 2018-11-15 | End: 2018-11-15 | Stop reason: HOSPADM

## 2018-11-15 RX ORDER — SCOLOPAMINE TRANSDERMAL SYSTEM 1 MG/1
1 PATCH, EXTENDED RELEASE TRANSDERMAL ONCE
Status: DISCONTINUED | OUTPATIENT
Start: 2018-11-15 | End: 2018-11-15 | Stop reason: HOSPADM

## 2018-11-15 RX ORDER — TRAMADOL HYDROCHLORIDE 50 MG/1
50 TABLET ORAL EVERY 6 HOURS PRN
Qty: 10 TABLET | Refills: 0 | Status: SHIPPED | OUTPATIENT
Start: 2018-11-15 | End: 2019-09-19

## 2018-11-15 RX ORDER — SCOLOPAMINE TRANSDERMAL SYSTEM 1 MG/1
PATCH, EXTENDED RELEASE TRANSDERMAL
Status: DISCONTINUED
Start: 2018-11-15 | End: 2018-11-15 | Stop reason: HOSPADM

## 2018-11-15 RX ORDER — CEFDINIR 300 MG/1
300 CAPSULE ORAL 2 TIMES DAILY
Qty: 10 CAPSULE | Refills: 0 | Status: SHIPPED | OUTPATIENT
Start: 2018-11-15 | End: 2018-11-20

## 2018-11-15 RX ORDER — MIDAZOLAM HYDROCHLORIDE 1 MG/ML
INJECTION INTRAMUSCULAR; INTRAVENOUS
Status: DISCONTINUED | OUTPATIENT
Start: 2018-11-15 | End: 2018-11-15

## 2018-11-15 RX ORDER — GLYCOPYRROLATE 0.2 MG/ML
INJECTION INTRAMUSCULAR; INTRAVENOUS
Status: DISCONTINUED | OUTPATIENT
Start: 2018-11-15 | End: 2018-11-15

## 2018-11-15 RX ORDER — CEFAZOLIN SODIUM 2 G/50ML
SOLUTION INTRAVENOUS
Status: COMPLETED
Start: 2018-11-15 | End: 2018-11-15

## 2018-11-15 RX ORDER — FENTANYL CITRATE 50 UG/ML
INJECTION, SOLUTION INTRAMUSCULAR; INTRAVENOUS
Status: DISCONTINUED | OUTPATIENT
Start: 2018-11-15 | End: 2018-11-15

## 2018-11-15 RX ORDER — FLUMAZENIL 0.1 MG/ML
INJECTION INTRAVENOUS
Status: DISCONTINUED | OUTPATIENT
Start: 2018-11-15 | End: 2018-11-15

## 2018-11-15 RX ORDER — DEXAMETHASONE SODIUM PHOSPHATE 4 MG/ML
INJECTION, SOLUTION INTRA-ARTICULAR; INTRALESIONAL; INTRAMUSCULAR; INTRAVENOUS; SOFT TISSUE
Status: DISCONTINUED | OUTPATIENT
Start: 2018-11-15 | End: 2018-11-15

## 2018-11-15 RX ORDER — FENTANYL CITRATE 50 UG/ML
25 INJECTION, SOLUTION INTRAMUSCULAR; INTRAVENOUS EVERY 5 MIN PRN
Status: DISCONTINUED | OUTPATIENT
Start: 2018-11-15 | End: 2018-11-15 | Stop reason: HOSPADM

## 2018-11-15 RX ORDER — OXYCODONE HYDROCHLORIDE 5 MG/1
5 TABLET ORAL ONCE AS NEEDED
Status: COMPLETED | OUTPATIENT
Start: 2018-11-15 | End: 2018-11-15

## 2018-11-15 RX ORDER — LIDOCAINE HCL/PF 100 MG/5ML
SYRINGE (ML) INTRAVENOUS
Status: DISCONTINUED | OUTPATIENT
Start: 2018-11-15 | End: 2018-11-15

## 2018-11-15 RX ORDER — CEFAZOLIN SODIUM 2 G/50ML
2 SOLUTION INTRAVENOUS
Status: COMPLETED | OUTPATIENT
Start: 2018-11-15 | End: 2018-11-15

## 2018-11-15 RX ORDER — TAMSULOSIN HYDROCHLORIDE 0.4 MG/1
0.4 CAPSULE ORAL DAILY
Qty: 180 CAPSULE | Refills: 1
Start: 2018-11-15 | End: 2018-12-31

## 2018-11-15 RX ORDER — SODIUM CHLORIDE, SODIUM LACTATE, POTASSIUM CHLORIDE, CALCIUM CHLORIDE 600; 310; 30; 20 MG/100ML; MG/100ML; MG/100ML; MG/100ML
INJECTION, SOLUTION INTRAVENOUS CONTINUOUS
Status: DISCONTINUED | OUTPATIENT
Start: 2018-11-15 | End: 2018-11-15 | Stop reason: HOSPADM

## 2018-11-15 RX ORDER — PHENAZOPYRIDINE HYDROCHLORIDE 100 MG/1
100 TABLET, FILM COATED ORAL 3 TIMES DAILY PRN
Qty: 15 TABLET | Refills: 0 | Status: SHIPPED | OUTPATIENT
Start: 2018-11-15 | End: 2018-11-25

## 2018-11-15 RX ADMIN — MIDAZOLAM HYDROCHLORIDE 2 MG: 1 INJECTION, SOLUTION INTRAMUSCULAR; INTRAVENOUS at 08:11

## 2018-11-15 RX ADMIN — OXYCODONE HYDROCHLORIDE 5 MG: 5 TABLET ORAL at 09:11

## 2018-11-15 RX ADMIN — PROPOFOL 200 MG: 10 INJECTION, EMULSION INTRAVENOUS at 08:11

## 2018-11-15 RX ADMIN — LIDOCAINE HYDROCHLORIDE 10 MG: 10 INJECTION, SOLUTION EPIDURAL; INFILTRATION; INTRACAUDAL; PERINEURAL at 07:11

## 2018-11-15 RX ADMIN — DEXAMETHASONE SODIUM PHOSPHATE 4 MG: 4 INJECTION, SOLUTION INTRAMUSCULAR; INTRAVENOUS at 08:11

## 2018-11-15 RX ADMIN — SCOPALAMINE 1 PATCH: 1 PATCH, EXTENDED RELEASE TRANSDERMAL at 07:11

## 2018-11-15 RX ADMIN — FLUMAZENIL 0.2 MG: 0.1 INJECTION, SOLUTION INTRAVENOUS at 08:11

## 2018-11-15 RX ADMIN — FENTANYL CITRATE 50 MCG: 50 INJECTION, SOLUTION INTRAMUSCULAR; INTRAVENOUS at 08:11

## 2018-11-15 RX ADMIN — SODIUM CHLORIDE, SODIUM LACTATE, POTASSIUM CHLORIDE, AND CALCIUM CHLORIDE: .6; .31; .03; .02 INJECTION, SOLUTION INTRAVENOUS at 07:11

## 2018-11-15 RX ADMIN — CEFAZOLIN SODIUM 2 G: 2 SOLUTION INTRAVENOUS at 08:11

## 2018-11-15 RX ADMIN — LIDOCAINE HYDROCHLORIDE 100 MG: 20 INJECTION, SOLUTION INTRAVENOUS at 08:11

## 2018-11-15 RX ADMIN — ONDANSETRON 4 MG: 2 INJECTION, SOLUTION INTRAMUSCULAR; INTRAVENOUS at 08:11

## 2018-11-15 RX ADMIN — GLYCOPYRROLATE 0.1 MG: 0.2 INJECTION, SOLUTION INTRAMUSCULAR; INTRAVENOUS at 08:11

## 2018-11-15 NOTE — PLAN OF CARE
Vss, tish po fluids, denies pain, ambulates easily. IV removed, catheter intact. Discharge instructions provided and states understanding. States ready to go home.  Discharged from facility with family.

## 2018-11-15 NOTE — ANESTHESIA POSTPROCEDURE EVALUATION
"Anesthesia Post Evaluation    Patient: Sarwat Colunga    Procedure(s) Performed: Procedure(s) (LRB):  CYSTOSCOPY, WITH INSERTION OF UROLIFT IMPLANT (N/A)    Final Anesthesia Type: general  Patient location during evaluation: PACU  Patient participation: Yes- Able to Participate  Level of consciousness: awake and alert  Post-procedure vital signs: reviewed and stable  Pain management: adequate  Airway patency: patent  PONV status at discharge: No PONV  Anesthetic complications: no      Cardiovascular status: hemodynamically stable  Respiratory status: unassisted and room air  Hydration status: euvolemic  Follow-up not needed.        Visit Vitals  /66   Pulse 71   Temp 36.7 °C (98.1 °F)   Resp 16   Ht 5' 11" (1.803 m)   Wt 124.7 kg (275 lb)   SpO2 97%   BMI 38.35 kg/m²       Pain/Juno Score: Pain Assessment Performed: Yes (11/15/2018 10:31 AM)  Presence of Pain: denies (11/15/2018 10:31 AM)  Pain Rating Prior to Med Admin: 2 (11/15/2018  9:15 AM)  Juno Score: 10 (11/15/2018 10:31 AM)        "

## 2018-11-15 NOTE — DISCHARGE INSTRUCTIONS
Discharge Instructions: Caring for Your Indwelling Urinary Catheter  You have been discharged with an indwelling urinary catheter (also called a Priest catheter). A catheter is a thin, flexible tube. An indwelling urinary catheter has two parts. The first part is a tube that drains urine from your bladder. The second part is a bag or other device that collects the urine.  The most important thing to remember is that you want to prevent infection. Always wash your hands before handling your catheter bag or tubing.  Draining the bedside bag  · Wash your hands with soap and clean, running water or use an alcohol-based hand  that contains at least 60% alcohol.  · Hold the drainage tube over a toilet or measuring container.  · Unclamp the tube and let the bag drain.  · Dont touch the tip of the drainage tube or let it touch the toilet or container.  Cleaning the drainage tube  · When the bag is empty, clean the tip of the drainage tube with an alcohol wipe.  · Clamp the tube.  · Reinsert the tube into the pocket on the drainage bag.  Cleaning your skin and tubing  · Clean the skin near the catheter with soap and water.  · Wash your genital area from front to back.  · Wash the catheter tubing. Always wash the catheter in the direction away from your body.  · You will be told when and how to change your bag and tubing.  · Dont try to remove the catheter by yourself.  · You may shower with the catheter in place.  Emptying a leg bag  · Wash your hands.  · Remove the stopper on the bag.  · Drain the bag into the toilet or a measuring container. Dont let the tip of the drainage tube touch anything, including your fingers.  · Clean the tip of the drainage tube with alcohol.  · Replace the stopper.  Follow-up care  Make a follow-up appointment as directed by your healthcare provider  When to call your healthcare provider  Call your healthcare provider right away if you have any of the following:  · Chills or fever  "above 100.4°F (38°C)  · Leakage around the catheter insertion site  · Increased spasms (uncontrollable twitching) in your legs, abdomen, or bladder. Occasional mild spasms are normal.  · Burning in the urinary tract, penis, or genital area  · Nausea and vomiting  · Aching in the lower back  · Cloudy or bloody (pink or red) urine, sediment or mucus in the urine, or foul-smelling urine   Date Last Reviewed: 1/1/2017 © 2000-2017 [employer]. [address] [place] [address], [place] [address]. All rights reserved. This information is not intended as a substitute for professional medical care. Always follow your healthcare professional's instructions.      Discharge Instructions: After Your Surgery/Procedure  Youve just had surgery. During surgery you were given medicine called anesthesia to keep you relaxed and free of pain. After surgery you may have some pain or nausea. This is common. Here are some tips for feeling better and getting well after surgery.     Stay on schedule with your medication.   Going home  Your doctor or nurse will show you how to take care of yourself when you go home. He or she will also answer your questions. Have an adult family member or friend drive you home.      For your safety we recommend these precaution for the first 24 hours after your procedure:  · Do not drive or use heavy equipment.  · Do not make important decisions or sign legal papers.  · Do not drink alcohol.  · Have someone stay with you, if needed. He or she can watch for problems and help keep you safe.  · Your concentration, balance, coordination, and judgement may be impaired for many hours after anesthesia.  Use caution when ambulating or standing up.     · You may feel weak and "washed out" after anesthesia and surgery.      Subtle residual effects of general anesthesia or sedation with regional / local anesthesia can last more than 24 hours.  Rest for the remainder of the day or longer if your Doctor/Surgeon has " advised you to do so.  Although you may feel normal within the first 24 hours, your reflexes and mental ability may be impaired without you realizing it.  You may feel dizzy, lightheaded or sleepy for 24 hours or longer.      Be sure to go to all follow-up visits with your doctor. And rest after your surgery for as long as your doctor tells you to.  Coping with pain  If you have pain after surgery, pain medicine will help you feel better. Take it as told, before pain becomes severe. Also, ask your doctor or pharmacist about other ways to control pain. This might be with heat, ice, or relaxation. And follow any other instructions your surgeon or nurse gives you.  Tips for taking pain medicine  To get the best relief possible, remember these points:  · Pain medicines can upset your stomach. Taking them with a little food may help.  · Most pain relievers taken by mouth need at least 20 to 30 minutes to start to work.  · Taking medicine on a schedule can help you remember to take it. Try to time your medicine so that you can take it before starting an activity. This might be before you get dressed, go for a walk, or sit down for dinner.  · Constipation is a common side effect of pain medicines. Call your doctor before taking any medicines such as laxatives or stool softeners to help ease constipation. Also ask if you should skip any foods. Drinking lots of fluids and eating foods such as fruits and vegetables that are high in fiber can also help. Remember, do not take laxatives unless your surgeon has prescribed them.  · Drinking alcohol and taking pain medicine can cause dizziness and slow your breathing. It can even be deadly. Do not drink alcohol while taking pain medicine.  · Pain medicine can make you react more slowly to things. Do not drive or run machinery while taking pain medicine.  Your health care provider may tell you to take acetaminophen to help ease your pain. Ask him or her how much you are supposed to  take each day. Acetaminophen or other pain relievers may interact with your prescription medicines or other over-the-counter (OTC) drugs. Some prescription medicines have acetaminophen and other ingredients. Using both prescription and OTC acetaminophen for pain can cause you to overdose. Read the labels on your OTC medicines with care. This will help you to clearly know the list of ingredients, how much to take, and any warnings. It may also help you not take too much acetaminophen. If you have questions or do not understand the information, ask your pharmacist or health care provider to explain it to you before you take the OTC medicine.  Managing nausea  Some people have an upset stomach after surgery. This is often because of anesthesia, pain, or pain medicine, or the stress of surgery. These tips will help you handle nausea and eat healthy foods as you get better. If you were on a special food plan before surgery, ask your doctor if you should follow it while you get better. These tips may help:  · Do not push yourself to eat. Your body will tell you when to eat and how much.  · Start off with clear liquids and soup. They are easier to digest.  · Next try semi-solid foods, such as mashed potatoes, applesauce, and gelatin, as you feel ready.  · Slowly move to solid foods. Dont eat fatty, rich, or spicy foods at first.  · Do not force yourself to have 3 large meals a day. Instead eat smaller amounts more often.  · Take pain medicines with a small amount of solid food, such as crackers or toast, to avoid nausea.     Call your surgeon if  · You still have pain an hour after taking medicine. The medicine may not be strong enough.  · You feel too sleepy, dizzy, or groggy. The medicine may be too strong.  · You have side effects like nausea, vomiting, or skin changes, such as rash, itching, or hives.       If you have obstructive sleep apnea  You were given anesthesia medicine during surgery to keep you comfortable  and free of pain. After surgery, you may have more apnea spells because of this medicine and other medicines you were given. The spells may last longer than usual.   At home:  · Keep using the continuous positive airway pressure (CPAP) device when you sleep. Unless your health care provider tells you not to, use it when you sleep, day or night. CPAP is a common device used to treat obstructive sleep apnea.  · Talk with your provider before taking any pain medicine, muscle relaxants, or sedatives. Your provider will tell you about the possible dangers of taking these medicines.  © 9278-4433 [employer]. [address] [place] [address], [place] [address]. All rights reserved. This information is not intended as a substitute for professional medical care. Always follow your healthcare professional's instructions.  Post op instructions for prevention of DVT  What is deep vein thrombosis?  Deep vein thrombosis (DVT) is the medical term for blood clots in the deep veins of the leg.  These blood clots can be dangerous.  A DVT can block a blood vessel and keep blood from getting where it needs to go.  Another problem is that the clot can travel to other parts of the body such as the lungs.  A clot that travels to the lungs is called a pulmonary embolus (PE) and can cause serious problems with breathing which can lead to death.  Am I at risk for DVT/PE?  If you are not very active, you are at risk of DVT.  Anyone confined to bed, sitting for long periods of time, recovering from surgery, etc. increases the risk of DVT.  Other risk factors are cancer diagnosis, certain medications, estrogen replacement in any form,older age, obesity, pregnancy, smoking, history of clotting disorders, and dehydration.  How will I know if I have a DVT?   Swelling in the lower leg   Pain   Warmth, redness, hardness or bulging of the vein  If you have any of these symptoms, call your doctors office right away.  Some people will not have  any symptoms until the clot moves to the lungs.  What are the symptoms of a PE?   Panting, shortness of breath, or trouble breathing   Sharp, knife-like chest pain when you breathe   Coughing or coughing up blood   Rapid heartbeat  If you have any of these symptoms or get worse quickly, call 911 for emergency treatment.  How can I prevent a DVT?   Avoid long periods of inactivity and dont cross your legs--get up and walk around every hour or so.   Stay active--walking after surgery is highly encouraged.  This means you should get out of the house and walk in the neighborhood.  Going up and down stairs will not impair healing (unless advised against such activity by your doctor).     Drink plenty of noncaffeinated, nonalcoholic fluids each day to prevent dehydration.   Wear special support stockings, if they have been advised by your doctor.   If you travel, stop at least once an hour and walk around.   Avoid smoking (assistance with stopping is available through your healthcare provider)  Always notify your doctor if you are not able to follow the post operative instructions that are given to you at the time of discharge.  It may be necessary to prescribe one of the medications available to prevent DVT.

## 2018-11-15 NOTE — TRANSFER OF CARE
"Anesthesia Transfer of Care Note    Patient: Sarwat Colunga    Procedure(s) Performed: Procedure(s) (LRB):  CYSTOSCOPY, WITH INSERTION OF UROLIFT IMPLANT (N/A)    Patient location: PACU    Anesthesia Type: general    Transport from OR: Transported from OR on 2-3 L/min O2 by NC with adequate spontaneous ventilation    Post pain: adequate analgesia    Post assessment: no apparent anesthetic complications and tolerated procedure well    Post vital signs: stable    Level of consciousness: sedated    Nausea/Vomiting: no nausea/vomiting    Complications: none    Transfer of care protocol was followed      Last vitals:   Visit Vitals  /66 (BP Location: Left arm, Patient Position: Lying)   Pulse 73   Temp 37 °C (98.6 °F) (Skin)   Resp 18   Ht 5' 11" (1.803 m)   Wt 124.7 kg (275 lb)   SpO2 97%   BMI 38.35 kg/m²     "

## 2018-11-15 NOTE — ANESTHESIA PREPROCEDURE EVALUATION
11/15/2018  Sarwat Colunga is a 58 y.o., male.    Pre-op Assessment         Review of Systems      Physical Exam  General:  Obesity    Airway/Jaw/Neck:  Airway Findings: Mouth Opening: Normal Tongue: Normal  General Airway Assessment: Adult  Mallampati: I  TM Distance: Normal, at least 6 cm  Jaw/Neck Findings:  Micrognathia     Eyes/Ears/Nose:  EYES/EARS/NOSE FINDINGS: Normal   Dental:  DENTAL FINDINGS: Normal   Chest/Lungs:  Chest/Lungs Findings: Clear to auscultation, Normal Respiratory Rate     Heart/Vascular:  Heart Findings: Rate: Normal  Rhythm: Regular Rhythm  Sounds: Normal        Mental Status:  Mental Status Findings:  Cooperative, Alert and Oriented         Anesthesia Plan  Type of Anesthesia, risks & benefits discussed:  Anesthesia Type:  general  Patient's Preference:   Intra-op Monitoring Plan: standard ASA monitors  Intra-op Monitoring Plan Comments:   Post Op Pain Control Plan: IV/PO Opioids PRN  Post Op Pain Control Plan Comments:   Induction:   IV  Beta Blocker:  Patient is not currently on a Beta-Blocker (No further documentation required).       Informed Consent: Patient understands risks and agrees with Anesthesia plan.  Questions answered. Anesthesia consent signed with patient.  ASA Score: 3     Day of Surgery Review of History & Physical: I have interviewed and examined the patient. I have reviewed the patient's H&P dated:    H&P update referred to the provider.         Ready For Surgery From Anesthesia Perspective.                                                                                                                11/15/2018  Sarwat Colunga is a 58 y.o., male.    Anesthesia Evaluation    I have reviewed the Patient Summary Reports.    I have reviewed the Nursing Notes.      Review of Systems  Anesthesia Hx:  Hx of Anesthetic complications    Social:  Non-Smoker     Hematology/Oncology:  Hematology Normal   Oncology Normal     EENT/Dental:EENT/Dental Normal   Cardiovascular:   Hypertension, well controlled    Pulmonary:   Sleep Apnea    Hepatic/GI:   GERD    Musculoskeletal:   Arthritis     Neurological:   Neuromuscular Disease,   Peripheral Neuropathy    Endocrine:   Diabetes, type 2    Dermatological:  Skin Normal    Psych:  Psychiatric Normal           Physical Exam  General:  Obesity    Airway/Jaw/Neck:  AIRWAY FINDINGS: Normal      Eyes/Ears/Nose:  EYES/EARS/NOSE FINDINGS: Normal   Dental:  DENTAL FINDINGS: Normal   Chest/Lungs:  Chest/Lungs Findings: Clear to auscultation, Normal Respiratory Rate     Heart/Vascular:  Heart Findings: Rate: Normal  Rhythm: Regular Rhythm  Sounds: Normal  Heart murmur: negative Vascular Findings: Normal    Abdomen:  Abdomen Findings: Normal    Musculoskeletal:  Musculoskeletal Findings: Normal   Skin:  Skin Findings: Normal    Mental Status:  Mental Status Findings: Normal        Anesthesia Plan  Type of Anesthesia, risks & benefits discussed:  Anesthesia Type:  general  Patient's Preference:   Intra-op Monitoring Plan:   Intra-op Monitoring Plan Comments:   Post Op Pain Control Plan:   Post Op Pain Control Plan Comments:   Induction:   IV  Beta Blocker:  Patient is not currently on a Beta-Blocker (No further documentation required).       Informed Consent: Patient understands risks and agrees with Anesthesia plan.  Questions answered. Anesthesia consent signed with patient.  ASA Score: 3     Day of Surgery Review of History & Physical:    H&P update referred to the provider.         Ready For Surgery From Anesthesia Perspective.

## 2018-11-15 NOTE — PLAN OF CARE
Patient and spouse instructed on home care of urinary catheter and removal for tomorrow morning at 0900 to call the office if not comfortable to perform at home. Urinary leg bag attached and drainage bag sent home with patient.

## 2018-11-15 NOTE — INTERVAL H&P NOTE
The patient has been examined and the H&P has been reviewed:       Findings:   US: volume 50.1cm3 (H: 35.8mm, W 49.1mm, L 54.5mm), small intravesical extension  Cysto: Bilateral lateral lobe ingrowth causing significant obstruction especially when viewed from verumontanum, with kissing lobes centrally. wide flat hypervascular nonobstucting median lobe  :    Disposition:  We did discuss options for his obstructing prostate.  He has failed medical therapy, has significant visual obstruction, and his urgency did improve with conservative efforts. He would like to proceed with intervention due to his refractory LUTS on medical therapy. After extensive discussion of both procedures and medical therapy, he elected to proceed with Urolift.      Procedure in detail discussed, including risks benefits and alternatives (such as TURP and continued medical management). Discussed risks including but not limited to hematuria, postop retention, pain, infection, injury to bladder or urethra, and worsening urgency, latent pelvic pain, no guarantee of symptomatic relief, prostate regrowth, need for future procedures. We did discuss the non-incidence of ejaculatory dysfunction, as well as the 1-2% retreatment rate in 5 year studies. Also discussed about 20% of people may need a catheter after (due to retention or hematuria) but not required if voiding postop.      We did have a rachell discussion about his mild baseline urinary urgency as well. Though urgency can be present with significant obstruction and be relieved with relief of obstruction, there can be a transient worsening postoperatively. If persists, can be worked up and treated in the future as he prefers to relieve obstruction first which is most reasonable. All questions answered, and appropriate informed consent obtained.      UroLift scheduled in OR for 11/15/18.  Will CC Dr Arreola for preop medical clearance/optimization  Will need PAT prior with Ucx at least 1-2 weeks  prior with Ucx         Anesthesia/Surgery risks, benefits and alternative options discussed and understood by patient/family.          Active Hospital Problems    Diagnosis  POA    BPH (benign prostatic hyperplasia) [N40.0]  Yes      Resolved Hospital Problems   No resolved problems to display.

## 2018-11-15 NOTE — OR NURSING
Pt arrived to preop and placed in bed. SCDs and warm blankets provided. Periop process explained to patient with verbalized understanding. Pts belongings tagged/bagged and kept in preop. Wedding band and wallet sent with security. Yellow form in chart.

## 2018-11-15 NOTE — OP NOTE
[facility] Operative Report/Brief Discharge Note     Date: 11/15/2018     Staff Surgeon: Sarwat Viveros MD     Pre-Op Diagnosis: BPH with obstruction/LUTS     Post-Op Diagnosis: same     Procedure(s) Performed:   Urolift: cystoscopy with prostatic urethral lift/transprostatic tissue retraction (26977) with delivery of 5 total implants (52442 x4)     Specimen(s): none     Anesthesia: General LMA     Findings: obstructing lateral lobes, high bladder neck, good anterior channel after 4 standard implants but still obstructing at level of verumontanum and given high bladder neck, addition left sided implant placed which improved continuity of channel from verumontanum to bladder neck     Estimated Blood Loss: minimal     Drains: 20 fr everett     Complications: none     Indications for procedure:  Mr Colunga is a 59 yo M with with long history of BPH with obstructive LUTS, previously on TRT. Negative prostate biopsy in 7/2015 with psa 7.3 and 41g gland. All follow up PSAs have been normal. He also had visual evidence of NÚÑEZ on cysto at that time, and finasteride was added to his flomax. LUTS persisted and finasteride was added to his flomax. He had progressive urgency and mirabegron added. PVR 117cc, AUA SS 9/4 on all 3 meds. Did eliminate soda, make scheduled bathroom breaks, and start stool softener and urgency has significantly improved. Cystoscopy and transrectal ultrasound evaluation on 10/23/18 revealed a 50.1 g prostate with significant lateral lobe obstruction with kissing lobes, without gross median lobe, mild intravesical extension. He desired further intervention for his sympmtoms refractory to medical therapy, and prefers to be on less medications. After discussion of surgical treatment options and interventions, the patient elected to proceed with a prostatic urethral lift (Urolift). Discussed risks including but not limited to hematuria, postop retention, pain, infection, injury to bladder or urethra,  and worsening urgency, latent pelvic pain, no guarantee of symptomatic relief, prostate regrowth, need for future procedures, 15-20% chance of requiring Priest catheter postoperatively.  He accepted the aforementioned risks, and after answering all questions, appropriate informed consent was obtained.          Procedure in detail:  After appropriate informed consent was obtained, the patient was taken to the cystoscopy suite in the operating room.  Preoperative antibiotics were given and a WHO proved timeout was performed.      A 20 Kiswahili cystoscope was inserted into the bladder confirming previous cystoscopic findings of an obstructing prostate with moderate lateral lobe obstruction, and slightly high bladder neck.  Bilateral ureteral orifices were in their orthotopic position on the trigone bilaterally and the bladder otherwise appeared normal.     Cystoscopy bridge was then replaced with a urolift delivery device.  The first treatment site was the patient's left lateral lobe, starting 2cm distal to bladder neck.   After rotating the distal tip of the delivery device towards the right, it was drawn back into the prostatic urethra. The distal tip of the delivery device was then angled laterally, approximately 20° at this position, to compress the lateral lobe. The trigger was pulled to deploy a needle containing the implant through the capsule of the prostate. The needle was then retracted allowing the implants to be delivered to the capsular surface of the prostate which was then tensioned to a short capsular tensioning and removal of the slack monofilament.  The device was then angled back towards midline and slowly advanced proximally about 3-4 mm until cystoscopic verification that the monofilament was centered in the delivery bay of the device.  Excess filament was then severed. The delivery device was then readvanced into the bladder, and leaving the cystoscopic sheath in place, the delivery device was  removed and exchanged with a second Urolift delivery device.  This same procedure was then repeated for the right lateral lobe in a similar position, though required 2 implants to successfully place one here due to capsular tab pull through..     After the initial 2 implants were placed, additional 2, one to each lateral lobe placed at level of verumontanum. Visual obturator passed back in after 4 implants with anterior channel noted from verumontanum though given high bladder neck and continued obstruction near verumontanum, delivery of one further implant to this position at left lateral lobe allowed more continuous channel when viewed from verumontanum. Given high bladder neck, mild vascular ooze, and baseline symptoms, 20fr coude everett placed with 10cc in balloon to gravity drainage to be indwelling overnight.     The patient tolerated the procedure well, there were no complications.  He was awakened and taken to PACU without incident.        Disposition:  Home today status post uncomplicated procedure as above.  He'll be given a regimen of  by mouth pain control, prophylactic postoperative antibiotics, urinary analgesic/anti-spasmodic to help control any postoperative irritative or inflammatory symptoms. He will remove everett  tomorrow morning 11/16/18 by 0900. He will continue his alpha blocker daily for 5-7 days, after which time he may discontinue its use. He will return in 2-3 weeks for a nurse visit for repeat symptom assessment and measurement of postvoid residual, and follow-up with me in 5-6 weeks.       Discharge home today status post uncomplicated procedure as above  Diet - resume home diet  Follow up: 2-3 weeks for nurse visit, 5-6 weeks MD - office to arrange  Instructions:   See urolift discharge instruction sheet  Avoid strenuous activity x3-5 days  No riding mowers, bicycles, motorcycles, tractors for 2-3 weeks  Continue flomax/finasteride x1 week once everett removed  No fish  oil/aspirin/bloodthinners x3-5 days  Pink/clear red (koolaid) urine ok as long as clear/translucent without dark blood or clots, and draining well/voiding well without difficulty - drink lots of water  Remove everett by 0900 11/16/18

## 2018-11-16 DIAGNOSIS — E11.40 TYPE 2 DIABETES, CONTROLLED, WITH NEUROPATHY: ICD-10-CM

## 2018-11-16 RX ORDER — DAPAGLIFLOZIN 5 MG/1
TABLET, FILM COATED ORAL
Qty: 30 TABLET | Refills: 0 | Status: CANCELLED | OUTPATIENT
Start: 2018-11-16

## 2018-11-19 RX ORDER — DAPAGLIFLOZIN 5 MG/1
TABLET, FILM COATED ORAL
Qty: 30 TABLET | Refills: 0 | Status: SHIPPED | OUTPATIENT
Start: 2018-11-19 | End: 2018-12-18 | Stop reason: SDUPTHER

## 2018-11-20 DIAGNOSIS — M54.10 DIABETIC RADICULOPATHY: ICD-10-CM

## 2018-11-20 DIAGNOSIS — E11.49 DIABETIC RADICULOPATHY: ICD-10-CM

## 2018-11-20 RX ORDER — INSULIN GLARGINE 100 [IU]/ML
INJECTION, SOLUTION SUBCUTANEOUS
Qty: 36 ML | Refills: 2 | Status: SHIPPED | OUTPATIENT
Start: 2018-11-20 | End: 2019-06-11 | Stop reason: SDUPTHER

## 2018-11-29 ENCOUNTER — CLINICAL SUPPORT (OUTPATIENT)
Dept: UROLOGY | Facility: CLINIC | Age: 58
End: 2018-11-29
Payer: COMMERCIAL

## 2018-11-29 DIAGNOSIS — E11.69 HYPERLIPIDEMIA ASSOCIATED WITH TYPE 2 DIABETES MELLITUS: ICD-10-CM

## 2018-11-29 DIAGNOSIS — I10 ESSENTIAL HYPERTENSION: ICD-10-CM

## 2018-11-29 DIAGNOSIS — N40.0 BENIGN PROSTATIC HYPERPLASIA, UNSPECIFIED WHETHER LOWER URINARY TRACT SYMPTOMS PRESENT: Primary | ICD-10-CM

## 2018-11-29 DIAGNOSIS — M54.10 DIABETIC RADICULOPATHY: ICD-10-CM

## 2018-11-29 DIAGNOSIS — Z91.89 CARDIOVASCULAR EVENT RISK: ICD-10-CM

## 2018-11-29 DIAGNOSIS — E11.49 DIABETIC RADICULOPATHY: ICD-10-CM

## 2018-11-29 DIAGNOSIS — E78.5 HYPERLIPIDEMIA ASSOCIATED WITH TYPE 2 DIABETES MELLITUS: ICD-10-CM

## 2018-11-29 PROCEDURE — 51798 US URINE CAPACITY MEASURE: CPT | Mod: S$GLB,,, | Performed by: UROLOGY

## 2018-11-29 PROCEDURE — 99999 PR PBB SHADOW E&M-EST. PATIENT-LVL I: CPT | Mod: PBBFAC,,,

## 2018-11-29 RX ORDER — ESOMEPRAZOLE MAGNESIUM 40 MG/1
40 CAPSULE, DELAYED RELEASE ORAL
Qty: 30 CAPSULE | Refills: 5 | Status: SHIPPED | OUTPATIENT
Start: 2018-11-29 | End: 2022-04-12

## 2018-11-29 RX ORDER — METFORMIN HYDROCHLORIDE 1000 MG/1
1000 TABLET ORAL 2 TIMES DAILY WITH MEALS
Qty: 180 TABLET | Refills: 3 | Status: SHIPPED | OUTPATIENT
Start: 2018-11-29 | End: 2018-12-31 | Stop reason: SDUPTHER

## 2018-11-30 LAB — POC RESIDUAL URINE VOLUME: 0 ML (ref 0–100)

## 2018-11-30 RX ORDER — CHLORTHALIDONE 25 MG/1
25 TABLET ORAL DAILY
Qty: 90 TABLET | Refills: 0 | Status: SHIPPED | OUTPATIENT
Start: 2018-11-30 | End: 2019-03-09 | Stop reason: SDUPTHER

## 2018-11-30 RX ORDER — ATORVASTATIN CALCIUM 40 MG/1
40 TABLET, FILM COATED ORAL NIGHTLY
Qty: 90 TABLET | Refills: 0 | Status: SHIPPED | OUTPATIENT
Start: 2018-11-30 | End: 2018-12-15 | Stop reason: SDUPTHER

## 2018-12-11 ENCOUNTER — TELEPHONE (OUTPATIENT)
Dept: UROLOGY | Facility: CLINIC | Age: 58
End: 2018-12-11

## 2018-12-11 NOTE — TELEPHONE ENCOUNTER
----- Message from Brenda Bah sent at 12/11/2018  2:52 PM CST -----  Contact: Self  Call placed to pod     Patient states he is passing some blood and had a procedure in November and needs to speak to the nurse     Please call back [phone] Ext [phone]

## 2018-12-11 NOTE — TELEPHONE ENCOUNTER
December 3 he passed something that looked like soft scabs.  Since then he will have blood in his urine off and on with clearing in between.  He is not having any dysuria or difficulty urinating.    Advised to increase fluids, this is not totally unusual at this time post op.  Advised to call if any other concerns and that I will forward this to Dr. Viveros and call him back if MD has any further recommendations.

## 2018-12-15 DIAGNOSIS — E78.5 HYPERLIPIDEMIA ASSOCIATED WITH TYPE 2 DIABETES MELLITUS: ICD-10-CM

## 2018-12-15 DIAGNOSIS — Z91.89 CARDIOVASCULAR EVENT RISK: ICD-10-CM

## 2018-12-15 DIAGNOSIS — E11.69 HYPERLIPIDEMIA ASSOCIATED WITH TYPE 2 DIABETES MELLITUS: ICD-10-CM

## 2018-12-17 RX ORDER — ATORVASTATIN CALCIUM 40 MG/1
TABLET, FILM COATED ORAL
Qty: 90 TABLET | Refills: 0 | Status: SHIPPED | OUTPATIENT
Start: 2018-12-17 | End: 2019-09-10 | Stop reason: SDUPTHER

## 2018-12-18 DIAGNOSIS — M54.10 DIABETIC RADICULOPATHY: ICD-10-CM

## 2018-12-18 DIAGNOSIS — E11.40 TYPE 2 DIABETES, CONTROLLED, WITH NEUROPATHY: ICD-10-CM

## 2018-12-18 DIAGNOSIS — E11.49 DIABETIC RADICULOPATHY: ICD-10-CM

## 2018-12-18 RX ORDER — DAPAGLIFLOZIN 5 MG/1
5 TABLET, FILM COATED ORAL DAILY
Qty: 30 TABLET | Refills: 0 | Status: SHIPPED | OUTPATIENT
Start: 2018-12-18 | End: 2019-01-28 | Stop reason: SDUPTHER

## 2018-12-18 RX ORDER — GLIMEPIRIDE 4 MG/1
4 TABLET ORAL
Qty: 90 TABLET | Refills: 3 | Status: SHIPPED | OUTPATIENT
Start: 2018-12-18 | End: 2019-09-10 | Stop reason: SDUPTHER

## 2018-12-31 ENCOUNTER — OFFICE VISIT (OUTPATIENT)
Dept: UROLOGY | Facility: CLINIC | Age: 58
End: 2018-12-31
Payer: COMMERCIAL

## 2018-12-31 VITALS
HEART RATE: 87 BPM | WEIGHT: 273.38 LBS | BODY MASS INDEX: 38.27 KG/M2 | DIASTOLIC BLOOD PRESSURE: 74 MMHG | SYSTOLIC BLOOD PRESSURE: 111 MMHG | RESPIRATION RATE: 18 BRPM | HEIGHT: 71 IN | TEMPERATURE: 99 F

## 2018-12-31 DIAGNOSIS — E11.49 DIABETIC RADICULOPATHY: ICD-10-CM

## 2018-12-31 DIAGNOSIS — M54.10 DIABETIC RADICULOPATHY: ICD-10-CM

## 2018-12-31 DIAGNOSIS — N40.0 BPH WITHOUT OBSTRUCTION/LOWER URINARY TRACT SYMPTOMS: Primary | ICD-10-CM

## 2018-12-31 PROCEDURE — 99213 OFFICE O/P EST LOW 20 MIN: CPT | Mod: S$GLB,,, | Performed by: UROLOGY

## 2018-12-31 PROCEDURE — 99213 PR OFFICE/OUTPT VISIT, EST, LEVL III, 20-29 MIN: ICD-10-PCS | Mod: S$GLB,,, | Performed by: UROLOGY

## 2018-12-31 PROCEDURE — 3074F PR MOST RECENT SYSTOLIC BLOOD PRESSURE < 130 MM HG: ICD-10-PCS | Mod: CPTII,S$GLB,, | Performed by: UROLOGY

## 2018-12-31 PROCEDURE — 3008F BODY MASS INDEX DOCD: CPT | Mod: CPTII,S$GLB,, | Performed by: UROLOGY

## 2018-12-31 PROCEDURE — 3074F SYST BP LT 130 MM HG: CPT | Mod: CPTII,S$GLB,, | Performed by: UROLOGY

## 2018-12-31 PROCEDURE — 3078F PR MOST RECENT DIASTOLIC BLOOD PRESSURE < 80 MM HG: ICD-10-PCS | Mod: CPTII,S$GLB,, | Performed by: UROLOGY

## 2018-12-31 PROCEDURE — 3078F DIAST BP <80 MM HG: CPT | Mod: CPTII,S$GLB,, | Performed by: UROLOGY

## 2018-12-31 PROCEDURE — 3008F PR BODY MASS INDEX (BMI) DOCUMENTED: ICD-10-PCS | Mod: CPTII,S$GLB,, | Performed by: UROLOGY

## 2018-12-31 PROCEDURE — 99999 PR PBB SHADOW E&M-EST. PATIENT-LVL III: ICD-10-PCS | Mod: PBBFAC,,, | Performed by: UROLOGY

## 2018-12-31 PROCEDURE — 99999 PR PBB SHADOW E&M-EST. PATIENT-LVL III: CPT | Mod: PBBFAC,,, | Performed by: UROLOGY

## 2018-12-31 RX ORDER — METFORMIN HYDROCHLORIDE 1000 MG/1
1000 TABLET ORAL 2 TIMES DAILY WITH MEALS
Qty: 180 TABLET | Refills: 3 | Status: SHIPPED | OUTPATIENT
Start: 2018-12-31 | End: 2019-09-10 | Stop reason: SDUPTHER

## 2018-12-31 NOTE — PROGRESS NOTES
[facility] Progress Note    Sarwat Colunga is a 58 y.o. male who presents for For BPH follow up, s/p urolift    Mr Colunga is a 59 yo M with with long history of BPH with obstructive LUTS, previously on TRT. Negative prostate biopsy in 7/2015 with psa 7.3 and 41g gland. All follow up PSAs have been normal. He also had visual evidence of NÚÑEZ on cysto at that time, and finasteride was added to his flomax. LUTS persisted and finasteride was added to his flomax. He had progressive urgency and mirabegron added. PVR 117cc, AUA SS 9/4 on all 3 meds. Did eliminate soda, make scheduled bathroom breaks, and start stool softener and urgency has significantly improved. Cystoscopy and transrectal ultrasound evaluation on 10/23/18 revealed a 50.1 g prostate with significant lateral lobe obstruction with kissing lobes, without gross median lobe, mild intravesical extension. He desired further intervention for his sympmtoms refractory to medical therapy, and prefers to be on less medications. After discussion of surgical treatment options and interventions, the patient elected to proceed with a prostatic urethral lift (Urolift).     Urolift 11/15/18:  obstructing lateral lobes, high bladder neck, good anterior channel after 4 standard implants but still obstructing at level of verumontanum and given high bladder neck, addition left sided (5th) implant placed which improved continuity of channel from verumontanum to bladder neck  Priest overnight, removed the next morning    11/29/18 PVR 0cc by bladder scan, AUA SS: 10/1 (4 urgency; 3: frequency; 2: sleeping; 1: emptying)    December 3 he passed something that looked like soft scabs.  Since then he has blood in his urine off and on with clearing in between.  He is not having any dysuria or difficulty urinating.   He returns today noting that he was voiding coagulated blood for the 1st 2 weeks of December.  This did then clear and he has seen some blood 1 time each 5 days ago and  "then 2 days ago.  Otherwise his urine has been clear.  He stopped his Flomax almost immediately after the procedure because of frequency.  He is still on finasteride.  Though he did have initial urgency and frequency, these are calming down.  He no longer needs a diaper.  He can hold his urine.    Postvoid residual by bladder scan is again 0 cc  AUA symptom score:  3/1, pleased (1:  Frequency, urgency, nocturia)    ROS: A comprehensive 10 system review was performed and is negative except as noted above in HPI    PHYSICAL EXAM:    Vitals:    12/31/18 0921   BP: 111/74   Pulse: 87   Resp: 18   Temp: 98.5 °F (36.9 °C)     Body mass index is 38.13 kg/m². Weight: 124 kg (273 lb 5.9 oz) Height: 5' 11" (180.3 cm)       General: Alert, cooperative, no distress, appears stated age   Head: Normocephalic, without obvious abnormality, atraumatic   Eyes: PERRL, conjunctiva/corneas clear   Lungs: Respirations unlabored   Heart: Warm and well perfused   Abdomen: soft NT ND  Extremities: Extremities normal, atraumatic, no cyanosis or edema   Skin: Skin color, texture, turgor normal, no rashes or lesions   Psych: Appropriate   Neurologic: Non-focal     ASSESSMENT   1. BPH without obstruction/lower urinary tract symptoms         Plan    Doing very well with resolution of all obstructive voiding symptoms and essentially of all bothersome urinary symptoms status post Urolift.  With Urolift and continued Myrbetriq his urgency and frequency have resolved as has his urge incontinence and he no longer needs diapers.  He is pleased with his urinary habits at this time.  It does seem like he had a bit more postoperative hematuria than expected which does seem to be calming down, and he reports clear urine at this time.  Already off Flomax.  Can discontinue finasteride.  Advised close follow-up and to have him return to see our nurse practitioner in 3 months for re-evaluation of blood in urine and urgency.  He should have repeat AUA symptom " score and PVR.    If he still has no bothersome urgency and frequency at that time, his Myrbetriq can be discontinued.  He can then follow up with me 6 months later with a PSA prior which can be ordered at the next visit.

## 2019-01-11 ENCOUNTER — PATIENT OUTREACH (OUTPATIENT)
Dept: ADMINISTRATIVE | Facility: HOSPITAL | Age: 59
End: 2019-01-11

## 2019-01-11 NOTE — LETTER
January 21, 2019    [place] [id number]             [facility]  [address]  [facility] [address]  Phone: [phone] Dear [facility] is committed to your overall health and would like to ensure that you are up to date on your recommended test and/or procedures.   Rosales Page MD  has found that your chart shows you may be due for the following:     DIABETIC FOOT EXAM   HEMOGLOBIN  A1C       If you have had any of the above done at another facility, please let us know so that we may obtain copies from that facility.  If you have a copy of these records, please provide a copy for us to scan into your chart.  You are welcome to request that the report be faxed to us at  ([phone]).     Otherwise, please schedule these appointments at your earliest convenience by calling [phone] or going to [email or web address].     If you have an upcoming scheduled appointment, please disregard this letter.     Sincerely,   Your [facility]   MD Kita Thompson LPN Clinical Care Coordinator   [facility]   [address] [place] [address]   Phone [phone]   Fax [phone]

## 2019-01-28 DIAGNOSIS — E11.40 TYPE 2 DIABETES, CONTROLLED, WITH NEUROPATHY: ICD-10-CM

## 2019-01-28 RX ORDER — DAPAGLIFLOZIN 5 MG/1
5 TABLET, FILM COATED ORAL DAILY
Qty: 30 TABLET | Refills: 5 | Status: SHIPPED | OUTPATIENT
Start: 2019-01-28 | End: 2019-09-10 | Stop reason: SDUPTHER

## 2019-03-09 DIAGNOSIS — I10 ESSENTIAL HYPERTENSION: ICD-10-CM

## 2019-03-11 RX ORDER — CHLORTHALIDONE 25 MG/1
TABLET ORAL
Qty: 90 TABLET | Refills: 0 | Status: SHIPPED | OUTPATIENT
Start: 2019-03-11 | End: 2019-06-11 | Stop reason: SDUPTHER

## 2019-04-12 DIAGNOSIS — G62.9 PERIPHERAL POLYNEUROPATHY: ICD-10-CM

## 2019-04-12 RX ORDER — PREGABALIN 50 MG/1
CAPSULE ORAL
Qty: 450 CAPSULE | Refills: 1 | Status: SHIPPED | OUTPATIENT
Start: 2019-04-12 | End: 2019-08-07 | Stop reason: SDUPTHER

## 2019-06-11 DIAGNOSIS — I10 ESSENTIAL HYPERTENSION: ICD-10-CM

## 2019-06-11 DIAGNOSIS — M54.10 DIABETIC RADICULOPATHY: ICD-10-CM

## 2019-06-11 DIAGNOSIS — E11.49 DIABETIC RADICULOPATHY: ICD-10-CM

## 2019-06-11 RX ORDER — CHLORTHALIDONE 25 MG/1
TABLET ORAL
Qty: 90 TABLET | Refills: 0 | Status: SHIPPED | OUTPATIENT
Start: 2019-06-11 | End: 2019-09-09 | Stop reason: SDUPTHER

## 2019-06-11 RX ORDER — INSULIN GLARGINE 100 [IU]/ML
INJECTION, SOLUTION SUBCUTANEOUS
Qty: 36 ML | Refills: 0 | Status: SHIPPED | OUTPATIENT
Start: 2019-06-11 | End: 2019-07-01 | Stop reason: SDUPTHER

## 2019-06-29 ENCOUNTER — PATIENT MESSAGE (OUTPATIENT)
Dept: FAMILY MEDICINE | Facility: CLINIC | Age: 59
End: 2019-06-29

## 2019-06-29 DIAGNOSIS — M54.10 DIABETIC RADICULOPATHY: ICD-10-CM

## 2019-06-29 DIAGNOSIS — E11.49 DIABETIC RADICULOPATHY: ICD-10-CM

## 2019-07-01 RX ORDER — INSULIN GLARGINE 100 [IU]/ML
INJECTION, SOLUTION SUBCUTANEOUS
Qty: 36 ML | Refills: 0 | Status: SHIPPED | OUTPATIENT
Start: 2019-07-01 | End: 2019-09-10

## 2019-07-04 ENCOUNTER — PATIENT MESSAGE (OUTPATIENT)
Dept: FAMILY MEDICINE | Facility: CLINIC | Age: 59
End: 2019-07-04

## 2019-08-03 ENCOUNTER — LAB VISIT (OUTPATIENT)
Dept: LAB | Facility: HOSPITAL | Age: 59
End: 2019-08-03
Attending: INTERNAL MEDICINE
Payer: COMMERCIAL

## 2019-08-03 DIAGNOSIS — E11.49 TYPE 2 DIABETES MELLITUS WITH NEUROLOGICAL MANIFESTATIONS, CONTROLLED: ICD-10-CM

## 2019-08-03 LAB
ESTIMATED AVG GLUCOSE: 180 MG/DL (ref 68–131)
HBA1C MFR BLD HPLC: 7.9 % (ref 4–5.6)

## 2019-08-03 PROCEDURE — 83036 HEMOGLOBIN GLYCOSYLATED A1C: CPT

## 2019-08-03 PROCEDURE — 36415 COLL VENOUS BLD VENIPUNCTURE: CPT | Mod: PO

## 2019-08-06 ENCOUNTER — PATIENT MESSAGE (OUTPATIENT)
Dept: FAMILY MEDICINE | Facility: CLINIC | Age: 59
End: 2019-08-06

## 2019-08-07 DIAGNOSIS — G62.9 PERIPHERAL POLYNEUROPATHY: ICD-10-CM

## 2019-08-09 RX ORDER — PREGABALIN 50 MG/1
CAPSULE ORAL
Qty: 450 CAPSULE | Refills: 0 | Status: SHIPPED | OUTPATIENT
Start: 2019-08-09 | End: 2020-03-30 | Stop reason: SDUPTHER

## 2019-08-19 RX ORDER — LISINOPRIL 5 MG/1
TABLET ORAL
Qty: 90 TABLET | Refills: 0 | Status: SHIPPED | OUTPATIENT
Start: 2019-08-19 | End: 2019-11-15 | Stop reason: SDUPTHER

## 2019-08-21 ENCOUNTER — PATIENT OUTREACH (OUTPATIENT)
Dept: ADMINISTRATIVE | Facility: HOSPITAL | Age: 59
End: 2019-08-21

## 2019-08-26 DIAGNOSIS — E11.9 TYPE 2 DIABETES MELLITUS WITHOUT COMPLICATION, UNSPECIFIED WHETHER LONG TERM INSULIN USE: ICD-10-CM

## 2019-09-09 DIAGNOSIS — I10 ESSENTIAL HYPERTENSION: ICD-10-CM

## 2019-09-10 ENCOUNTER — OFFICE VISIT (OUTPATIENT)
Dept: ENDOCRINOLOGY | Facility: CLINIC | Age: 59
End: 2019-09-10
Payer: COMMERCIAL

## 2019-09-10 VITALS
SYSTOLIC BLOOD PRESSURE: 120 MMHG | HEIGHT: 71 IN | BODY MASS INDEX: 38.89 KG/M2 | DIASTOLIC BLOOD PRESSURE: 80 MMHG | WEIGHT: 277.75 LBS | HEART RATE: 90 BPM

## 2019-09-10 DIAGNOSIS — E11.22 TYPE 2 DIABETES MELLITUS WITH STAGE 3 CHRONIC KIDNEY DISEASE, WITH LONG-TERM CURRENT USE OF INSULIN: ICD-10-CM

## 2019-09-10 DIAGNOSIS — E66.09 CLASS 2 OBESITY DUE TO EXCESS CALORIES WITHOUT SERIOUS COMORBIDITY WITH BODY MASS INDEX (BMI) OF 38.0 TO 38.9 IN ADULT: ICD-10-CM

## 2019-09-10 DIAGNOSIS — E11.49 DIABETIC RADICULOPATHY: ICD-10-CM

## 2019-09-10 DIAGNOSIS — E11.69 HYPERLIPIDEMIA ASSOCIATED WITH TYPE 2 DIABETES MELLITUS: ICD-10-CM

## 2019-09-10 DIAGNOSIS — E78.5 HYPERLIPIDEMIA ASSOCIATED WITH TYPE 2 DIABETES MELLITUS: ICD-10-CM

## 2019-09-10 DIAGNOSIS — Z91.89 CARDIOVASCULAR EVENT RISK: ICD-10-CM

## 2019-09-10 DIAGNOSIS — G47.33 OSA ON CPAP: ICD-10-CM

## 2019-09-10 DIAGNOSIS — N18.30 TYPE 2 DIABETES MELLITUS WITH STAGE 3 CHRONIC KIDNEY DISEASE, WITH LONG-TERM CURRENT USE OF INSULIN: ICD-10-CM

## 2019-09-10 DIAGNOSIS — E11.40 TYPE 2 DIABETES, CONTROLLED, WITH NEUROPATHY: ICD-10-CM

## 2019-09-10 DIAGNOSIS — Z79.4 TYPE 2 DIABETES MELLITUS WITH STAGE 3 CHRONIC KIDNEY DISEASE, WITH LONG-TERM CURRENT USE OF INSULIN: ICD-10-CM

## 2019-09-10 DIAGNOSIS — E11.49 DIABETES MELLITUS TYPE 2 WITH NEUROLOGICAL MANIFESTATIONS: Primary | ICD-10-CM

## 2019-09-10 DIAGNOSIS — M54.10 DIABETIC RADICULOPATHY: ICD-10-CM

## 2019-09-10 PROBLEM — E66.812 CLASS 2 OBESITY DUE TO EXCESS CALORIES WITHOUT SERIOUS COMORBIDITY WITH BODY MASS INDEX (BMI) OF 38.0 TO 38.9 IN ADULT: Status: ACTIVE | Noted: 2019-09-10

## 2019-09-10 PROCEDURE — 99999 PR PBB SHADOW E&M-EST. PATIENT-LVL III: CPT | Mod: PBBFAC,,, | Performed by: NURSE PRACTITIONER

## 2019-09-10 PROCEDURE — 99999 PR PBB SHADOW E&M-EST. PATIENT-LVL III: ICD-10-PCS | Mod: PBBFAC,,, | Performed by: NURSE PRACTITIONER

## 2019-09-10 PROCEDURE — 99204 PR OFFICE/OUTPT VISIT, NEW, LEVL IV, 45-59 MIN: ICD-10-PCS | Mod: S$GLB,,, | Performed by: NURSE PRACTITIONER

## 2019-09-10 PROCEDURE — 99204 OFFICE O/P NEW MOD 45 MIN: CPT | Mod: S$GLB,,, | Performed by: NURSE PRACTITIONER

## 2019-09-10 RX ORDER — DAPAGLIFLOZIN 5 MG/1
5 TABLET, FILM COATED ORAL DAILY
Qty: 90 TABLET | Refills: 4 | Status: SHIPPED | OUTPATIENT
Start: 2019-09-10 | End: 2020-06-15 | Stop reason: SDUPTHER

## 2019-09-10 RX ORDER — INSULIN PUMP SYRINGE, 3 ML
EACH MISCELLANEOUS
Qty: 1 EACH | Refills: 0 | Status: SHIPPED | OUTPATIENT
Start: 2019-09-10 | End: 2020-02-27

## 2019-09-10 RX ORDER — INSULIN GLARGINE 100 [IU]/ML
INJECTION, SOLUTION SUBCUTANEOUS
Qty: 45 ML | Refills: 4 | Status: SHIPPED | OUTPATIENT
Start: 2019-09-10 | End: 2019-12-10

## 2019-09-10 RX ORDER — CHLORTHALIDONE 25 MG/1
TABLET ORAL
Qty: 90 TABLET | Refills: 0 | Status: SHIPPED | OUTPATIENT
Start: 2019-09-10 | End: 2022-04-12 | Stop reason: SDUPTHER

## 2019-09-10 RX ORDER — GLIMEPIRIDE 4 MG/1
4 TABLET ORAL
Qty: 90 TABLET | Refills: 3 | Status: SHIPPED | OUTPATIENT
Start: 2019-09-10 | End: 2019-11-25 | Stop reason: SDUPTHER

## 2019-09-10 RX ORDER — METFORMIN HYDROCHLORIDE 1000 MG/1
1000 TABLET ORAL 2 TIMES DAILY WITH MEALS
Qty: 180 TABLET | Refills: 3 | Status: SHIPPED | OUTPATIENT
Start: 2019-09-10 | End: 2020-06-15 | Stop reason: SDUPTHER

## 2019-09-10 RX ORDER — ATORVASTATIN CALCIUM 40 MG/1
TABLET, FILM COATED ORAL
Qty: 90 TABLET | Refills: 4 | Status: SHIPPED | OUTPATIENT
Start: 2019-09-10 | End: 2020-07-29

## 2019-09-10 RX ORDER — LANCETS
EACH MISCELLANEOUS
Qty: 200 EACH | Refills: 4 | Status: SHIPPED | OUTPATIENT
Start: 2019-09-10 | End: 2022-04-08 | Stop reason: ALTCHOICE

## 2019-09-10 NOTE — PATIENT INSTRUCTIONS
Try alpha lipoic acid 6000 mg one tablet daily for neuropathy- if does not improve within 3 weeks- stop taking

## 2019-09-10 NOTE — PROGRESS NOTES
CC: This 58 y.o. male presents for management of Diabetes    and chronic conditions pending review including HTN, HLP, CKD 3, Obesity, DM PN, DANNY    HPI: He was diagnosed with T2DM in 2005. Has never been hospitalized r/t DM.  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  /GYN: 1-2+ nocturia, noburning or pain.   MS/Neuro: + numbness/ tingling in BLE; Gait steady, speech clear  Psych: Denies drug/ETOH abuse, no hx of depression.  Other systems: negative.    PE:  GENERAL: Well developed, well nourished.  PSYCH: AAOx3, appropriate mood and affect, pleasant expression, conversant, appears relaxed, well groomed.   EYES: Conjunctiva, corneas clear  NECK: Supple, trachea midline,no thyromegaly or nodules  CHEST: Resp even and unlabored, CTA bilateral.  CARDIAC: RRR, S1, S2 heard, no murmurs  ABDOMEN: Soft, non-tender, non-distended   VASCULAR: DP pulses +2/4 bilaterally, no edema.  NEURO: Gait steady  SKIN: Skin warm and dry no acanthosis nigracans.  FOOT EXAMINATION:  9/10/19  + foot deformity, + callus formation, Onychomycosis,  no interspace maceration or ulceration noted.  Decreased hair growth  present over toes/feet.    Protective sensation absent bilaterally with 10 gram monofilament.  +2 dorsalis pedis and posterior pulses noted.              Lab Results   Component Value Date    MICALBCREAT 5.2 08/24/2018       Hemoglobin A1C   Date Value Ref Range Status   08/03/2019 7.9 (H) 4.0 - 5.6 % Final     Comment:     ADA Screening Guidelines:  5.7-6.4%  Consistent with prediabetes  >or=6.5%  Consistent with diabetes  High levels of fetal hemoglobin interfere with the HbA1C  assay. Heterozygous hemoglobin variants (HbS, HgC, etc)do  not significantly interfere with this assay.   However, presence of multiple variants may affect accuracy.     08/24/2018 7.5 (H) 4.0 - 5.6 % Final     Comment:     ADA Screening Guidelines:  5.7-6.4%  Consistent with prediabetes  >or=6.5%  Consistent with diabetes  High levels of fetal hemoglobin interfere with the HbA1C  assay. Heterozygous hemoglobin variants (HbS, HgC, etc)do  not significantly interfere with this assay.   However, presence of multiple variants may affect accuracy.     12/18/2017 7.1 (H) 4.0 - 5.6 % Final     Comment:     According to ADA guidelines, hemoglobin A1c <7.0% represents  optimal control in non-pregnant diabetic patients. Different  metrics may apply to specific patient populations.   Standards of Medical Care in Diabetes-2016.  For the purpose of screening for the presence of diabetes:  <5.7%     Consistent with the absence of diabetes  5.7-6.4%  Consistent with increasing risk for diabetes   (prediabetes)  >or=6.5%  Consistent with diabetes  Currently, no consensus exists for use of hemoglobin A1c  for diagnosis of diabetes for children.  This Hemoglobin A1c assay has significant interference with fetal   hemoglobin   (HbF). The results are invalid for patients with abnormal amounts of   HbF,   including those with known Hereditary Persistence   of Fetal Hemoglobin. Heterozygous hemoglobin variants (HbAS, HbAC,   HbAD, HbAE, HbA2) do not  significantly interfere with this assay;   however, presence of multiple variants in a sample may impact the %   interference.          ASSESSMENT and PLAN:    1. T2DM with hyperglycemia, DM PN, CKD 3-    Decrease lantus to 50 u qhs, Start Ozempic 0.25 mg once weekly x 4 weeks.  At week 5 increase to 0.5 mg weekly   Send bg log w bid checks in 2 weeks 2   Continue metformin, amaryl, and farxiga (dose can not be titrated up r/t kidney fx)  Discussed A1c and BG goals.   - takes ASA, ACEi, statin    2. HTN - controlled, continue meds as previously prescribed and monitor.     3. HLP -   on statin therapy, LFTs WNL    4. Obesity- Body mass index is 38.74 kg/m².  Has a gym membership, encouraged to use 30 minutes a day3-5 days a week    5 DANNY- wears his cpap machine       Follow-up: in 3 months with lab prior

## 2019-09-19 PROBLEM — N13.8 BPH WITH URINARY OBSTRUCTION: Status: RESOLVED | Noted: 2018-10-23 | Resolved: 2019-09-19

## 2019-09-19 PROBLEM — N40.1 BPH WITH URINARY OBSTRUCTION: Status: RESOLVED | Noted: 2018-10-23 | Resolved: 2019-09-19

## 2019-09-19 PROBLEM — F32.9 REACTIVE DEPRESSION: Status: ACTIVE | Noted: 2019-09-19

## 2019-09-25 ENCOUNTER — TELEPHONE (OUTPATIENT)
Dept: ENDOCRINOLOGY | Facility: CLINIC | Age: 59
End: 2019-09-25

## 2019-09-25 NOTE — TELEPHONE ENCOUNTER
S/w pt. Informed [employer] is needing PA forms to fax over. We have not received anything yet. Advised pt to call pharmacy. Verbalized understanding.

## 2019-10-02 ENCOUNTER — TELEPHONE (OUTPATIENT)
Dept: ENDOCRINOLOGY | Facility: CLINIC | Age: 59
End: 2019-10-02

## 2019-10-04 RX ORDER — FINASTERIDE 5 MG/1
5 TABLET, FILM COATED ORAL DAILY
Qty: 30 TABLET | Refills: 6 | Status: SHIPPED | OUTPATIENT
Start: 2019-10-04 | End: 2020-04-01

## 2019-10-12 RX ORDER — FINASTERIDE 5 MG/1
TABLET, FILM COATED ORAL
Qty: 90 TABLET | Refills: 0 | OUTPATIENT
Start: 2019-10-12

## 2019-11-06 ENCOUNTER — TELEPHONE (OUTPATIENT)
Dept: ENDOCRINOLOGY | Facility: CLINIC | Age: 59
End: 2019-11-06

## 2019-11-18 RX ORDER — LISINOPRIL 5 MG/1
TABLET ORAL
Qty: 90 TABLET | Refills: 0 | Status: SHIPPED | OUTPATIENT
Start: 2019-11-18 | End: 2019-11-24 | Stop reason: SDUPTHER

## 2019-11-24 RX ORDER — MIRABEGRON 50 MG/1
TABLET, FILM COATED, EXTENDED RELEASE ORAL
Qty: 30 TABLET | Refills: 0 | Status: SHIPPED | OUTPATIENT
Start: 2019-11-24 | End: 2021-08-17

## 2019-11-25 DIAGNOSIS — M54.10 DIABETIC RADICULOPATHY: ICD-10-CM

## 2019-11-25 DIAGNOSIS — E11.49 DIABETIC RADICULOPATHY: ICD-10-CM

## 2019-11-25 RX ORDER — PEN NEEDLE, DIABETIC 30 GX3/16"
1 NEEDLE, DISPOSABLE MISCELLANEOUS 4 TIMES DAILY
Qty: 100 EACH | Refills: 11 | Status: SHIPPED | OUTPATIENT
Start: 2019-11-25 | End: 2021-02-18 | Stop reason: SDUPTHER

## 2019-11-25 RX ORDER — GLIMEPIRIDE 4 MG/1
TABLET ORAL
Qty: 90 TABLET | Refills: 3 | Status: SHIPPED | OUTPATIENT
Start: 2019-11-25 | End: 2020-01-14

## 2019-11-29 RX ORDER — LISINOPRIL 5 MG/1
TABLET ORAL
Qty: 90 TABLET | Refills: 3 | Status: SHIPPED | OUTPATIENT
Start: 2019-11-29 | End: 2020-07-29

## 2019-11-30 ENCOUNTER — LAB VISIT (OUTPATIENT)
Dept: LAB | Facility: HOSPITAL | Age: 59
End: 2019-11-30
Attending: NURSE PRACTITIONER
Payer: COMMERCIAL

## 2019-11-30 DIAGNOSIS — E11.49 DIABETES MELLITUS TYPE 2 WITH NEUROLOGICAL MANIFESTATIONS: ICD-10-CM

## 2019-11-30 LAB
25(OH)D3+25(OH)D2 SERPL-MCNC: 13 NG/ML (ref 30–96)
ALBUMIN SERPL BCP-MCNC: 3.9 G/DL (ref 3.5–5.2)
ALP SERPL-CCNC: 56 U/L (ref 55–135)
ALT SERPL W/O P-5'-P-CCNC: 31 U/L (ref 10–44)
ANION GAP SERPL CALC-SCNC: 8 MMOL/L (ref 8–16)
AST SERPL-CCNC: 21 U/L (ref 10–40)
BILIRUB SERPL-MCNC: 0.5 MG/DL (ref 0.1–1)
BUN SERPL-MCNC: 24 MG/DL (ref 8–23)
CALCIUM SERPL-MCNC: 9.5 MG/DL (ref 8.7–10.5)
CHLORIDE SERPL-SCNC: 96 MMOL/L (ref 95–110)
CHOLEST SERPL-MCNC: 128 MG/DL (ref 120–199)
CHOLEST/HDLC SERPL: 3.7 {RATIO} (ref 2–5)
CO2 SERPL-SCNC: 32 MMOL/L (ref 23–29)
CREAT SERPL-MCNC: 1.3 MG/DL (ref 0.5–1.4)
EST. GFR  (AFRICAN AMERICAN): >60 ML/MIN/1.73 M^2
EST. GFR  (NON AFRICAN AMERICAN): 58.9 ML/MIN/1.73 M^2
ESTIMATED AVG GLUCOSE: 148 MG/DL (ref 68–131)
GLUCOSE SERPL-MCNC: 121 MG/DL (ref 70–110)
HBA1C MFR BLD HPLC: 6.8 % (ref 4–5.6)
HDLC SERPL-MCNC: 35 MG/DL (ref 40–75)
HDLC SERPL: 27.3 % (ref 20–50)
LDLC SERPL CALC-MCNC: 65 MG/DL (ref 63–159)
NONHDLC SERPL-MCNC: 93 MG/DL
POTASSIUM SERPL-SCNC: 4.4 MMOL/L (ref 3.5–5.1)
PROT SERPL-MCNC: 6.7 G/DL (ref 6–8.4)
SODIUM SERPL-SCNC: 136 MMOL/L (ref 136–145)
TRIGL SERPL-MCNC: 140 MG/DL (ref 30–150)
TSH SERPL DL<=0.005 MIU/L-ACNC: 1.36 UIU/ML (ref 0.4–4)

## 2019-11-30 PROCEDURE — 80061 LIPID PANEL: CPT

## 2019-11-30 PROCEDURE — 80053 COMPREHEN METABOLIC PANEL: CPT

## 2019-11-30 PROCEDURE — 82306 VITAMIN D 25 HYDROXY: CPT

## 2019-11-30 PROCEDURE — 83036 HEMOGLOBIN GLYCOSYLATED A1C: CPT

## 2019-11-30 PROCEDURE — 84443 ASSAY THYROID STIM HORMONE: CPT

## 2019-11-30 PROCEDURE — 36415 COLL VENOUS BLD VENIPUNCTURE: CPT | Mod: PO

## 2019-12-09 ENCOUNTER — LAB VISIT (OUTPATIENT)
Dept: LAB | Facility: HOSPITAL | Age: 59
End: 2019-12-09
Attending: PSYCHIATRY & NEUROLOGY
Payer: COMMERCIAL

## 2019-12-09 DIAGNOSIS — Z79.899 ENCOUNTER FOR LONG-TERM (CURRENT) USE OF OTHER MEDICATIONS: Primary | ICD-10-CM

## 2019-12-09 LAB
ALBUMIN SERPL BCP-MCNC: 3.8 G/DL (ref 3.5–5.2)
ALP SERPL-CCNC: 50 U/L (ref 55–135)
ALT SERPL W/O P-5'-P-CCNC: 23 U/L (ref 10–44)
ANION GAP SERPL CALC-SCNC: 12 MMOL/L (ref 8–16)
AST SERPL-CCNC: 15 U/L (ref 10–40)
BASOPHILS # BLD AUTO: 0.04 K/UL (ref 0–0.2)
BASOPHILS NFR BLD: 0.5 % (ref 0–1.9)
BILIRUB SERPL-MCNC: 0.5 MG/DL (ref 0.1–1)
BUN SERPL-MCNC: 22 MG/DL (ref 8–23)
CALCIUM SERPL-MCNC: 9.4 MG/DL (ref 8.7–10.5)
CHLORIDE SERPL-SCNC: 99 MMOL/L (ref 95–110)
CO2 SERPL-SCNC: 31 MMOL/L (ref 23–29)
CREAT SERPL-MCNC: 1.3 MG/DL (ref 0.5–1.4)
DIFFERENTIAL METHOD: ABNORMAL
EOSINOPHIL # BLD AUTO: 0.4 K/UL (ref 0–0.5)
EOSINOPHIL NFR BLD: 4.5 % (ref 0–8)
ERYTHROCYTE [DISTWIDTH] IN BLOOD BY AUTOMATED COUNT: 13.1 % (ref 11.5–14.5)
EST. GFR  (AFRICAN AMERICAN): >60 ML/MIN/1.73 M^2
EST. GFR  (NON AFRICAN AMERICAN): 59 ML/MIN/1.73 M^2
GLUCOSE SERPL-MCNC: 111 MG/DL (ref 70–110)
HCT VFR BLD AUTO: 42.7 % (ref 40–54)
HGB BLD-MCNC: 14.1 G/DL (ref 14–18)
IMM GRANULOCYTES # BLD AUTO: 0.03 K/UL (ref 0–0.04)
LYMPHOCYTES # BLD AUTO: 2.3 K/UL (ref 1–4.8)
LYMPHOCYTES NFR BLD: 27.2 % (ref 18–48)
MCH RBC QN AUTO: 31.2 PG (ref 27–31)
MCHC RBC AUTO-ENTMCNC: 33 G/DL (ref 32–36)
MCV RBC AUTO: 95 FL (ref 82–98)
MONOCYTES # BLD AUTO: 0.8 K/UL (ref 0.3–1)
MONOCYTES NFR BLD: 9.2 % (ref 4–15)
NEUTROPHILS # BLD AUTO: 4.9 K/UL (ref 1.8–7.7)
NEUTROPHILS NFR BLD: 58.2 % (ref 38–73)
NRBC BLD-RTO: 0 /100 WBC
PLATELET # BLD AUTO: 165 K/UL (ref 150–350)
PMV BLD AUTO: 9.1 FL (ref 9.2–12.9)
POTASSIUM SERPL-SCNC: 3.9 MMOL/L (ref 3.5–5.1)
PROT SERPL-MCNC: 6.2 G/DL (ref 6–8.4)
RBC # BLD AUTO: 4.52 M/UL (ref 4.6–6.2)
SODIUM SERPL-SCNC: 142 MMOL/L (ref 136–145)
VALPROATE SERPL-MCNC: 53.1 UG/ML (ref 50–100)
WBC # BLD AUTO: 8.37 K/UL (ref 3.9–12.7)

## 2019-12-09 PROCEDURE — 85025 COMPLETE CBC W/AUTO DIFF WBC: CPT

## 2019-12-09 PROCEDURE — 80164 ASSAY DIPROPYLACETIC ACD TOT: CPT

## 2019-12-09 PROCEDURE — 80053 COMPREHEN METABOLIC PANEL: CPT

## 2019-12-09 PROCEDURE — 36415 COLL VENOUS BLD VENIPUNCTURE: CPT

## 2019-12-10 ENCOUNTER — OFFICE VISIT (OUTPATIENT)
Dept: ENDOCRINOLOGY | Facility: CLINIC | Age: 59
End: 2019-12-10
Payer: COMMERCIAL

## 2019-12-10 VITALS — WEIGHT: 277.13 LBS | BODY MASS INDEX: 38.8 KG/M2 | HEIGHT: 71 IN

## 2019-12-10 DIAGNOSIS — I15.2 HYPERTENSION ASSOCIATED WITH DIABETES: ICD-10-CM

## 2019-12-10 DIAGNOSIS — E11.22 TYPE 2 DIABETES MELLITUS WITH STAGE 3 CHRONIC KIDNEY DISEASE, WITH LONG-TERM CURRENT USE OF INSULIN: Primary | ICD-10-CM

## 2019-12-10 DIAGNOSIS — M54.10 DIABETIC RADICULOPATHY: ICD-10-CM

## 2019-12-10 DIAGNOSIS — E78.5 HYPERLIPIDEMIA ASSOCIATED WITH TYPE 2 DIABETES MELLITUS: ICD-10-CM

## 2019-12-10 DIAGNOSIS — E11.59 HYPERTENSION ASSOCIATED WITH DIABETES: ICD-10-CM

## 2019-12-10 DIAGNOSIS — E11.49 DIABETIC RADICULOPATHY: ICD-10-CM

## 2019-12-10 DIAGNOSIS — Z79.4 TYPE 2 DIABETES MELLITUS WITH STAGE 3 CHRONIC KIDNEY DISEASE, WITH LONG-TERM CURRENT USE OF INSULIN: Primary | ICD-10-CM

## 2019-12-10 DIAGNOSIS — E11.69 HYPERLIPIDEMIA ASSOCIATED WITH TYPE 2 DIABETES MELLITUS: ICD-10-CM

## 2019-12-10 DIAGNOSIS — N18.30 TYPE 2 DIABETES MELLITUS WITH STAGE 3 CHRONIC KIDNEY DISEASE, WITH LONG-TERM CURRENT USE OF INSULIN: Primary | ICD-10-CM

## 2019-12-10 DIAGNOSIS — E66.09 CLASS 2 OBESITY DUE TO EXCESS CALORIES WITHOUT SERIOUS COMORBIDITY WITH BODY MASS INDEX (BMI) OF 38.0 TO 38.9 IN ADULT: ICD-10-CM

## 2019-12-10 DIAGNOSIS — E55.9 VITAMIN D DEFICIENCY: ICD-10-CM

## 2019-12-10 DIAGNOSIS — G47.33 OSA ON CPAP: ICD-10-CM

## 2019-12-10 DIAGNOSIS — E11.49 DIABETES MELLITUS TYPE 2 WITH NEUROLOGICAL MANIFESTATIONS: ICD-10-CM

## 2019-12-10 PROCEDURE — 99999 PR PBB SHADOW E&M-EST. PATIENT-LVL II: ICD-10-PCS | Mod: PBBFAC,,, | Performed by: NURSE PRACTITIONER

## 2019-12-10 PROCEDURE — 99214 PR OFFICE/OUTPT VISIT, EST, LEVL IV, 30-39 MIN: ICD-10-PCS | Mod: S$GLB,,, | Performed by: NURSE PRACTITIONER

## 2019-12-10 PROCEDURE — 99214 OFFICE O/P EST MOD 30 MIN: CPT | Mod: S$GLB,,, | Performed by: NURSE PRACTITIONER

## 2019-12-10 PROCEDURE — 99999 PR PBB SHADOW E&M-EST. PATIENT-LVL II: CPT | Mod: PBBFAC,,, | Performed by: NURSE PRACTITIONER

## 2019-12-10 RX ORDER — INSULIN GLARGINE 100 [IU]/ML
INJECTION, SOLUTION SUBCUTANEOUS
Qty: 45 ML | Refills: 4
Start: 2019-12-10 | End: 2020-08-24

## 2019-12-10 RX ORDER — ERGOCALCIFEROL 1.25 MG/1
50000 CAPSULE ORAL
Qty: 12 CAPSULE | Refills: 4 | Status: SHIPPED | OUTPATIENT
Start: 2019-12-10 | End: 2020-03-16

## 2019-12-10 NOTE — PROGRESS NOTES
CC: This 59 y.o. male presents for management of Diabetes    and chronic conditions pending review including HTN, HLP, CKD 3, Obesity, DM PN, DANNY    HPI: He was diagnosed with T2DM in 2005. Has never been hospitalized r/t DM.  Family hx of DM: maybe dad?, sister's pre-diabetes     hypoglycemia at home - none  monitoring BG at home:          Diet: Eats 3 Meals a day, snacks- rarely  - lunch is his biggest meal- eats in the cafeteria at work   Exercise: none   CURRENT DM MEDS: metformin 1000 mg bid, amaryl 4 mg qam, Lantus 45 u qhs, farxiga 5 mg, Trulicity 1.5 mg weekly   Vial/pen:  Uses  pen  Glucometer type:       Standards of Care:  Eye exam: Dr Dickerson 2019  +DR Oliver at [facility]   Wife is a retired psychiatrist     ROS:   Gen: Appetite good, no weight gain or loss, + fatigue    Skin: Skin is intact and heals well, no rashes, no hair changes  Eyes: Denies visual disturbances  Resp: no SOB or LEYVA, no cough  Cardiac: No palpitations, chest pain, no edema   GI: No nausea or vomiting, diarrhea, constipation, or abdominal pain.  /GYN: 1-2+ nocturia, noburning or pain.   MS/Neuro: + numbness/ tingling in BLE; Gait steady, speech clear  Psych: Denies drug/ETOH abuse, no hx of depression.  Other systems: negative.    PE:  GENERAL: Well developed, well nourished.  PSYCH: AAOx3, appropriate mood and affect, pleasant expression, conversant, appears relaxed, well groomed.   EYES: Conjunctiva, corneas clear  NECK: Supple, trachea midline,no thyromegaly or nodules  CHEST: Resp even and unlabored, CTA bilateral.  CARDIAC: RRR, S1, S2 heard, no murmurs  ABDOMEN: Soft, non-tender, non-distended   VASCULAR: DP pulses +2/4 bilaterally, no edema.  NEURO: Gait steady  SKIN: Skin warm and dry no acanthosis nigracans.  FOOT EXAMINATION:  9/10/19  + foot deformity, + callus formation, Onychomycosis,  no interspace maceration or ulceration noted.  Decreased hair growth present over toes/feet.    Protective  sensation absent bilaterally with 10 gram monofilament.  +2 dorsalis pedis and posterior pulses noted.      Lab Results   Component Value Date    MICALBCREAT 6.3 11/30/2019       Hemoglobin A1C   Date Value Ref Range Status   11/30/2019 6.8 (H) 4.0 - 5.6 % Final     Comment:     ADA Screening Guidelines:  5.7-6.4%  Consistent with prediabetes  >or=6.5%  Consistent with diabetes  High levels of fetal hemoglobin interfere with the HbA1C  assay. Heterozygous hemoglobin variants (HbS, HgC, etc)do  not significantly interfere with this assay.   However, presence of multiple variants may affect accuracy.     08/03/2019 7.9 (H) 4.0 - 5.6 % Final     Comment:     ADA Screening Guidelines:  5.7-6.4%  Consistent with prediabetes  >or=6.5%  Consistent with diabetes  High levels of fetal hemoglobin interfere with the HbA1C  assay. Heterozygous hemoglobin variants (HbS, HgC, etc)do  not significantly interfere with this assay.   However, presence of multiple variants may affect accuracy.     08/24/2018 7.5 (H) 4.0 - 5.6 % Final     Comment:     ADA Screening Guidelines:  5.7-6.4%  Consistent with prediabetes  >or=6.5%  Consistent with diabetes  High levels of fetal hemoglobin interfere with the HbA1C  assay. Heterozygous hemoglobin variants (HbS, HgC, etc)do  not significantly interfere with this assay.   However, presence of multiple variants may affect accuracy.          ASSESSMENT and PLAN:    1. T2DM with hyperglycemia, DM PN, CKD 3-    Decrease lantus to 45 u qhs, ContinueTrulicity 1.5 mg weekly    Send bg daily- stagger times  Continue metformin, amaryl, and farxiga   Discussed A1c and BG goals.   - takes ASA, ACEi, statin    2. HTN - controlled, continue meds as previously prescribed and monitor.     3. HLP -   on statin therapy, LFTs WNL    4. Obesity- Body mass index is 38.65 kg/m².    Has a gym membership, encouraged to use 30 minutes a day 3-5 days a week    5 DANNY- wears his cpap machine    6. Vitamin d deficiency -  start ergo weekly, recheck w RTC     Follow-up: in 3 months with lab prior

## 2020-01-14 DIAGNOSIS — E11.49 DIABETIC RADICULOPATHY: ICD-10-CM

## 2020-01-14 DIAGNOSIS — M54.10 DIABETIC RADICULOPATHY: ICD-10-CM

## 2020-01-14 RX ORDER — GLIMEPIRIDE 4 MG/1
TABLET ORAL
Qty: 90 TABLET | Refills: 3 | Status: SHIPPED | OUTPATIENT
Start: 2020-01-14 | End: 2020-03-09

## 2020-02-12 ENCOUNTER — LAB VISIT (OUTPATIENT)
Dept: LAB | Facility: HOSPITAL | Age: 60
End: 2020-02-12
Attending: PSYCHIATRY & NEUROLOGY
Payer: COMMERCIAL

## 2020-02-12 DIAGNOSIS — Z79.899 ENCOUNTER FOR LONG-TERM (CURRENT) USE OF OTHER MEDICATIONS: Primary | ICD-10-CM

## 2020-02-12 LAB
BASOPHILS # BLD AUTO: 0.06 K/UL (ref 0–0.2)
BASOPHILS NFR BLD: 0.7 % (ref 0–1.9)
DIFFERENTIAL METHOD: NORMAL
EOSINOPHIL # BLD AUTO: 0.2 K/UL (ref 0–0.5)
EOSINOPHIL NFR BLD: 2.4 % (ref 0–8)
ERYTHROCYTE [DISTWIDTH] IN BLOOD BY AUTOMATED COUNT: 13.4 % (ref 11.5–14.5)
HCT VFR BLD AUTO: 48.1 % (ref 40–54)
HGB BLD-MCNC: 15.4 G/DL (ref 14–18)
IMM GRANULOCYTES # BLD AUTO: 0.03 K/UL (ref 0–0.04)
IMM GRANULOCYTES NFR BLD AUTO: 0.4 % (ref 0–0.5)
LYMPHOCYTES # BLD AUTO: 1.9 K/UL (ref 1–4.8)
LYMPHOCYTES NFR BLD: 24 % (ref 18–48)
MCH RBC QN AUTO: 30.8 PG (ref 27–31)
MCHC RBC AUTO-ENTMCNC: 32 G/DL (ref 32–36)
MCV RBC AUTO: 96 FL (ref 82–98)
MONOCYTES # BLD AUTO: 0.7 K/UL (ref 0.3–1)
MONOCYTES NFR BLD: 8.7 % (ref 4–15)
NEUTROPHILS # BLD AUTO: 5.2 K/UL (ref 1.8–7.7)
NEUTROPHILS NFR BLD: 63.8 % (ref 38–73)
NRBC BLD-RTO: 0 /100 WBC
PLATELET # BLD AUTO: 172 K/UL (ref 150–350)
PMV BLD AUTO: 9.2 FL (ref 9.2–12.9)
RBC # BLD AUTO: 5 M/UL (ref 4.6–6.2)
WBC # BLD AUTO: 8.07 K/UL (ref 3.9–12.7)

## 2020-02-12 PROCEDURE — 85025 COMPLETE CBC W/AUTO DIFF WBC: CPT

## 2020-02-12 PROCEDURE — 80164 ASSAY DIPROPYLACETIC ACD TOT: CPT

## 2020-02-12 PROCEDURE — 36415 COLL VENOUS BLD VENIPUNCTURE: CPT | Mod: PO

## 2020-02-12 PROCEDURE — 80053 COMPREHEN METABOLIC PANEL: CPT

## 2020-02-12 PROCEDURE — 84443 ASSAY THYROID STIM HORMONE: CPT

## 2020-02-13 LAB
ALBUMIN SERPL BCP-MCNC: 4 G/DL (ref 3.5–5.2)
ALP SERPL-CCNC: 56 U/L (ref 55–135)
ALT SERPL W/O P-5'-P-CCNC: 37 U/L (ref 10–44)
ANION GAP SERPL CALC-SCNC: 8 MMOL/L (ref 8–16)
AST SERPL-CCNC: 26 U/L (ref 10–40)
BILIRUB SERPL-MCNC: 0.6 MG/DL (ref 0.1–1)
BUN SERPL-MCNC: 18 MG/DL (ref 8–23)
CALCIUM SERPL-MCNC: 9.1 MG/DL (ref 8.7–10.5)
CHLORIDE SERPL-SCNC: 94 MMOL/L (ref 95–110)
CO2 SERPL-SCNC: 34 MMOL/L (ref 23–29)
CREAT SERPL-MCNC: 1.4 MG/DL (ref 0.5–1.4)
EST. GFR  (AFRICAN AMERICAN): >60 ML/MIN/1.73 M^2
EST. GFR  (NON AFRICAN AMERICAN): 53.8 ML/MIN/1.73 M^2
GLUCOSE SERPL-MCNC: 80 MG/DL (ref 70–110)
POTASSIUM SERPL-SCNC: 4.2 MMOL/L (ref 3.5–5.1)
PROT SERPL-MCNC: 6.9 G/DL (ref 6–8.4)
SODIUM SERPL-SCNC: 136 MMOL/L (ref 136–145)
TSH SERPL DL<=0.005 MIU/L-ACNC: 0.84 UIU/ML (ref 0.4–4)
VALPROATE SERPL-MCNC: 51.9 UG/ML (ref 50–100)

## 2020-02-27 RX ORDER — INSULIN PUMP SYRINGE, 3 ML
EACH MISCELLANEOUS
Qty: 1 EACH | Refills: 0 | Status: SHIPPED | OUTPATIENT
Start: 2020-02-27 | End: 2022-04-08 | Stop reason: ALTCHOICE

## 2020-03-07 ENCOUNTER — LAB VISIT (OUTPATIENT)
Dept: LAB | Facility: HOSPITAL | Age: 60
End: 2020-03-07
Attending: PSYCHIATRY & NEUROLOGY
Payer: COMMERCIAL

## 2020-03-07 DIAGNOSIS — Z79.899 ENCOUNTER FOR LONG-TERM (CURRENT) USE OF OTHER MEDICATIONS: Primary | ICD-10-CM

## 2020-03-07 LAB — VALPROATE SERPL-MCNC: 79.5 UG/ML (ref 50–100)

## 2020-03-07 PROCEDURE — 82140 ASSAY OF AMMONIA: CPT

## 2020-03-07 PROCEDURE — 36415 COLL VENOUS BLD VENIPUNCTURE: CPT | Mod: PO

## 2020-03-07 PROCEDURE — 80164 ASSAY DIPROPYLACETIC ACD TOT: CPT

## 2020-03-09 ENCOUNTER — OFFICE VISIT (OUTPATIENT)
Dept: ENDOCRINOLOGY | Facility: CLINIC | Age: 60
End: 2020-03-09
Payer: COMMERCIAL

## 2020-03-09 VITALS
DIASTOLIC BLOOD PRESSURE: 68 MMHG | BODY MASS INDEX: 38.92 KG/M2 | HEART RATE: 92 BPM | WEIGHT: 278 LBS | RESPIRATION RATE: 18 BRPM | HEIGHT: 71 IN | SYSTOLIC BLOOD PRESSURE: 124 MMHG

## 2020-03-09 DIAGNOSIS — E55.9 VITAMIN D DEFICIENCY: ICD-10-CM

## 2020-03-09 DIAGNOSIS — Z79.4 TYPE 2 DIABETES MELLITUS WITH STAGE 3 CHRONIC KIDNEY DISEASE, WITH LONG-TERM CURRENT USE OF INSULIN: Primary | ICD-10-CM

## 2020-03-09 DIAGNOSIS — E66.09 CLASS 2 OBESITY DUE TO EXCESS CALORIES WITHOUT SERIOUS COMORBIDITY WITH BODY MASS INDEX (BMI) OF 38.0 TO 38.9 IN ADULT: ICD-10-CM

## 2020-03-09 DIAGNOSIS — N18.30 TYPE 2 DIABETES MELLITUS WITH STAGE 3 CHRONIC KIDNEY DISEASE, WITH LONG-TERM CURRENT USE OF INSULIN: Primary | ICD-10-CM

## 2020-03-09 DIAGNOSIS — I15.2 HYPERTENSION ASSOCIATED WITH DIABETES: ICD-10-CM

## 2020-03-09 DIAGNOSIS — E11.22 TYPE 2 DIABETES MELLITUS WITH STAGE 3 CHRONIC KIDNEY DISEASE, WITH LONG-TERM CURRENT USE OF INSULIN: Primary | ICD-10-CM

## 2020-03-09 DIAGNOSIS — G47.33 OSA ON CPAP: ICD-10-CM

## 2020-03-09 DIAGNOSIS — E11.59 HYPERTENSION ASSOCIATED WITH DIABETES: ICD-10-CM

## 2020-03-09 DIAGNOSIS — E11.69 HYPERLIPIDEMIA ASSOCIATED WITH TYPE 2 DIABETES MELLITUS: ICD-10-CM

## 2020-03-09 DIAGNOSIS — E78.5 HYPERLIPIDEMIA ASSOCIATED WITH TYPE 2 DIABETES MELLITUS: ICD-10-CM

## 2020-03-09 DIAGNOSIS — E11.49 DIABETES MELLITUS TYPE 2 WITH NEUROLOGICAL MANIFESTATIONS: ICD-10-CM

## 2020-03-09 LAB
AMMONIA PLAS-SCNC: 32 UMOL/L (ref 10–50)
GLUCOSE SERPL-MCNC: 123 MG/DL (ref 70–110)

## 2020-03-09 PROCEDURE — 82962 GLUCOSE BLOOD TEST: CPT | Mod: S$GLB,,, | Performed by: NURSE PRACTITIONER

## 2020-03-09 PROCEDURE — 99214 PR OFFICE/OUTPT VISIT, EST, LEVL IV, 30-39 MIN: ICD-10-PCS | Mod: S$GLB,,, | Performed by: NURSE PRACTITIONER

## 2020-03-09 PROCEDURE — 82962 POCT GLUCOSE, HAND-HELD DEVICE: ICD-10-PCS | Mod: S$GLB,,, | Performed by: NURSE PRACTITIONER

## 2020-03-09 PROCEDURE — 99999 PR PBB SHADOW E&M-EST. PATIENT-LVL V: CPT | Mod: PBBFAC,,, | Performed by: NURSE PRACTITIONER

## 2020-03-09 PROCEDURE — 99214 OFFICE O/P EST MOD 30 MIN: CPT | Mod: S$GLB,,, | Performed by: NURSE PRACTITIONER

## 2020-03-09 PROCEDURE — 99999 PR PBB SHADOW E&M-EST. PATIENT-LVL V: ICD-10-PCS | Mod: PBBFAC,,, | Performed by: NURSE PRACTITIONER

## 2020-03-09 RX ORDER — GLIMEPIRIDE 2 MG/1
2 TABLET ORAL
Qty: 90 TABLET | Refills: 3 | Status: SHIPPED | OUTPATIENT
Start: 2020-03-09 | End: 2020-10-13 | Stop reason: SDUPTHER

## 2020-03-09 RX ORDER — DIVALPROEX SODIUM 250 MG/1
TABLET, DELAYED RELEASE ORAL
COMMUNITY
Start: 2020-02-26 | End: 2020-08-20

## 2020-03-09 NOTE — PROGRESS NOTES
CC: This 59 y.o. male presents for management of Diabetes    and chronic conditions pending review including HTN, HLP, CKD 3, Obesity, DM PN, DANNY    HPI: He was diagnosed with T2DM in 2005. Has never been hospitalized r/t DM.  Family hx of DM: maybe dad?, sister's pre-diabetes     hypoglycemia at home - x 2 in the past week, has symptoms but not confirming w a check   monitoring BG at home:  Not checking  bg in office 121 2.5 hrs pp elizabethin toast    Diet: Eats 3 Meals a day, snacks- rarely  - lunch is his biggest meal- eats in the cafeteria at work   Exercise: 3 times a week, lifting weights    CURRENT DM MEDS: metformin 1000 mg bid, amaryl 4 mg qam, Lantus 45 u qhs, farxiga 5 mg, Trulicity 1.5 mg weekly (Sundays)   Vial/pen:  Uses  pen  Glucometer type:   Freestyle    Standards of Care:  Eye exam: Dr Dickerson 2019  +    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  /GYN: 1-2+ nocturia, noburning or pain.   MS/Neuro: + numbness/ tingling in BLE; Gait steady, speech clear  Psych: Denies drug/ETOH abuse, no hx of depression.  Other systems: negative.    PE:  GENERAL: Well developed, well nourished.  PSYCH: AAOx3, appropriate mood and affect, pleasant expression, conversant, appears relaxed, well groomed.   EYES: Conjunctiva, corneas clear  NECK: Supple, trachea midline,no thyromegaly or nodules  ABDOMEN: Soft, non-tender, non-distended   VASCULAR: DP pulses +2/4 bilaterally, no edema.  NEURO: Gait steady  SKIN: Skin warm and dry no acanthosis nigracans.  FOOT EXAMINATION:  9/10/19  + foot deformity, + callus formation, Onychomycosis,  no interspace maceration or ulceration  noted.  Decreased hair growth present over toes/feet.    Protective sensation absent bilaterally with 10 gram monofilament.  +2 dorsalis pedis and posterior pulses noted.      Lab Results   Component Value Date    MICALBCREAT 6.3 11/30/2019       Hemoglobin A1C   Date Value Ref Range Status   11/30/2019 6.8 (H) 4.0 - 5.6 % Final     Comment:     ADA Screening Guidelines:  5.7-6.4%  Consistent with prediabetes  >or=6.5%  Consistent with diabetes  High levels of fetal hemoglobin interfere with the HbA1C  assay. Heterozygous hemoglobin variants (HbS, HgC, etc)do  not significantly interfere with this assay.   However, presence of multiple variants may affect accuracy.     08/03/2019 7.9 (H) 4.0 - 5.6 % Final     Comment:     ADA Screening Guidelines:  5.7-6.4%  Consistent with prediabetes  >or=6.5%  Consistent with diabetes  High levels of fetal hemoglobin interfere with the HbA1C  assay. Heterozygous hemoglobin variants (HbS, HgC, etc)do  not significantly interfere with this assay.   However, presence of multiple variants may affect accuracy.     08/24/2018 7.5 (H) 4.0 - 5.6 % Final     Comment:     ADA Screening Guidelines:  5.7-6.4%  Consistent with prediabetes  >or=6.5%  Consistent with diabetes  High levels of fetal hemoglobin interfere with the HbA1C  assay. Heterozygous hemoglobin variants (HbS, HgC, etc)do  not significantly interfere with this assay.   However, presence of multiple variants may affect accuracy.          ASSESSMENT and PLAN:    1. T2DM with hyperglycemia, DM PN, CKD 3-  Labs Saturday  Decrease glimepiride 2 mg qam, Continue lantus to 45 u qhs,Trulicity 1.5 mg weekly, metformin, amaryl, and farxiga   Send bg daily- stagger times  Discussed A1c and BG goals.  Notify me for continued hypoglycemia    - takes ASA, ACEi, statin    2. HTN - controlled, continue meds as previously prescribed and monitor.     3. HLP -   on statin therapy, LFTs WNL    4. Obesity- Body mass index is 38.77 kg/m².     Continue exercise    5 DANNY- wears his cpap machine    6. Vitamin d deficiency - Continue ergo weekly, Lab Saturday      Follow-up: in 3 months with lab prior         Detail Level: Simple Additional Notes: No evidence recurrence, cont to monitor clinically Additional Notes: Pt relates vaguely an area of injury yrs ago, no details, says longstanding s changes, ? vascular component, benign appearing changes curr and will obs, re-eval at f/us. RTC in 3m for re-eval if no intv issues

## 2020-03-14 ENCOUNTER — LAB VISIT (OUTPATIENT)
Dept: LAB | Facility: HOSPITAL | Age: 60
End: 2020-03-14
Attending: NURSE PRACTITIONER
Payer: COMMERCIAL

## 2020-03-14 DIAGNOSIS — Z79.4 TYPE 2 DIABETES MELLITUS WITH STAGE 3 CHRONIC KIDNEY DISEASE, WITH LONG-TERM CURRENT USE OF INSULIN: ICD-10-CM

## 2020-03-14 DIAGNOSIS — E11.22 TYPE 2 DIABETES MELLITUS WITH STAGE 3 CHRONIC KIDNEY DISEASE, WITH LONG-TERM CURRENT USE OF INSULIN: ICD-10-CM

## 2020-03-14 DIAGNOSIS — E66.09 CLASS 2 OBESITY DUE TO EXCESS CALORIES WITHOUT SERIOUS COMORBIDITY WITH BODY MASS INDEX (BMI) OF 38.0 TO 38.9 IN ADULT: ICD-10-CM

## 2020-03-14 DIAGNOSIS — N18.30 TYPE 2 DIABETES MELLITUS WITH STAGE 3 CHRONIC KIDNEY DISEASE, WITH LONG-TERM CURRENT USE OF INSULIN: ICD-10-CM

## 2020-03-14 LAB
25(OH)D3+25(OH)D2 SERPL-MCNC: 16 NG/ML (ref 30–96)
ESTIMATED AVG GLUCOSE: 140 MG/DL (ref 68–131)
HBA1C MFR BLD HPLC: 6.5 % (ref 4–5.6)

## 2020-03-14 PROCEDURE — 83036 HEMOGLOBIN GLYCOSYLATED A1C: CPT

## 2020-03-14 PROCEDURE — 82306 VITAMIN D 25 HYDROXY: CPT

## 2020-03-14 PROCEDURE — 36415 COLL VENOUS BLD VENIPUNCTURE: CPT

## 2020-03-16 ENCOUNTER — TELEPHONE (OUTPATIENT)
Dept: ENDOCRINOLOGY | Facility: CLINIC | Age: 60
End: 2020-03-16

## 2020-03-16 RX ORDER — ERGOCALCIFEROL 1.25 MG/1
CAPSULE ORAL
Qty: 24 CAPSULE | Refills: 4 | Status: SHIPPED | OUTPATIENT
Start: 2020-03-16 | End: 2020-06-15

## 2020-04-01 RX ORDER — FINASTERIDE 5 MG/1
TABLET, FILM COATED ORAL
Qty: 30 TABLET | Refills: 6 | Status: SHIPPED | OUTPATIENT
Start: 2020-04-01 | End: 2020-10-30

## 2020-06-10 ENCOUNTER — LAB VISIT (OUTPATIENT)
Dept: LAB | Facility: HOSPITAL | Age: 60
End: 2020-06-10
Attending: NURSE PRACTITIONER
Payer: COMMERCIAL

## 2020-06-10 DIAGNOSIS — Z79.4 TYPE 2 DIABETES MELLITUS WITH STAGE 3 CHRONIC KIDNEY DISEASE, WITH LONG-TERM CURRENT USE OF INSULIN: ICD-10-CM

## 2020-06-10 DIAGNOSIS — N18.30 TYPE 2 DIABETES MELLITUS WITH STAGE 3 CHRONIC KIDNEY DISEASE, WITH LONG-TERM CURRENT USE OF INSULIN: ICD-10-CM

## 2020-06-10 DIAGNOSIS — E11.22 TYPE 2 DIABETES MELLITUS WITH STAGE 3 CHRONIC KIDNEY DISEASE, WITH LONG-TERM CURRENT USE OF INSULIN: ICD-10-CM

## 2020-06-10 PROCEDURE — 36415 COLL VENOUS BLD VENIPUNCTURE: CPT | Mod: PO

## 2020-06-10 PROCEDURE — 83036 HEMOGLOBIN GLYCOSYLATED A1C: CPT

## 2020-06-11 LAB
ESTIMATED AVG GLUCOSE: 166 MG/DL (ref 68–131)
HBA1C MFR BLD HPLC: 7.4 % (ref 4–5.6)

## 2020-06-15 ENCOUNTER — OFFICE VISIT (OUTPATIENT)
Dept: ENDOCRINOLOGY | Facility: CLINIC | Age: 60
End: 2020-06-15
Payer: COMMERCIAL

## 2020-06-15 VITALS
BODY MASS INDEX: 38.01 KG/M2 | HEIGHT: 71 IN | HEART RATE: 75 BPM | SYSTOLIC BLOOD PRESSURE: 126 MMHG | WEIGHT: 271.5 LBS | DIASTOLIC BLOOD PRESSURE: 72 MMHG

## 2020-06-15 DIAGNOSIS — E11.49 DIABETIC RADICULOPATHY: ICD-10-CM

## 2020-06-15 DIAGNOSIS — I15.2 HYPERTENSION ASSOCIATED WITH DIABETES: ICD-10-CM

## 2020-06-15 DIAGNOSIS — Z79.4 TYPE 2 DIABETES MELLITUS WITH STAGE 3 CHRONIC KIDNEY DISEASE, WITH LONG-TERM CURRENT USE OF INSULIN: Primary | ICD-10-CM

## 2020-06-15 DIAGNOSIS — E55.9 VITAMIN D DEFICIENCY: ICD-10-CM

## 2020-06-15 DIAGNOSIS — E11.69 HYPERLIPIDEMIA ASSOCIATED WITH TYPE 2 DIABETES MELLITUS: ICD-10-CM

## 2020-06-15 DIAGNOSIS — M54.10 DIABETIC RADICULOPATHY: ICD-10-CM

## 2020-06-15 DIAGNOSIS — E11.22 TYPE 2 DIABETES MELLITUS WITH STAGE 3 CHRONIC KIDNEY DISEASE, WITH LONG-TERM CURRENT USE OF INSULIN: Primary | ICD-10-CM

## 2020-06-15 DIAGNOSIS — E11.59 HYPERTENSION ASSOCIATED WITH DIABETES: ICD-10-CM

## 2020-06-15 DIAGNOSIS — E78.5 HYPERLIPIDEMIA ASSOCIATED WITH TYPE 2 DIABETES MELLITUS: ICD-10-CM

## 2020-06-15 DIAGNOSIS — N18.30 TYPE 2 DIABETES MELLITUS WITH STAGE 3 CHRONIC KIDNEY DISEASE, WITH LONG-TERM CURRENT USE OF INSULIN: Primary | ICD-10-CM

## 2020-06-15 DIAGNOSIS — E66.09 CLASS 2 OBESITY DUE TO EXCESS CALORIES WITHOUT SERIOUS COMORBIDITY WITH BODY MASS INDEX (BMI) OF 38.0 TO 38.9 IN ADULT: ICD-10-CM

## 2020-06-15 DIAGNOSIS — R25.1 OCCASIONAL TREMORS: ICD-10-CM

## 2020-06-15 DIAGNOSIS — G47.33 OSA ON CPAP: ICD-10-CM

## 2020-06-15 DIAGNOSIS — E11.49 DIABETES MELLITUS TYPE 2 WITH NEUROLOGICAL MANIFESTATIONS: ICD-10-CM

## 2020-06-15 DIAGNOSIS — E11.40 TYPE 2 DIABETES, CONTROLLED, WITH NEUROPATHY: ICD-10-CM

## 2020-06-15 PROCEDURE — 3051F PR MOST RECENT HEMOGLOBIN A1C LEVEL 7.0 - < 8.0%: ICD-10-PCS | Mod: CPTII,S$GLB,, | Performed by: NURSE PRACTITIONER

## 2020-06-15 PROCEDURE — 99999 PR PBB SHADOW E&M-EST. PATIENT-LVL V: ICD-10-PCS | Mod: PBBFAC,,, | Performed by: NURSE PRACTITIONER

## 2020-06-15 PROCEDURE — 3051F HG A1C>EQUAL 7.0%<8.0%: CPT | Mod: CPTII,S$GLB,, | Performed by: NURSE PRACTITIONER

## 2020-06-15 PROCEDURE — 99999 PR PBB SHADOW E&M-EST. PATIENT-LVL V: CPT | Mod: PBBFAC,,, | Performed by: NURSE PRACTITIONER

## 2020-06-15 PROCEDURE — 99214 OFFICE O/P EST MOD 30 MIN: CPT | Mod: S$GLB,,, | Performed by: NURSE PRACTITIONER

## 2020-06-15 PROCEDURE — 99214 PR OFFICE/OUTPT VISIT, EST, LEVL IV, 30-39 MIN: ICD-10-PCS | Mod: S$GLB,,, | Performed by: NURSE PRACTITIONER

## 2020-06-15 RX ORDER — DAPAGLIFLOZIN 5 MG/1
5 TABLET, FILM COATED ORAL DAILY
Qty: 90 TABLET | Refills: 4 | Status: SHIPPED | OUTPATIENT
Start: 2020-06-15 | End: 2021-06-16

## 2020-06-15 RX ORDER — ERGOCALCIFEROL 1.25 MG/1
CAPSULE ORAL
Qty: 24 CAPSULE | Refills: 4 | Status: SHIPPED | OUTPATIENT
Start: 2020-06-15 | End: 2021-02-01 | Stop reason: SDUPTHER

## 2020-06-15 RX ORDER — METFORMIN HYDROCHLORIDE 1000 MG/1
1000 TABLET ORAL 2 TIMES DAILY WITH MEALS
Qty: 180 TABLET | Refills: 3 | Status: SHIPPED | OUTPATIENT
Start: 2020-06-15 | End: 2020-10-16

## 2020-06-15 NOTE — PROGRESS NOTES
CC: This 59 y.o. male presents for management of diabetes  and chronic conditions pending review including HTN, HLP, CKD 3, Obesity, DM PN, DANNY    HPI: He was diagnosed with T2DM in 2005. Has never been hospitalized r/t DM.  Family hx of DM: maybe dad?, sister's pre-diabetes     Since last visit, he's started to have tremors in his and hands and feet- just spontaneously happen.  Reports that it typically happen when he's straining his muscle... ie like when he's counting his rosary at night   Not currently checking his bg (has not checked in the past 4 months)  hypoglycemia at home - 1-2 times since his last visit   Symptoms- weak, sweaty, shakey    Diet: Eats 3 Meals a day, snacks- rarely- chips, candy bar  Reports eating more junk food in the past few months  May skip breakfast  Exercise: walking 45 minutes, 5 times a week  CURRENT DM MEDS: metformin 1000 mg bid, amaryl 2 mg qam, Lantus 45 u qhs, farxiga 5 mg, Trulicity 1.5 mg weekly (Sundays)   Vial/pen:  Uses  pen  Glucometer type:   Freestyle      Standards of Care:  Eye exam: Dr Dickerson 2019  +MATIAS has appt scheduled tomorrow w Dr Florentino     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  /GYN: 1-2+ nocturia, no burning or pain.   MS/Neuro: + numbness/ tingling in BLE; Gait usteady, speech clear  Psych: Denies drug/ETOH abuse, no hx of depression.  Other systems: negative.    PE:  GENERAL: Well developed, well nourished.  PSYCH: AAOx3, appropriate mood and affect, pleasant expression, conversant, appears relaxed, well groomed.   EYES: Conjunctiva, corneas clear  NECK: Supple, trachea midline,no thyromegaly or  nodules  ABDOMEN: Soft, non-tender, non-distended   VASCULAR: DP pulses +1/4 bilaterally, no edema.  NEURO: Gait unsteady  SKIN: Skin warm and dry no acanthosis nigracans.  FOOT EXAMINATION: 6/15/2020  + foot deformity, + callus formation, +Onychomycosis,  no interspace maceration or ulceration noted.  Decreased hair growth present over toes/feet.    Protective sensation absent bilaterally with 10 gram monofilament and absent vibratory sensation bilaterally, +1 dorsalis pedis and posterior pulses noted.      Lab Results   Component Value Date    MICALBCREAT 7.5 06/10/2020       Hemoglobin A1C   Date Value Ref Range Status   06/10/2020 7.4 (H) 4.0 - 5.6 % Final     Comment:     ADA Screening Guidelines:  5.7-6.4%  Consistent with prediabetes  >or=6.5%  Consistent with diabetes  High levels of fetal hemoglobin interfere with the HbA1C  assay. Heterozygous hemoglobin variants (HbS, HgC, etc)do  not significantly interfere with this assay.   However, presence of multiple variants may affect accuracy.     03/14/2020 6.5 (H) 4.0 - 5.6 % Final     Comment:     ADA Screening Guidelines:  5.7-6.4%  Consistent with prediabetes  >or=6.5%  Consistent with diabetes  High levels of fetal hemoglobin interfere with the HbA1C  assay. Heterozygous hemoglobin variants (HbS, HgC, etc)do  not significantly interfere with this assay.   However, presence of multiple variants may affect accuracy.     11/30/2019 6.8 (H) 4.0 - 5.6 % Final     Comment:     ADA Screening Guidelines:  5.7-6.4%  Consistent with prediabetes  >or=6.5%  Consistent with diabetes  High levels of fetal hemoglobin interfere with the HbA1C  assay. Heterozygous hemoglobin variants (HbS, HgC, etc)do  not significantly interfere with this assay.   However, presence of multiple variants may affect accuracy.          ASSESSMENT and PLAN:    1. T2DM with hyperglycemia, DM PN, CKD 3- Continue glimepiride 2 mg qam, lantus to 45 u qhs,Trulicity 1.5 mg weekly, metformin,and  farxiga   Stop eating junk food!  Send bg daily- stagger times  Discussed A1c and BG goals.  Notify me for continued hypoglycemia    - takes ASA, ACEi, statin    2. HTN - controlled, continue meds as previously prescribed and monitor.     3. HLP -   on statin therapy, LFTs WNL    4. Obesity- Body mass index is 37.87 kg/m².    Continue exercise and weight loss    5 DANNY- wears his cpap machine     6. Vitamin d deficiency - Continue ergo weekly (2 tabs),      7. Tremors and balance issues- refer to neurology      Follow-up: in 3 months with lab prior

## 2020-06-20 ENCOUNTER — HOSPITAL ENCOUNTER (EMERGENCY)
Facility: HOSPITAL | Age: 60
Discharge: HOME | End: 2020-06-21
Attending: EMERGENCY MEDICINE
Payer: COMMERCIAL

## 2020-06-20 DIAGNOSIS — R11.2 NAUSEA VOMITING AND DIARRHEA: Primary | ICD-10-CM

## 2020-06-20 DIAGNOSIS — R19.7 NAUSEA VOMITING AND DIARRHEA: Primary | ICD-10-CM

## 2020-06-20 PROCEDURE — 99284 EMERGENCY DEPT VISIT MOD MDM: CPT

## 2020-06-20 PROCEDURE — 63600175 PHARM REV CODE 636 W HCPCS: Performed by: EMERGENCY MEDICINE

## 2020-06-20 PROCEDURE — 25000003 PHARM REV CODE 250: Performed by: EMERGENCY MEDICINE

## 2020-06-20 PROCEDURE — 80048 BASIC METABOLIC PNL TOTAL CA: CPT

## 2020-06-20 PROCEDURE — 36415 COLL VENOUS BLD VENIPUNCTURE: CPT

## 2020-06-20 RX ORDER — LURASIDONE HYDROCHLORIDE 40 MG/1
TABLET, FILM COATED ORAL
COMMUNITY
Start: 2020-05-26 | End: 2021-08-17

## 2020-06-20 RX ORDER — ONDANSETRON 2 MG/ML
8 INJECTION INTRAMUSCULAR; INTRAVENOUS
Status: COMPLETED | OUTPATIENT
Start: 2020-06-20 | End: 2020-06-20

## 2020-06-20 RX ORDER — GLIMEPIRIDE 4 MG/1
TABLET ORAL
COMMUNITY
Start: 2020-04-20 | End: 2020-08-20

## 2020-06-20 RX ORDER — BUPROPION HYDROCHLORIDE 300 MG/1
TABLET ORAL
COMMUNITY
Start: 2020-06-12 | End: 2021-08-17

## 2020-06-20 RX ORDER — PAROXETINE 10 MG/1
TABLET, FILM COATED ORAL
COMMUNITY
Start: 2020-06-11 | End: 2020-08-20

## 2020-06-20 RX ORDER — DIPHENOXYLATE HYDROCHLORIDE AND ATROPINE SULFATE 2.5; .025 MG/1; MG/1
1 TABLET ORAL
Status: COMPLETED | OUTPATIENT
Start: 2020-06-20 | End: 2020-06-20

## 2020-06-20 RX ADMIN — ONDANSETRON 8 MG: 2 INJECTION INTRAMUSCULAR; INTRAVENOUS at 11:06

## 2020-06-20 RX ADMIN — SODIUM CHLORIDE 1000 ML: 0.9 INJECTION, SOLUTION INTRAVENOUS at 11:06

## 2020-06-20 RX ADMIN — DIPHENOXYLATE HYDROCHLORIDE AND ATROPINE SULFATE 1 TABLET: 2.5; .025 TABLET ORAL at 11:06

## 2020-06-21 VITALS
SYSTOLIC BLOOD PRESSURE: 152 MMHG | DIASTOLIC BLOOD PRESSURE: 81 MMHG | OXYGEN SATURATION: 95 % | HEART RATE: 83 BPM | RESPIRATION RATE: 18 BRPM | TEMPERATURE: 98 F | BODY MASS INDEX: 37.94 KG/M2 | HEIGHT: 71 IN | WEIGHT: 271 LBS

## 2020-06-21 LAB
ANION GAP SERPL CALC-SCNC: 14 MMOL/L (ref 8–16)
BUN SERPL-MCNC: 23 MG/DL (ref 8–23)
CALCIUM SERPL-MCNC: 9.7 MG/DL (ref 8.7–10.5)
CHLORIDE SERPL-SCNC: 94 MMOL/L (ref 95–110)
CO2 SERPL-SCNC: 28 MMOL/L (ref 23–29)
CREAT SERPL-MCNC: 1.4 MG/DL (ref 0.5–1.4)
EST. GFR  (AFRICAN AMERICAN): >60 ML/MIN/1.73 M^2
EST. GFR  (NON AFRICAN AMERICAN): 54 ML/MIN/1.73 M^2
GLUCOSE SERPL-MCNC: 102 MG/DL (ref 70–110)
POTASSIUM SERPL-SCNC: 3.5 MMOL/L (ref 3.5–5.1)
SODIUM SERPL-SCNC: 136 MMOL/L (ref 136–145)

## 2020-06-21 RX ORDER — ONDANSETRON 4 MG/1
4 TABLET, ORALLY DISINTEGRATING ORAL EVERY 8 HOURS PRN
Qty: 12 TABLET | Refills: 0 | Status: SHIPPED | OUTPATIENT
Start: 2020-06-21 | End: 2021-08-17

## 2020-06-21 RX ORDER — DIPHENOXYLATE HYDROCHLORIDE AND ATROPINE SULFATE 2.5; .025 MG/1; MG/1
1 TABLET ORAL 4 TIMES DAILY PRN
Qty: 15 TABLET | Refills: 0 | Status: SHIPPED | OUTPATIENT
Start: 2020-06-21 | End: 2020-07-01

## 2020-06-21 NOTE — ED PROVIDER NOTES
Encounter Date: 6/20/2020    SCRIBE #1 NOTE: Denice CAMACHO am scribing for, and in the presence of, Maldonado Strong MD.       History     Chief Complaint   Patient presents with    Emesis     and diarrhea that started 4-5 days ago       Time seen by provider: 11:26 PM on 06/20/2020    Sarwat Colunga is a 59 y.o. male with PMHx of HTN and type 2 DM who presents to the ED with c/o diarrhea x5 daily for 4 days with nausea and vomiting x1 tonight. Patient has been taking imodium daily. No bloody stools or vomit. No recent colonoscopy. No recent travel. No sick contacts. The patient denies fever, chills, abdominal pain, or any other symptoms at this time. No recent abdominal surgeries.    The history is provided by the patient.     Review of patient's allergies indicates:   Allergen Reactions    Bactrim [sulfamethoxazole-trimethoprim] Other (See Comments)     Dizziness,lightheaded, heavy chest    Ciprofloxacin Other (See Comments)     CONFUSED, OFF BALANCE, WOKE UP AT NITE    Doxycycline Hives    Lamictal [lamotrigine] Rash    Trileptal [oxcarbazepine]      Past Medical History:   Diagnosis Date    Arthritis     Benign localized hyperplasia of prostate without urinary obstruction and other lower urinary tract symptoms (LUTS)     Body mass index 37.0-37.9, adult     Diabetes mellitus     Esophageal reflux     Hypertension     Other and unspecified hyperlipidemia     Other testicular hypofunction     Polyneuropathy in diabetes(357.2)     PONV (postoperative nausea and vomiting)     Rosacea     Sleep apnea     uses CPAP    Type II or unspecified type diabetes mellitus with neurological manifestations, uncontrolled(250.62)      Past Surgical History:   Procedure Laterality Date    ANKLE SURGERY Right     ANKLE SURGERY Left     COLONOSCOPY N/A 5/17/2017    Procedure: COLONOSCOPY;  Surgeon: Carols Hess MD;  Location: [facility];  Service: Endoscopy;  Laterality: N/A;    COLONOSCOPY W/ POLYPECTOMY       CYSTOSCOPY      CYSTOSCOPY N/A 10/23/2018    Procedure: CYSTOSCOPY;  Surgeon: Sarwat Viveros MD;  Location: [facility] OR;  Service: Urology;  Laterality: N/A;    CYSTOSCOPY WITH INSERTION OF MINIMALLY INVASIVE IMPLANT TO ENLARGE PROSTATIC URETHRA N/A 11/15/2018    Procedure: CYSTOSCOPY, WITH INSERTION OF UROLIFT IMPLANT;  Surgeon: Sarwat Viveros MD;  Location: [facility] OR;  Service: Urology;  Laterality: N/A;    DEBRIDEMENT TENNIS ELBOW      right    FOREARM SURGERY Right     tendon repair    FRACTURE SURGERY      right ankle orif    HERNIA REPAIR      umbillical    PROSTATE BIOPSY      QUADRICEPS REPAIR      right    TONSILLECTOMY      TRANSRECTAL ULTRASOUND EXAMINATION N/A 10/23/2018    Procedure: ULTRASOUND, RECTAL APPROACH;  Surgeon: Sarwat Viveros MD;  Location: [facility] OR;  Service: Urology;  Laterality: N/A;    TRICEPS TENDON RELEASE      tendon repair left tricep    VASECTOMY       Family History   Problem Relation Age of Onset    Pulmonary embolism Mother     No Known Problems Father     Heart disease Other     Heart attack Other     Hypertension Other     Hyperlipidemia Other     Arthritis Other     Clotting disorder Other     Mental illness Other     Diabetes Other     Cancer Other     Melanoma Neg Hx     Psoriasis Neg Hx     Lupus Neg Hx     Eczema Neg Hx      Social History     Tobacco Use    Smoking status: Never Smoker    Smokeless tobacco: Never Used   Substance Use Topics    Alcohol use: No    Drug use: No     Review of Systems   Constitutional: Negative for chills and fever.   HENT: Negative for sore throat.    Respiratory: Negative for shortness of breath.    Cardiovascular: Negative for chest pain.   Gastrointestinal: Positive for diarrhea, nausea and vomiting. Negative for abdominal pain, blood in stool and constipation.   Genitourinary: Negative for dysuria and hematuria.   Musculoskeletal: Negative for back pain and myalgias.   Skin: Negative for rash.    Neurological: Negative for weakness.   Hematological: Does not bruise/bleed easily.   All other systems reviewed and are negative.      Physical Exam     Initial Vitals [06/20/20 2310]   BP Pulse Resp Temp SpO2   (!) 148/81 91 18 98.2 °F (36.8 °C) 96 %      MAP       --         Physical Exam    Nursing note and vitals reviewed.  Constitutional: He appears well-developed and well-nourished. He is not diaphoretic.  Non-toxic appearance. He does not have a sickly appearance. He does not appear ill. No distress.   HENT:   Head: Normocephalic and atraumatic.   Eyes: EOM are normal.   Neck: Normal range of motion. Neck supple. Normal range of motion present. No neck rigidity.   Cardiovascular: Normal rate, regular rhythm and normal heart sounds. Exam reveals no gallop and no friction rub.    No murmur heard.  Pulmonary/Chest: Breath sounds normal. No respiratory distress. He has no wheezes. He has no rhonchi. He has no rales.   Abdominal: Soft. He exhibits no distension. There is no abdominal tenderness. There is no rebound and no guarding.   Musculoskeletal: Normal range of motion.   Neurological: He is alert and oriented to person, place, and time.   Skin: Skin is warm and dry. No rash noted.   Psychiatric: He has a normal mood and affect. His behavior is normal. Judgment and thought content normal.         ED Course   Procedures  Labs Reviewed   BASIC METABOLIC PANEL          Imaging Results    None          Medical Decision Making:   History:   Old Medical Records: I decided to obtain old medical records.  Clinical Tests:   Lab Tests: Ordered and Reviewed            Scribe Attestation:   Scribe #1: I performed the above scribed service and the documentation accurately describes the services I performed. I attest to the accuracy of the note.    I, Dr. Strong, personally performed the services described in this documentation. All medical record entries made by the scribe were at my direction and in my presence.  I  have reviewed the chart and agree that the record reflects my personal performance and is accurate and complete.1:13 AM 06/21/2020            ED Course as of Jun 21 0113   Sat Jun 20, 2020 2318 SpO2: 96 % [EF]   2319 Resp: 18 [EF]   2319 Pulse: 91 [EF]   2319 Temp src: Oral [EF]   2319 Temp: 98.2 °F (36.8 °C) [EF]   2319 BP(!): 148/81 [EF]   Sun Jun 21, 2020   0052 59-year-old presents with several days of nausea vomiting diarrhea.  No risk factors for C diff.  Abdominal exam is entirely benign.  There is no concern for a surgical abdomen.  Feels much better after IV fluids Zofran and Lomotil.  He will be discharged home with prescription for Lomotil.  Return to the ER if symptoms worsen or change.  Symptoms likely either food poisoning or viral syndrome.  No recent antibiotics foreign travel cruises or GI procedures.    [EF]      ED Course User Index  [EF] Maldonado Strong MD                Clinical Impression:       ICD-10-CM ICD-9-CM   1. Nausea vomiting and diarrhea  R11.2 787.91    R19.7 787.01                                Maldonado Strong MD  06/21/20 0114

## 2020-06-21 NOTE — ED TRIAGE NOTES
Sarwat SEEMA Colunga is here with vomiting and diarrhea, started 4-5 days ago and has gotten worse. +N,+V X 1, +diarrhea X 4-5

## 2020-06-23 ENCOUNTER — PATIENT MESSAGE (OUTPATIENT)
Dept: ENDOCRINOLOGY | Facility: CLINIC | Age: 60
End: 2020-06-23

## 2020-07-07 ENCOUNTER — HOSPITAL ENCOUNTER (EMERGENCY)
Facility: HOSPITAL | Age: 60
Discharge: HOME | End: 2020-07-07
Attending: EMERGENCY MEDICINE
Payer: COMMERCIAL

## 2020-07-07 VITALS
RESPIRATION RATE: 16 BRPM | WEIGHT: 271 LBS | OXYGEN SATURATION: 94 % | SYSTOLIC BLOOD PRESSURE: 133 MMHG | HEART RATE: 83 BPM | TEMPERATURE: 99 F | DIASTOLIC BLOOD PRESSURE: 77 MMHG | BODY MASS INDEX: 37.94 KG/M2 | HEIGHT: 71 IN

## 2020-07-07 DIAGNOSIS — R19.7 DIARRHEA, UNSPECIFIED TYPE: Primary | ICD-10-CM

## 2020-07-07 LAB
ALBUMIN SERPL BCP-MCNC: 4.1 G/DL (ref 3.5–5.2)
ALP SERPL-CCNC: 59 U/L (ref 55–135)
ALT SERPL W/O P-5'-P-CCNC: 31 U/L (ref 10–44)
ANION GAP SERPL CALC-SCNC: 12 MMOL/L (ref 8–16)
AST SERPL-CCNC: 20 U/L (ref 10–40)
BACTERIA #/AREA URNS HPF: NORMAL /HPF
BASOPHILS # BLD AUTO: 0.04 K/UL (ref 0–0.2)
BASOPHILS NFR BLD: 0.3 % (ref 0–1.9)
BILIRUB SERPL-MCNC: 0.5 MG/DL (ref 0.1–1)
BILIRUB UR QL STRIP: NEGATIVE
BUN SERPL-MCNC: 31 MG/DL (ref 8–23)
C DIFF GDH STL QL: NEGATIVE
C DIFF TOX A+B STL QL IA: NEGATIVE
CALCIUM SERPL-MCNC: 9.5 MG/DL (ref 8.7–10.5)
CHLORIDE SERPL-SCNC: 99 MMOL/L (ref 95–110)
CLARITY UR: CLEAR
CO2 SERPL-SCNC: 27 MMOL/L (ref 23–29)
COLOR UR: YELLOW
CREAT SERPL-MCNC: 1.5 MG/DL (ref 0.5–1.4)
DIFFERENTIAL METHOD: ABNORMAL
EOSINOPHIL # BLD AUTO: 0.4 K/UL (ref 0–0.5)
EOSINOPHIL NFR BLD: 2.4 % (ref 0–8)
ERYTHROCYTE [DISTWIDTH] IN BLOOD BY AUTOMATED COUNT: 13.4 % (ref 11.5–14.5)
EST. GFR  (AFRICAN AMERICAN): 57 ML/MIN/1.73 M^2
EST. GFR  (NON AFRICAN AMERICAN): 50 ML/MIN/1.73 M^2
GLUCOSE SERPL-MCNC: 200 MG/DL (ref 70–110)
GLUCOSE UR QL STRIP: ABNORMAL
HCT VFR BLD AUTO: 49.3 % (ref 40–54)
HGB BLD-MCNC: 16.2 G/DL (ref 14–18)
HGB UR QL STRIP: ABNORMAL
IMM GRANULOCYTES # BLD AUTO: 0.07 K/UL (ref 0–0.04)
IMM GRANULOCYTES NFR BLD AUTO: 0.5 % (ref 0–0.5)
KETONES UR QL STRIP: ABNORMAL
LEUKOCYTE ESTERASE UR QL STRIP: NEGATIVE
LIPASE SERPL-CCNC: 32 U/L (ref 4–60)
LYMPHOCYTES # BLD AUTO: 2.2 K/UL (ref 1–4.8)
LYMPHOCYTES NFR BLD: 14.8 % (ref 18–48)
MCH RBC QN AUTO: 31 PG (ref 27–31)
MCHC RBC AUTO-ENTMCNC: 32.9 G/DL (ref 32–36)
MCV RBC AUTO: 94 FL (ref 82–98)
MICROSCOPIC COMMENT: NORMAL
MONOCYTES # BLD AUTO: 1.1 K/UL (ref 0.3–1)
MONOCYTES NFR BLD: 7.3 % (ref 4–15)
NEUTROPHILS # BLD AUTO: 10.9 K/UL (ref 1.8–7.7)
NEUTROPHILS NFR BLD: 74.7 % (ref 38–73)
NITRITE UR QL STRIP: NEGATIVE
NRBC BLD-RTO: 0 /100 WBC
PH UR STRIP: 6 [PH] (ref 5–8)
PLATELET # BLD AUTO: 157 K/UL (ref 150–350)
PMV BLD AUTO: 9 FL (ref 9.2–12.9)
POCT GLUCOSE: 155 MG/DL (ref 70–110)
POTASSIUM SERPL-SCNC: 4.1 MMOL/L (ref 3.5–5.1)
PROT SERPL-MCNC: 7.2 G/DL (ref 6–8.4)
PROT UR QL STRIP: NEGATIVE
RBC # BLD AUTO: 5.22 M/UL (ref 4.6–6.2)
RBC #/AREA URNS HPF: 2 /HPF (ref 0–4)
SODIUM SERPL-SCNC: 138 MMOL/L (ref 136–145)
SP GR UR STRIP: <=1.005 (ref 1–1.03)
URN SPEC COLLECT METH UR: ABNORMAL
UROBILINOGEN UR STRIP-ACNC: 1 EU/DL
WBC # BLD AUTO: 14.59 K/UL (ref 3.9–12.7)
WBC #/AREA STL HPF: NORMAL /[HPF]
YEAST URNS QL MICRO: NORMAL

## 2020-07-07 PROCEDURE — 87045 FECES CULTURE AEROBIC BACT: CPT

## 2020-07-07 PROCEDURE — 87046 STOOL CULTR AEROBIC BACT EA: CPT

## 2020-07-07 PROCEDURE — 80053 COMPREHEN METABOLIC PANEL: CPT

## 2020-07-07 PROCEDURE — 36415 COLL VENOUS BLD VENIPUNCTURE: CPT

## 2020-07-07 PROCEDURE — 87209 SMEAR COMPLEX STAIN: CPT

## 2020-07-07 PROCEDURE — 96361 HYDRATE IV INFUSION ADD-ON: CPT

## 2020-07-07 PROCEDURE — 83690 ASSAY OF LIPASE: CPT

## 2020-07-07 PROCEDURE — 99285 EMERGENCY DEPT VISIT HI MDM: CPT | Mod: 25

## 2020-07-07 PROCEDURE — 82962 GLUCOSE BLOOD TEST: CPT

## 2020-07-07 PROCEDURE — 25500020 PHARM REV CODE 255

## 2020-07-07 PROCEDURE — 25000003 PHARM REV CODE 250: Performed by: EMERGENCY MEDICINE

## 2020-07-07 PROCEDURE — 85025 COMPLETE CBC W/AUTO DIFF WBC: CPT

## 2020-07-07 PROCEDURE — 87324 CLOSTRIDIUM AG IA: CPT

## 2020-07-07 PROCEDURE — 87427 SHIGA-LIKE TOXIN AG IA: CPT

## 2020-07-07 PROCEDURE — 87449 NOS EACH ORGANISM AG IA: CPT

## 2020-07-07 PROCEDURE — 89055 LEUKOCYTE ASSESSMENT FECAL: CPT

## 2020-07-07 PROCEDURE — 96360 HYDRATION IV INFUSION INIT: CPT

## 2020-07-07 PROCEDURE — 81000 URINALYSIS NONAUTO W/SCOPE: CPT

## 2020-07-07 RX ORDER — METRONIDAZOLE 500 MG/1
500 TABLET ORAL 3 TIMES DAILY
Qty: 21 TABLET | Refills: 0 | Status: SHIPPED | OUTPATIENT
Start: 2020-07-07 | End: 2020-07-14

## 2020-07-07 RX ADMIN — SODIUM CHLORIDE 1000 ML: 0.9 INJECTION, SOLUTION INTRAVENOUS at 09:07

## 2020-07-07 RX ADMIN — IOHEXOL 100 ML: 350 INJECTION, SOLUTION INTRAVENOUS at 09:07

## 2020-07-07 NOTE — Clinical Note
Sarwat Colunga was seen and treated in our emergency department on 7/7/2020.  He may return to work on 07/08/2020.       If you have any questions or concerns, please don't hesitate to call.      Erinn Quick RN

## 2020-07-07 NOTE — ED NOTES
Upon discharge, patient is AAOx4, no cardiac or respiratory complications. Follow up care and  Medications have been reviewed with patient and has been instructed to return to the ER if needed. Patient verbalized understanding and ambulated to the lobby without difficulty. MADDI CHAKRABORTY.

## 2020-07-08 LAB
E COLI SXT1 STL QL IA: NEGATIVE
E COLI SXT2 STL QL IA: NEGATIVE

## 2020-07-08 NOTE — ED PROVIDER NOTES
Encounter Date: 7/7/2020       History     Chief Complaint   Patient presents with    Diarrhea     all night / recent 6 day episode of same / taking imodium      The history is provided by the patient.   Diarrhea   This is a recurrent problem. The current episode started yesterday. The problem occurs 2 to 4 times per day. The problem has been gradually improving. The stool consistency is described as watery. Associated symptoms include bloating and increased flatus. Pertinent negatives include no abdominal pain, arthralgias, chills, coughing, fever, headaches, myalgias, sweats, URI, vomiting or weight loss. Nothing aggravates the symptoms. There are no known risk factors. He has tried nothing for the symptoms. The treatment provided no relief. There is no history of bowel resection, inflammatory bowel disease, irritable bowel syndrome, malabsorption, a recent abdominal surgery or short gut syndrome.     Review of patient's allergies indicates:   Allergen Reactions    Bactrim [sulfamethoxazole-trimethoprim] Other (See Comments)     Dizziness,lightheaded, heavy chest    Ciprofloxacin Other (See Comments)     CONFUSED, OFF BALANCE, WOKE UP AT NITE    Doxycycline Hives    Lamictal [lamotrigine] Rash    Trileptal [oxcarbazepine]      Past Medical History:   Diagnosis Date    Arthritis     Benign localized hyperplasia of prostate without urinary obstruction and other lower urinary tract symptoms (LUTS)     Body mass index 37.0-37.9, adult     Diabetes mellitus     Esophageal reflux     Hypertension     Other and unspecified hyperlipidemia     Other testicular hypofunction     Polyneuropathy in diabetes(357.2)     PONV (postoperative nausea and vomiting)     Rosacea     Sleep apnea     uses CPAP    Type II or unspecified type diabetes mellitus with neurological manifestations, uncontrolled(250.62)      Past Surgical History:   Procedure Laterality Date    ANKLE SURGERY Right     ANKLE SURGERY Left      COLONOSCOPY N/A 5/17/2017    Procedure: COLONOSCOPY;  Surgeon: Carlos Hess MD;  Location: [facility] ENDO;  Service: Endoscopy;  Laterality: N/A;    COLONOSCOPY W/ POLYPECTOMY      CYSTOSCOPY      CYSTOSCOPY N/A 10/23/2018    Procedure: CYSTOSCOPY;  Surgeon: Sarwat Viveros MD;  Location: [facility] OR;  Service: Urology;  Laterality: N/A;    CYSTOSCOPY WITH INSERTION OF MINIMALLY INVASIVE IMPLANT TO ENLARGE PROSTATIC URETHRA N/A 11/15/2018    Procedure: CYSTOSCOPY, WITH INSERTION OF UROLIFT IMPLANT;  Surgeon: Sarwat Viveros MD;  Location: [facility] OR;  Service: Urology;  Laterality: N/A;    DEBRIDEMENT TENNIS ELBOW      right    FOREARM SURGERY Right     tendon repair    FRACTURE SURGERY      right ankle orif    HERNIA REPAIR      umbillical    PROSTATE BIOPSY      QUADRICEPS REPAIR      right    TONSILLECTOMY      TRANSRECTAL ULTRASOUND EXAMINATION N/A 10/23/2018    Procedure: ULTRASOUND, RECTAL APPROACH;  Surgeon: Sarwat Viveros MD;  Location: [facility] OR;  Service: Urology;  Laterality: N/A;    TRICEPS TENDON RELEASE      tendon repair left tricep    VASECTOMY       Family History   Problem Relation Age of Onset    Pulmonary embolism Mother     No Known Problems Father     Heart disease Other     Heart attack Other     Hypertension Other     Hyperlipidemia Other     Arthritis Other     Clotting disorder Other     Mental illness Other     Diabetes Other     Cancer Other     Melanoma Neg Hx     Psoriasis Neg Hx     Lupus Neg Hx     Eczema Neg Hx      Social History     Tobacco Use    Smoking status: Never Smoker    Smokeless tobacco: Never Used   Substance Use Topics    Alcohol use: No    Drug use: No     Review of Systems   Constitutional: Negative for activity change, chills, diaphoresis, fever and weight loss.   HENT: Negative for drooling, rhinorrhea, sore throat and trouble swallowing.    Eyes: Negative for pain and visual disturbance.   Respiratory: Negative for cough, shortness  of breath and stridor.    Cardiovascular: Negative for chest pain and leg swelling.   Gastrointestinal: Positive for bloating, diarrhea and flatus. Negative for abdominal distention, abdominal pain, blood in stool, constipation, nausea and vomiting.   Genitourinary: Negative for discharge, dysuria and hematuria.   Musculoskeletal: Negative for arthralgias, gait problem and myalgias.   Skin: Negative for rash.   Neurological: Negative for seizures, facial asymmetry and headaches.   Psychiatric/Behavioral: Negative for hallucinations and suicidal ideas.       Physical Exam     Initial Vitals [07/07/20 0719]   BP Pulse Resp Temp SpO2   137/65 97 16 98.7 °F (37.1 °C) 96 %      MAP       --         Physical Exam    Nursing note and vitals reviewed.  Constitutional: No distress.   obese   HENT:   Head: Normocephalic and atraumatic.   Nose: Nose normal.   Eyes: EOM are normal.   Neck: Neck supple. No tracheal deviation present. No JVD present.   Cardiovascular: Normal rate, regular rhythm, normal heart sounds and intact distal pulses. Exam reveals no gallop and no friction rub.    No murmur heard.  Pulmonary/Chest: Breath sounds normal. No respiratory distress. He has no wheezes. He has no rhonchi. He has no rales.   Abdominal: Soft. Bowel sounds are normal. There is no abdominal tenderness.   Musculoskeletal: Normal range of motion.   Neurological: He is alert and oriented to person, place, and time. No cranial nerve deficit.   Skin: Skin is warm and dry. Capillary refill takes less than 2 seconds. No rash noted.   Psychiatric: He has a normal mood and affect.         ED Course   Procedures  Labs Reviewed   CBC W/ AUTO DIFFERENTIAL - Abnormal; Notable for the following components:       Result Value    WBC 14.59 (*)     MPV 9.0 (*)     Gran # (ANC) 10.9 (*)     Immature Grans (Abs) 0.07 (*)     Mono # 1.1 (*)     Gran% 74.7 (*)     Lymph% 14.8 (*)     All other components within normal limits   COMPREHENSIVE METABOLIC  PANEL - Abnormal; Notable for the following components:    Glucose 200 (*)     BUN, Bld 31 (*)     Creatinine 1.5 (*)     eGFR if  57 (*)     eGFR if non  50 (*)     All other components within normal limits   URINALYSIS, REFLEX TO URINE CULTURE - Abnormal; Notable for the following components:    Specific Gravity, UA <=1.005 (*)     Glucose, UA 4+ (*)     Ketones, UA Trace (*)     Occult Blood UA Trace (*)     All other components within normal limits    Narrative:     Specimen Source->Urine   POCT GLUCOSE - Abnormal; Notable for the following components:    POCT Glucose 155 (*)     All other components within normal limits   CLOSTRIDIUM DIFFICILE   CULTURE, STOOL   ENTEROHEMORRHAGIC E.COLI   LIPASE   URINALYSIS MICROSCOPIC    Narrative:     Specimen Source->Urine   WBC, STOOL   STOOL EXAM-OVA,CYSTS,PARASITES          Imaging Results          CT Abdomen Pelvis With Contrast (Final result)  Result time 07/07/20 09:36:44    Final result by Saloni Reynolds MD (07/07/20 09:36:44)                 Impression:      Mild diffuse distention of colon with air-fluid levels throughout suggesting diarrheal process and could reflect mild ileus or very mild colitis but without appreciable bowel wall thickening or surrounding inflammatory change.  Also very mild distention of stomach which could also be due to mild ileus.      Electronically signed by: Saloni Reynolds MD  Date:    07/07/2020  Time:    09:36             Narrative:    EXAMINATION:  CT ABDOMEN PELVIS WITH CONTRAST    CLINICAL HISTORY:  Abdominal infection suspected;    TECHNIQUE:  Low dose axial images, sagittal and coronal reformations were obtained from the lung bases to the pubic symphysis following the IV administration of 100 mL of Omnipaque 350 without p.o. contrast    COMPARISON:  None.    FINDINGS:  Liver unremarkable appearance    Spleen borderline in size    Adrenal glands unremarkable appearance    Pancreas unremarkable  appearance    Abdominal aorta no aneurysm    Stomach, bowel, mesentery; normal appearance of the appendix.  Mild diffuse distention of colon with air-fluid levels throughout colon down to and including sigmoid colon suggesting diarrheal process.  No appreciable bowel wall thickening.  No surrounding pericolonic fat stranding or fluid.  No free intraperitoneal air or fluid    Stomach is mildly distended with residual fluid and secretions in air-fluid level.  Recommend correlation clinically as to when the patient last ate..    Kidneys, ureters, bladder; symmetrical renal enhancement.  No hydronephrosis.  2 cm low-density left renal mass consistent with a cyst.  Subcentimeter low-density mass in the left kidney too small to reliably characterize but also suggesting most likely representing a cyst.  Urinary bladder mildly distended at time of the exam and unremarkable appearance    Prostate gland contains multiple cysts radiation seeds and measures 4.4 cm transversely    Osseous show degenerative changes.                                 Medical Decision Making:   ED Management:  The cause of the patient's symptoms is not clear, but may be due to either food poisoning or viral gastrointestinal infection. However, there does not appear to be an emergent cause of the symptoms, including, but not limited to, small bowel obstruction, coronary syndrome, bowel ischemia, DKA, or pancreatitis.    After treatment, the patient is feeling much better, tolerating PO fluids, and shows no signs of dehydration. Suspect likely transient course of illness.    Plan discharge home with prompt primary care physician follow up in the next 48 hours.                     ED Course as of Jul 07 2243   Tue Jul 07, 2020   1000 Impression:     Mild diffuse distention of colon with air-fluid levels throughout suggesting diarrheal process and could reflect mild ileus or very mild colitis but without appreciable bowel wall thickening or surrounding  inflammatory change.  Also very mild distention of stomach which could also be due to mild ileus.        Electronically signed by: Saloni Reynolds MD  Date:                                            07/07/2020  Time:                                           09:36    [BD]      ED Course User Index  [BD] Leonard Marcos MD                Clinical Impression:       ICD-10-CM ICD-9-CM   1. Diarrhea, unspecified type  R19.7 787.91             ED Disposition Condition    Discharge Stable        ED Prescriptions     Medication Sig Dispense Start Date End Date Auth. Provider    metroNIDAZOLE (FLAGYL) 500 MG tablet Take 1 tablet (500 mg total) by mouth 3 (three) times daily. for 7 days 21 tablet 7/7/2020 7/14/2020 Leonard Marcos MD        Follow-up Information     Follow up With Specialties Details Why Contact Info    Rhonda Magallon MD Gastroenterology, Internal Medicine Go in 1 day  [address]  [address]  [place] [address]  [phone]      Lydia Matamoros MD Internal Medicine Go in 1 day  [address]   [address]  [place] [address]  [phone]      [facility] Emergency Medicine Go to  As needed, If symptoms worsen [address]  [facility] [address] [phone]                                     Leonard Marcos MD  07/07/20 6868

## 2020-07-09 LAB — O+P STL MICRO: ABNORMAL

## 2020-07-10 LAB — BACTERIA STL CULT: NORMAL

## 2020-09-12 ENCOUNTER — LAB VISIT (OUTPATIENT)
Dept: LAB | Facility: HOSPITAL | Age: 60
End: 2020-09-12
Attending: NURSE PRACTITIONER
Payer: COMMERCIAL

## 2020-09-12 DIAGNOSIS — N18.30 TYPE 2 DIABETES MELLITUS WITH STAGE 3 CHRONIC KIDNEY DISEASE, WITH LONG-TERM CURRENT USE OF INSULIN: ICD-10-CM

## 2020-09-12 DIAGNOSIS — E78.5 HYPERLIPIDEMIA ASSOCIATED WITH TYPE 2 DIABETES MELLITUS: ICD-10-CM

## 2020-09-12 DIAGNOSIS — Z79.4 TYPE 2 DIABETES MELLITUS WITH STAGE 3 CHRONIC KIDNEY DISEASE, WITH LONG-TERM CURRENT USE OF INSULIN: ICD-10-CM

## 2020-09-12 DIAGNOSIS — E11.22 TYPE 2 DIABETES MELLITUS WITH STAGE 3 CHRONIC KIDNEY DISEASE, WITH LONG-TERM CURRENT USE OF INSULIN: ICD-10-CM

## 2020-09-12 DIAGNOSIS — E55.9 VITAMIN D DEFICIENCY: ICD-10-CM

## 2020-09-12 DIAGNOSIS — E11.69 HYPERLIPIDEMIA ASSOCIATED WITH TYPE 2 DIABETES MELLITUS: ICD-10-CM

## 2020-09-12 LAB
25(OH)D3+25(OH)D2 SERPL-MCNC: 34 NG/ML (ref 30–96)
ALBUMIN SERPL BCP-MCNC: 3.9 G/DL (ref 3.5–5.2)
ALP SERPL-CCNC: 52 U/L (ref 55–135)
ALT SERPL W/O P-5'-P-CCNC: 26 U/L (ref 10–44)
ANION GAP SERPL CALC-SCNC: 9 MMOL/L (ref 8–16)
AST SERPL-CCNC: 18 U/L (ref 10–40)
BILIRUB SERPL-MCNC: 0.4 MG/DL (ref 0.1–1)
BUN SERPL-MCNC: 27 MG/DL (ref 8–23)
CALCIUM SERPL-MCNC: 9.8 MG/DL (ref 8.7–10.5)
CHLORIDE SERPL-SCNC: 98 MMOL/L (ref 95–110)
CHOLEST SERPL-MCNC: 126 MG/DL (ref 120–199)
CHOLEST/HDLC SERPL: 3.8 {RATIO} (ref 2–5)
CO2 SERPL-SCNC: 33 MMOL/L (ref 23–29)
CREAT SERPL-MCNC: 1.4 MG/DL (ref 0.5–1.4)
EST. GFR  (AFRICAN AMERICAN): >60 ML/MIN/1.73 M^2
EST. GFR  (NON AFRICAN AMERICAN): 54 ML/MIN/1.73 M^2
ESTIMATED AVG GLUCOSE: 154 MG/DL (ref 68–131)
GLUCOSE SERPL-MCNC: 122 MG/DL (ref 70–110)
HBA1C MFR BLD HPLC: 7 % (ref 4–5.6)
HDLC SERPL-MCNC: 33 MG/DL (ref 40–75)
HDLC SERPL: 26.2 % (ref 20–50)
LDLC SERPL CALC-MCNC: 67.4 MG/DL (ref 63–159)
NONHDLC SERPL-MCNC: 93 MG/DL
POTASSIUM SERPL-SCNC: 5 MMOL/L (ref 3.5–5.1)
PROT SERPL-MCNC: 6.9 G/DL (ref 6–8.4)
SODIUM SERPL-SCNC: 140 MMOL/L (ref 136–145)
T4 FREE SERPL-MCNC: 1.07 NG/DL (ref 0.71–1.51)
TRIGL SERPL-MCNC: 128 MG/DL (ref 30–150)
TSH SERPL DL<=0.005 MIU/L-ACNC: 1.68 UIU/ML (ref 0.4–4)

## 2020-09-12 PROCEDURE — 80053 COMPREHEN METABOLIC PANEL: CPT

## 2020-09-12 PROCEDURE — 80061 LIPID PANEL: CPT

## 2020-09-12 PROCEDURE — 36415 COLL VENOUS BLD VENIPUNCTURE: CPT

## 2020-09-12 PROCEDURE — 84439 ASSAY OF FREE THYROXINE: CPT

## 2020-09-12 PROCEDURE — 83036 HEMOGLOBIN GLYCOSYLATED A1C: CPT

## 2020-09-12 PROCEDURE — 84443 ASSAY THYROID STIM HORMONE: CPT

## 2020-09-12 PROCEDURE — 82306 VITAMIN D 25 HYDROXY: CPT

## 2020-09-14 ENCOUNTER — OFFICE VISIT (OUTPATIENT)
Dept: ENDOCRINOLOGY | Facility: CLINIC | Age: 60
End: 2020-09-14
Payer: COMMERCIAL

## 2020-09-14 VITALS
DIASTOLIC BLOOD PRESSURE: 68 MMHG | SYSTOLIC BLOOD PRESSURE: 120 MMHG | HEART RATE: 80 BPM | WEIGHT: 276.44 LBS | HEIGHT: 71 IN | BODY MASS INDEX: 38.7 KG/M2

## 2020-09-14 DIAGNOSIS — E78.5 HYPERLIPIDEMIA ASSOCIATED WITH TYPE 2 DIABETES MELLITUS: ICD-10-CM

## 2020-09-14 DIAGNOSIS — E11.49 DIABETIC RADICULOPATHY: ICD-10-CM

## 2020-09-14 DIAGNOSIS — E66.09 CLASS 2 OBESITY DUE TO EXCESS CALORIES WITHOUT SERIOUS COMORBIDITY WITH BODY MASS INDEX (BMI) OF 38.0 TO 38.9 IN ADULT: ICD-10-CM

## 2020-09-14 DIAGNOSIS — E11.59 HYPERTENSION ASSOCIATED WITH DIABETES: ICD-10-CM

## 2020-09-14 DIAGNOSIS — G47.33 OSA ON CPAP: ICD-10-CM

## 2020-09-14 DIAGNOSIS — N18.30 TYPE 2 DIABETES MELLITUS WITH STAGE 3 CHRONIC KIDNEY DISEASE, WITH LONG-TERM CURRENT USE OF INSULIN: Primary | ICD-10-CM

## 2020-09-14 DIAGNOSIS — E11.22 TYPE 2 DIABETES MELLITUS WITH STAGE 3 CHRONIC KIDNEY DISEASE, WITH LONG-TERM CURRENT USE OF INSULIN: Primary | ICD-10-CM

## 2020-09-14 DIAGNOSIS — E11.69 HYPERLIPIDEMIA ASSOCIATED WITH TYPE 2 DIABETES MELLITUS: ICD-10-CM

## 2020-09-14 DIAGNOSIS — E55.9 VITAMIN D DEFICIENCY: ICD-10-CM

## 2020-09-14 DIAGNOSIS — M54.10 DIABETIC RADICULOPATHY: ICD-10-CM

## 2020-09-14 DIAGNOSIS — I15.2 HYPERTENSION ASSOCIATED WITH DIABETES: ICD-10-CM

## 2020-09-14 DIAGNOSIS — Z79.4 TYPE 2 DIABETES MELLITUS WITH STAGE 3 CHRONIC KIDNEY DISEASE, WITH LONG-TERM CURRENT USE OF INSULIN: Primary | ICD-10-CM

## 2020-09-14 PROCEDURE — 99999 PR PBB SHADOW E&M-EST. PATIENT-LVL IV: CPT | Mod: PBBFAC,,, | Performed by: NURSE PRACTITIONER

## 2020-09-14 PROCEDURE — 3051F PR MOST RECENT HEMOGLOBIN A1C LEVEL 7.0 - < 8.0%: ICD-10-PCS | Mod: CPTII,S$GLB,, | Performed by: NURSE PRACTITIONER

## 2020-09-14 PROCEDURE — 3051F HG A1C>EQUAL 7.0%<8.0%: CPT | Mod: CPTII,S$GLB,, | Performed by: NURSE PRACTITIONER

## 2020-09-14 PROCEDURE — 99999 PR PBB SHADOW E&M-EST. PATIENT-LVL IV: ICD-10-PCS | Mod: PBBFAC,,, | Performed by: NURSE PRACTITIONER

## 2020-09-14 PROCEDURE — 99213 OFFICE O/P EST LOW 20 MIN: CPT | Mod: S$GLB,,, | Performed by: NURSE PRACTITIONER

## 2020-09-14 PROCEDURE — 99213 PR OFFICE/OUTPT VISIT, EST, LEVL III, 20-29 MIN: ICD-10-PCS | Mod: S$GLB,,, | Performed by: NURSE PRACTITIONER

## 2020-09-14 RX ORDER — INSULIN GLARGINE 100 [IU]/ML
INJECTION, SOLUTION SUBCUTANEOUS
Qty: 45 ML | Refills: 4
Start: 2020-09-14 | End: 2021-02-18

## 2020-09-14 NOTE — PROGRESS NOTES
CC: This 59 y.o. male presents for management of diabetes  and chronic conditions pending review including HTN, HLP, CKD 3, Obesity, DM PN, DANNY    HPI: He was diagnosed with T2DM in 2005. Has never been hospitalized r/t DM.  Family hx of DM: maybe dad?, sister's pre-diabetes     Not currently checking his bg   hypoglycemia at home - 1-2 times since his last visit     Symptoms- weak, sweaty, shakey  Rare episodes     Diet: Eats 2 Meals a day, snacks- rarely- chips, candy bar  May skip breakfast  Exercise: none  CURRENT DM MEDS: metformin 1000 mg bid, amaryl 2 mg qam, Lantus 45 u qhs, farxiga 5 mg, Trulicity 1.5 mg weekly (Sundays)   Vial/pen:  Uses  pen  Glucometer type:   Freestyle      Standards of Care:  Eye exam: Dr Dickerson 6/2020  +MATIAS Florentino     Working as a counselor at [employer]   Wife is a retired psychiatrist     ROS:   Gen: Appetite good, + weight gain 5 lbs, + fatigue    Skin: Skin is intact and heals well, no rashes, no hair changes  Eyes: Denies visual disturbances  Resp: no SOB or LEYVA, no cough  Cardiac: No palpitations, chest pain, no edema   GI: No nausea or vomiting, diarrhea, constipation, or abdominal pain.  /GYN: 1+ nocturia, no burning or pain.   MS/Neuro: + numbness/ tingling in BLE; Gait steady, speech clear  Psych: Denies drug/ETOH abuse, no hx of depression.  Other systems: negative.    PE:  GENERAL: Well developed, well nourished.  PSYCH: AAOx3, appropriate mood and affect, pleasant expression, conversant, appears relaxed, well groomed.   EYES: Conjunctiva, corneas clear  NECK: Supple, trachea midline   ABDOMEN: Soft, non-tender, non-distended   VASCULAR: DP pulses +1/4 bilaterally, no edema.  NEURO: Gait unsteady  SKIN: Skin warm and dry no acanthosis nigracans.  FOOT EXAMINATION: 6/15/2020  + foot deformity, + callus formation, +Onychomycosis,  no interspace maceration or ulceration noted.  Decreased hair growth present over toes/feet.    Protective sensation  absent bilaterally with 10 gram monofilament and absent vibratory sensation bilaterally, +1 dorsalis pedis and posterior pulses noted.      Lab Results   Component Value Date    MICALBCREAT 7.5 06/10/2020       Hemoglobin A1C   Date Value Ref Range Status   09/12/2020 7.0 (H) 4.0 - 5.6 % Final     Comment:     ADA Screening Guidelines:  5.7-6.4%  Consistent with prediabetes  >or=6.5%  Consistent with diabetes  High levels of fetal hemoglobin interfere with the HbA1C  assay. Heterozygous hemoglobin variants (HbS, HgC, etc)do  not significantly interfere with this assay.   However, presence of multiple variants may affect accuracy.     06/10/2020 7.4 (H) 4.0 - 5.6 % Final     Comment:     ADA Screening Guidelines:  5.7-6.4%  Consistent with prediabetes  >or=6.5%  Consistent with diabetes  High levels of fetal hemoglobin interfere with the HbA1C  assay. Heterozygous hemoglobin variants (HbS, HgC, etc)do  not significantly interfere with this assay.   However, presence of multiple variants may affect accuracy.     03/14/2020 6.5 (H) 4.0 - 5.6 % Final     Comment:     ADA Screening Guidelines:  5.7-6.4%  Consistent with prediabetes  >or=6.5%  Consistent with diabetes  High levels of fetal hemoglobin interfere with the HbA1C  assay. Heterozygous hemoglobin variants (HbS, HgC, etc)do  not significantly interfere with this assay.   However, presence of multiple variants may affect accuracy.          ASSESSMENT and PLAN:    1. T2DM with hyperglycemia, DM PN, CKD 3- Continue glimepiride 2 mg qam, lantus to 45 u qhs,Trulicity 1.5 mg weekly, metformin,and farxiga   Resume exercise and lose weight gained  Send bg daily- stagger times  Discussed A1c and BG goals.  Notify me for continued hypoglycemia    - takes ASA, ACEi, statin    2. HTN - controlled, continue meds as previously prescribed and monitor.     3. HLP -   on statin therapy, LFTs WNL    4. Obesity- Body mass index is 38.56 kg/m².    Resume exercise and weight  loss    5 DANNY- wears his cpap machine     6. Vitamin d deficiency - Continue ergo weekly (2 tabs),       Follow-up: in 4 months with lab prior

## 2020-09-18 ENCOUNTER — PATIENT MESSAGE (OUTPATIENT)
Dept: GASTROENTEROLOGY | Facility: CLINIC | Age: 60
End: 2020-09-18

## 2020-09-18 ENCOUNTER — TELEPHONE (OUTPATIENT)
Dept: GASTROENTEROLOGY | Facility: CLINIC | Age: 60
End: 2020-09-18

## 2020-09-25 ENCOUNTER — TELEPHONE (OUTPATIENT)
Dept: RHEUMATOLOGY | Facility: CLINIC | Age: 60
End: 2020-09-25

## 2020-09-25 NOTE — TELEPHONE ENCOUNTER
----- Message from Isaias Lopez sent at 9/25/2020  6:18 AM CDT -----  Regarding: My Chart Request  Message    Appointment Request From: Sarwat Colunga    With Provider: Dr Ferguson    Preferred Date Range: Any    Preferred Times: Any Time    Reason for visit: Arthritis????    Comments:  This message is being sent by Tere Doss on behalf of Sarwat Colunga.  Pain and decreased functioning

## 2020-10-13 RX ORDER — GLIMEPIRIDE 2 MG/1
2 TABLET ORAL
Qty: 90 TABLET | Refills: 3 | Status: SHIPPED | OUTPATIENT
Start: 2020-10-13 | End: 2020-10-14 | Stop reason: SDUPTHER

## 2020-10-14 RX ORDER — GLIMEPIRIDE 2 MG/1
2 TABLET ORAL
Qty: 90 TABLET | Refills: 3 | Status: SHIPPED | OUTPATIENT
Start: 2020-10-14 | End: 2021-03-23 | Stop reason: SDUPTHER

## 2020-11-25 ENCOUNTER — OFFICE VISIT (OUTPATIENT)
Dept: URGENT CARE | Facility: CLINIC | Age: 60
End: 2020-11-25
Payer: COMMERCIAL

## 2020-11-25 VITALS
HEART RATE: 87 BPM | DIASTOLIC BLOOD PRESSURE: 62 MMHG | RESPIRATION RATE: 16 BRPM | OXYGEN SATURATION: 96 % | TEMPERATURE: 98 F | SYSTOLIC BLOOD PRESSURE: 102 MMHG

## 2020-11-25 DIAGNOSIS — Z20.828 EXPOSURE TO SARS-ASSOCIATED CORONAVIRUS: Primary | ICD-10-CM

## 2020-11-25 LAB
CTP QC/QA: YES
SARS-COV-2 RDRP RESP QL NAA+PROBE: NEGATIVE

## 2020-11-25 PROCEDURE — U0002: ICD-10-PCS | Mod: QW,S$GLB,, | Performed by: NURSE PRACTITIONER

## 2020-11-25 PROCEDURE — 99212 PR OFFICE/OUTPT VISIT, EST, LEVL II, 10-19 MIN: ICD-10-PCS | Mod: S$GLB,,, | Performed by: NURSE PRACTITIONER

## 2020-11-25 PROCEDURE — U0002 COVID-19 LAB TEST NON-CDC: HCPCS | Mod: QW,S$GLB,, | Performed by: NURSE PRACTITIONER

## 2020-11-25 PROCEDURE — 99212 OFFICE O/P EST SF 10 MIN: CPT | Mod: S$GLB,,, | Performed by: NURSE PRACTITIONER

## 2020-11-25 NOTE — PROGRESS NOTES
Subjective:       Patient ID: Sarwat Colunga is a 60 y.o. male.    Vitals:  temperature is 98.3 °F (36.8 °C). His blood pressure is 102/62 and his pulse is 87. His respiration is 16 and oxygen saturation is 96%.     Chief Complaint: No chief complaint on file.    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   HENT: Positive for congestion, postnasal drip, sinus pain, sinus pressure and sore throat.    Neck: Negative for painful lymph nodes.   Cardiovascular: Negative for chest pain and leg swelling.   Eyes: Negative for double vision and blurred vision.   Respiratory: Positive for cough. Negative for shortness of breath.    Gastrointestinal: Negative for nausea, vomiting and diarrhea.   Genitourinary: Negative for dysuria, frequency and urgency.   Musculoskeletal: Negative for joint pain, joint swelling, muscle cramps and muscle ache.   Skin: Negative for color change, pale and rash.   Allergic/Immunologic: Negative for seasonal allergies.   Neurological: Negative for dizziness, history of vertigo, light-headedness, passing out and headaches.   Hematologic/Lymphatic: Negative for swollen lymph nodes, easy bruising/bleeding and history of blood clots. Does not bruise/bleed easily.   Psychiatric/Behavioral: Negative for nervous/anxious, sleep disturbance and depression. The patient is not nervous/anxious.        Objective:      Physical Exam   Constitutional: He is oriented to person, place, and time. He appears well-developed. He is cooperative.  Non-toxic appearance. He does not appear ill. No distress.   HENT:   Head: Normocephalic and atraumatic.   Ears:   Right Ear: Hearing, external ear and ear canal normal. A middle ear effusion is present.   Left Ear: Hearing, external ear and ear canal normal. A middle ear effusion is present.   Nose: Mucosal edema and  rhinorrhea present. No nasal deformity. No epistaxis. Right sinus exhibits no maxillary sinus tenderness and no frontal sinus tenderness. Left sinus exhibits no maxillary sinus tenderness and no frontal sinus tenderness.   Mouth/Throat: Uvula is midline and mucous membranes are normal. No trismus in the jaw. Normal dentition. No uvula swelling. Posterior oropharyngeal erythema and cobblestoning present.   Eyes: Conjunctivae and lids are normal. Right eye exhibits no discharge. Left eye exhibits no discharge. No scleral icterus.   Neck: Trachea normal, normal range of motion, full passive range of motion without pain and phonation normal. Neck supple.   Cardiovascular: Normal rate, regular rhythm, normal heart sounds and normal pulses.   Pulmonary/Chest: Effort normal and breath sounds normal. No respiratory distress.   Abdominal: Soft. Normal appearance and bowel sounds are normal. He exhibits no distension, no pulsatile midline mass and no mass. There is no abdominal tenderness.   Musculoskeletal: Normal range of motion.         General: No deformity.   Neurological: He is alert and oriented to person, place, and time. He exhibits normal muscle tone. Coordination normal.   Skin: Skin is warm, dry, intact, not diaphoretic and not pale. Psychiatric: His speech is normal and behavior is normal. Judgment and thought content normal.   Nursing note and vitals reviewed.        Assessment:       1. Exposure to SARS-associated coronavirus        Plan:     negative covid test, notified in clinic    Exposure to SARS-associated coronavirus  -     POCT COVID-19 Rapid Screening

## 2020-12-02 RX ORDER — LURASIDONE HYDROCHLORIDE 40 MG/1
TABLET, FILM COATED ORAL
Qty: 30 TABLET | Refills: 6 | OUTPATIENT
Start: 2020-12-02

## 2021-01-02 ENCOUNTER — LAB VISIT (OUTPATIENT)
Dept: LAB | Facility: HOSPITAL | Age: 61
End: 2021-01-02
Attending: NURSE PRACTITIONER
Payer: COMMERCIAL

## 2021-01-02 DIAGNOSIS — E11.22 TYPE 2 DIABETES MELLITUS WITH STAGE 3 CHRONIC KIDNEY DISEASE, WITH LONG-TERM CURRENT USE OF INSULIN: ICD-10-CM

## 2021-01-02 DIAGNOSIS — Z79.4 TYPE 2 DIABETES MELLITUS WITH STAGE 3 CHRONIC KIDNEY DISEASE, WITH LONG-TERM CURRENT USE OF INSULIN: ICD-10-CM

## 2021-01-02 DIAGNOSIS — E55.9 VITAMIN D DEFICIENCY: ICD-10-CM

## 2021-01-02 DIAGNOSIS — N18.30 TYPE 2 DIABETES MELLITUS WITH STAGE 3 CHRONIC KIDNEY DISEASE, WITH LONG-TERM CURRENT USE OF INSULIN: ICD-10-CM

## 2021-01-02 LAB
25(OH)D3+25(OH)D2 SERPL-MCNC: 38 NG/ML (ref 30–96)
ALBUMIN SERPL BCP-MCNC: 3.9 G/DL (ref 3.5–5.2)
ALP SERPL-CCNC: 63 U/L (ref 55–135)
ALT SERPL W/O P-5'-P-CCNC: 22 U/L (ref 10–44)
ANION GAP SERPL CALC-SCNC: 13 MMOL/L (ref 8–16)
AST SERPL-CCNC: 20 U/L (ref 10–40)
BILIRUB SERPL-MCNC: 0.5 MG/DL (ref 0.1–1)
BUN SERPL-MCNC: 23 MG/DL (ref 8–23)
CALCIUM SERPL-MCNC: 9.7 MG/DL (ref 8.7–10.5)
CHLORIDE SERPL-SCNC: 98 MMOL/L (ref 95–110)
CO2 SERPL-SCNC: 30 MMOL/L (ref 23–29)
CREAT SERPL-MCNC: 1.3 MG/DL (ref 0.5–1.4)
EST. GFR  (AFRICAN AMERICAN): >60 ML/MIN/1.73 M^2
EST. GFR  (NON AFRICAN AMERICAN): 58.5 ML/MIN/1.73 M^2
ESTIMATED AVG GLUCOSE: 134 MG/DL (ref 68–131)
GLUCOSE SERPL-MCNC: 82 MG/DL (ref 70–110)
HBA1C MFR BLD HPLC: 6.3 % (ref 4–5.6)
POTASSIUM SERPL-SCNC: 4.3 MMOL/L (ref 3.5–5.1)
PROT SERPL-MCNC: 6.9 G/DL (ref 6–8.4)
SODIUM SERPL-SCNC: 141 MMOL/L (ref 136–145)

## 2021-01-02 PROCEDURE — 36415 COLL VENOUS BLD VENIPUNCTURE: CPT | Mod: PO

## 2021-01-02 PROCEDURE — 83036 HEMOGLOBIN GLYCOSYLATED A1C: CPT

## 2021-01-02 PROCEDURE — 82306 VITAMIN D 25 HYDROXY: CPT

## 2021-01-02 PROCEDURE — 80053 COMPREHEN METABOLIC PANEL: CPT

## 2021-02-01 RX ORDER — ERGOCALCIFEROL 1.25 MG/1
CAPSULE ORAL
Qty: 24 CAPSULE | Refills: 4 | Status: SHIPPED | OUTPATIENT
Start: 2021-02-01 | End: 2021-12-26

## 2021-02-18 ENCOUNTER — OFFICE VISIT (OUTPATIENT)
Dept: ENDOCRINOLOGY | Facility: CLINIC | Age: 61
End: 2021-02-18
Payer: COMMERCIAL

## 2021-02-18 VITALS
DIASTOLIC BLOOD PRESSURE: 80 MMHG | WEIGHT: 274.06 LBS | HEIGHT: 71 IN | HEART RATE: 89 BPM | SYSTOLIC BLOOD PRESSURE: 128 MMHG | TEMPERATURE: 98 F | BODY MASS INDEX: 38.37 KG/M2 | OXYGEN SATURATION: 95 %

## 2021-02-18 DIAGNOSIS — N18.31 TYPE 2 DIABETES MELLITUS WITH STAGE 3A CHRONIC KIDNEY DISEASE, WITH LONG-TERM CURRENT USE OF INSULIN: Primary | ICD-10-CM

## 2021-02-18 DIAGNOSIS — E11.69 HYPERLIPIDEMIA ASSOCIATED WITH TYPE 2 DIABETES MELLITUS: ICD-10-CM

## 2021-02-18 DIAGNOSIS — E66.09 CLASS 2 OBESITY DUE TO EXCESS CALORIES WITHOUT SERIOUS COMORBIDITY WITH BODY MASS INDEX (BMI) OF 38.0 TO 38.9 IN ADULT: ICD-10-CM

## 2021-02-18 DIAGNOSIS — E11.59 HYPERTENSION ASSOCIATED WITH DIABETES: ICD-10-CM

## 2021-02-18 DIAGNOSIS — E78.5 HYPERLIPIDEMIA ASSOCIATED WITH TYPE 2 DIABETES MELLITUS: ICD-10-CM

## 2021-02-18 DIAGNOSIS — E55.9 VITAMIN D DEFICIENCY: ICD-10-CM

## 2021-02-18 DIAGNOSIS — I15.2 HYPERTENSION ASSOCIATED WITH DIABETES: ICD-10-CM

## 2021-02-18 DIAGNOSIS — Z79.4 TYPE 2 DIABETES MELLITUS WITH STAGE 3A CHRONIC KIDNEY DISEASE, WITH LONG-TERM CURRENT USE OF INSULIN: Primary | ICD-10-CM

## 2021-02-18 DIAGNOSIS — E11.22 TYPE 2 DIABETES MELLITUS WITH STAGE 3A CHRONIC KIDNEY DISEASE, WITH LONG-TERM CURRENT USE OF INSULIN: Primary | ICD-10-CM

## 2021-02-18 DIAGNOSIS — E11.49 DIABETES MELLITUS TYPE 2 WITH NEUROLOGICAL MANIFESTATIONS: ICD-10-CM

## 2021-02-18 DIAGNOSIS — G47.33 OSA ON CPAP: ICD-10-CM

## 2021-02-18 PROCEDURE — 99999 PR PBB SHADOW E&M-EST. PATIENT-LVL V: ICD-10-PCS | Mod: PBBFAC,,, | Performed by: NURSE PRACTITIONER

## 2021-02-18 PROCEDURE — 1125F PR PAIN SEVERITY QUANTIFIED, PAIN PRESENT: ICD-10-PCS | Mod: S$GLB,,, | Performed by: NURSE PRACTITIONER

## 2021-02-18 PROCEDURE — 3008F BODY MASS INDEX DOCD: CPT | Mod: CPTII,S$GLB,, | Performed by: NURSE PRACTITIONER

## 2021-02-18 PROCEDURE — 99213 OFFICE O/P EST LOW 20 MIN: CPT | Mod: S$GLB,,, | Performed by: NURSE PRACTITIONER

## 2021-02-18 PROCEDURE — 1125F AMNT PAIN NOTED PAIN PRSNT: CPT | Mod: S$GLB,,, | Performed by: NURSE PRACTITIONER

## 2021-02-18 PROCEDURE — 99999 PR PBB SHADOW E&M-EST. PATIENT-LVL V: CPT | Mod: PBBFAC,,, | Performed by: NURSE PRACTITIONER

## 2021-02-18 PROCEDURE — 3008F PR BODY MASS INDEX (BMI) DOCUMENTED: ICD-10-PCS | Mod: CPTII,S$GLB,, | Performed by: NURSE PRACTITIONER

## 2021-02-18 PROCEDURE — 99213 PR OFFICE/OUTPT VISIT, EST, LEVL III, 20-29 MIN: ICD-10-PCS | Mod: S$GLB,,, | Performed by: NURSE PRACTITIONER

## 2021-02-18 RX ORDER — INSULIN DEGLUDEC 200 U/ML
INJECTION, SOLUTION SUBCUTANEOUS
Qty: 9 ML | Refills: 12 | Status: SHIPPED | OUTPATIENT
Start: 2021-02-18 | End: 2022-02-09 | Stop reason: SDUPTHER

## 2021-02-18 RX ORDER — INSULIN DEGLUDEC 200 U/ML
INJECTION, SOLUTION SUBCUTANEOUS
Qty: 9 ML | Refills: 12 | Status: SHIPPED | OUTPATIENT
Start: 2021-02-18 | End: 2021-02-18 | Stop reason: SDUPTHER

## 2021-02-18 RX ORDER — PEN NEEDLE, DIABETIC 30 GX3/16"
1 NEEDLE, DISPOSABLE MISCELLANEOUS 4 TIMES DAILY
Qty: 100 EACH | Refills: 11 | Status: SHIPPED | OUTPATIENT
Start: 2021-02-18 | End: 2022-02-09 | Stop reason: SDUPTHER

## 2021-03-23 RX ORDER — GLIMEPIRIDE 2 MG/1
2 TABLET ORAL
Qty: 90 TABLET | Refills: 3 | Status: SHIPPED | OUTPATIENT
Start: 2021-03-23 | End: 2021-03-31 | Stop reason: SDUPTHER

## 2021-03-30 ENCOUNTER — TELEPHONE (OUTPATIENT)
Dept: RHEUMATOLOGY | Facility: CLINIC | Age: 61
End: 2021-03-30

## 2021-03-31 RX ORDER — GLIMEPIRIDE 2 MG/1
4 TABLET ORAL
Qty: 180 TABLET | Refills: 3 | Status: SHIPPED | OUTPATIENT
Start: 2021-03-31 | End: 2021-05-20

## 2021-05-19 ENCOUNTER — PATIENT MESSAGE (OUTPATIENT)
Dept: ENDOCRINOLOGY | Facility: CLINIC | Age: 61
End: 2021-05-19

## 2021-05-20 ENCOUNTER — TELEPHONE (OUTPATIENT)
Dept: ENDOCRINOLOGY | Facility: CLINIC | Age: 61
End: 2021-05-20

## 2021-05-20 RX ORDER — GLIMEPIRIDE 2 MG/1
2 TABLET ORAL
Qty: 90 TABLET | Refills: 3 | Status: SHIPPED | OUTPATIENT
Start: 2021-05-20 | End: 2021-08-18

## 2021-08-14 ENCOUNTER — LAB VISIT (OUTPATIENT)
Dept: LAB | Facility: HOSPITAL | Age: 61
End: 2021-08-14
Attending: INTERNAL MEDICINE
Payer: COMMERCIAL

## 2021-08-14 DIAGNOSIS — E11.69 HYPERLIPIDEMIA ASSOCIATED WITH TYPE 2 DIABETES MELLITUS: ICD-10-CM

## 2021-08-14 DIAGNOSIS — E11.22 TYPE 2 DIABETES MELLITUS WITH STAGE 3A CHRONIC KIDNEY DISEASE, WITH LONG-TERM CURRENT USE OF INSULIN: ICD-10-CM

## 2021-08-14 DIAGNOSIS — E78.5 HYPERLIPIDEMIA ASSOCIATED WITH TYPE 2 DIABETES MELLITUS: ICD-10-CM

## 2021-08-14 DIAGNOSIS — E55.9 VITAMIN D DEFICIENCY: ICD-10-CM

## 2021-08-14 DIAGNOSIS — Z79.4 TYPE 2 DIABETES MELLITUS WITH STAGE 3A CHRONIC KIDNEY DISEASE, WITH LONG-TERM CURRENT USE OF INSULIN: ICD-10-CM

## 2021-08-14 DIAGNOSIS — N18.31 TYPE 2 DIABETES MELLITUS WITH STAGE 3A CHRONIC KIDNEY DISEASE, WITH LONG-TERM CURRENT USE OF INSULIN: ICD-10-CM

## 2021-08-14 LAB
25(OH)D3+25(OH)D2 SERPL-MCNC: 50 NG/ML (ref 30–96)
ALBUMIN SERPL BCP-MCNC: 3.7 G/DL (ref 3.5–5.2)
ALP SERPL-CCNC: 61 U/L (ref 55–135)
ALT SERPL W/O P-5'-P-CCNC: 21 U/L (ref 10–44)
ANION GAP SERPL CALC-SCNC: 13 MMOL/L (ref 8–16)
AST SERPL-CCNC: 23 U/L (ref 10–40)
BILIRUB SERPL-MCNC: 0.5 MG/DL (ref 0.1–1)
BUN SERPL-MCNC: 18 MG/DL (ref 8–23)
CALCIUM SERPL-MCNC: 9.4 MG/DL (ref 8.7–10.5)
CHLORIDE SERPL-SCNC: 101 MMOL/L (ref 95–110)
CHOLEST SERPL-MCNC: 103 MG/DL (ref 120–199)
CHOLEST/HDLC SERPL: 2.9 {RATIO} (ref 2–5)
CO2 SERPL-SCNC: 29 MMOL/L (ref 23–29)
CREAT SERPL-MCNC: 1 MG/DL (ref 0.5–1.4)
EST. GFR  (AFRICAN AMERICAN): >60 ML/MIN/1.73 M^2
EST. GFR  (NON AFRICAN AMERICAN): >60 ML/MIN/1.73 M^2
ESTIMATED AVG GLUCOSE: 111 MG/DL (ref 68–131)
GLUCOSE SERPL-MCNC: 56 MG/DL (ref 70–110)
HBA1C MFR BLD: 5.5 % (ref 4–5.6)
HDLC SERPL-MCNC: 35 MG/DL (ref 40–75)
HDLC SERPL: 34 % (ref 20–50)
LDLC SERPL CALC-MCNC: 52.8 MG/DL (ref 63–159)
NONHDLC SERPL-MCNC: 68 MG/DL
POTASSIUM SERPL-SCNC: 4.3 MMOL/L (ref 3.5–5.1)
PROT SERPL-MCNC: 6.4 G/DL (ref 6–8.4)
SODIUM SERPL-SCNC: 143 MMOL/L (ref 136–145)
TRIGL SERPL-MCNC: 76 MG/DL (ref 30–150)

## 2021-08-14 PROCEDURE — 36415 COLL VENOUS BLD VENIPUNCTURE: CPT | Performed by: NURSE PRACTITIONER

## 2021-08-14 PROCEDURE — 83036 HEMOGLOBIN GLYCOSYLATED A1C: CPT | Performed by: NURSE PRACTITIONER

## 2021-08-14 PROCEDURE — 80061 LIPID PANEL: CPT | Performed by: NURSE PRACTITIONER

## 2021-08-14 PROCEDURE — 80053 COMPREHEN METABOLIC PANEL: CPT | Performed by: NURSE PRACTITIONER

## 2021-08-14 PROCEDURE — 82306 VITAMIN D 25 HYDROXY: CPT | Performed by: NURSE PRACTITIONER

## 2021-08-18 ENCOUNTER — OFFICE VISIT (OUTPATIENT)
Dept: PODIATRY | Facility: CLINIC | Age: 61
End: 2021-08-18
Payer: COMMERCIAL

## 2021-08-18 ENCOUNTER — OFFICE VISIT (OUTPATIENT)
Dept: ENDOCRINOLOGY | Facility: CLINIC | Age: 61
End: 2021-08-18
Payer: COMMERCIAL

## 2021-08-18 VITALS
WEIGHT: 262.56 LBS | OXYGEN SATURATION: 95 % | HEART RATE: 79 BPM | SYSTOLIC BLOOD PRESSURE: 118 MMHG | TEMPERATURE: 98 F | HEIGHT: 71 IN | DIASTOLIC BLOOD PRESSURE: 80 MMHG | BODY MASS INDEX: 36.76 KG/M2

## 2021-08-18 VITALS
HEART RATE: 76 BPM | WEIGHT: 262 LBS | BODY MASS INDEX: 36.68 KG/M2 | DIASTOLIC BLOOD PRESSURE: 80 MMHG | OXYGEN SATURATION: 96 % | SYSTOLIC BLOOD PRESSURE: 120 MMHG | HEIGHT: 71 IN | RESPIRATION RATE: 18 BRPM

## 2021-08-18 DIAGNOSIS — E11.49 DIABETES MELLITUS TYPE 2 WITH NEUROLOGICAL MANIFESTATIONS: Primary | ICD-10-CM

## 2021-08-18 DIAGNOSIS — I87.2 VENOUS INSUFFICIENCY OF BOTH LOWER EXTREMITIES: ICD-10-CM

## 2021-08-18 DIAGNOSIS — E11.69 HYPERLIPIDEMIA ASSOCIATED WITH TYPE 2 DIABETES MELLITUS: ICD-10-CM

## 2021-08-18 DIAGNOSIS — E11.49 DIABETES MELLITUS TYPE 2 WITH NEUROLOGICAL MANIFESTATIONS: ICD-10-CM

## 2021-08-18 DIAGNOSIS — L60.2 ONYCHOGRYPOSIS: ICD-10-CM

## 2021-08-18 DIAGNOSIS — E78.5 HYPERLIPIDEMIA ASSOCIATED WITH TYPE 2 DIABETES MELLITUS: ICD-10-CM

## 2021-08-18 DIAGNOSIS — Z79.4 TYPE 2 DIABETES MELLITUS WITH STAGE 3A CHRONIC KIDNEY DISEASE, WITH LONG-TERM CURRENT USE OF INSULIN: Primary | ICD-10-CM

## 2021-08-18 DIAGNOSIS — M20.41 HAMMER TOES OF BOTH FEET: ICD-10-CM

## 2021-08-18 DIAGNOSIS — G47.33 OSA ON CPAP: ICD-10-CM

## 2021-08-18 DIAGNOSIS — I15.2 HYPERTENSION ASSOCIATED WITH DIABETES: ICD-10-CM

## 2021-08-18 DIAGNOSIS — E55.9 VITAMIN D DEFICIENCY: ICD-10-CM

## 2021-08-18 DIAGNOSIS — E11.59 HYPERTENSION ASSOCIATED WITH DIABETES: ICD-10-CM

## 2021-08-18 DIAGNOSIS — E66.09 CLASS 2 OBESITY DUE TO EXCESS CALORIES WITHOUT SERIOUS COMORBIDITY WITH BODY MASS INDEX (BMI) OF 38.0 TO 38.9 IN ADULT: ICD-10-CM

## 2021-08-18 DIAGNOSIS — E11.22 TYPE 2 DIABETES MELLITUS WITH STAGE 3A CHRONIC KIDNEY DISEASE, WITH LONG-TERM CURRENT USE OF INSULIN: Primary | ICD-10-CM

## 2021-08-18 DIAGNOSIS — N18.31 TYPE 2 DIABETES MELLITUS WITH STAGE 3A CHRONIC KIDNEY DISEASE, WITH LONG-TERM CURRENT USE OF INSULIN: Primary | ICD-10-CM

## 2021-08-18 DIAGNOSIS — M20.42 HAMMER TOES OF BOTH FEET: ICD-10-CM

## 2021-08-18 DIAGNOSIS — M21.619 BUNION: ICD-10-CM

## 2021-08-18 PROCEDURE — 1125F AMNT PAIN NOTED PAIN PRSNT: CPT | Mod: CPTII,S$GLB,, | Performed by: NURSE PRACTITIONER

## 2021-08-18 PROCEDURE — 3008F PR BODY MASS INDEX (BMI) DOCUMENTED: ICD-10-PCS | Mod: CPTII,S$GLB,, | Performed by: NURSE PRACTITIONER

## 2021-08-18 PROCEDURE — 1125F PR PAIN SEVERITY QUANTIFIED, PAIN PRESENT: ICD-10-PCS | Mod: CPTII,S$GLB,, | Performed by: PODIATRIST

## 2021-08-18 PROCEDURE — 11721 ROUTINE FOOT CARE: ICD-10-PCS | Mod: S$GLB,,, | Performed by: PODIATRIST

## 2021-08-18 PROCEDURE — 3044F HG A1C LEVEL LT 7.0%: CPT | Mod: CPTII,S$GLB,, | Performed by: PODIATRIST

## 2021-08-18 PROCEDURE — 1160F RVW MEDS BY RX/DR IN RCRD: CPT | Mod: CPTII,S$GLB,, | Performed by: PODIATRIST

## 2021-08-18 PROCEDURE — 1125F PR PAIN SEVERITY QUANTIFIED, PAIN PRESENT: ICD-10-PCS | Mod: CPTII,S$GLB,, | Performed by: NURSE PRACTITIONER

## 2021-08-18 PROCEDURE — 3008F PR BODY MASS INDEX (BMI) DOCUMENTED: ICD-10-PCS | Mod: CPTII,S$GLB,, | Performed by: PODIATRIST

## 2021-08-18 PROCEDURE — 3074F PR MOST RECENT SYSTOLIC BLOOD PRESSURE < 130 MM HG: ICD-10-PCS | Mod: CPTII,S$GLB,, | Performed by: PODIATRIST

## 2021-08-18 PROCEDURE — 3044F PR MOST RECENT HEMOGLOBIN A1C LEVEL <7.0%: ICD-10-PCS | Mod: CPTII,S$GLB,, | Performed by: PODIATRIST

## 2021-08-18 PROCEDURE — 99214 OFFICE O/P EST MOD 30 MIN: CPT | Mod: S$GLB,,, | Performed by: NURSE PRACTITIONER

## 2021-08-18 PROCEDURE — 1125F AMNT PAIN NOTED PAIN PRSNT: CPT | Mod: CPTII,S$GLB,, | Performed by: PODIATRIST

## 2021-08-18 PROCEDURE — 1159F PR MEDICATION LIST DOCUMENTED IN MEDICAL RECORD: ICD-10-PCS | Mod: CPTII,S$GLB,, | Performed by: PODIATRIST

## 2021-08-18 PROCEDURE — 99204 PR OFFICE/OUTPT VISIT, NEW, LEVL IV, 45-59 MIN: ICD-10-PCS | Mod: 25,S$GLB,, | Performed by: PODIATRIST

## 2021-08-18 PROCEDURE — 3074F SYST BP LT 130 MM HG: CPT | Mod: CPTII,S$GLB,, | Performed by: NURSE PRACTITIONER

## 2021-08-18 PROCEDURE — 3074F SYST BP LT 130 MM HG: CPT | Mod: CPTII,S$GLB,, | Performed by: PODIATRIST

## 2021-08-18 PROCEDURE — 3044F HG A1C LEVEL LT 7.0%: CPT | Mod: CPTII,S$GLB,, | Performed by: NURSE PRACTITIONER

## 2021-08-18 PROCEDURE — 1160F PR REVIEW ALL MEDS BY PRESCRIBER/CLIN PHARMACIST DOCUMENTED: ICD-10-PCS | Mod: CPTII,S$GLB,, | Performed by: PODIATRIST

## 2021-08-18 PROCEDURE — 3044F PR MOST RECENT HEMOGLOBIN A1C LEVEL <7.0%: ICD-10-PCS | Mod: CPTII,S$GLB,, | Performed by: NURSE PRACTITIONER

## 2021-08-18 PROCEDURE — 1159F MED LIST DOCD IN RCRD: CPT | Mod: CPTII,S$GLB,, | Performed by: PODIATRIST

## 2021-08-18 PROCEDURE — 3008F BODY MASS INDEX DOCD: CPT | Mod: CPTII,S$GLB,, | Performed by: NURSE PRACTITIONER

## 2021-08-18 PROCEDURE — 99999 PR PBB SHADOW E&M-EST. PATIENT-LVL V: CPT | Mod: PBBFAC,,, | Performed by: NURSE PRACTITIONER

## 2021-08-18 PROCEDURE — 1159F PR MEDICATION LIST DOCUMENTED IN MEDICAL RECORD: ICD-10-PCS | Mod: CPTII,S$GLB,, | Performed by: NURSE PRACTITIONER

## 2021-08-18 PROCEDURE — 99214 PR OFFICE/OUTPT VISIT, EST, LEVL IV, 30-39 MIN: ICD-10-PCS | Mod: S$GLB,,, | Performed by: NURSE PRACTITIONER

## 2021-08-18 PROCEDURE — 3079F DIAST BP 80-89 MM HG: CPT | Mod: CPTII,S$GLB,, | Performed by: PODIATRIST

## 2021-08-18 PROCEDURE — 1159F MED LIST DOCD IN RCRD: CPT | Mod: CPTII,S$GLB,, | Performed by: NURSE PRACTITIONER

## 2021-08-18 PROCEDURE — 99999 PR PBB SHADOW E&M-EST. PATIENT-LVL V: ICD-10-PCS | Mod: PBBFAC,,, | Performed by: NURSE PRACTITIONER

## 2021-08-18 PROCEDURE — 3074F PR MOST RECENT SYSTOLIC BLOOD PRESSURE < 130 MM HG: ICD-10-PCS | Mod: CPTII,S$GLB,, | Performed by: NURSE PRACTITIONER

## 2021-08-18 PROCEDURE — 3079F DIAST BP 80-89 MM HG: CPT | Mod: CPTII,S$GLB,, | Performed by: NURSE PRACTITIONER

## 2021-08-18 PROCEDURE — 3079F PR MOST RECENT DIASTOLIC BLOOD PRESSURE 80-89 MM HG: ICD-10-PCS | Mod: CPTII,S$GLB,, | Performed by: NURSE PRACTITIONER

## 2021-08-18 PROCEDURE — 3008F BODY MASS INDEX DOCD: CPT | Mod: CPTII,S$GLB,, | Performed by: PODIATRIST

## 2021-08-18 PROCEDURE — 11721 DEBRIDE NAIL 6 OR MORE: CPT | Mod: S$GLB,,, | Performed by: PODIATRIST

## 2021-08-18 PROCEDURE — 99204 OFFICE O/P NEW MOD 45 MIN: CPT | Mod: 25,S$GLB,, | Performed by: PODIATRIST

## 2021-08-18 PROCEDURE — 3079F PR MOST RECENT DIASTOLIC BLOOD PRESSURE 80-89 MM HG: ICD-10-PCS | Mod: CPTII,S$GLB,, | Performed by: PODIATRIST

## 2021-09-28 ENCOUNTER — HOSPITAL ENCOUNTER (OUTPATIENT)
Facility: HOSPITAL | Age: 61
Discharge: HOME | End: 2021-09-29
Attending: EMERGENCY MEDICINE
Payer: COMMERCIAL

## 2021-09-28 DIAGNOSIS — R27.0 INTERMITTENT ATAXIA: ICD-10-CM

## 2021-09-28 DIAGNOSIS — R26.89 BALANCE PROBLEM: ICD-10-CM

## 2021-09-28 DIAGNOSIS — R29.6 FREQUENT FALLS: Primary | ICD-10-CM

## 2021-09-28 DIAGNOSIS — E11.22 TYPE 2 DIABETES MELLITUS WITH STAGE 3A CHRONIC KIDNEY DISEASE, WITH LONG-TERM CURRENT USE OF INSULIN: ICD-10-CM

## 2021-09-28 DIAGNOSIS — F03.90 DEMENTIA WITHOUT BEHAVIORAL DISTURBANCE: ICD-10-CM

## 2021-09-28 DIAGNOSIS — N18.31 TYPE 2 DIABETES MELLITUS WITH STAGE 3A CHRONIC KIDNEY DISEASE, WITH LONG-TERM CURRENT USE OF INSULIN: ICD-10-CM

## 2021-09-28 DIAGNOSIS — R41.82 ALTERED MENTAL STATUS: ICD-10-CM

## 2021-09-28 DIAGNOSIS — I50.9 CHF (CONGESTIVE HEART FAILURE): ICD-10-CM

## 2021-09-28 DIAGNOSIS — Z79.4 TYPE 2 DIABETES MELLITUS WITH STAGE 3A CHRONIC KIDNEY DISEASE, WITH LONG-TERM CURRENT USE OF INSULIN: ICD-10-CM

## 2021-09-28 LAB
ALBUMIN SERPL BCP-MCNC: 4 G/DL (ref 3.5–5.2)
ALP SERPL-CCNC: 59 U/L (ref 55–135)
ALT SERPL W/O P-5'-P-CCNC: 18 U/L (ref 10–44)
AMPHET+METHAMPHET UR QL: ABNORMAL
ANION GAP SERPL CALC-SCNC: 12 MMOL/L (ref 8–16)
APTT PPP: 27.7 SEC (ref 25.6–35.8)
AST SERPL-CCNC: 19 U/L (ref 10–40)
BACTERIA #/AREA URNS HPF: NEGATIVE /HPF
BARBITURATES UR QL SCN>200 NG/ML: NEGATIVE
BASOPHILS # BLD AUTO: 0.04 K/UL (ref 0–0.2)
BASOPHILS NFR BLD: 0.4 % (ref 0–1.9)
BENZODIAZ UR QL SCN>200 NG/ML: ABNORMAL
BILIRUB SERPL-MCNC: 1.2 MG/DL (ref 0.1–1)
BILIRUB UR QL STRIP: NEGATIVE
BNP SERPL-MCNC: 11 PG/ML (ref 0–99)
BUN SERPL-MCNC: 23 MG/DL (ref 8–23)
BZE UR QL SCN: NEGATIVE
CALCIUM SERPL-MCNC: 9.4 MG/DL (ref 8.7–10.5)
CANNABINOIDS UR QL SCN: NEGATIVE
CHLORIDE SERPL-SCNC: 103 MMOL/L (ref 95–110)
CK SERPL-CCNC: 405 U/L (ref 20–200)
CLARITY UR: CLEAR
CO2 SERPL-SCNC: 27 MMOL/L (ref 23–29)
COLOR UR: YELLOW
CREAT SERPL-MCNC: 1.2 MG/DL (ref 0.5–1.4)
CREAT UR-MCNC: 41 MG/DL (ref 23–375)
DIFFERENTIAL METHOD: ABNORMAL
EOSINOPHIL # BLD AUTO: 0.2 K/UL (ref 0–0.5)
EOSINOPHIL NFR BLD: 2.3 % (ref 0–8)
ERYTHROCYTE [DISTWIDTH] IN BLOOD BY AUTOMATED COUNT: 13.1 % (ref 11.5–14.5)
EST. GFR  (AFRICAN AMERICAN): >60 ML/MIN/1.73 M^2
EST. GFR  (NON AFRICAN AMERICAN): >60 ML/MIN/1.73 M^2
ETHANOL SERPL-MCNC: <5 MG/DL
GLUCOSE SERPL-MCNC: 133 MG/DL (ref 70–110)
GLUCOSE SERPL-MCNC: 137 MG/DL (ref 70–110)
GLUCOSE UR QL STRIP: ABNORMAL
HCT VFR BLD AUTO: 43.9 % (ref 40–54)
HGB BLD-MCNC: 14.6 G/DL (ref 14–18)
HGB UR QL STRIP: NEGATIVE
HYALINE CASTS #/AREA URNS LPF: 0 /LPF
IMM GRANULOCYTES # BLD AUTO: 0.02 K/UL (ref 0–0.04)
IMM GRANULOCYTES NFR BLD AUTO: 0.2 % (ref 0–0.5)
INR PPP: 1
KETONES UR QL STRIP: NEGATIVE
LEUKOCYTE ESTERASE UR QL STRIP: NEGATIVE
LIPASE SERPL-CCNC: 37 U/L (ref 4–60)
LYMPHOCYTES # BLD AUTO: 3.1 K/UL (ref 1–4.8)
LYMPHOCYTES NFR BLD: 34.2 % (ref 18–48)
MAGNESIUM SERPL-MCNC: 2.1 MG/DL (ref 1.6–2.6)
MCH RBC QN AUTO: 31.1 PG (ref 27–31)
MCHC RBC AUTO-ENTMCNC: 33.3 G/DL (ref 32–36)
MCV RBC AUTO: 93 FL (ref 82–98)
MICROSCOPIC COMMENT: ABNORMAL
MONOCYTES # BLD AUTO: 0.8 K/UL (ref 0.3–1)
MONOCYTES NFR BLD: 8.3 % (ref 4–15)
NEUTROPHILS # BLD AUTO: 4.9 K/UL (ref 1.8–7.7)
NEUTROPHILS NFR BLD: 54.6 % (ref 38–73)
NITRITE UR QL STRIP: NEGATIVE
NRBC BLD-RTO: 0 /100 WBC
OPIATES UR QL SCN: NEGATIVE
PCP UR QL SCN>25 NG/ML: NEGATIVE
PH UR STRIP: 6 [PH] (ref 5–8)
PLATELET # BLD AUTO: 175 K/UL (ref 150–450)
PMV BLD AUTO: 9 FL (ref 9.2–12.9)
POTASSIUM SERPL-SCNC: 4.3 MMOL/L (ref 3.5–5.1)
PROT SERPL-MCNC: 6.6 G/DL (ref 6–8.4)
PROT UR QL STRIP: NEGATIVE
PROTHROMBIN TIME: 12.6 SEC (ref 11.8–14.3)
RBC # BLD AUTO: 4.7 M/UL (ref 4.6–6.2)
RBC #/AREA URNS HPF: 0 /HPF (ref 0–4)
SALICYLATES SERPL-MCNC: <4 MG/DL (ref 15–30)
SARS-COV-2 RDRP RESP QL NAA+PROBE: NEGATIVE
SODIUM SERPL-SCNC: 142 MMOL/L (ref 136–145)
SP GR UR STRIP: 1.01 (ref 1–1.03)
SQUAMOUS #/AREA URNS HPF: 0 /HPF
TOXICOLOGY INFORMATION: ABNORMAL
TROPONIN I SERPL DL<=0.01 NG/ML-MCNC: <0.03 NG/ML
TSH SERPL DL<=0.005 MIU/L-ACNC: 1.45 UIU/ML (ref 0.34–5.6)
URN SPEC COLLECT METH UR: ABNORMAL
UROBILINOGEN UR STRIP-ACNC: NEGATIVE EU/DL
WBC # BLD AUTO: 9.03 K/UL (ref 3.9–12.7)
WBC #/AREA URNS HPF: 0 /HPF (ref 0–5)
YEAST URNS QL MICRO: ABNORMAL

## 2021-09-28 PROCEDURE — 85610 PROTHROMBIN TIME: CPT | Performed by: EMERGENCY MEDICINE

## 2021-09-28 PROCEDURE — 93005 ELECTROCARDIOGRAM TRACING: CPT | Performed by: SPECIALIST

## 2021-09-28 PROCEDURE — 93010 EKG 12-LEAD: ICD-10-PCS | Mod: ,,, | Performed by: SPECIALIST

## 2021-09-28 PROCEDURE — 83735 ASSAY OF MAGNESIUM: CPT | Performed by: EMERGENCY MEDICINE

## 2021-09-28 PROCEDURE — 80053 COMPREHEN METABOLIC PANEL: CPT | Performed by: EMERGENCY MEDICINE

## 2021-09-28 PROCEDURE — 80179 DRUG ASSAY SALICYLATE: CPT | Performed by: EMERGENCY MEDICINE

## 2021-09-28 PROCEDURE — 99285 EMERGENCY DEPT VISIT HI MDM: CPT | Mod: 25

## 2021-09-28 PROCEDURE — 93010 ELECTROCARDIOGRAM REPORT: CPT | Mod: ,,, | Performed by: SPECIALIST

## 2021-09-28 PROCEDURE — 83880 ASSAY OF NATRIURETIC PEPTIDE: CPT | Performed by: EMERGENCY MEDICINE

## 2021-09-28 PROCEDURE — 81001 URINALYSIS AUTO W/SCOPE: CPT | Performed by: EMERGENCY MEDICINE

## 2021-09-28 PROCEDURE — 85730 THROMBOPLASTIN TIME PARTIAL: CPT | Performed by: EMERGENCY MEDICINE

## 2021-09-28 PROCEDURE — 82550 ASSAY OF CK (CPK): CPT | Performed by: EMERGENCY MEDICINE

## 2021-09-28 PROCEDURE — 83690 ASSAY OF LIPASE: CPT | Performed by: EMERGENCY MEDICINE

## 2021-09-28 PROCEDURE — U0002 COVID-19 LAB TEST NON-CDC: HCPCS | Performed by: EMERGENCY MEDICINE

## 2021-09-28 PROCEDURE — 80307 DRUG TEST PRSMV CHEM ANLYZR: CPT | Performed by: EMERGENCY MEDICINE

## 2021-09-28 PROCEDURE — 85025 COMPLETE CBC W/AUTO DIFF WBC: CPT | Performed by: EMERGENCY MEDICINE

## 2021-09-28 PROCEDURE — 80164 ASSAY DIPROPYLACETIC ACD TOT: CPT | Performed by: EMERGENCY MEDICINE

## 2021-09-28 PROCEDURE — G0378 HOSPITAL OBSERVATION PER HR: HCPCS

## 2021-09-28 PROCEDURE — 82077 ASSAY SPEC XCP UR&BREATH IA: CPT | Performed by: EMERGENCY MEDICINE

## 2021-09-28 PROCEDURE — 84484 ASSAY OF TROPONIN QUANT: CPT | Performed by: EMERGENCY MEDICINE

## 2021-09-28 PROCEDURE — 84443 ASSAY THYROID STIM HORMONE: CPT | Performed by: EMERGENCY MEDICINE

## 2021-09-28 RX ORDER — MUPIROCIN 20 MG/G
OINTMENT TOPICAL 3 TIMES DAILY
COMMUNITY
End: 2022-04-12

## 2021-09-29 ENCOUNTER — CLINICAL SUPPORT (OUTPATIENT)
Dept: CARDIOLOGY | Facility: HOSPITAL | Age: 61
End: 2021-09-29
Attending: STUDENT IN AN ORGANIZED HEALTH CARE EDUCATION/TRAINING PROGRAM
Payer: COMMERCIAL

## 2021-09-29 VITALS
OXYGEN SATURATION: 97 % | SYSTOLIC BLOOD PRESSURE: 141 MMHG | BODY MASS INDEX: 35.56 KG/M2 | RESPIRATION RATE: 13 BRPM | DIASTOLIC BLOOD PRESSURE: 108 MMHG | TEMPERATURE: 98 F | HEART RATE: 67 BPM | WEIGHT: 254 LBS | HEIGHT: 71 IN

## 2021-09-29 VITALS — WEIGHT: 253 LBS | BODY MASS INDEX: 35.42 KG/M2 | HEIGHT: 71 IN

## 2021-09-29 PROBLEM — E66.9 CLASS 1 OBESITY IN ADULT: Chronic | Status: ACTIVE | Noted: 2021-09-29

## 2021-09-29 PROBLEM — E66.811 CLASS 1 OBESITY IN ADULT: Chronic | Status: ACTIVE | Noted: 2021-09-29

## 2021-09-29 PROBLEM — R26.89 BALANCE PROBLEM: Chronic | Status: ACTIVE | Noted: 2021-09-29

## 2021-09-29 LAB
ANION GAP SERPL CALC-SCNC: 11 MMOL/L (ref 8–16)
BUN SERPL-MCNC: 23 MG/DL (ref 8–23)
CALCIUM SERPL-MCNC: 9.2 MG/DL (ref 8.7–10.5)
CHLORIDE SERPL-SCNC: 103 MMOL/L (ref 95–110)
CHOLEST SERPL-MCNC: 107 MG/DL (ref 120–199)
CHOLEST/HDLC SERPL: 2.9 {RATIO} (ref 2–5)
CK MB SERPL-MCNC: 2.8 NG/ML (ref 0.1–6.5)
CO2 SERPL-SCNC: 28 MMOL/L (ref 23–29)
CREAT SERPL-MCNC: 1.1 MG/DL (ref 0.5–1.4)
ERYTHROCYTE [DISTWIDTH] IN BLOOD BY AUTOMATED COUNT: 13.2 % (ref 11.5–14.5)
EST. GFR  (AFRICAN AMERICAN): >60 ML/MIN/1.73 M^2
EST. GFR  (NON AFRICAN AMERICAN): >60 ML/MIN/1.73 M^2
ESTIMATED AVG GLUCOSE: 126 MG/DL (ref 68–131)
GLUCOSE SERPL-MCNC: 100 MG/DL (ref 70–110)
GLUCOSE SERPL-MCNC: 71 MG/DL (ref 70–110)
GLUCOSE SERPL-MCNC: 75 MG/DL (ref 70–110)
HBA1C MFR BLD: 6 % (ref 4.5–6.2)
HCT VFR BLD AUTO: 43 % (ref 40–54)
HDLC SERPL-MCNC: 37 MG/DL (ref 40–75)
HDLC SERPL: 34.6 % (ref 20–50)
HGB BLD-MCNC: 14.1 G/DL (ref 14–18)
LDLC SERPL CALC-MCNC: 53.4 MG/DL (ref 63–159)
MAGNESIUM SERPL-MCNC: 2.3 MG/DL (ref 1.6–2.6)
MCH RBC QN AUTO: 30.9 PG (ref 27–31)
MCHC RBC AUTO-ENTMCNC: 32.8 G/DL (ref 32–36)
MCV RBC AUTO: 94 FL (ref 82–98)
NONHDLC SERPL-MCNC: 70 MG/DL
PLATELET # BLD AUTO: 158 K/UL (ref 150–450)
PMV BLD AUTO: 8.9 FL (ref 9.2–12.9)
POTASSIUM SERPL-SCNC: 4.1 MMOL/L (ref 3.5–5.1)
RBC # BLD AUTO: 4.57 M/UL (ref 4.6–6.2)
SODIUM SERPL-SCNC: 142 MMOL/L (ref 136–145)
TRIGL SERPL-MCNC: 83 MG/DL (ref 30–150)
VALPROATE SERPL-MCNC: 20.7 UG/ML (ref 50–100)
VIT B12 SERPL-MCNC: 151 PG/ML (ref 210–950)
WBC # BLD AUTO: 8.08 K/UL (ref 3.9–12.7)

## 2021-09-29 PROCEDURE — 99900031 HC PATIENT EDUCATION (STAT)

## 2021-09-29 PROCEDURE — G0378 HOSPITAL OBSERVATION PER HR: HCPCS

## 2021-09-29 PROCEDURE — 97161 PT EVAL LOW COMPLEX 20 MIN: CPT

## 2021-09-29 PROCEDURE — 83735 ASSAY OF MAGNESIUM: CPT | Performed by: STUDENT IN AN ORGANIZED HEALTH CARE EDUCATION/TRAINING PROGRAM

## 2021-09-29 PROCEDURE — 99900035 HC TECH TIME PER 15 MIN (STAT)

## 2021-09-29 PROCEDURE — 83036 HEMOGLOBIN GLYCOSYLATED A1C: CPT | Performed by: STUDENT IN AN ORGANIZED HEALTH CARE EDUCATION/TRAINING PROGRAM

## 2021-09-29 PROCEDURE — 82747 ASSAY OF FOLIC ACID RBC: CPT | Performed by: NURSE PRACTITIONER

## 2021-09-29 PROCEDURE — 36415 COLL VENOUS BLD VENIPUNCTURE: CPT | Performed by: EMERGENCY MEDICINE

## 2021-09-29 PROCEDURE — 82553 CREATINE MB FRACTION: CPT | Performed by: STUDENT IN AN ORGANIZED HEALTH CARE EDUCATION/TRAINING PROGRAM

## 2021-09-29 PROCEDURE — 82607 VITAMIN B-12: CPT | Performed by: NURSE PRACTITIONER

## 2021-09-29 PROCEDURE — 84425 ASSAY OF VITAMIN B-1: CPT | Performed by: NURSE PRACTITIONER

## 2021-09-29 PROCEDURE — 94799 UNLISTED PULMONARY SVC/PX: CPT

## 2021-09-29 PROCEDURE — 95819 EEG AWAKE AND ASLEEP: CPT

## 2021-09-29 PROCEDURE — 25000003 PHARM REV CODE 250: Performed by: STUDENT IN AN ORGANIZED HEALTH CARE EDUCATION/TRAINING PROGRAM

## 2021-09-29 PROCEDURE — 97535 SELF CARE MNGMENT TRAINING: CPT | Mod: 59

## 2021-09-29 PROCEDURE — 80061 LIPID PANEL: CPT | Performed by: STUDENT IN AN ORGANIZED HEALTH CARE EDUCATION/TRAINING PROGRAM

## 2021-09-29 PROCEDURE — 36415 COLL VENOUS BLD VENIPUNCTURE: CPT | Performed by: STUDENT IN AN ORGANIZED HEALTH CARE EDUCATION/TRAINING PROGRAM

## 2021-09-29 PROCEDURE — 94761 N-INVAS EAR/PLS OXIMETRY MLT: CPT

## 2021-09-29 PROCEDURE — 93306 TTE W/DOPPLER COMPLETE: CPT

## 2021-09-29 PROCEDURE — 92610 EVALUATE SWALLOWING FUNCTION: CPT

## 2021-09-29 PROCEDURE — 84207 ASSAY OF VITAMIN B-6: CPT | Performed by: NURSE PRACTITIONER

## 2021-09-29 PROCEDURE — 86592 SYPHILIS TEST NON-TREP QUAL: CPT | Performed by: NURSE PRACTITIONER

## 2021-09-29 PROCEDURE — 85027 COMPLETE CBC AUTOMATED: CPT | Performed by: STUDENT IN AN ORGANIZED HEALTH CARE EDUCATION/TRAINING PROGRAM

## 2021-09-29 PROCEDURE — 97530 THERAPEUTIC ACTIVITIES: CPT

## 2021-09-29 PROCEDURE — 97165 OT EVAL LOW COMPLEX 30 MIN: CPT

## 2021-09-29 PROCEDURE — 80048 BASIC METABOLIC PNL TOTAL CA: CPT | Performed by: STUDENT IN AN ORGANIZED HEALTH CARE EDUCATION/TRAINING PROGRAM

## 2021-09-29 RX ORDER — POLYETHYLENE GLYCOL 3350 17 G/17G
17 POWDER, FOR SOLUTION ORAL DAILY
Status: DISCONTINUED | OUTPATIENT
Start: 2021-09-29 | End: 2021-09-29 | Stop reason: HOSPADM

## 2021-09-29 RX ORDER — POTASSIUM CHLORIDE 7.45 MG/ML
40 INJECTION INTRAVENOUS
Status: DISCONTINUED | OUTPATIENT
Start: 2021-09-29 | End: 2021-09-29 | Stop reason: HOSPADM

## 2021-09-29 RX ORDER — PANTOPRAZOLE SODIUM 40 MG/1
40 TABLET, DELAYED RELEASE ORAL
Status: DISCONTINUED | OUTPATIENT
Start: 2021-09-29 | End: 2021-09-29 | Stop reason: HOSPADM

## 2021-09-29 RX ORDER — ASPIRIN 81 MG/1
81 TABLET ORAL DAILY
Status: DISCONTINUED | OUTPATIENT
Start: 2021-09-29 | End: 2021-09-29 | Stop reason: HOSPADM

## 2021-09-29 RX ORDER — MAGNESIUM SULFATE 1 G/100ML
1 INJECTION INTRAVENOUS
Status: DISCONTINUED | OUTPATIENT
Start: 2021-09-29 | End: 2021-09-29 | Stop reason: HOSPADM

## 2021-09-29 RX ORDER — POTASSIUM CHLORIDE 20 MEQ/1
40 TABLET, EXTENDED RELEASE ORAL
Status: DISCONTINUED | OUTPATIENT
Start: 2021-09-29 | End: 2021-09-29 | Stop reason: HOSPADM

## 2021-09-29 RX ORDER — POTASSIUM CHLORIDE 7.45 MG/ML
20 INJECTION INTRAVENOUS
Status: DISCONTINUED | OUTPATIENT
Start: 2021-09-29 | End: 2021-09-29 | Stop reason: HOSPADM

## 2021-09-29 RX ORDER — MAGNESIUM SULFATE HEPTAHYDRATE 40 MG/ML
2 INJECTION, SOLUTION INTRAVENOUS
Status: DISCONTINUED | OUTPATIENT
Start: 2021-09-29 | End: 2021-09-29 | Stop reason: HOSPADM

## 2021-09-29 RX ORDER — INSULIN ASPART 100 [IU]/ML
1-10 INJECTION, SOLUTION INTRAVENOUS; SUBCUTANEOUS
Status: DISCONTINUED | OUTPATIENT
Start: 2021-09-29 | End: 2021-09-29 | Stop reason: HOSPADM

## 2021-09-29 RX ORDER — HYDRALAZINE HYDROCHLORIDE 20 MG/ML
10 INJECTION INTRAMUSCULAR; INTRAVENOUS EVERY 6 HOURS PRN
Status: DISCONTINUED | OUTPATIENT
Start: 2021-09-29 | End: 2021-09-29 | Stop reason: HOSPADM

## 2021-09-29 RX ORDER — IBUPROFEN 200 MG
24 TABLET ORAL
Status: DISCONTINUED | OUTPATIENT
Start: 2021-09-29 | End: 2021-09-29 | Stop reason: HOSPADM

## 2021-09-29 RX ORDER — ACETAMINOPHEN 325 MG/1
650 TABLET ORAL EVERY 6 HOURS PRN
Status: DISCONTINUED | OUTPATIENT
Start: 2021-09-29 | End: 2021-09-29 | Stop reason: HOSPADM

## 2021-09-29 RX ORDER — ONDANSETRON 2 MG/ML
4 INJECTION INTRAMUSCULAR; INTRAVENOUS EVERY 8 HOURS PRN
Status: DISCONTINUED | OUTPATIENT
Start: 2021-09-29 | End: 2021-09-29 | Stop reason: HOSPADM

## 2021-09-29 RX ORDER — POTASSIUM CHLORIDE 20 MEQ/1
20 TABLET, EXTENDED RELEASE ORAL
Status: DISCONTINUED | OUTPATIENT
Start: 2021-09-29 | End: 2021-09-29 | Stop reason: HOSPADM

## 2021-09-29 RX ORDER — ARIPIPRAZOLE 5 MG/1
5 TABLET ORAL DAILY
Status: DISCONTINUED | OUTPATIENT
Start: 2021-09-29 | End: 2021-09-29 | Stop reason: HOSPADM

## 2021-09-29 RX ORDER — GLUCAGON 1 MG
1 KIT INJECTION
Status: DISCONTINUED | OUTPATIENT
Start: 2021-09-29 | End: 2021-09-29 | Stop reason: HOSPADM

## 2021-09-29 RX ORDER — LISINOPRIL 5 MG/1
5 TABLET ORAL DAILY
Status: DISCONTINUED | OUTPATIENT
Start: 2021-09-29 | End: 2021-09-29 | Stop reason: HOSPADM

## 2021-09-29 RX ORDER — IBUPROFEN 200 MG
16 TABLET ORAL
Status: DISCONTINUED | OUTPATIENT
Start: 2021-09-29 | End: 2021-09-29 | Stop reason: HOSPADM

## 2021-09-29 RX ORDER — CHLORTHALIDONE 25 MG/1
25 TABLET ORAL DAILY
Status: DISCONTINUED | OUTPATIENT
Start: 2021-09-29 | End: 2021-09-29 | Stop reason: HOSPADM

## 2021-09-29 RX ORDER — MAGNESIUM SULFATE HEPTAHYDRATE 40 MG/ML
4 INJECTION, SOLUTION INTRAVENOUS
Status: DISCONTINUED | OUTPATIENT
Start: 2021-09-29 | End: 2021-09-29 | Stop reason: HOSPADM

## 2021-09-29 RX ORDER — LANOLIN ALCOHOL/MO/W.PET/CERES
800 CREAM (GRAM) TOPICAL
Status: DISCONTINUED | OUTPATIENT
Start: 2021-09-29 | End: 2021-09-29 | Stop reason: HOSPADM

## 2021-09-29 RX ORDER — SODIUM CHLORIDE 0.9 % (FLUSH) 0.9 %
10 SYRINGE (ML) INJECTION
Status: DISCONTINUED | OUTPATIENT
Start: 2021-09-29 | End: 2021-09-29 | Stop reason: HOSPADM

## 2021-09-29 RX ORDER — BUPROPION HYDROCHLORIDE 150 MG/1
150 TABLET ORAL DAILY
Status: DISCONTINUED | OUTPATIENT
Start: 2021-09-29 | End: 2021-09-29 | Stop reason: HOSPADM

## 2021-09-29 RX ORDER — ENOXAPARIN SODIUM 100 MG/ML
40 INJECTION SUBCUTANEOUS EVERY 24 HOURS
Status: DISCONTINUED | OUTPATIENT
Start: 2021-09-29 | End: 2021-09-29 | Stop reason: HOSPADM

## 2021-09-29 RX ORDER — INSULIN DEGLUDEC 200 U/ML
44 INJECTION, SOLUTION SUBCUTANEOUS NIGHTLY
Status: DISCONTINUED | OUTPATIENT
Start: 2021-09-29 | End: 2021-09-29

## 2021-09-29 RX ORDER — ATORVASTATIN CALCIUM 40 MG/1
40 TABLET, FILM COATED ORAL NIGHTLY
Status: DISCONTINUED | OUTPATIENT
Start: 2021-09-29 | End: 2021-09-29 | Stop reason: HOSPADM

## 2021-09-29 RX ADMIN — BUPROPION HYDROCHLORIDE 150 MG: 150 TABLET, FILM COATED, EXTENDED RELEASE ORAL at 08:09

## 2021-09-29 RX ADMIN — PANTOPRAZOLE SODIUM 40 MG: 40 TABLET, DELAYED RELEASE ORAL at 05:09

## 2021-09-29 RX ADMIN — CHLORTHALIDONE 25 MG: 25 TABLET ORAL at 08:09

## 2021-09-29 RX ADMIN — LISINOPRIL 5 MG: 5 TABLET ORAL at 08:09

## 2021-09-29 RX ADMIN — ARIPIPRAZOLE 5 MG: 5 TABLET ORAL at 08:09

## 2021-09-29 RX ADMIN — ASPIRIN 81 MG: 81 TABLET, COATED ORAL at 08:09

## 2021-09-30 LAB
AORTIC ROOT ANNULUS: 3.08 CM
AV INDEX (PROSTH): 0.59
AV MEAN GRADIENT: 8 MMHG
AV PEAK GRADIENT: 14 MMHG
AV VALVE AREA: 1.8 CM2
AV VELOCITY RATIO: 57.68
BSA FOR ECHO PROCEDURE: 2.4 M2
DOP CALC AO PEAK VEL: 1.89 M/S
DOP CALC AO VTI: 40.22 CM
DOP CALC LVOT AREA: 3 CM2
DOP CALC LVOT DIAMETER: 1.97 CM
DOP CALC LVOT PEAK VEL: 109.02 M/S
DOP CALC LVOT STROKE VOLUME: 72.38 CM3
DOP CALCLVOT PEAK VEL VTI: 23.76 CM
E WAVE DECELERATION TIME: 244.75 MSEC
E/A RATIO: 1.11
E/E' RATIO: 9.2 M/S
EJECTION FRACTION: 55 %
LEFT ATRIUM SIZE: 4.53 CM
LV LATERAL E/E' RATIO: 7.67 M/S
LV SEPTAL E/E' RATIO: 11.5 M/S
MV PEAK A VEL: 0.83 M/S
MV PEAK E VEL: 0.92 M/S
PV PEAK VELOCITY: 116.11 CM/S
RPR SER QL: NORMAL
TDI LATERAL: 0.12 M/S
TDI SEPTAL: 0.08 M/S
TDI: 0.1 M/S

## 2021-10-02 LAB
FOLATE BLD-MCNC: 415 NG/ML
FOLATE RBC-MCNC: 939 NG/ML
HCT VFR BLD AUTO: 44.2 % (ref 37.5–51)

## 2021-10-06 LAB — VIT B1 BLD-SCNC: 186.2 NMOL/L (ref 66.5–200)

## 2021-10-09 LAB — VIT B6 SERPL-MCNC: 9.2 UG/L (ref 5.3–46.7)

## 2021-11-24 ENCOUNTER — OFFICE VISIT (OUTPATIENT)
Dept: PODIATRY | Facility: CLINIC | Age: 61
End: 2021-11-24
Payer: COMMERCIAL

## 2021-11-24 VITALS — WEIGHT: 253 LBS | BODY MASS INDEX: 35.42 KG/M2 | RESPIRATION RATE: 16 BRPM | HEIGHT: 71 IN

## 2021-11-24 DIAGNOSIS — M20.42 HAMMER TOES OF BOTH FEET: ICD-10-CM

## 2021-11-24 DIAGNOSIS — E11.49 DIABETES MELLITUS TYPE 2 WITH NEUROLOGICAL MANIFESTATIONS: Primary | ICD-10-CM

## 2021-11-24 DIAGNOSIS — I87.2 VENOUS INSUFFICIENCY OF BOTH LOWER EXTREMITIES: ICD-10-CM

## 2021-11-24 DIAGNOSIS — L60.2 ONYCHOGRYPOSIS: ICD-10-CM

## 2021-11-24 DIAGNOSIS — M20.41 HAMMER TOES OF BOTH FEET: ICD-10-CM

## 2021-11-24 DIAGNOSIS — M21.619 BUNION: ICD-10-CM

## 2021-11-24 PROCEDURE — 11721 DEBRIDE NAIL 6 OR MORE: CPT | Mod: S$GLB,,, | Performed by: PODIATRIST

## 2021-11-24 PROCEDURE — 11721 ROUTINE FOOT CARE: ICD-10-PCS | Mod: S$GLB,,, | Performed by: PODIATRIST

## 2021-11-24 PROCEDURE — 99499 NO LOS: ICD-10-PCS | Mod: S$GLB,,, | Performed by: PODIATRIST

## 2021-11-24 PROCEDURE — 99499 UNLISTED E&M SERVICE: CPT | Mod: S$GLB,,, | Performed by: PODIATRIST

## 2021-11-24 RX ORDER — GLIMEPIRIDE 2 MG/1
2 TABLET ORAL DAILY
COMMUNITY
Start: 2021-11-01 | End: 2022-02-09

## 2021-11-24 RX ORDER — LORAZEPAM 1 MG/1
.5-1 TABLET ORAL DAILY PRN
COMMUNITY
Start: 2021-10-25 | End: 2022-04-08 | Stop reason: ALTCHOICE

## 2021-11-24 RX ORDER — ARIPIPRAZOLE 10 MG/1
10 TABLET ORAL NIGHTLY
COMMUNITY
Start: 2021-10-12 | End: 2024-02-15

## 2021-11-24 RX ORDER — DIAZEPAM 10 MG/1
1 TABLET ORAL 2 TIMES DAILY PRN
COMMUNITY
Start: 2021-07-12 | End: 2022-01-27

## 2021-11-24 RX ORDER — INSULIN GLARGINE 100 [IU]/ML
INJECTION, SOLUTION SUBCUTANEOUS
COMMUNITY
End: 2022-02-09

## 2022-01-27 ENCOUNTER — OFFICE VISIT (OUTPATIENT)
Dept: RHEUMATOLOGY | Facility: CLINIC | Age: 62
End: 2022-01-27
Payer: COMMERCIAL

## 2022-01-27 VITALS
HEART RATE: 86 BPM | WEIGHT: 255.06 LBS | SYSTOLIC BLOOD PRESSURE: 120 MMHG | BODY MASS INDEX: 35.71 KG/M2 | DIASTOLIC BLOOD PRESSURE: 82 MMHG | HEIGHT: 71 IN

## 2022-01-27 DIAGNOSIS — N18.31 TYPE 2 DIABETES MELLITUS WITH STAGE 3A CHRONIC KIDNEY DISEASE, WITH LONG-TERM CURRENT USE OF INSULIN: ICD-10-CM

## 2022-01-27 DIAGNOSIS — E53.8 B12 DEFICIENCY: Primary | ICD-10-CM

## 2022-01-27 DIAGNOSIS — J40 BRONCHITIS: ICD-10-CM

## 2022-01-27 DIAGNOSIS — Z79.4 TYPE 2 DIABETES MELLITUS WITH STAGE 3A CHRONIC KIDNEY DISEASE, WITH LONG-TERM CURRENT USE OF INSULIN: ICD-10-CM

## 2022-01-27 DIAGNOSIS — E11.22 TYPE 2 DIABETES MELLITUS WITH STAGE 3A CHRONIC KIDNEY DISEASE, WITH LONG-TERM CURRENT USE OF INSULIN: ICD-10-CM

## 2022-01-27 DIAGNOSIS — M47.9 SPONDYLOSIS: ICD-10-CM

## 2022-01-27 DIAGNOSIS — E55.9 VITAMIN D DEFICIENCY: ICD-10-CM

## 2022-01-27 PROCEDURE — 99999 PR PBB SHADOW E&M-EST. PATIENT-LVL III: ICD-10-PCS | Mod: PBBFAC,,, | Performed by: INTERNAL MEDICINE

## 2022-01-27 PROCEDURE — 96372 PR INJECTION,THERAP/PROPH/DIAG2ST, IM OR SUBCUT: ICD-10-PCS | Mod: S$GLB,,, | Performed by: INTERNAL MEDICINE

## 2022-01-27 PROCEDURE — 4010F PR ACE/ARB THEARPY RXD/TAKEN: ICD-10-PCS | Mod: CPTII,S$GLB,, | Performed by: INTERNAL MEDICINE

## 2022-01-27 PROCEDURE — 3008F PR BODY MASS INDEX (BMI) DOCUMENTED: ICD-10-PCS | Mod: CPTII,S$GLB,, | Performed by: INTERNAL MEDICINE

## 2022-01-27 PROCEDURE — 99205 PR OFFICE/OUTPT VISIT, NEW, LEVL V, 60-74 MIN: ICD-10-PCS | Mod: 25,S$GLB,, | Performed by: INTERNAL MEDICINE

## 2022-01-27 PROCEDURE — 4010F ACE/ARB THERAPY RXD/TAKEN: CPT | Mod: CPTII,S$GLB,, | Performed by: INTERNAL MEDICINE

## 2022-01-27 PROCEDURE — 3074F SYST BP LT 130 MM HG: CPT | Mod: CPTII,S$GLB,, | Performed by: INTERNAL MEDICINE

## 2022-01-27 PROCEDURE — 99999 PR PBB SHADOW E&M-EST. PATIENT-LVL III: CPT | Mod: PBBFAC,,, | Performed by: INTERNAL MEDICINE

## 2022-01-27 PROCEDURE — 99205 OFFICE O/P NEW HI 60 MIN: CPT | Mod: 25,S$GLB,, | Performed by: INTERNAL MEDICINE

## 2022-01-27 PROCEDURE — 3079F DIAST BP 80-89 MM HG: CPT | Mod: CPTII,S$GLB,, | Performed by: INTERNAL MEDICINE

## 2022-01-27 PROCEDURE — 3008F BODY MASS INDEX DOCD: CPT | Mod: CPTII,S$GLB,, | Performed by: INTERNAL MEDICINE

## 2022-01-27 PROCEDURE — 3074F PR MOST RECENT SYSTOLIC BLOOD PRESSURE < 130 MM HG: ICD-10-PCS | Mod: CPTII,S$GLB,, | Performed by: INTERNAL MEDICINE

## 2022-01-27 PROCEDURE — 1159F PR MEDICATION LIST DOCUMENTED IN MEDICAL RECORD: ICD-10-PCS | Mod: CPTII,S$GLB,, | Performed by: INTERNAL MEDICINE

## 2022-01-27 PROCEDURE — 1160F PR REVIEW ALL MEDS BY PRESCRIBER/CLIN PHARMACIST DOCUMENTED: ICD-10-PCS | Mod: CPTII,S$GLB,, | Performed by: INTERNAL MEDICINE

## 2022-01-27 PROCEDURE — 96372 THER/PROPH/DIAG INJ SC/IM: CPT | Mod: S$GLB,,, | Performed by: INTERNAL MEDICINE

## 2022-01-27 PROCEDURE — 3044F PR MOST RECENT HEMOGLOBIN A1C LEVEL <7.0%: ICD-10-PCS | Mod: CPTII,S$GLB,, | Performed by: INTERNAL MEDICINE

## 2022-01-27 PROCEDURE — 3044F HG A1C LEVEL LT 7.0%: CPT | Mod: CPTII,S$GLB,, | Performed by: INTERNAL MEDICINE

## 2022-01-27 PROCEDURE — 1160F RVW MEDS BY RX/DR IN RCRD: CPT | Mod: CPTII,S$GLB,, | Performed by: INTERNAL MEDICINE

## 2022-01-27 PROCEDURE — 1159F MED LIST DOCD IN RCRD: CPT | Mod: CPTII,S$GLB,, | Performed by: INTERNAL MEDICINE

## 2022-01-27 PROCEDURE — 3079F PR MOST RECENT DIASTOLIC BLOOD PRESSURE 80-89 MM HG: ICD-10-PCS | Mod: CPTII,S$GLB,, | Performed by: INTERNAL MEDICINE

## 2022-01-27 RX ORDER — KETOROLAC TROMETHAMINE 30 MG/ML
60 INJECTION, SOLUTION INTRAMUSCULAR; INTRAVENOUS
Status: COMPLETED | OUTPATIENT
Start: 2022-01-27 | End: 2022-01-27

## 2022-01-27 RX ORDER — BESIFLOXACIN 6 MG/ML
SUSPENSION OPHTHALMIC
COMMUNITY
Start: 2021-10-28 | End: 2022-04-08 | Stop reason: ALTCHOICE

## 2022-01-27 RX ORDER — NEEDLES, DISPOSABLE 25GX5/8"
1 NEEDLE, DISPOSABLE MISCELLANEOUS WEEKLY
Qty: 25 EACH | Refills: 3 | Status: SHIPPED | OUTPATIENT
Start: 2022-01-27 | End: 2024-02-15

## 2022-01-27 RX ORDER — DIFLUPREDNATE OPHTHALMIC 0.5 MG/ML
EMULSION OPHTHALMIC
COMMUNITY
Start: 2021-10-28 | End: 2022-04-08 | Stop reason: ALTCHOICE

## 2022-01-27 RX ORDER — CELECOXIB 100 MG/1
100 CAPSULE ORAL DAILY
Qty: 30 CAPSULE | Refills: 4 | Status: SHIPPED | OUTPATIENT
Start: 2022-01-27 | End: 2022-07-19

## 2022-01-27 RX ORDER — AZITHROMYCIN 250 MG/1
TABLET, FILM COATED ORAL
Qty: 6 TABLET | Refills: 0 | Status: SHIPPED | OUTPATIENT
Start: 2022-01-27 | End: 2022-02-09

## 2022-01-27 RX ORDER — DEXAMETHASONE 1 MG/1
1 TABLET ORAL DAILY
Qty: 10 TABLET | Refills: 0 | Status: SHIPPED | OUTPATIENT
Start: 2022-01-27 | End: 2022-02-06

## 2022-01-27 RX ORDER — BROMFENAC SODIUM 0.7 MG/ML
SOLUTION/ DROPS OPHTHALMIC
COMMUNITY
Start: 2021-10-28 | End: 2022-04-08 | Stop reason: ALTCHOICE

## 2022-01-27 RX ORDER — KETOROLAC TROMETHAMINE 30 MG/ML
30 INJECTION, SOLUTION INTRAMUSCULAR; INTRAVENOUS
Status: DISCONTINUED | OUTPATIENT
Start: 2022-01-27 | End: 2022-01-27

## 2022-01-27 RX ORDER — CYANOCOBALAMIN 1000 UG/ML
1000 INJECTION, SOLUTION INTRAMUSCULAR; SUBCUTANEOUS
Status: COMPLETED | OUTPATIENT
Start: 2022-01-27 | End: 2022-01-27

## 2022-01-27 RX ORDER — CYANOCOBALAMIN 1000 UG/ML
1000 INJECTION, SOLUTION INTRAMUSCULAR; SUBCUTANEOUS
Qty: 12 ML | Refills: 3 | Status: SHIPPED | OUTPATIENT
Start: 2022-01-27 | End: 2023-01-27

## 2022-01-27 RX ORDER — LEVOMEFOLATE CALCIUM 15 MG
1 TABLET ORAL DAILY
COMMUNITY
Start: 2021-11-22 | End: 2022-04-12

## 2022-01-27 RX ORDER — DEXMETHYLPHENIDATE HYDROCHLORIDE 30 MG/1
1 CAPSULE, EXTENDED RELEASE ORAL DAILY
COMMUNITY
Start: 2022-01-18

## 2022-01-27 RX ORDER — CYANOCOBALAMIN/FOLIC AC/VIT B6 1-2.5-25MG
1 TABLET ORAL DAILY
Qty: 90 EACH | Refills: 3 | Status: SHIPPED | OUTPATIENT
Start: 2022-01-27 | End: 2022-04-27

## 2022-01-27 RX ORDER — DEXMETHYLPHENIDATE HYDROCHLORIDE 10 MG/1
10 TABLET ORAL NIGHTLY
COMMUNITY
Start: 2022-01-18

## 2022-01-27 RX ADMIN — CYANOCOBALAMIN 1000 MCG: 1000 INJECTION, SOLUTION INTRAMUSCULAR; SUBCUTANEOUS at 11:01

## 2022-01-27 RX ADMIN — KETOROLAC TROMETHAMINE 60 MG: 30 INJECTION, SOLUTION INTRAMUSCULAR; INTRAVENOUS at 11:01

## 2022-01-27 ASSESSMENT — ROUTINE ASSESSMENT OF PATIENT INDEX DATA (RAPID3)
FATIGUE SCORE: 0
PAIN SCORE: 3.5
TOTAL RAPID3 SCORE: 3
PATIENT GLOBAL ASSESSMENT SCORE: 3.5
PSYCHOLOGICAL DISTRESS SCORE: 0
MDHAQ FUNCTION SCORE: 0.6

## 2022-01-27 NOTE — PROGRESS NOTES
Administered 1 cc ( 1000 mcg/ml ) of b12 to the right upper outer gluteal. Informed of s/s to report verbalized understanding. No adverse reactions noted.        Administered 2 cc ( 30 mg/ml ) of toradol to the right upper outer gluteal. Informed of s/s to report verbalized understanding. No adverse reactions noted.

## 2022-01-27 NOTE — PROGRESS NOTES
Subjective:       Patient ID: Sarwat Colunga is a 61 y.o. male.    Chief Complaint: Disease Management    HPI: 60 yo male with a history of neck pain, back pain. told may have AS.he started noticing back pain, neck is stiff.  Patient complains of arthralgias and myalgias for which has been present for a few years. Pain is located in multiple joints, both shoulder(s), both elbow(s), both wrist(s), both MCP(s): 1st, 2nd, 3rd, 4th and 5th, both PIP(s): 1st, 2nd, 3rd, 4th and 5th, both DIP(s): 1st and 2nd, both hip(s), both knee(s) and both MTP(s): 1st, 2nd, 3rd, 4th and 5th, is described as aching, pulsating, shooting and throbbing, and is constant, moderate .  Associated symptoms include: crepitation, decreased range of motion, edema, effusion, tenderness and warmth.dx with DM x 16 yrs and severe neuropathy.    Review of Systems   Constitutional: Negative for activity change, appetite change, chills, diaphoresis, fatigue, fever and unexpected weight change.   HENT: Negative for congestion, ear pain, facial swelling, mouth sores, nosebleeds, postnasal drip, rhinorrhea, sinus pressure, sneezing, sore throat, tinnitus, trouble swallowing and voice change.    Eyes: Negative for pain, discharge, redness, itching and visual disturbance.   Respiratory: Negative for apnea, cough, chest tightness, shortness of breath and wheezing.    Cardiovascular: Negative for chest pain, palpitations and leg swelling.   Gastrointestinal: Negative for abdominal pain, constipation, diarrhea, nausea and vomiting.   Endocrine: Negative for cold intolerance, heat intolerance, polydipsia and polyuria.   Genitourinary: Negative for decreased urine volume, difficulty urinating, dysuria, flank pain, frequency, hematuria and urgency.   Musculoskeletal: Positive for back pain, gait problem, joint swelling, myalgias, neck pain, neck stiffness and stiffness. Negative for arthralgias.   Skin: Positive for rash. Negative for pallor and wound.  "  Allergic/Immunologic: Negative for immunocompromised state.   Neurological: Negative for dizziness, tremors, seizures, syncope, weakness, numbness and headaches.   Hematological: Negative for adenopathy. Does not bruise/bleed easily.   Psychiatric/Behavioral: Negative for sleep disturbance and suicidal ideas. The patient is not nervous/anxious.          Objective:   /82   Pulse 86   Ht 5' 10.98" (1.803 m)   Wt 115.7 kg (255 lb 1.2 oz)   BMI 35.59 kg/m²      Physical Exam   Constitutional: He is oriented to person, place, and time. He appears distressed.   HENT:   Head: Normocephalic and atraumatic.   Mouth/Throat: Oropharynx is clear and moist.   Eyes: Pupils are equal, round, and reactive to light. EOM are normal.   Neck: No thyromegaly present.   Cardiovascular: Normal rate, regular rhythm and normal heart sounds. Exam reveals no gallop and no friction rub.   No murmur heard.  Pulmonary/Chest: Breath sounds normal. He has no wheezes. He has no rales. He exhibits no tenderness.   Abdominal: There is no abdominal tenderness. There is no rebound and no guarding.   Musculoskeletal:         General: Tenderness and edema present.      Right shoulder: Tenderness present. No effusion.      Left shoulder: Tenderness present.      Right elbow: Tenderness present.      Left elbow: Normal.      Left wrist: Normal.      Cervical back: Neck supple.      Right knee: Swelling and effusion present. Normal range of motion.      Left knee: Swelling and effusion present. Normal range of motion.   Lymphadenopathy:     He has no cervical adenopathy.   Neurological: He is alert and oriented to person, place, and time. He displays weakness. He exhibits abnormal muscle tone.   Reflex Scores:       Patellar reflexes are 2+ on the right side and 2+ on the left side.  Skin: No rash noted. No erythema. No pallor.   Psychiatric: Mood and affect normal.   Vitals reviewed.      Right Side Rheumatological Exam     Examination finds " the 2nd MCP, 3rd MCP, 4th MCP and 5th MCP normal.    The patient is tender to palpation of the shoulder and elbow    He has swelling of the knee    The patient has an enlarged wrist, 1st PIP, 2nd PIP, 3rd PIP, 4th PIP and 5th PIP    Shoulder Exam   Tenderness Location: acromioclavicular joint and posterior shoulder    Range of Motion   Active abduction: abnormal   Adduction: abnormal  Effusion: negative  Sensation: normal    Knee Exam     Range of Motion   Extension: normal   Flexion: normal   Patellofemoral Crepitus: positive  Effusion: positive  Range of Motion: normal range of motion  Sensation: normal    Hip Exam   Tenderness Location: anterior and posterior    Range of Motion   Extension: abnormal   Flexion: abnormal   Sensation: normal    Elbow/Wrist Exam   Tenderness Location: no tenderness    Range of Motion   Elbow   Flexion: normal   Sensation: normal    Muscle Strength (0-5 scale):  Neck Flexion:  3  Neck Extension: 3  Deltoid:  3  Biceps: 3/5   Triceps:  3  Quadriceps:  3   Distal Lower Extremity: 3    Left Side Rheumatological Exam     Examination finds the elbow, wrist, 1st MCP, 2nd MCP, 3rd MCP, 4th MCP and 5th MCP normal.    The patient is tender to palpation of the shoulder.    He has swelling of the knee    The patient has an enlarged 1st PIP, 2nd PIP, 3rd PIP, 4th PIP and 5th PIP.    Shoulder Exam   Tenderness Location: acromioclavicular joint and posterior shoulder    Range of Motion   Active abduction: abnormal   Extension: abnormal   Sensation: normal    Knee Exam     Range of Motion   Extension: normal   Flexion: normal     Patellofemoral Crepitus: positive  Effusion: positive  Range of Motion: decreased range of motion  Sensation: normal    Hip Exam   Tenderness Location: anterior and posterior    Range of Motion   Extension: abnormal   Flexion: abnormal   Sensation: normal    Elbow/Wrist Exam     Range of Motion   Elbow   Flexion: normal   Sensation: normal    Muscle Strength (0-5  scale):  Neck Flexion:  3  Neck Extension: 3  Deltoid:  3  Biceps: 3/5   Triceps:  3  Quadriceps:  3   Distal Lower Extremity: 3      Back/Neck Exam   General Inspection   Gait: normal       Tenderness Right paramedian tenderness of the Lower C-Spine, Lower L-Spine, Upper C-Spine, Upper L-Spine and SI Joint.Left paramedian tenderness of the Lower C-Spine, Lower L-Spine, Upper C-Spine, Upper L-Spine and SI Joint.    Back Range of Motion   Extension: abnormal  Flexion: abnormal  Lateral Bend Right: abnormal  Lateral Bend Left: abnormal  Rotation Right: abnormal  Rotation Left: abnormal    Neck Range of Motion   Flexion: Limited and moderate  Extension: Limited and moderate  Right Lateral Bend: abnormal  Left Lateral Bend: abnormal  Right Rotation: abnormal  Left Rotation: abnormal       Results for orders placed or performed during the hospital encounter of 09/28/21   APTT   Result Value Ref Range    aPTT 27.7 25.6 - 35.8 sec   BNP   Result Value Ref Range    BNP 11 0 - 99 pg/mL   CBC Auto Differential   Result Value Ref Range    WBC 9.03 3.90 - 12.70 K/uL    RBC 4.70 4.60 - 6.20 M/uL    Hemoglobin 14.6 14.0 - 18.0 g/dL    Hematocrit 43.9 40.0 - 54.0 %    MCV 93 82 - 98 fL    MCH 31.1 (H) 27.0 - 31.0 pg    MCHC 33.3 32.0 - 36.0 g/dL    RDW 13.1 11.5 - 14.5 %    Platelets 175 150 - 450 K/uL    MPV 9.0 (L) 9.2 - 12.9 fL    Immature Granulocytes 0.2 0.0 - 0.5 %    Gran # (ANC) 4.9 1.8 - 7.7 K/uL    Immature Grans (Abs) 0.02 0.00 - 0.04 K/uL    Lymph # 3.1 1.0 - 4.8 K/uL    Mono # 0.8 0.3 - 1.0 K/uL    Eos # 0.2 0.0 - 0.5 K/uL    Baso # 0.04 0.00 - 0.20 K/uL    nRBC 0 0 /100 WBC    Gran % 54.6 38.0 - 73.0 %    Lymph % 34.2 18.0 - 48.0 %    Mono % 8.3 4.0 - 15.0 %    Eosinophil % 2.3 0.0 - 8.0 %    Basophil % 0.4 0.0 - 1.9 %    Differential Method Automated    Comprehensive Metabolic Panel   Result Value Ref Range    Sodium 142 136 - 145 mmol/L    Potassium 4.3 3.5 - 5.1 mmol/L    Chloride 103 95 - 110 mmol/L    CO2 27 23  - 29 mmol/L    Glucose 137 (H) 70 - 110 mg/dL    BUN 23 8 - 23 mg/dL    Creatinine 1.2 0.5 - 1.4 mg/dL    Calcium 9.4 8.7 - 10.5 mg/dL    Total Protein 6.6 6.0 - 8.4 g/dL    Albumin 4.0 3.5 - 5.2 g/dL    Total Bilirubin 1.2 (H) 0.1 - 1.0 mg/dL    Alkaline Phosphatase 59 55 - 135 U/L    AST 19 10 - 40 U/L    ALT 18 10 - 44 U/L    Anion Gap 12 8 - 16 mmol/L    eGFR if African American >60.0 >60 mL/min/1.73 m^2    eGFR if non African American >60.0 >60 mL/min/1.73 m^2   Drug screen panel, in-house   Result Value Ref Range    Benzodiazepines Presumptive Positive (A) Negative    Cocaine (Metab.) Negative Negative    Opiate Scrn, Ur Negative Negative    Barbiturate Screen, Ur Negative Negative    Amphetamine Screen, Ur Presumptive Positive (A) Negative    THC Negative Negative    Phencyclidine Negative Negative    Creatinine, Urine 41.0 23.0 - 375.0 mg/dL    Toxicology Information SEE COMMENT    Lipase   Result Value Ref Range    Lipase 37 4 - 60 U/L   Magnesium   Result Value Ref Range    Magnesium 2.1 1.6 - 2.6 mg/dL   Protime-INR   Result Value Ref Range    PT 12.6 11.8 - 14.3 sec    INR 1.0    Troponin I   Result Value Ref Range    Troponin I <0.030 <=0.040 ng/mL   TSH   Result Value Ref Range    TSH 1.450 0.340 - 5.600 uIU/mL   Urinalysis   Result Value Ref Range    Specimen UA Urine, Clean Catch     Color, UA Yellow Yellow, Straw, Jenn    Appearance, UA Clear Clear    pH, UA 6.0 5.0 - 8.0    Specific Gravity, UA 1.010 1.005 - 1.030    Protein, UA Negative Negative    Glucose, UA 4+ (A) Negative    Ketones, UA Negative Negative    Bilirubin (UA) Negative Negative    Occult Blood UA Negative Negative    Nitrite, UA Negative Negative    Urobilinogen, UA Negative Negative EU/dL    Leukocytes, UA Negative Negative   CK   Result Value Ref Range     (H) 20 - 200 U/L   Ethanol   Result Value Ref Range    Alcohol, Serum <5 <10 mg/dL   Salicylate level   Result Value Ref Range    Salicylate Lvl <4.0 (L) 15.0 - 30.0  mg/dL   COVID-19 Rapid Screening   Result Value Ref Range    SARS-CoV-2 RNA, Amplification, Qual Negative Negative   Urinalysis Microscopic   Result Value Ref Range    RBC, UA 0 0 - 4 /hpf    WBC, UA 0 0 - 5 /hpf    Bacteria Negative None-Occ /hpf    Yeast, UA None None    Squam Epithel, UA 0 /hpf    Hyaline Casts, UA 0.00 (A) 0-1/lpf /lpf    Microscopic Comment SEE COMMENT    Valproic Acid   Result Value Ref Range    Valproic Acid Level 20.7 (L) 50.0 - 100.0 ug/mL   CBC Without Differential   Result Value Ref Range    WBC 8.08 3.90 - 12.70 K/uL    RBC 4.57 (L) 4.60 - 6.20 M/uL    Hemoglobin 14.1 14.0 - 18.0 g/dL    Hematocrit 43.0 40.0 - 54.0 %    MCV 94 82 - 98 fL    MCH 30.9 27.0 - 31.0 pg    MCHC 32.8 32.0 - 36.0 g/dL    RDW 13.2 11.5 - 14.5 %    Platelets 158 150 - 450 K/uL    MPV 8.9 (L) 9.2 - 12.9 fL   Basic Metabolic Panel   Result Value Ref Range    Sodium 142 136 - 145 mmol/L    Potassium 4.1 3.5 - 5.1 mmol/L    Chloride 103 95 - 110 mmol/L    CO2 28 23 - 29 mmol/L    Glucose 71 70 - 110 mg/dL    BUN 23 8 - 23 mg/dL    Creatinine 1.1 0.5 - 1.4 mg/dL    Calcium 9.2 8.7 - 10.5 mg/dL    Anion Gap 11 8 - 16 mmol/L    eGFR if African American >60.0 >60 mL/min/1.73 m^2    eGFR if non African American >60.0 >60 mL/min/1.73 m^2   Magnesium   Result Value Ref Range    Magnesium 2.3 1.6 - 2.6 mg/dL   Lipid panel   Result Value Ref Range    Cholesterol 107 (L) 120 - 199 mg/dL    Triglycerides 83 30 - 150 mg/dL    HDL 37 (L) 40 - 75 mg/dL    LDL Cholesterol 53.4 (L) 63.0 - 159.0 mg/dL    HDL/Cholesterol Ratio 34.6 20.0 - 50.0 %    Total Cholesterol/HDL Ratio 2.9 2.0 - 5.0    Non-HDL Cholesterol 70 mg/dL   Hemoglobin A1c   Result Value Ref Range    Hemoglobin A1C 6.0 4.5 - 6.2 %    Estimated Avg Glucose 126 68 - 131 mg/dL   CK-MB   Result Value Ref Range    CPK MB 2.8 0.1 - 6.5 ng/mL   Vitamin B1   Result Value Ref Range    Thiamine 186.2 66.5 - 200.0 nmol/L   Vitamin B6   Result Value Ref Range    Vitamin B6 9.2 5.3 -  46.7 ug/L   Vitamin B12   Result Value Ref Range    Vitamin B-12 151 (L) 210 - 950 pg/mL   RPR   Result Value Ref Range    RPR Non-reactive Non-reactive   Folate RBC   Result Value Ref Range    Folate, Hemolysate 415.0 Not Estab. ng/mL    Hematocrit 44.2 37.5 - 51.0 %    Folate (packed RBC's) 939 >498 ng/mL   Echo Saline Bubble? Yes   Result Value Ref Range    TDI SEPTAL 0.08 m/s    LV LATERAL E/E' RATIO 7.67 m/s    LV SEPTAL E/E' RATIO 11.50 m/s    TDI LATERAL 0.12 m/s    PV PEAK VELOCITY 116.11 cm/s    Ao root annulus 3.08 cm    LA size 4.53 cm    AV mean gradient 8 mmHg    AV valve area 1.80 cm2    AV Velocity Ratio 57.68     AV index (prosthetic) 0.59     E/A ratio 1.11     Mean e' 0.10 m/s    E wave deceleration time 244.75 msec    LVOT diameter 1.97 cm    LVOT area 3.0 cm2    LVOT peak eloy 109.02 m/s    LVOT peak VTI 23.76 cm    Ao peak eloy 1.89 m/s    Ao VTI 40.22 cm    LVOT stroke volume 72.38 cm3    AV peak gradient 14 mmHg    E/E' ratio 9.20 m/s    MV Peak E Eloy 0.92 m/s    MV Peak A Eloy 0.83 m/s    BSA 2.4 m2    EF 55 %   POCT glucose   Result Value Ref Range    POC Glucose 133 (H) 70 - 110   POCT glucose   Result Value Ref Range    POC Glucose 75 70 - 110   POCT glucose   Result Value Ref Range    POC Glucose 100 70 - 110     reviewed labs with patient during this visit      Assessment:       1. B12 deficiency    2. Spondylosis    3. Type 2 diabetes mellitus with stage 3a chronic kidney disease, with long-term current use of insulin    4. Vitamin D deficiency    5. Bronchitis            Plan:       Sarwat was seen today for disease management.    Diagnoses and all orders for this visit:    B12 deficiency  -     INTRINSIC FACTOR BLOCKING AB; Future  -     CBC Auto Differential; Future  -     Comprehensive Metabolic Panel; Future  -     C-Reactive Protein; Future  -     Sedimentation rate; Future  -     GURMEET Screen w/Reflex; Future  -     Aldolase; Future  -     CK; Future  -     Cyclic Citrullinated  "Peptide Antibody, IgG; Future  -     Rheumatoid Factor; Future  -     TSH; Future  -     Thyroid Peroxidase Antibody; Future  -     Anti-Thyroglobulin Antibody; Future  -     Hepatitis B Core Antibody, IgM; Future  -     Hepatitis B Surface Ab, Qualitative; Future  -     Hepatitis B Surface Antigen; Future  -     Hepatitis C Antibody; Future  -     Quantiferon Gold TB; Future  -     HLA B27 Antigen; Future  -     X-Ray Cervical Spine AP And Lateral; Future  -     X-Ray Thoracic Spine AP Lateral; Future  -     X-Ray Lumbar Spine Ap And Lateral; Future  -     Zinc; Future  -     TESTOSTERONE; Future  -     Estrogens, Total; Future  -     celecoxib (CELEBREX) 100 MG capsule; Take 1 capsule (100 mg total) by mouth once daily.  -     Discontinue: ketorolac injection 30 mg  -     cyanocobalamin injection 1,000 mcg  -     cyanocobalamin 1,000 mcg/mL injection; Inject 1 mL (1,000 mcg total) into the skin every 7 days.  -     syringe, disposable, 1 mL Syrg; 1 Syringe by Misc.(Non-Drug; Combo Route) route once a week.  -     needle, disp, 30 gauge 30 gauge x 1/2" Ndle; 1 each by Misc.(Non-Drug; Combo Route) route once a week.  -     blunt needle, disposable 18 x 1 1/2 " Ndle; 1 Syringe by Misc.(Non-Drug; Combo Route) route once a week.  -     folic acid-vit B6-vit B12 (FOLBEE) 2.5-25-1 mg Tab; Take 1 tablet by mouth once daily.  -     ketorolac injection 60 mg    Spondylosis  -     INTRINSIC FACTOR BLOCKING AB; Future  -     CBC Auto Differential; Future  -     Comprehensive Metabolic Panel; Future  -     C-Reactive Protein; Future  -     Sedimentation rate; Future  -     GURMEET Screen w/Reflex; Future  -     Aldolase; Future  -     CK; Future  -     Cyclic Citrullinated Peptide Antibody, IgG; Future  -     Rheumatoid Factor; Future  -     TSH; Future  -     Thyroid Peroxidase Antibody; Future  -     Anti-Thyroglobulin Antibody; Future  -     Hepatitis B Core Antibody, IgM; Future  -     Hepatitis B Surface Ab, Qualitative; " "Future  -     Hepatitis B Surface Antigen; Future  -     Hepatitis C Antibody; Future  -     Quantiferon Gold TB; Future  -     HLA B27 Antigen; Future  -     X-Ray Cervical Spine AP And Lateral; Future  -     X-Ray Thoracic Spine AP Lateral; Future  -     X-Ray Lumbar Spine Ap And Lateral; Future  -     Zinc; Future  -     TESTOSTERONE; Future  -     Estrogens, Total; Future  -     celecoxib (CELEBREX) 100 MG capsule; Take 1 capsule (100 mg total) by mouth once daily.  -     Discontinue: ketorolac injection 30 mg  -     cyanocobalamin injection 1,000 mcg  -     cyanocobalamin 1,000 mcg/mL injection; Inject 1 mL (1,000 mcg total) into the skin every 7 days.  -     syringe, disposable, 1 mL Syrg; 1 Syringe by Misc.(Non-Drug; Combo Route) route once a week.  -     needle, disp, 30 gauge 30 gauge x 1/2" Ndle; 1 each by Misc.(Non-Drug; Combo Route) route once a week.  -     blunt needle, disposable 18 x 1 1/2 " Ndle; 1 Syringe by Misc.(Non-Drug; Combo Route) route once a week.  -     folic acid-vit B6-vit B12 (FOLBEE) 2.5-25-1 mg Tab; Take 1 tablet by mouth once daily.  -     ketorolac injection 60 mg    Type 2 diabetes mellitus with stage 3a chronic kidney disease, with long-term current use of insulin  -     INTRINSIC FACTOR BLOCKING AB; Future  -     CBC Auto Differential; Future  -     Comprehensive Metabolic Panel; Future  -     C-Reactive Protein; Future  -     Sedimentation rate; Future  -     GURMEET Screen w/Reflex; Future  -     Aldolase; Future  -     CK; Future  -     Cyclic Citrullinated Peptide Antibody, IgG; Future  -     Rheumatoid Factor; Future  -     TSH; Future  -     Thyroid Peroxidase Antibody; Future  -     Anti-Thyroglobulin Antibody; Future  -     Hepatitis B Core Antibody, IgM; Future  -     Hepatitis B Surface Ab, Qualitative; Future  -     Hepatitis B Surface Antigen; Future  -     Hepatitis C Antibody; Future  -     Quantiferon Gold TB; Future  -     HLA B27 Antigen; Future  -     X-Ray Cervical " "Spine AP And Lateral; Future  -     X-Ray Thoracic Spine AP Lateral; Future  -     X-Ray Lumbar Spine Ap And Lateral; Future  -     Zinc; Future  -     TESTOSTERONE; Future  -     Estrogens, Total; Future  -     celecoxib (CELEBREX) 100 MG capsule; Take 1 capsule (100 mg total) by mouth once daily.  -     Discontinue: ketorolac injection 30 mg  -     cyanocobalamin injection 1,000 mcg  -     cyanocobalamin 1,000 mcg/mL injection; Inject 1 mL (1,000 mcg total) into the skin every 7 days.  -     syringe, disposable, 1 mL Syrg; 1 Syringe by Misc.(Non-Drug; Combo Route) route once a week.  -     needle, disp, 30 gauge 30 gauge x 1/2" Ndle; 1 each by Misc.(Non-Drug; Combo Route) route once a week.  -     blunt needle, disposable 18 x 1 1/2 " Ndle; 1 Syringe by Misc.(Non-Drug; Combo Route) route once a week.  -     folic acid-vit B6-vit B12 (FOLBEE) 2.5-25-1 mg Tab; Take 1 tablet by mouth once daily.  -     ketorolac injection 60 mg    Vitamin D deficiency  -     INTRINSIC FACTOR BLOCKING AB; Future  -     CBC Auto Differential; Future  -     Comprehensive Metabolic Panel; Future  -     C-Reactive Protein; Future  -     Sedimentation rate; Future  -     GURMEET Screen w/Reflex; Future  -     Aldolase; Future  -     CK; Future  -     Cyclic Citrullinated Peptide Antibody, IgG; Future  -     Rheumatoid Factor; Future  -     TSH; Future  -     Thyroid Peroxidase Antibody; Future  -     Anti-Thyroglobulin Antibody; Future  -     Hepatitis B Core Antibody, IgM; Future  -     Hepatitis B Surface Ab, Qualitative; Future  -     Hepatitis B Surface Antigen; Future  -     Hepatitis C Antibody; Future  -     Quantiferon Gold TB; Future  -     HLA B27 Antigen; Future  -     X-Ray Cervical Spine AP And Lateral; Future  -     X-Ray Thoracic Spine AP Lateral; Future  -     X-Ray Lumbar Spine Ap And Lateral; Future  -     Zinc; Future  -     TESTOSTERONE; Future  -     Estrogens, Total; Future    Bronchitis  -     azithromycin (Z-LEONILA) 250 MG " tablet; Take 2 tablets by mouth on day 1; Take 1 tablet by mouth on days 2-5  -     dexAMETHasone (DECADRON) 1 MG Tab; Take 1 tablet (1 mg total) by mouth once daily. for 10 days    More than 50% of the 60 minute encounter was spent face to face counseling the patient regarding current status and future plan of care as well as side of the medications. All questions were answered to patient's satisfaction

## 2022-02-03 ENCOUNTER — HOSPITAL ENCOUNTER (OUTPATIENT)
Dept: RADIOLOGY | Facility: HOSPITAL | Age: 62
Discharge: HOME | End: 2022-02-03
Attending: INTERNAL MEDICINE
Payer: COMMERCIAL

## 2022-02-03 DIAGNOSIS — M47.9 SPONDYLOSIS: ICD-10-CM

## 2022-02-03 DIAGNOSIS — E55.9 VITAMIN D DEFICIENCY: ICD-10-CM

## 2022-02-03 DIAGNOSIS — N18.31 TYPE 2 DIABETES MELLITUS WITH STAGE 3A CHRONIC KIDNEY DISEASE, WITH LONG-TERM CURRENT USE OF INSULIN: ICD-10-CM

## 2022-02-03 DIAGNOSIS — Z79.4 TYPE 2 DIABETES MELLITUS WITH STAGE 3A CHRONIC KIDNEY DISEASE, WITH LONG-TERM CURRENT USE OF INSULIN: ICD-10-CM

## 2022-02-03 DIAGNOSIS — E53.8 B12 DEFICIENCY: ICD-10-CM

## 2022-02-03 DIAGNOSIS — E11.22 TYPE 2 DIABETES MELLITUS WITH STAGE 3A CHRONIC KIDNEY DISEASE, WITH LONG-TERM CURRENT USE OF INSULIN: ICD-10-CM

## 2022-02-03 PROCEDURE — 72100 X-RAY EXAM L-S SPINE 2/3 VWS: CPT | Mod: TC,FY

## 2022-02-03 PROCEDURE — 72040 X-RAY EXAM NECK SPINE 2-3 VW: CPT | Mod: TC,FY

## 2022-02-03 PROCEDURE — 72040 X-RAY EXAM NECK SPINE 2-3 VW: CPT | Mod: 26,,, | Performed by: RADIOLOGY

## 2022-02-03 PROCEDURE — 72070 X-RAY EXAM THORAC SPINE 2VWS: CPT | Mod: 26,,, | Performed by: RADIOLOGY

## 2022-02-03 PROCEDURE — 72040 XR CERVICAL SPINE AP LATERAL: ICD-10-PCS | Mod: 26,,, | Performed by: RADIOLOGY

## 2022-02-03 PROCEDURE — 72100 X-RAY EXAM L-S SPINE 2/3 VWS: CPT | Mod: 26,,, | Performed by: RADIOLOGY

## 2022-02-03 PROCEDURE — 72100 XR LUMBAR SPINE AP AND LATERAL: ICD-10-PCS | Mod: 26,,, | Performed by: RADIOLOGY

## 2022-02-03 PROCEDURE — 72070 XR THORACIC SPINE AP LATERAL: ICD-10-PCS | Mod: 26,,, | Performed by: RADIOLOGY

## 2022-02-03 PROCEDURE — 72070 X-RAY EXAM THORAC SPINE 2VWS: CPT | Mod: TC,FY

## 2022-02-09 ENCOUNTER — OFFICE VISIT (OUTPATIENT)
Dept: ENDOCRINOLOGY | Facility: CLINIC | Age: 62
End: 2022-02-09
Payer: COMMERCIAL

## 2022-02-09 VITALS
DIASTOLIC BLOOD PRESSURE: 78 MMHG | BODY MASS INDEX: 36.58 KG/M2 | HEIGHT: 70 IN | TEMPERATURE: 99 F | WEIGHT: 255.5 LBS | SYSTOLIC BLOOD PRESSURE: 140 MMHG | HEART RATE: 75 BPM | OXYGEN SATURATION: 98 %

## 2022-02-09 DIAGNOSIS — Z79.4 TYPE 2 DIABETES MELLITUS WITH STAGE 3A CHRONIC KIDNEY DISEASE, WITH LONG-TERM CURRENT USE OF INSULIN: Primary | ICD-10-CM

## 2022-02-09 DIAGNOSIS — E11.69 HYPERLIPIDEMIA ASSOCIATED WITH TYPE 2 DIABETES MELLITUS: ICD-10-CM

## 2022-02-09 DIAGNOSIS — E11.49 DIABETES MELLITUS TYPE 2 WITH NEUROLOGICAL MANIFESTATIONS: ICD-10-CM

## 2022-02-09 DIAGNOSIS — I15.2 HYPERTENSION ASSOCIATED WITH DIABETES: ICD-10-CM

## 2022-02-09 DIAGNOSIS — E78.5 HYPERLIPIDEMIA ASSOCIATED WITH TYPE 2 DIABETES MELLITUS: ICD-10-CM

## 2022-02-09 DIAGNOSIS — E11.59 HYPERTENSION ASSOCIATED WITH DIABETES: ICD-10-CM

## 2022-02-09 DIAGNOSIS — E55.9 VITAMIN D DEFICIENCY: ICD-10-CM

## 2022-02-09 DIAGNOSIS — E66.01 CLASS 2 SEVERE OBESITY DUE TO EXCESS CALORIES WITH SERIOUS COMORBIDITY AND BODY MASS INDEX (BMI) OF 36.0 TO 36.9 IN ADULT: ICD-10-CM

## 2022-02-09 DIAGNOSIS — E11.22 TYPE 2 DIABETES MELLITUS WITH STAGE 3A CHRONIC KIDNEY DISEASE, WITH LONG-TERM CURRENT USE OF INSULIN: Primary | ICD-10-CM

## 2022-02-09 DIAGNOSIS — N18.31 TYPE 2 DIABETES MELLITUS WITH STAGE 3A CHRONIC KIDNEY DISEASE, WITH LONG-TERM CURRENT USE OF INSULIN: Primary | ICD-10-CM

## 2022-02-09 DIAGNOSIS — G47.33 OSA ON CPAP: ICD-10-CM

## 2022-02-09 PROBLEM — E66.09 CLASS 2 OBESITY DUE TO EXCESS CALORIES WITHOUT SERIOUS COMORBIDITY WITH BODY MASS INDEX (BMI) OF 38.0 TO 38.9 IN ADULT: Status: RESOLVED | Noted: 2019-09-10 | Resolved: 2022-02-09

## 2022-02-09 PROBLEM — E66.812 CLASS 2 OBESITY DUE TO EXCESS CALORIES WITHOUT SERIOUS COMORBIDITY WITH BODY MASS INDEX (BMI) OF 38.0 TO 38.9 IN ADULT: Status: RESOLVED | Noted: 2019-09-10 | Resolved: 2022-02-09

## 2022-02-09 PROBLEM — E66.9 CLASS 1 OBESITY IN ADULT: Chronic | Status: RESOLVED | Noted: 2021-09-29 | Resolved: 2022-02-09

## 2022-02-09 PROBLEM — E66.812 CLASS 2 SEVERE OBESITY DUE TO EXCESS CALORIES WITH SERIOUS COMORBIDITY AND BODY MASS INDEX (BMI) OF 36.0 TO 36.9 IN ADULT: Status: ACTIVE | Noted: 2022-02-09

## 2022-02-09 PROBLEM — E66.811 CLASS 1 OBESITY IN ADULT: Chronic | Status: RESOLVED | Noted: 2021-09-29 | Resolved: 2022-02-09

## 2022-02-09 PROCEDURE — 99999 PR PBB SHADOW E&M-EST. PATIENT-LVL V: CPT | Mod: PBBFAC,,, | Performed by: NURSE PRACTITIONER

## 2022-02-09 PROCEDURE — 3044F PR MOST RECENT HEMOGLOBIN A1C LEVEL <7.0%: ICD-10-PCS | Mod: CPTII,S$GLB,, | Performed by: NURSE PRACTITIONER

## 2022-02-09 PROCEDURE — 3078F PR MOST RECENT DIASTOLIC BLOOD PRESSURE < 80 MM HG: ICD-10-PCS | Mod: CPTII,S$GLB,, | Performed by: NURSE PRACTITIONER

## 2022-02-09 PROCEDURE — 3078F DIAST BP <80 MM HG: CPT | Mod: CPTII,S$GLB,, | Performed by: NURSE PRACTITIONER

## 2022-02-09 PROCEDURE — 3077F SYST BP >= 140 MM HG: CPT | Mod: CPTII,S$GLB,, | Performed by: NURSE PRACTITIONER

## 2022-02-09 PROCEDURE — 3008F BODY MASS INDEX DOCD: CPT | Mod: CPTII,S$GLB,, | Performed by: NURSE PRACTITIONER

## 2022-02-09 PROCEDURE — 99999 PR PBB SHADOW E&M-EST. PATIENT-LVL V: ICD-10-PCS | Mod: PBBFAC,,, | Performed by: NURSE PRACTITIONER

## 2022-02-09 PROCEDURE — 3044F HG A1C LEVEL LT 7.0%: CPT | Mod: CPTII,S$GLB,, | Performed by: NURSE PRACTITIONER

## 2022-02-09 PROCEDURE — 99214 PR OFFICE/OUTPT VISIT, EST, LEVL IV, 30-39 MIN: ICD-10-PCS | Mod: S$GLB,,, | Performed by: NURSE PRACTITIONER

## 2022-02-09 PROCEDURE — 1159F MED LIST DOCD IN RCRD: CPT | Mod: CPTII,S$GLB,, | Performed by: NURSE PRACTITIONER

## 2022-02-09 PROCEDURE — 4010F ACE/ARB THERAPY RXD/TAKEN: CPT | Mod: CPTII,S$GLB,, | Performed by: NURSE PRACTITIONER

## 2022-02-09 PROCEDURE — 99214 OFFICE O/P EST MOD 30 MIN: CPT | Mod: S$GLB,,, | Performed by: NURSE PRACTITIONER

## 2022-02-09 PROCEDURE — 3077F PR MOST RECENT SYSTOLIC BLOOD PRESSURE >= 140 MM HG: ICD-10-PCS | Mod: CPTII,S$GLB,, | Performed by: NURSE PRACTITIONER

## 2022-02-09 PROCEDURE — 4010F PR ACE/ARB THEARPY RXD/TAKEN: ICD-10-PCS | Mod: CPTII,S$GLB,, | Performed by: NURSE PRACTITIONER

## 2022-02-09 PROCEDURE — 1159F PR MEDICATION LIST DOCUMENTED IN MEDICAL RECORD: ICD-10-PCS | Mod: CPTII,S$GLB,, | Performed by: NURSE PRACTITIONER

## 2022-02-09 PROCEDURE — 3008F PR BODY MASS INDEX (BMI) DOCUMENTED: ICD-10-PCS | Mod: CPTII,S$GLB,, | Performed by: NURSE PRACTITIONER

## 2022-02-09 RX ORDER — INSULIN DEGLUDEC 200 U/ML
INJECTION, SOLUTION SUBCUTANEOUS
Qty: 3 PEN | Refills: 12 | Status: ON HOLD | OUTPATIENT
Start: 2022-02-09 | End: 2022-04-19 | Stop reason: SDUPTHER

## 2022-02-09 RX ORDER — PEN NEEDLE, DIABETIC 30 GX3/16"
1 NEEDLE, DISPOSABLE MISCELLANEOUS 4 TIMES DAILY
Qty: 100 EACH | Refills: 11 | Status: SHIPPED | OUTPATIENT
Start: 2022-02-09 | End: 2023-02-23 | Stop reason: SDUPTHER

## 2022-02-09 NOTE — PROGRESS NOTES
CC: This 61 y.o. male presents for management of diabetes  and chronic conditions pending review including HTN, HLP, CKD 3, Obesity, DM PN, DANNY    HPI: He was diagnosed with T2DM in 2005. Has never been hospitalized r/t DM.  Family hx of DM: maybe dad?, sister's pre-diabetes   He has had his COVID vaccines and booster    No acute events since his last visit   Not currently checking his bg   Hypoglycemic symptoms - none     Diet: Eats 2-3 Meals a day, snacks- rarely-   Trail mix bars, raisin bread  B- frozen dinner  L sandwich  S- skips often   Exercise:  Stopped going to [employer]   CURRENT DM MEDS: metformin 1000 mg bid, Tresiba 45 u qhs, farxiga 5 mg, Trulicity 1.5 mg weekly (Sundays)   Vial/pen:  Uses  pen  Glucometer type:   Freestyle      Standards of Care:  Eye exam: Dr Dickerson  11/2021  +DR-       Retired      Wife is a retired psychiatrist     ROS:   Gen: Appetite good, + weight loss 7 lbs   Eyes: Denies visual disturbances  Resp: no SOB or LEYVA, no cough  Cardiac: No palpitations, chest pain, no edema   GI: No nausea or vomiting, diarrhea, constipation, or abdominal pain.  /GYN: 1+ nocturia, no burning or pain.   MS/Neuro: + numbness/ tingling in BLE;speech clear    PE:  GENERAL: Well developed, well nourished.  PSYCH: AAOx3, appropriate mood and affect, pleasant expression, conversant, appears relaxed, well groomed.   EYES: Conjunctiva, corneas clear  NECK: Supple, trachea midline   ABDOMEN: Soft, non-tender, non-distended   SKIN:   no acanthosis nigracans.  FOOT EXAMINATION: 8/18/2021  + foot deformity- left hammer toe + callus formation, +Onychomycosis, moist area noted between 1st and 2nd toe on left foot.  Decreased hair growth present over toes/feet.    Protective sensation absent bilaterally with 10 gram monofilament and absent vibratory sensation bilaterally, +1 dorsalis pedis and posterior pulses noted.  Seeing Dr Khan today     Personally reviewed Past Medical, Surgical,  "Social History.    BP (!) 140/78 (BP Location: Left arm, Patient Position: Sitting, BP Method: Large (Manual))   Pulse 75   Temp 98.5 °F (36.9 °C) (Oral)   Ht 5' 10" (1.778 m)   Wt 115.9 kg (255 lb 8.2 oz)   SpO2 98%   BMI 36.66 kg/m²        Personally reviewed the below labs:      Chemistry        Component Value Date/Time     02/03/2022 1142    K 5.3 (H) 02/03/2022 1142     02/03/2022 1142    CO2 27 02/03/2022 1142    BUN 20 02/03/2022 1142    CREATININE 1.3 02/03/2022 1142     02/03/2022 1142        Component Value Date/Time    CALCIUM 10.0 02/03/2022 1142    ALKPHOS 77 02/03/2022 1142    AST 14 02/03/2022 1142    ALT 18 02/03/2022 1142    BILITOT 1.0 02/03/2022 1142    ESTGFRAFRICA >60 02/03/2022 1142    EGFRNONAA 58 (A) 02/03/2022 1142        Lab Results   Component Value Date    TSH 1.761 02/03/2022       Recent Labs   Lab 09/29/21  0315   LDL Cholesterol 53.4 L   HDL 37 L   Cholesterol 107 L        Results for orders placed or performed in visit on 08/14/21   Vitamin D   Result Value Ref Range    Vit D, 25-Hydroxy 50 30 - 96 ng/mL     No results found for this or any previous visit.      Lab Results   Component Value Date    MICALBCREAT 5.1 08/14/2021       Hemoglobin A1C   Date Value Ref Range Status   02/03/2022 6.8 (H) 4.0 - 5.6 % Final     Comment:     ADA Screening Guidelines:  5.7-6.4%  Consistent with prediabetes  >or=6.5%  Consistent with diabetes    High levels of fetal hemoglobin interfere with the HbA1C  assay. Heterozygous hemoglobin variants (HbS, HgC, etc)do  not significantly interfere with this assay.   However, presence of multiple variants may affect accuracy.     09/29/2021 6.0 4.5 - 6.2 % Final     Comment:     According to ADA guidelines, hemoglobin A1C <7.0% represents  optimal control in non-pregnant diabetic patients.  Different  metrics may apply to specific populations.   Standards of Medical Care in Diabetes - 2016.    For the purpose of screening for the " presence of diabetes:  <5.7%     Consistent with the absence of diabetes  5.7-6.4%  Consistent with increasing risk for diabetes   (prediabetes)  >or=6.5%  Consistent with diabetes    Currently no consensus exists for use of hemoglobin A1C  for diagnosis of diabetes for children.     08/14/2021 5.5 4.0 - 5.6 % Final     Comment:     ADA Screening Guidelines:  5.7-6.4%  Consistent with prediabetes  >or=6.5%  Consistent with diabetes    High levels of fetal hemoglobin interfere with the HbA1C  assay. Heterozygous hemoglobin variants (HbS, HgC, etc)do  not significantly interfere with this assay.   However, presence of multiple variants may affect accuracy.          ASSESSMENT and PLAN:    1. T2DM controlled w DM PN, CKD 3-    Continue Tresiba U200 44 u qhs,Trulicity 1.5 mg weekly, metformin,and farxiga   recommend checking bg daily- stagger times   Notify me for any continued hypoglycemia     2. HTN - controlled, continue meds as previously prescribed and monitor.     3. HLP -   on statin therapy, LFTs WNL     4. Obesity- Body mass index is 36.66 kg/m².    Resume exercise and continue weight loss    5 DANNY- wears his cpap machine     6. Vitamin d deficiency - Continue ergo weekly (2 tabs),      Follow-up: in 6 months with lab prior

## 2022-02-16 ENCOUNTER — TELEPHONE (OUTPATIENT)
Dept: UROLOGY | Facility: CLINIC | Age: 62
End: 2022-02-16
Payer: COMMERCIAL

## 2022-02-16 NOTE — TELEPHONE ENCOUNTER
Spoke with patient scheduled with  in June for erectile dysfunction. Informed patient this appointment is scheduled incorrectly due to patient hasn't been seen by  in 3 years and is considered a new patient. Informed patient we can schedule him with the NP for a sooner appointment. Patient declined seeing NP and wants to see doctor.

## 2022-02-16 NOTE — TELEPHONE ENCOUNTER
----- Message from Monico Mendieta sent at 2/15/2022  7:07 PM CST -----  Regarding: Sooner Appt  Contact: [phone]  Sooner Apoointment Request    Caller is requesting a sooner appointment.  Caller declined first available appointment listed below.      Name of Caller: Sarwat    When is the first available appointment? 6/27/2022    Symptoms: ED    Would the patient rather a call back or a response via [facility]? Call Back    Best Call Back Number:[phone]    Additional Information: The has schedule an appt for 6/27/2022, but he would like to be seen much sooner.

## 2022-02-20 RX ORDER — DEXAMETHASONE 1 MG/1
TABLET ORAL
Qty: 10 TABLET | Refills: 1 | Status: SHIPPED | OUTPATIENT
Start: 2022-02-20 | End: 2022-04-08 | Stop reason: ALTCHOICE

## 2022-02-23 ENCOUNTER — OFFICE VISIT (OUTPATIENT)
Dept: PODIATRY | Facility: CLINIC | Age: 62
End: 2022-02-23
Payer: COMMERCIAL

## 2022-02-23 VITALS — WEIGHT: 253 LBS | BODY MASS INDEX: 36.22 KG/M2 | HEIGHT: 70 IN

## 2022-02-23 DIAGNOSIS — M21.619 BUNION: ICD-10-CM

## 2022-02-23 DIAGNOSIS — E11.49 DIABETES MELLITUS TYPE 2 WITH NEUROLOGICAL MANIFESTATIONS: Primary | ICD-10-CM

## 2022-02-23 DIAGNOSIS — L60.2 ONYCHOGRYPOSIS: ICD-10-CM

## 2022-02-23 DIAGNOSIS — M20.42 HAMMER TOES OF BOTH FEET: ICD-10-CM

## 2022-02-23 DIAGNOSIS — M20.41 HAMMER TOES OF BOTH FEET: ICD-10-CM

## 2022-02-23 DIAGNOSIS — L85.1 ACQUIRED KERATODERMA: ICD-10-CM

## 2022-02-23 DIAGNOSIS — I87.2 VENOUS INSUFFICIENCY OF BOTH LOWER EXTREMITIES: ICD-10-CM

## 2022-02-23 PROCEDURE — 1159F MED LIST DOCD IN RCRD: CPT | Mod: CPTII,S$GLB,, | Performed by: PODIATRIST

## 2022-02-23 PROCEDURE — 11056 ROUTINE FOOT CARE: ICD-10-PCS | Mod: S$GLB,,, | Performed by: PODIATRIST

## 2022-02-23 PROCEDURE — 4010F ACE/ARB THERAPY RXD/TAKEN: CPT | Mod: CPTII,S$GLB,, | Performed by: PODIATRIST

## 2022-02-23 PROCEDURE — 1160F PR REVIEW ALL MEDS BY PRESCRIBER/CLIN PHARMACIST DOCUMENTED: ICD-10-PCS | Mod: CPTII,S$GLB,, | Performed by: PODIATRIST

## 2022-02-23 PROCEDURE — 11056 PARNG/CUTG B9 HYPRKR LES 2-4: CPT | Mod: S$GLB,,, | Performed by: PODIATRIST

## 2022-02-23 PROCEDURE — 11721 DEBRIDE NAIL 6 OR MORE: CPT | Mod: 59,S$GLB,, | Performed by: PODIATRIST

## 2022-02-23 PROCEDURE — 3044F HG A1C LEVEL LT 7.0%: CPT | Mod: CPTII,S$GLB,, | Performed by: PODIATRIST

## 2022-02-23 PROCEDURE — 3008F PR BODY MASS INDEX (BMI) DOCUMENTED: ICD-10-PCS | Mod: CPTII,S$GLB,, | Performed by: PODIATRIST

## 2022-02-23 PROCEDURE — 1160F RVW MEDS BY RX/DR IN RCRD: CPT | Mod: CPTII,S$GLB,, | Performed by: PODIATRIST

## 2022-02-23 PROCEDURE — 4010F PR ACE/ARB THEARPY RXD/TAKEN: ICD-10-PCS | Mod: CPTII,S$GLB,, | Performed by: PODIATRIST

## 2022-02-23 PROCEDURE — 3008F BODY MASS INDEX DOCD: CPT | Mod: CPTII,S$GLB,, | Performed by: PODIATRIST

## 2022-02-23 PROCEDURE — 1159F PR MEDICATION LIST DOCUMENTED IN MEDICAL RECORD: ICD-10-PCS | Mod: CPTII,S$GLB,, | Performed by: PODIATRIST

## 2022-02-23 PROCEDURE — 99499 UNLISTED E&M SERVICE: CPT | Mod: S$GLB,,, | Performed by: PODIATRIST

## 2022-02-23 PROCEDURE — 99499 NO LOS: ICD-10-PCS | Mod: S$GLB,,, | Performed by: PODIATRIST

## 2022-02-23 PROCEDURE — 3044F PR MOST RECENT HEMOGLOBIN A1C LEVEL <7.0%: ICD-10-PCS | Mod: CPTII,S$GLB,, | Performed by: PODIATRIST

## 2022-02-23 PROCEDURE — 11721 ROUTINE FOOT CARE: ICD-10-PCS | Mod: 59,S$GLB,, | Performed by: PODIATRIST

## 2022-02-23 RX ORDER — AZITHROMYCIN 250 MG/1
500 TABLET, FILM COATED ORAL DAILY
COMMUNITY
Start: 2022-02-14 | End: 2022-03-18

## 2022-02-23 RX ORDER — BUDESONIDE 180 UG/1
AEROSOL, POWDER RESPIRATORY (INHALATION)
COMMUNITY
Start: 2022-02-07 | End: 2022-04-12

## 2022-02-23 NOTE — PROGRESS NOTES
"  [address]  [place] [address]  Phone: [phone]   [phone]    Patient's PCP:Lydia Matamoros MD  Referring Provider: No ref. provider found    Subjective:      Chief Complaint:: Nail Care (C&C Q9)    HPI  Sarwat Colunga is a 61 y.o. male who presents today for routine care of high risk feet. Patient has no complaints of pain at this time.     Systemic Doctor: Cynthia Mejia NP  Date Last Seen: 02/09/2022  Blood Sugar: 104  Hemoglobin A1c: 6.8    Vitals:    02/23/22 1412   Weight: 114.8 kg (253 lb)   Height: 5' 10" (1.778 m)   PainSc: 0-No pain      Shoe Size: 11.5    Past Surgical History:   Procedure Laterality Date    ANKLE SURGERY Right     ANKLE SURGERY Left     COLONOSCOPY N/A 5/17/2017    Procedure: COLONOSCOPY;  Surgeon: Carlos Hess MD;  Location: [facility];  Service: Endoscopy;  Laterality: N/A;    COLONOSCOPY W/ POLYPECTOMY      CYSTOSCOPY      CYSTOSCOPY N/A 10/23/2018    Procedure: CYSTOSCOPY;  Surgeon: Sarwat Viveros MD;  Location: [facility] OR;  Service: Urology;  Laterality: N/A;    CYSTOSCOPY WITH INSERTION OF MINIMALLY INVASIVE IMPLANT TO ENLARGE PROSTATIC URETHRA N/A 11/15/2018    Procedure: CYSTOSCOPY, WITH INSERTION OF UROLIFT IMPLANT;  Surgeon: Sarwat Viveros MD;  Location: [facility] OR;  Service: Urology;  Laterality: N/A;    DEBRIDEMENT TENNIS ELBOW      right    FOREARM SURGERY Right     tendon repair    FRACTURE SURGERY      right ankle orif    HERNIA REPAIR      umbillical    PROSTATE BIOPSY      QUADRICEPS REPAIR      right    TONSILLECTOMY      TRANSRECTAL ULTRASOUND EXAMINATION N/A 10/23/2018    Procedure: ULTRASOUND, RECTAL APPROACH;  Surgeon: Sarwat Viveros MD;  Location: [facility] OR;  Service: Urology;  Laterality: N/A;    TRICEPS TENDON RELEASE      tendon repair left tricep    VASECTOMY       Past Medical History:   Diagnosis Date    Arthritis     Benign localized hyperplasia of prostate without urinary obstruction and other " lower urinary tract symptoms (LUTS)     Body mass index 37.0-37.9, adult     Diabetes mellitus     Esophageal reflux     Hypertension     Other and unspecified hyperlipidemia     Other testicular hypofunction     Polyneuropathy in diabetes(357.2)     PONV (postoperative nausea and vomiting)     Rosacea     Sleep apnea     uses CPAP    Type II or unspecified type diabetes mellitus with neurological manifestations, uncontrolled(250.62)      Family History   Problem Relation Age of Onset    Pulmonary embolism Mother     No Known Problems Father     Heart disease Other     Heart attack Other     Hypertension Other     Hyperlipidemia Other     Arthritis Other     Clotting disorder Other     Mental illness Other     Diabetes Other     Cancer Other     Melanoma Neg Hx     Psoriasis Neg Hx     Lupus Neg Hx     Eczema Neg Hx         Social History:   Marital Status:   Alcohol History:  reports no history of alcohol use.  Tobacco History:  reports that he has never smoked. He has never used smokeless tobacco.  Drug History:  reports no history of drug use.    Review of patient's allergies indicates:   Allergen Reactions    Bactrim [sulfamethoxazole-trimethoprim] Other (See Comments)     Dizziness,lightheaded, heavy chest    Ciprofloxacin Other (See Comments)     CONFUSED, OFF BALANCE, WOKE UP AT NITE    Doxycycline Hives    Lamictal [lamotrigine] Rash    Trileptal [oxcarbazepine]        Current Outpatient Medications   Medication Sig Dispense Refill    ARIPiprazole (ABILIFY) 10 MG Tab Take 10 mg by mouth once daily.      ARIPiprazole (ABILIFY) 5 MG Tab Take 5 mg by mouth once daily.      aspirin (ECOTRIN) 81 MG EC tablet Take 81 mg by mouth once daily.      atorvastatin (LIPITOR) 40 MG tablet TAKE 1 TABLET(40 MG) BY MOUTH EVERY EVENING (Patient taking differently: Take 40 mg by mouth every evening. TAKE 1 TABLET(40 MG) BY MOUTH EVERY EVENING) 90 tablet 4    azithromycin (Z-LEONILA) 250  "MG tablet Take 500 mg by mouth once daily.      BESIVANCE 0.6 % DrpS       blood sugar diagnostic Strp To check BG 2 times daily, to use with insurance preferred meter 200 strip 4    blood-glucose meter (FREESTYLE LITE METER) kit CHECK BLOOD GLUCOSE TWICE A DAY. 1 each 0    blunt needle, disposable 18 x 1 1/2 " Ndle 1 Syringe by Misc.(Non-Drug; Combo Route) route once a week. 25 each 3    buPROPion (WELLBUTRIN XL) 150 MG TB24 tablet Take 150 mg by mouth once daily.      celecoxib (CELEBREX) 100 MG capsule Take 1 capsule (100 mg total) by mouth once daily. 30 capsule 4    chlorthalidone (HYGROTEN) 25 MG Tab TAKE 1 TABLET(25 MG) BY MOUTH EVERY DAY (Patient taking differently: Take 25 mg by mouth once daily. TAKE 1 TABLET(25 MG) BY MOUTH EVERY DAY) 90 tablet 0    cyanocobalamin 1,000 mcg/mL injection Inject 1 mL (1,000 mcg total) into the skin every 7 days. 12 mL 3    dexAMETHasone (DECADRON) 1 MG Tab TAKE 1 TABLET BY MOUTH ONCE DAILY FOR 10 DAYS 10 tablet 1    dexmethylphenidate (FOCALIN XR) 30 mg 24 hr capsule Take 1 capsule by mouth once daily.      dexmethylphenidate (FOCALIN) 10 MG tablet Take 10 mg by mouth once daily.      difluprednate (DUREZOL) 0.05 % Drop ophthalmic solution       divalproex (DEPAKOTE) 250 MG EC tablet Take 3 tablets by mouth 2 (two) times a day.      ergocalciferol (ERGOCALCIFEROL) 50,000 unit Cap TAKE 2 CAPSULES BY MOUTH WEEKLY 24 capsule 4    esomeprazole (NEXIUM) 40 MG capsule Take 1 capsule (40 mg total) by mouth before breakfast. (Patient taking differently: Take 20 mg by mouth before breakfast.) 30 capsule 5    FARXIGA 5 mg Tab tablet TAKE 1 TABLET(5 MG) BY MOUTH EVERY DAY (Patient taking differently: Take 5 mg by mouth once daily.) 90 tablet 4    finasteride (PROSCAR) 5 mg tablet TAKE 1 TABLET(5 MG) BY MOUTH EVERY DAY (Patient taking differently: Take 5 mg by mouth once daily. DO NOT CRUSH OR CHEW; SWALLOW WHOLE.) 30 tablet 6    folic acid-vit B6-vit B12 (FOLBEE) " "2.5-25-1 mg Tab Take 1 tablet by mouth once daily. 90 each 3    insulin degludec (TRESIBA FLEXTOUCH U-200) 200 unit/mL (3 mL) insulin pen 44 u qhs 3 pen 12    lancets Misc To check BG 2 times daily, to use with insurance preferred meter 200 each 4    levomefolate calcium (ELFOLATE) 15 mg Tab Take 1 tablet by mouth once daily.      lisinopriL (PRINIVIL,ZESTRIL) 5 MG tablet TAKE 1 TABLET BY MOUTH EVERY DAY (Patient taking differently: Take 5 mg by mouth once daily.) 90 tablet 3    LORazepam (ATIVAN) 1 MG tablet Take 0.5-1 mg by mouth daily as needed.      metFORMIN (GLUCOPHAGE) 1000 MG tablet TAKE 1 TABLET(1000 MG) BY MOUTH TWICE DAILY WITH MEALS (Patient taking differently: Take 1,000 mg by mouth 2 (two) times daily with meals.) 180 tablet 3    mupirocin (BACTROBAN) 2 % ointment Apply topically 3 (three) times daily. HCS      needle, disp, 30 gauge 30 gauge x 1/2" Ndle 1 each by Misc.(Non-Drug; Combo Route) route once a week. 25 each 3    pen needle, diabetic (BD ULTRA-FINE MINI PEN NEEDLE) 31 gauge x 3/16" Ndle 1 each by Misc.(Non-Drug; Combo Route) route 4 (four) times daily. 100 each 11    PROLENSA 0.07 % Drop       PULMICORT FLEXHALER 180 mcg/actuation AePB SMARTSI Puff(s) By Mouth Twice Daily      syringe, disposable, 1 mL Syrg 1 Syringe by Misc.(Non-Drug; Combo Route) route once a week. 25 each 0    TRULICITY 1.5 mg/0.5 mL pen injector ADMINISTER 0.5 ML UNDER THE SKIN EVERY 7 DAYS (Patient taking differently: Inject 0.5 mg into the skin every 7 days. SUNDAYS) 12 pen 3    pregabalin (LYRICA) 150 MG capsule Take 1 capsule (150 mg total) by mouth 2 (two) times daily. 180 capsule 1     No current facility-administered medications for this visit.       Review of Systems   Constitutional: Negative.  Negative for chills, fatigue, fever and unexpected weight change.   HENT: Negative.  Negative for hearing loss and trouble swallowing.    Eyes: Negative.  Negative for photophobia and visual disturbance. "   Respiratory: Negative.  Negative for cough, shortness of breath and wheezing.    Cardiovascular: Positive for leg swelling. Negative for chest pain and palpitations.   Gastrointestinal: Negative.  Negative for abdominal pain and nausea.   Endocrine: Negative.    Genitourinary: Negative.  Negative for dysuria and frequency.   Musculoskeletal: Negative.  Negative for arthralgias, back pain, gait problem, joint swelling and myalgias.   Skin: Negative.  Negative for rash and wound.   Allergic/Immunologic: Negative.    Neurological: Positive for numbness. Negative for tremors, seizures, weakness and headaches.   Hematological: Negative.  Does not bruise/bleed easily.   Psychiatric/Behavioral: Negative.          Objective:        Physical Exam:   Foot Exam    General  General Appearance: appears stated age and healthy   Orientation: alert and oriented to person, place, and time   Affect: appropriate   Gait: unimpaired       Right Foot/Ankle     Inspection and Palpation  Ecchymosis: none  Tenderness: none   Swelling: (Mild edema of the lower extremity)  Arch: normal  Hammertoes: second toe, third toe, fourth toe and fifth toe  Skin Exam: callus;   Neurovascular  Dorsalis pedis: 2+  Posterior tibial: 2+  Capillary Refill: 2+  Varicose veins: present  Saphenous nerve sensation: diminished  Tibial nerve sensation: diminished  Superficial peroneal nerve sensation: diminished  Deep peroneal nerve sensation: diminished  Sural nerve sensation: diminished    Edema  Type of edema: non-pitting    Muscle Strength  Ankle dorsiflexion: 5  Ankle plantar flexion: 5  Ankle inversion: 5  Ankle eversion: 5  Great toe extension: 5  Great toe flexion: 5    Range of Motion    Normal right ankle ROM    Tests  Anterior drawer: negative   Talar tilt: negative   PT Tinel's sign: negative    Paresthesia: positive  Comments  Nails 1 through 5 are thickened, discolored, dystrophic, and elongated with subungual debris    Skin is thin, shiny, and  atrophic with diminished pedal hair growth       Left Foot/Ankle      Inspection and Palpation  Ecchymosis: none  Tenderness: none   Swelling: (Mild edema of the lower extremity)  Arch: normal  Hammertoes: second toe, third toe, fourth toe and fifth toe  Hallux valgus: yes  Skin Exam: callus; no drainage and no erythema   Neurovascular  Dorsalis pedis: 2+  Posterior tibial: 2+  Capillary refill: 2+  Varicose veins: present  Saphenous nerve sensation: diminished  Tibial nerve sensation: diminished  Superficial peroneal nerve sensation: diminished  Deep peroneal nerve sensation: diminished  Sural nerve sensation: diminished    Edema  Type of edema: non-pitting    Muscle Strength  Ankle dorsiflexion: 5  Ankle plantar flexion: 5  Ankle inversion: 5  Ankle eversion: 5  Great toe extension: 5  Great toe flexion: 5    Range of Motion    Normal left ankle ROM    Tests  Anterior drawer: negative   Talar tilt: negative   PT Tinel's sign: negative  Paresthesia: positive  Comments  Nails 1 through 5 are thickened, discolored, dystrophic, and elongated with subungual debris    Skin is thin, shiny, and atrophic with diminished pedal hair growth     Physical Exam  Cardiovascular:      Pulses:           Dorsalis pedis pulses are 2+ on the right side and 2+ on the left side.        Posterior tibial pulses are 2+ on the right side and 2+ on the left side.   Musculoskeletal:      Left foot: Bunion present.   Feet:      Right foot:      Skin integrity: Callus present.      Left foot:      Skin integrity: Callus present. No erythema.         Imaging: none            Assessment:       1. Diabetes mellitus type 2 with neurological manifestations    2. Venous insufficiency of both lower extremities    3. Hammer toes of both feet    4. Bunion - Left Foot    5. Onychogryposis    6. Acquired keratoderma      Plan:   Diabetes mellitus type 2 with neurological manifestations  -     Routine Foot Care    Venous insufficiency of both lower  "extremities  -     Routine Foot Care    Hammer toes of both feet    Bunion - Left Foot    Onychogryposis  -     Routine Foot Care    Acquired keratoderma  -     Routine Foot Care      Follow up if symptoms worsen or fail to improve.    Routine Foot Care    Date/Time: 2/23/2022 2:00 PM  Performed by: Alexi Khan DPM  Authorized by: Alexi Khan DPM     Time out: Immediately prior to procedure a "time out" was called to verify the correct patient, procedure, equipment, support staff and site/side marked as required.    Consent Done?:  Not Needed  Hyperkeratotic Skin Lesions?: Yes    Number of trimmed lesions:  2  Location(s):  Right 1st Toe and Left 2nd Toe    Nail Care Type:  Debride  Location(s): All  (Left 1st Toe, Left 3rd Toe, Left 2nd Toe, Left 4th Toe, Left 5th Toe, Right 1st Toe, Right 2nd Toe, Right 3rd Toe, Right 4th Toe and Right 5th Toe)  Patient tolerance:  Patient tolerated the procedure well with no immediate complications     Instruments Used: Nail Nipper   Manually reduced with electric .                 Counseling:     I provided patient education verbally regarding:   Patient diagnosis, treatment options, as well as alternatives, risks, and benefits.     Counseled patient on the aspects of diabetes and how it pertains to the feet.  I explained the importance of proper diabetic foot care and how it is essential for the health of their feet.      Shoe inspection. Patient instructed on proper foot hygeine. We discussed wearing proper shoe gear, daily foot inspections, never walking without protective shoe gear, never putting sharp instruments to feet, routine podiatric nail visits every 2-3 months.        This note was created using Dragon voice recognition software that occasionally misinterpreted phrases or words.                 "

## 2022-03-01 ENCOUNTER — PATIENT MESSAGE (OUTPATIENT)
Dept: RHEUMATOLOGY | Facility: CLINIC | Age: 62
End: 2022-03-01
Payer: COMMERCIAL

## 2022-03-05 NOTE — TELEPHONE ENCOUNTER
As last visit 2018, and considered new pt as noted, can book as new pt in avail public slot per his availability

## 2022-03-10 ENCOUNTER — PATIENT MESSAGE (OUTPATIENT)
Dept: RHEUMATOLOGY | Facility: CLINIC | Age: 62
End: 2022-03-10
Payer: COMMERCIAL

## 2022-03-11 ENCOUNTER — PATIENT MESSAGE (OUTPATIENT)
Dept: ENDOCRINOLOGY | Facility: CLINIC | Age: 62
End: 2022-03-11
Payer: COMMERCIAL

## 2022-03-11 DIAGNOSIS — E11.40 TYPE 2 DIABETES, CONTROLLED, WITH NEUROPATHY: ICD-10-CM

## 2022-03-11 RX ORDER — DAPAGLIFLOZIN 5 MG/1
5 TABLET, FILM COATED ORAL DAILY
Qty: 90 TABLET | Refills: 4 | Status: SHIPPED | OUTPATIENT
Start: 2022-03-11 | End: 2023-02-23 | Stop reason: SDUPTHER

## 2022-03-11 NOTE — TELEPHONE ENCOUNTER
Spoke with patient informed him of recommendations. Appointment scheduled for 4/8. Patient verbally voiced understanding.

## 2022-03-18 ENCOUNTER — OFFICE VISIT (OUTPATIENT)
Dept: RHEUMATOLOGY | Facility: CLINIC | Age: 62
End: 2022-03-18
Payer: COMMERCIAL

## 2022-03-18 DIAGNOSIS — E11.22 TYPE 2 DIABETES MELLITUS WITH STAGE 3A CHRONIC KIDNEY DISEASE, WITH LONG-TERM CURRENT USE OF INSULIN: ICD-10-CM

## 2022-03-18 DIAGNOSIS — R97.20 BPH WITH ELEVATED PSA: ICD-10-CM

## 2022-03-18 DIAGNOSIS — M47.9 SPONDYLOSIS: Primary | ICD-10-CM

## 2022-03-18 DIAGNOSIS — R79.89 LOW TESTOSTERONE: ICD-10-CM

## 2022-03-18 DIAGNOSIS — M47.817 LUMBAR AND SACRAL ARTHRITIS: ICD-10-CM

## 2022-03-18 DIAGNOSIS — Z79.4 TYPE 2 DIABETES MELLITUS WITH STAGE 3A CHRONIC KIDNEY DISEASE, WITH LONG-TERM CURRENT USE OF INSULIN: ICD-10-CM

## 2022-03-18 DIAGNOSIS — E11.42 DIABETIC POLYNEUROPATHY ASSOCIATED WITH TYPE 2 DIABETES MELLITUS: ICD-10-CM

## 2022-03-18 DIAGNOSIS — N40.0 BPH WITH ELEVATED PSA: ICD-10-CM

## 2022-03-18 DIAGNOSIS — N18.31 TYPE 2 DIABETES MELLITUS WITH STAGE 3A CHRONIC KIDNEY DISEASE, WITH LONG-TERM CURRENT USE OF INSULIN: ICD-10-CM

## 2022-03-18 DIAGNOSIS — E11.610 DIABETIC NEUROPATHIC ARTHRITIS: ICD-10-CM

## 2022-03-18 PROCEDURE — 4010F ACE/ARB THERAPY RXD/TAKEN: CPT | Mod: CPTII,95,, | Performed by: INTERNAL MEDICINE

## 2022-03-18 PROCEDURE — 3044F HG A1C LEVEL LT 7.0%: CPT | Mod: CPTII,95,, | Performed by: INTERNAL MEDICINE

## 2022-03-18 PROCEDURE — 4010F PR ACE/ARB THEARPY RXD/TAKEN: ICD-10-PCS | Mod: CPTII,95,, | Performed by: INTERNAL MEDICINE

## 2022-03-18 PROCEDURE — 3044F PR MOST RECENT HEMOGLOBIN A1C LEVEL <7.0%: ICD-10-PCS | Mod: CPTII,95,, | Performed by: INTERNAL MEDICINE

## 2022-03-18 PROCEDURE — 99215 PR OFFICE/OUTPT VISIT, EST, LEVL V, 40-54 MIN: ICD-10-PCS | Mod: 95,,, | Performed by: INTERNAL MEDICINE

## 2022-03-18 PROCEDURE — 99215 OFFICE O/P EST HI 40 MIN: CPT | Mod: 95,,, | Performed by: INTERNAL MEDICINE

## 2022-03-18 RX ORDER — PIROXICAM 20 MG/1
20 CAPSULE ORAL DAILY
Qty: 30 CAPSULE | Refills: 11 | Status: SHIPPED | OUTPATIENT
Start: 2022-03-18 | End: 2022-10-25

## 2022-03-18 RX ORDER — FINASTERIDE 5 MG/1
5 TABLET, FILM COATED ORAL DAILY
Qty: 30 TABLET | Refills: 3 | Status: SHIPPED | OUTPATIENT
Start: 2022-03-18 | End: 2022-04-10

## 2022-03-18 NOTE — PROGRESS NOTES
Subjective:       Patient ID: Sarwat Colunga is a 61 y.o. male.    Chief Complaint: No chief complaint on file.    Follow up  62 yo male with a history of neck pain, back pain. told may have AS.he started noticing back pain, neck is stiff.  Patient complains of arthralgias and myalgias for which has been present for a few years. both MTP(s): 1st, 2nd, 3rd, 4th and 5th, is described as aching, pulsating, shooting and throbbing, and is constant, moderate .  Associated symptoms include: crepitation, decreased range of motion, edema, effusion, tenderness and warmth.dx with DM x 16 yrs and severe neuropathy.    Review of Systems   Constitutional: Negative for activity change, appetite change, chills, diaphoresis, fatigue, fever and unexpected weight change.   HENT: Negative for congestion, ear pain, facial swelling, mouth sores, nosebleeds, postnasal drip, rhinorrhea, sinus pressure, sneezing, sore throat, tinnitus, trouble swallowing and voice change.    Eyes: Negative for pain, discharge, redness, itching and visual disturbance.   Respiratory: Negative for apnea, cough, chest tightness, shortness of breath and wheezing.    Cardiovascular: Negative for chest pain, palpitations and leg swelling.   Gastrointestinal: Negative for abdominal pain, constipation, diarrhea, nausea and vomiting.   Endocrine: Negative for cold intolerance, heat intolerance, polydipsia and polyuria.   Genitourinary: Negative for decreased urine volume, difficulty urinating, dysuria, flank pain, frequency, genital sores, hematuria and urgency.   Musculoskeletal: Positive for back pain, gait problem, joint swelling, myalgias, neck pain and neck stiffness. Negative for arthralgias.   Skin: Positive for rash. Negative for pallor and wound.   Allergic/Immunologic: Negative for immunocompromised state.   Neurological: Negative for dizziness, tremors, seizures, syncope, weakness, numbness and headaches.   Hematological: Negative for adenopathy. Does  not bruise/bleed easily.   Psychiatric/Behavioral: Negative for sleep disturbance and suicidal ideas. The patient is not nervous/anxious.          Objective:   There were no vitals taken for this visit.     Physical Exam   Constitutional: He is oriented to person, place, and time.   Neurological: He is alert and oriented to person, place, and time.   Psychiatric: Mood, affect and judgment normal.        Results for orders placed or performed in visit on 03/03/22   PSA, Screening   Result Value Ref Range    PSA, Screen 4.1 (H) 0.00 - 4.00 ng/mL     Collected Updated Procedure    02/03/2022 1142 02/04/2022 0124 Hemoglobin A1C [[id number]]   (Abnormal)   Blood    Component Value Units   Hemoglobin A1C 6.8 High   %   Estimated Avg Glucose 148 High  mg/dL          02/03/2022 1142 02/08/2022 2036 [place] Miscellaneous Test [[id number]] Component Value   [place] Miscellaneous Result SEE COMMENTS           02/03/2022 1142 02/08/2022 0819 Estrogens, Total [[id number]]   Blood    Component Value Units   Estrogen 175  pg/mL          02/03/2022 1142 02/04/2022 0311 TESTOSTERONE [[id number]]    Blood    Component Value Units   Testosterone, Total 319 ng/dL          02/03/2022 1142 02/07/2022 1044 Zinc [[id number]]   Blood    Component Value Units   Zinc 102  ug/dL          02/03/2022 1142 02/15/2022 1418 HLA B27 Antigen [[id number]]   Blood    Component Value   HLA B27 Interpretation TAQ: 418004   SAPE: 207    B27 Testing Date 02/10/2022 04:38 PM   HLA B27 Result Negative           02/03/2022 1142 02/04/2022 1310 Hepatitis C Antibody [[id number]]    Blood    Component Value   Hepatitis C Ab Negative          02/03/2022 1142 02/04/2022 1310 Hepatitis B Surface Antigen [[id number]]    Blood    Component Value   Hepatitis B Surface Ag Negative          02/03/2022 1142 02/04/2022 1310 Hepatitis B Surface Ab, Qualitative [[id number]]    Blood    Component Value   Hep B S Ab Negative           02/03/2022 1142 02/04/2022 1310 Hepatitis B Core  Antibody, IgM [[id number]]    Blood    Component Value   Hep B C IgM Negative          02/03/2022 1142 02/04/2022 0103 Anti-Thyroglobulin Antibody [[id number]]    Blood    Component Value Units   Thyroglobulin Ab Screen <4.0 IU/mL          02/03/2022 1142 02/04/2022 0103 Thyroid Peroxidase Antibody [[id number]]    Blood    Component Value Units   Thyroperoxidase Antibodies <6.0 IU/mL          02/03/2022 1142 02/03/2022 1259 TSH [[id number]]    Blood    Component Value Units   TSH 1.761 uIU/mL          02/03/2022 1142 02/04/2022 0058 Rheumatoid Factor [[id number]]    Blood    Component Value Units   Rheumatoid Factor <13.0 IU/mL          02/03/2022 1142 02/04/2022 0459 Cyclic Citrullinated Peptide Antibody, IgG [[id number]]    Blood    Component Value Units   CCP Antibodies <0.5 U/mL          02/03/2022 1142 02/03/2022 1238 CK [[id number]]    Blood    Component Value Units   CPK 73 U/L          02/03/2022 1142 02/05/2022 0458 Aldolase [[id number]]    Blood    Component Value Units   Aldolase 4.0 U/L          02/03/2022 1142 02/04/2022 1549 GURMEET Screen w/Reflex [[id number]]    Blood    Component Value   GURMEET Screen Negative <1:80           02/03/2022 1142 02/03/2022 1257 Sedimentation rate [[id number]]    Blood    Component Value Units   Sed Rate 1 mm/Hr          02/03/2022 1142 02/03/2022 1238 C-Reactive Protein [[id number]]    Blood    Component Value Units   CRP 1.4 mg/L          02/03/2022 1142 02/03/2022 1238 Comprehensive Metabolic Panel [[id number]]    (Abnormal)   Blood    Component Value Units   Sodium 138 mmol/L   Potassium 5.3 High  mmol/L   Chloride 100 mmol/L   CO2 27 mmol/L   Glucose 104 mg/dL   BUN 20 mg/dL   Creatinine 1.3 mg/dL   Calcium 10.0 mg/dL   Total Protein 7.4 g/dL   Albumin 4.2 g/dL   Total Bilirubin 1.0  mg/dL   Alkaline Phosphatase 77 U/L   AST 14 U/L   ALT 18 U/L   Anion Gap 11 mmol/L   eGFR if African American >60 mL/min/1.73 m^2   eGFR if non  58 Abnormal   mL/min/1.73 m^2               reviewed labs with patient during this visit      Assessment:       1. Spondylosis    2. Type 2 diabetes mellitus with stage 3a chronic kidney disease, with long-term current use of insulin    3. Diabetic neuropathic arthritis    4. Diabetic polyneuropathy associated with type 2 diabetes mellitus    5. BPH with elevated PSA    6. Lumbar and sacral arthritis    7. Low testosterone    8. Estrogen increased            Plan:       Diagnoses and all orders for this visit:    Spondylosis  -     CBC Auto Differential; Future  -     Comprehensive Metabolic Panel; Future  -     C-Reactive Protein; Future  -     Sedimentation rate; Future    Type 2 diabetes mellitus with stage 3a chronic kidney disease, with long-term current use of insulin  -     CBC Auto Differential; Future  -     Comprehensive Metabolic Panel; Future  -     C-Reactive Protein; Future  -     Sedimentation rate; Future    Diabetic neuropathic arthritis  -     CBC Auto Differential; Future  -     Comprehensive Metabolic Panel; Future  -     C-Reactive Protein; Future  -     Sedimentation rate; Future    Diabetic polyneuropathy associated with type 2 diabetes mellitus  -     CBC Auto Differential; Future  -     Comprehensive Metabolic Panel; Future  -     C-Reactive Protein; Future  -     Sedimentation rate; Future    BPH with elevated PSA  -     finasteride (PROSCAR) 5 mg tablet; Take 1 tablet (5 mg total) by mouth once daily.  -     CBC Auto Differential; Future  -     Comprehensive Metabolic Panel; Future  -     C-Reactive Protein; Future  -     Sedimentation rate; Future    Lumbar and sacral arthritis  -     piroxicam (FELDENE) 20 MG capsule; Take 1 capsule (20 mg total) by mouth once daily.  -     CBC Auto Differential; Future  -     Comprehensive Metabolic Panel; Future  -     C-Reactive Protein; Future  -     Sedimentation rate; Future    Low testosterone  -     CBC Auto Differential; Future  -     Comprehensive Metabolic Panel; Future  -      C-Reactive Protein; Future  -     Sedimentation rate; Future    Estrogen increased  -     CBC Auto Differential; Future  -     Comprehensive Metabolic Panel; Future  -     C-Reactive Protein; Future  -     Sedimentation rate; Future      The patient location is: home  The chief complaint leading to consultation is: oa    Visit type: audiovisual    Face to Face time with patient: 40   minutes of total time spent on the encounter, which includes face to face time and non-face to face time preparing to see the patient (eg, review of tests), Obtaining and/or reviewing separately obtained history, Documenting clinical information in the electronic or other health record, Independently interpreting results (not separately reported) and communicating results to the patient/family/caregiver, or Care coordination (not separately reported).         Each patient to whom he or she provides medical services by telemedicine is:  (1) informed of the relationship between the physician and patient and the respective role of any other health care provider with respect to management of the patient; and (2) notified that he or she may decline to receive medical services by telemedicine and may withdraw from such care at any time.    Notes:

## 2022-03-24 ENCOUNTER — PATIENT MESSAGE (OUTPATIENT)
Dept: RHEUMATOLOGY | Facility: CLINIC | Age: 62
End: 2022-03-24
Payer: COMMERCIAL

## 2022-03-29 ENCOUNTER — DOCUMENTATION ONLY (OUTPATIENT)
Dept: RHEUMATOLOGY | Facility: CLINIC | Age: 62
End: 2022-03-29
Payer: COMMERCIAL

## 2022-04-08 ENCOUNTER — OFFICE VISIT (OUTPATIENT)
Dept: UROLOGY | Facility: CLINIC | Age: 62
End: 2022-04-08
Payer: COMMERCIAL

## 2022-04-08 VITALS
SYSTOLIC BLOOD PRESSURE: 152 MMHG | DIASTOLIC BLOOD PRESSURE: 81 MMHG | HEIGHT: 70 IN | RESPIRATION RATE: 18 BRPM | WEIGHT: 253 LBS | HEART RATE: 78 BPM | BODY MASS INDEX: 36.22 KG/M2

## 2022-04-08 DIAGNOSIS — N40.0 BPH WITHOUT OBSTRUCTION/LOWER URINARY TRACT SYMPTOMS: ICD-10-CM

## 2022-04-08 DIAGNOSIS — R31.29 MICROHEMATURIA: ICD-10-CM

## 2022-04-08 DIAGNOSIS — R97.20 ELEVATED PSA: Primary | ICD-10-CM

## 2022-04-08 DIAGNOSIS — F52.21 ERECTILE DISORDER, ACQUIRED, GENERALIZED, SEVERE: ICD-10-CM

## 2022-04-08 LAB
BILIRUB SERPL-MCNC: ABNORMAL MG/DL
BLOOD URINE, POC: ABNORMAL
CLARITY, POC UA: CLEAR
COLOR, POC UA: YELLOW
GLUCOSE UR QL STRIP: ABNORMAL
KETONES UR QL STRIP: ABNORMAL
LEUKOCYTE ESTERASE URINE, POC: ABNORMAL
NITRITE, POC UA: ABNORMAL
PH, POC UA: 6
POC RESIDUAL URINE VOLUME: 2 ML (ref 0–100)
PROTEIN, POC: ABNORMAL
SPECIFIC GRAVITY, POC UA: 1.01
UROBILINOGEN, POC UA: 0.2

## 2022-04-08 PROCEDURE — 4010F ACE/ARB THERAPY RXD/TAKEN: CPT | Mod: CPTII,S$GLB,, | Performed by: UROLOGY

## 2022-04-08 PROCEDURE — 1160F PR REVIEW ALL MEDS BY PRESCRIBER/CLIN PHARMACIST DOCUMENTED: ICD-10-PCS | Mod: CPTII,S$GLB,, | Performed by: UROLOGY

## 2022-04-08 PROCEDURE — 99205 PR OFFICE/OUTPT VISIT, NEW, LEVL V, 60-74 MIN: ICD-10-PCS | Mod: S$GLB,,, | Performed by: UROLOGY

## 2022-04-08 PROCEDURE — 51798 POCT BLADDER SCAN: ICD-10-PCS | Mod: S$GLB,,, | Performed by: UROLOGY

## 2022-04-08 PROCEDURE — 3077F PR MOST RECENT SYSTOLIC BLOOD PRESSURE >= 140 MM HG: ICD-10-PCS | Mod: CPTII,S$GLB,, | Performed by: UROLOGY

## 2022-04-08 PROCEDURE — 3079F PR MOST RECENT DIASTOLIC BLOOD PRESSURE 80-89 MM HG: ICD-10-PCS | Mod: CPTII,S$GLB,, | Performed by: UROLOGY

## 2022-04-08 PROCEDURE — 81002 POCT URINE DIPSTICK WITHOUT MICROSCOPE: ICD-10-PCS | Mod: S$GLB,,, | Performed by: UROLOGY

## 2022-04-08 PROCEDURE — 3008F BODY MASS INDEX DOCD: CPT | Mod: CPTII,S$GLB,, | Performed by: UROLOGY

## 2022-04-08 PROCEDURE — 99205 OFFICE O/P NEW HI 60 MIN: CPT | Mod: S$GLB,,, | Performed by: UROLOGY

## 2022-04-08 PROCEDURE — 3044F PR MOST RECENT HEMOGLOBIN A1C LEVEL <7.0%: ICD-10-PCS | Mod: CPTII,S$GLB,, | Performed by: UROLOGY

## 2022-04-08 PROCEDURE — 3077F SYST BP >= 140 MM HG: CPT | Mod: CPTII,S$GLB,, | Performed by: UROLOGY

## 2022-04-08 PROCEDURE — 1159F PR MEDICATION LIST DOCUMENTED IN MEDICAL RECORD: ICD-10-PCS | Mod: CPTII,S$GLB,, | Performed by: UROLOGY

## 2022-04-08 PROCEDURE — 1159F MED LIST DOCD IN RCRD: CPT | Mod: CPTII,S$GLB,, | Performed by: UROLOGY

## 2022-04-08 PROCEDURE — 3079F DIAST BP 80-89 MM HG: CPT | Mod: CPTII,S$GLB,, | Performed by: UROLOGY

## 2022-04-08 PROCEDURE — 3044F HG A1C LEVEL LT 7.0%: CPT | Mod: CPTII,S$GLB,, | Performed by: UROLOGY

## 2022-04-08 PROCEDURE — 51798 US URINE CAPACITY MEASURE: CPT | Mod: S$GLB,,, | Performed by: UROLOGY

## 2022-04-08 PROCEDURE — 1160F RVW MEDS BY RX/DR IN RCRD: CPT | Mod: CPTII,S$GLB,, | Performed by: UROLOGY

## 2022-04-08 PROCEDURE — 99999 PR PBB SHADOW E&M-EST. PATIENT-LVL V: CPT | Mod: PBBFAC,,, | Performed by: UROLOGY

## 2022-04-08 PROCEDURE — 4010F PR ACE/ARB THEARPY RXD/TAKEN: ICD-10-PCS | Mod: CPTII,S$GLB,, | Performed by: UROLOGY

## 2022-04-08 PROCEDURE — 3008F PR BODY MASS INDEX (BMI) DOCUMENTED: ICD-10-PCS | Mod: CPTII,S$GLB,, | Performed by: UROLOGY

## 2022-04-08 PROCEDURE — 81002 URINALYSIS NONAUTO W/O SCOPE: CPT | Mod: S$GLB,,, | Performed by: UROLOGY

## 2022-04-08 PROCEDURE — 99999 PR PBB SHADOW E&M-EST. PATIENT-LVL V: ICD-10-PCS | Mod: PBBFAC,,, | Performed by: UROLOGY

## 2022-04-08 RX ORDER — SILDENAFIL 100 MG/1
100 TABLET, FILM COATED ORAL
Qty: 9 TABLET | Refills: 10 | Status: SHIPPED | OUTPATIENT
Start: 2022-04-08 | End: 2023-04-08

## 2022-04-08 RX ORDER — LORAZEPAM 2 MG/1
1-2 TABLET ORAL DAILY PRN
Status: ON HOLD | COMMUNITY
Start: 2022-03-30 | End: 2022-04-19 | Stop reason: HOSPADM

## 2022-04-08 NOTE — PROGRESS NOTES
[facility] Urology New Patient/H&P:     Sarwat Colunga is a 61 y.o. male who presents for evaluation of elevated psa at referral of Dr Matamoros    History of BPH with obstructive LUTS, previously on TRT. Negative prostate biopsy in 7/2015 with psa 7.3 and 41g gland.   All follow up PSAs normal through 2018. He also had visual evidence of NÚÑEZ on cysto at that time, and finasteride was added to his flomax.   LUTS persisted and finasteride was added to his flomax. He had progressive urgency and mirabegron added. PVR 117cc, AUA SS 9/4 on all 3 meds. Did eliminate soda, make scheduled bathroom breaks, and start stool softener and urgency has significantly improved.   Cystoscopy and transrectal ultrasound evaluation on 10/23/18 revealed a 50.1 g prostate with significant lateral lobe obstruction with kissing lobes, without gross median lobe, mild intravesical extension. Underwent Urolift 11/15/18:    11/29/18 PVR 0cc by bladder scan, AUA SS: 10/1 (4 urgency; 3: frequency; 2: sleeping; 1: emptying)  12/31/18: He stopped his Flomax almost immediately after the procedure because of frequency.  He is still on finasteride. Though he did have initial urgency and frequency, these are calming down.  He no longer needs a diaper.  He can hold his urine.  PVR 0 cc. AUA symptom score:  3/1, pleased (1:  Frequency, urgency, nocturia)  DCed finateride. Planned 3 and 6 month follow up, but he never followed up    PMHx: HTN, HLD, CKD 3, Obesity, DM PN, DANNY    He returns today noting he had planned to reest care to discuss ED management but in interim has been found to have elevated psa  Dr Matamoros, pcp, ordered psa on 3/3/22 noting it to be elevated at 4.1 on 3/3/22  He has history of flomax and finasteride use which he discontinued both after urolift as above  Still urinating well post urolift today with AUA SS: 4/0, delighted (2 urgency, 1 freq, sleeping)  Occ urgency with postponing. Emptying well. PVR 2cc  On chart review:  psa 1.1 in  2017 and prior since negative prostate biopsy with psa 7 in 2015  psa 2.1 in 2020 (no finasteride)  psa 4.1 on 3/3/22 (no finasteride)  Orthopedics did imaging and had concern for ankylosing spondylitis and established care with Rheumatology, Dr. Ferguson, who did not find evidence of this but does have concern for some other arthritis.  Noted BPH with elevated PSA and restarted finasteride at visit on 3/18/22, after the above PSA.  Follows with Cynthia Mejia for enocrinology. On farxiga. A1c has been rising from 5.5 to 6 to 6.8 most recently in feb 2022 but notes happy if <7  Udip with 500 gluc (did note sugary breakfast and poor dietary compliance) and trc bloood. Non/never smoker  Inquired about estrogen and lab review noted T 309 and Est 175  Has moderate ED as well, and has used viagra in past with only a little bit of help. Not sure of proper use  viagra a little bit of help not sure of proper use         Past Medical History:   Diagnosis Date    Arthritis     Benign localized hyperplasia of prostate without urinary obstruction and other lower urinary tract symptoms (LUTS)     Body mass index 37.0-37.9, adult     Diabetes mellitus     Esophageal reflux     Hypertension     Other and unspecified hyperlipidemia     Other testicular hypofunction     Polyneuropathy in diabetes(357.2)     PONV (postoperative nausea and vomiting)     Rosacea     Sleep apnea     uses CPAP    Type II or unspecified type diabetes mellitus with neurological manifestations, uncontrolled(250.62)        Past Surgical History:   Procedure Laterality Date    ANKLE SURGERY Right     ANKLE SURGERY Left     COLONOSCOPY N/A 5/17/2017    Procedure: COLONOSCOPY;  Surgeon: Carlos Hess MD;  Location: [facility];  Service: Endoscopy;  Laterality: N/A;    COLONOSCOPY W/ POLYPECTOMY      CYSTOSCOPY      CYSTOSCOPY N/A 10/23/2018    Procedure: CYSTOSCOPY;  Surgeon: Sarwat Viveros MD;  Location: [facility] OR;  Service: Urology;   Laterality: N/A;    CYSTOSCOPY WITH INSERTION OF MINIMALLY INVASIVE IMPLANT TO ENLARGE PROSTATIC URETHRA N/A 11/15/2018    Procedure: CYSTOSCOPY, WITH INSERTION OF UROLIFT IMPLANT;  Surgeon: Sarwat Viveros MD;  Location: [facility] OR;  Service: Urology;  Laterality: N/A;    DEBRIDEMENT TENNIS ELBOW      right    FOREARM SURGERY Right     tendon repair    FRACTURE SURGERY      right ankle orif    HERNIA REPAIR      umbillical    PROSTATE BIOPSY      QUADRICEPS REPAIR      right    TONSILLECTOMY      TRANSRECTAL ULTRASOUND EXAMINATION N/A 10/23/2018    Procedure: ULTRASOUND, RECTAL APPROACH;  Surgeon: Sarwat Viveros MD;  Location: [facility];  Service: Urology;  Laterality: N/A;    TRICEPS TENDON RELEASE      tendon repair left tricep    VASECTOMY         Family History   Problem Relation Age of Onset    Pulmonary embolism Mother     No Known Problems Father     Heart disease Other     Heart attack Other     Hypertension Other     Hyperlipidemia Other     Arthritis Other     Clotting disorder Other     Mental illness Other     Diabetes Other     Cancer Other     Melanoma Neg Hx     Psoriasis Neg Hx     Lupus Neg Hx     Eczema Neg Hx        Social History     Socioeconomic History    Marital status:    Occupational History    Occupation:    Tobacco Use    Smoking status: Never Smoker    Smokeless tobacco: Never Used   Substance and Sexual Activity    Alcohol use: No    Drug use: No       Review of patient's allergies indicates:   Allergen Reactions    Bactrim [sulfamethoxazole-trimethoprim] Other (See Comments)     Dizziness,lightheaded, heavy chest    Ciprofloxacin Other (See Comments)     CONFUSED, OFF BALANCE, WOKE UP AT NITE    Doxycycline Hives    Lamictal [lamotrigine] Rash    Trileptal [oxcarbazepine]        Medications Reviewed: see MAR    Focused Physical Exam    Vitals:    04/08/22 1105   BP: (!) 152/81   Pulse: 78   Resp: 18     Body mass index is 36.3  "kg/m². Weight: 114.8 kg (253 lb) Height: 5' 10" (177.8 cm)       Abdomen: Soft, non-tender, nondistended, no CVA tenderness, 1+ pannus  :  normal phallus without plaques/lesions, orthotopic urethral meatus, bilaterally desc testes in normal scrotum without mass or lesion  BEVERLY: normal sphincter tone, no masses, no hemmorrhoids   PROSTATE: 35g, no nodules, non-tender, symmetrical.       LABS:    Recent Results (from the past 336 hour(s))   POCT Bladder Scan    Collection Time: 04/08/22 11:14 AM   Result Value Ref Range    POC Residual Urine Volume 2 0 - 100 mL   POCT URINE DIPSTICK WITHOUT MICROSCOPE    Collection Time: 04/08/22 11:14 AM   Result Value Ref Range    Color, UA Yellow     pH, UA 6     WBC, UA neg     Nitrite, UA neg     Protein, POC neg     Glucose,  mg     Ketones, UA neg     Urobilinogen, UA 0.2     Bilirubin, POC neg     Blood, UA trace     Clarity, UA Clear     Spec Grav UA 1.015          Assessment/Diagnosis:    1. Elevated PSA  Ambulatory referral/consult to Urology    POCT Bladder Scan    POCT URINE DIPSTICK WITHOUT MICROSCOPE    PSA, Total and Free    Creatinine, Serum   2. BPH without obstruction/lower urinary tract symptoms     3. Erectile disorder, acquired, generalized, severe     4. Microhematuria  Urinalysis, Reflex to Urine Culture Urine, Clean Catch    Urinalysis Microscopic       Plans:  Still urinating very well greater than 3 years status post UroLift with continued resolution of obstructive symptoms and some urinary frequency of which we did discuss conservative recommendations for urgency and frequency and also noted the possible contributions of his diabetic agents.  Also noted the importance of good glucose control and impact of this on decreasing frequency and urgency, and his A1C has been trending up and will continue management with endocrinoogy and encouraged dietary compliance which he is admittedly poor at.    We did also review his new PSA is ordered by primary care " noting mild elevation, as well as interval PSA history since he was lost to follow up with normal PSA in 2020 of 2.1, which was stable from previous value on file of 1.1 (equivalent to 2.2 on finasteride).  He has been off of finasteride any BPH medications since his UroLift and last urologic follow-up in 2018. We did note his prior history of PSA elevation to 7 as an outlier in 2015 prior to establishing care with me of which prostate biopsy was negative at that time and his PSA had been normal and stable since then.  He did have a 43 g prostate at that time, and a 50 g prostate at the time of his BPH intervention, and has discontinued finasteride since that time and undoubtedly there has been continued prostate growth so we did discuss that his PSA of 4.1 may be of normal PSA density for him, however it is an acute rise from his previous baseline over the last 8 years since his prostate biopsy.    Since this value, approximally 1-2 weeks later, he was restarted on finasteride by his rheumatologist.  He has no bothersome obstructive lower urinary tract symptoms, and we did discuss that simply giving medication to treat a PSA value is not indicated, and we also discussed the impact of finasteride on interpreting PSA such that after 10-12 weeks on finasteride, maintained on it, the PSA value needs to be doubled for interpretation.  Until this time, would be difficult to interpret.  As there is no clinical indication to treat with finasteride at this time, before re-evaluating his PSA I did advise him to STOP finasteride, and will recheck PSA appropriate interval    Reviewed differential for elevated psa which also includes benign enlargement and prostatitis.  We did discuss that an elevated PSA is considered a PSA greater than 4 because statistically 20% of people in this value range are found to have prostate cancer, however we also discussed a bit about PSA velocity and trends and age specific psa elevations, as  well as the significance of free PSA percentage in helping to risk stratify further    Given the above factors, recent finasteride restart, history of BPH and negative biopsy, advised to Stop finasteride now.  In 3 months recheck PSA with free and total PSA. If PSA continues to be elevated or is rising, and/or the free PSA is of concerning low value, would recommend re-evaluation with prostate biopsy most likely, but can start with MRI of the prostate 1st to help guide further recommendations.   If labs are normal and MRI is negative will follow labs every 6 months.  If labs are abnormal and MRI is negative, routine prostate biopsy.   If labs are abnormal and MRI shows a significant index lesion, would refer for uronav targeted MRI fusion prostate biopsy.      I went over the details of a transrectal ultrasound-guided biopsy of the prostate, and described the technique in detail.   The patient will be given local injection anesthetic to block the prostate so as to minimize any pain. 12-14 biopsy specimens will be taken. These will be sent for histopathology analysis.   Complications include bleeding, fever and chills. He was also instructed to watch for any signs of fever. If he does have any fever or chills after, he was advised to come to the emergency room right away for intravenous antibiotics and possible admission to the hospital. He is to refrain from any strenuous activity including sexual activity for the next 72 hours after biopsy. He was also advised that he may have blood while urinating, during bowel movements as well as during ejaculations. He was given a prebiopsy/postbiopsy instruction sheet was reminding him to avoid aspirin and blood thinners for 7 days prior, take the Rxed antibiotics the day before, day of, day after biopsy, and perform a fleet enema at home morning of biopsy. All questions he had were answered in detail.   Prep instructions provided in writing in case we are to proceed    We did  also discuss the multifactorial components to his erectile dysfunction, underlying which is poorly controlled blood pressure and diabetes which he is working with primary care endocrinology on this.  Encouraged diet, exercise, weight loss, and control underlying medical problems.  We did the contributions of poorly controlled diabetes to ED, as well as blood pressure and blood pressure medications and weight gain.  As well, the potential negative impact of finasteride and worsening erectile function, though he is only restarted for 2 weeks, do not expect this, but is 1 other reason to discontinue this medication at this time.  Viagra has worked for him in the past and we did discuss that first-line treatment in combination with controlling all underlying medical problems are PD 5 inhibitors such as Viagra for which we also discussed proper use - using them 30-45 minutes in advance of an anticipated sexual encounter, and not using them within 30-60 minutes of a meal on either and as they are better observed on an empty stomach. Rx sent to pharmacy    Of note urinalysis dipstick also had trace blood today.  Was unable to send the urine specimen collected today off for microscopic analysis to rule out micro hematuria, so when he returns for lab visit in 3 months for PSA will also recollect urine and check a urinalysis microscopic to rule out true micro hematuria.  If greater than 5 red blood cells present in the urine indicating micro hematuria with an get upper tract imaging and on cystoscopy at time of prostate biopsy if needed, or independently perform if no prostate biopsies indicated.    In summary:  - stop finasteride  - recheck PSA free and total 3 months.    - If rising PSA or low free PSA get MRI.  Depending on labs and MRI plan lab surveillance verses biopsy (+/- mri fusion)  - Viagra for ED p.r.n., with continued management of diabetes and blood pressure by primary care in endocrinology as well as diet,  exercise, weight loss.  - UA micro at time of PSA to evaluate for actual micro hematuria    This plan was detailed in writing on patient's after visit summary for his review.  A 1 year reminder was set in the system as a backup will chart check all labs and workup as above.  He was provided with written instructions for prostate biopsy prep as well in case the recommendation is to proceed       Total time spent in/on encounter today, including face to face time with patient, counseling, medical record review, interpretation of tests/results, , and treatment plan coordination: 65 minutes

## 2022-04-08 NOTE — PATIENT INSTRUCTIONS
Stop finasteride now.    In 3 months recheck PSA with free and total PSA.  If PSA continues to be elevated or is rising, and/or the free PSA is of concerning low value, would recommend re-evaluation with prostate biopsy most likely, but can start with MRI of the prostate 1st to help guide further recommendations    If labs are normal and MRIs negative will follow labs every 6 months.    If labs are abnormal and MRI is negative, routine prostate biopsy.     If labs are abnormal and MRI shows a significant lesion, would refer to [facility] for targeted MRI fusion prostate biopsy.      We did also discussed proper use of oral PDE 5 inhibitors such as viagra  - using them 30-45 minutes in advance of an anticipated sexual encounter, and not using them within 30-60 minutes of a meal on either and as they are better observed on an empty stomach

## 2022-04-08 NOTE — Clinical Note
Meant to order/send UA micro, but didn't --> so placed lab collect urine orders (ua micro and ua reflex to culture) -- please add/aschedule/assoc with his psa lab visit in 3 months and notify pt will need urine and blood at time of his lab visit

## 2022-04-12 PROBLEM — R26.9 GAIT DISTURBANCE: Status: ACTIVE | Noted: 2022-04-12

## 2022-04-12 PROBLEM — R29.6 FREQUENT FALLS: Status: RESOLVED | Noted: 2021-09-28 | Resolved: 2022-04-12

## 2022-04-12 PROBLEM — R29.6 MULTIPLE FALLS: Status: RESOLVED | Noted: 2021-09-28 | Resolved: 2022-04-12

## 2022-04-12 PROBLEM — R26.89 BALANCE PROBLEM: Chronic | Status: RESOLVED | Noted: 2021-09-29 | Resolved: 2022-04-12

## 2022-04-17 ENCOUNTER — HOSPITAL ENCOUNTER (OUTPATIENT)
Facility: HOSPITAL | Age: 62
Discharge: HOME | End: 2022-04-19
Attending: EMERGENCY MEDICINE | Admitting: INTERNAL MEDICINE
Payer: COMMERCIAL

## 2022-04-17 DIAGNOSIS — R07.9 CHEST PAIN, UNSPECIFIED TYPE: ICD-10-CM

## 2022-04-17 DIAGNOSIS — N18.31 TYPE 2 DIABETES MELLITUS WITH STAGE 3A CHRONIC KIDNEY DISEASE, WITH LONG-TERM CURRENT USE OF INSULIN: ICD-10-CM

## 2022-04-17 DIAGNOSIS — E11.22 TYPE 2 DIABETES MELLITUS WITH STAGE 3A CHRONIC KIDNEY DISEASE, WITH LONG-TERM CURRENT USE OF INSULIN: ICD-10-CM

## 2022-04-17 DIAGNOSIS — R53.1 WEAKNESS: ICD-10-CM

## 2022-04-17 DIAGNOSIS — R07.9 CHEST PAIN: ICD-10-CM

## 2022-04-17 DIAGNOSIS — G93.40 ENCEPHALOPATHY: Primary | ICD-10-CM

## 2022-04-17 DIAGNOSIS — R26.9 GAIT DISTURBANCE: ICD-10-CM

## 2022-04-17 DIAGNOSIS — Z79.4 TYPE 2 DIABETES MELLITUS WITH STAGE 3A CHRONIC KIDNEY DISEASE, WITH LONG-TERM CURRENT USE OF INSULIN: ICD-10-CM

## 2022-04-17 DIAGNOSIS — R41.82 AMS (ALTERED MENTAL STATUS): ICD-10-CM

## 2022-04-17 LAB
ALBUMIN SERPL BCP-MCNC: 3.9 G/DL (ref 3.5–5.2)
ALP SERPL-CCNC: 79 U/L (ref 55–135)
ALT SERPL W/O P-5'-P-CCNC: 26 U/L (ref 10–44)
AMPHET+METHAMPHET UR QL: NEGATIVE
ANION GAP SERPL CALC-SCNC: 12 MMOL/L (ref 8–16)
AST SERPL-CCNC: 18 U/L (ref 10–40)
BACTERIA #/AREA URNS HPF: NORMAL /HPF
BARBITURATES UR QL SCN>200 NG/ML: NEGATIVE
BASOPHILS # BLD AUTO: 0.06 K/UL (ref 0–0.2)
BASOPHILS NFR BLD: 0.7 % (ref 0–1.9)
BENZODIAZ UR QL SCN>200 NG/ML: NEGATIVE
BILIRUB SERPL-MCNC: 1.1 MG/DL (ref 0.1–1)
BILIRUB UR QL STRIP: NEGATIVE
BUN SERPL-MCNC: 20 MG/DL (ref 8–23)
BZE UR QL SCN: NEGATIVE
CALCIUM SERPL-MCNC: 9.5 MG/DL (ref 8.7–10.5)
CANNABINOIDS UR QL SCN: NEGATIVE
CHLORIDE SERPL-SCNC: 100 MMOL/L (ref 95–110)
CLARITY UR: CLEAR
CO2 SERPL-SCNC: 26 MMOL/L (ref 23–29)
COLOR UR: YELLOW
CREAT SERPL-MCNC: 1.4 MG/DL (ref 0.5–1.4)
CREAT UR-MCNC: 46.6 MG/DL (ref 23–375)
DIFFERENTIAL METHOD: ABNORMAL
EOSINOPHIL # BLD AUTO: 0.2 K/UL (ref 0–0.5)
EOSINOPHIL NFR BLD: 1.8 % (ref 0–8)
ERYTHROCYTE [DISTWIDTH] IN BLOOD BY AUTOMATED COUNT: 13 % (ref 11.5–14.5)
EST. GFR  (AFRICAN AMERICAN): >60 ML/MIN/1.73 M^2
EST. GFR  (NON AFRICAN AMERICAN): 53 ML/MIN/1.73 M^2
ETHANOL SERPL-MCNC: <10 MG/DL
GLUCOSE SERPL-MCNC: 183 MG/DL (ref 70–110)
GLUCOSE UR QL STRIP: ABNORMAL
HCT VFR BLD AUTO: 43.6 % (ref 40–54)
HGB BLD-MCNC: 15 G/DL (ref 14–18)
HGB UR QL STRIP: NEGATIVE
IMM GRANULOCYTES # BLD AUTO: 0.03 K/UL (ref 0–0.04)
IMM GRANULOCYTES NFR BLD AUTO: 0.3 % (ref 0–0.5)
INFLUENZA A, MOLECULAR: NEGATIVE
INFLUENZA B, MOLECULAR: NEGATIVE
KETONES UR QL STRIP: NEGATIVE
LACTATE SERPL-SCNC: 1.7 MMOL/L (ref 0.5–2.2)
LEUKOCYTE ESTERASE UR QL STRIP: NEGATIVE
LIPASE SERPL-CCNC: 73 U/L (ref 4–60)
LYMPHOCYTES # BLD AUTO: 2.5 K/UL (ref 1–4.8)
LYMPHOCYTES NFR BLD: 27 % (ref 18–48)
MCH RBC QN AUTO: 31.3 PG (ref 27–31)
MCHC RBC AUTO-ENTMCNC: 34.4 G/DL (ref 32–36)
MCV RBC AUTO: 91 FL (ref 82–98)
METHADONE UR QL SCN>300 NG/ML: NEGATIVE
MICROSCOPIC COMMENT: NORMAL
MONOCYTES # BLD AUTO: 0.7 K/UL (ref 0.3–1)
MONOCYTES NFR BLD: 7.7 % (ref 4–15)
NEUTROPHILS # BLD AUTO: 5.8 K/UL (ref 1.8–7.7)
NEUTROPHILS NFR BLD: 62.5 % (ref 38–73)
NITRITE UR QL STRIP: NEGATIVE
NRBC BLD-RTO: 0 /100 WBC
OPIATES UR QL SCN: NEGATIVE
PCP UR QL SCN>25 NG/ML: NEGATIVE
PH UR STRIP: 6 [PH] (ref 5–8)
PLATELET # BLD AUTO: 162 K/UL (ref 150–450)
PMV BLD AUTO: 9.2 FL (ref 9.2–12.9)
POCT GLUCOSE: 151 MG/DL (ref 70–110)
POTASSIUM SERPL-SCNC: 4.3 MMOL/L (ref 3.5–5.1)
PROT SERPL-MCNC: 6.7 G/DL (ref 6–8.4)
PROT UR QL STRIP: NEGATIVE
RBC # BLD AUTO: 4.8 M/UL (ref 4.6–6.2)
RBC #/AREA URNS HPF: 1 /HPF (ref 0–4)
SARS-COV-2 RDRP RESP QL NAA+PROBE: NEGATIVE
SODIUM SERPL-SCNC: 138 MMOL/L (ref 136–145)
SP GR UR STRIP: 1.01 (ref 1–1.03)
SPECIMEN SOURCE: NORMAL
SQUAMOUS #/AREA URNS HPF: 0 /HPF
TOXICOLOGY INFORMATION: NORMAL
TROPONIN I SERPL DL<=0.01 NG/ML-MCNC: <0.006 NG/ML (ref 0–0.03)
TSH SERPL DL<=0.005 MIU/L-ACNC: 0.71 UIU/ML (ref 0.4–4)
URATE CRY URNS QL MICRO: NORMAL
URN SPEC COLLECT METH UR: ABNORMAL
UROBILINOGEN UR STRIP-ACNC: NEGATIVE EU/DL
WBC # BLD AUTO: 9.21 K/UL (ref 3.9–12.7)
WBC #/AREA URNS HPF: 0 /HPF (ref 0–5)
YEAST URNS QL MICRO: NORMAL

## 2022-04-17 PROCEDURE — 96361 HYDRATE IV INFUSION ADD-ON: CPT

## 2022-04-17 PROCEDURE — G0378 HOSPITAL OBSERVATION PER HR: HCPCS

## 2022-04-17 PROCEDURE — 87502 INFLUENZA DNA AMP PROBE: CPT | Performed by: EMERGENCY MEDICINE

## 2022-04-17 PROCEDURE — 84484 ASSAY OF TROPONIN QUANT: CPT | Performed by: EMERGENCY MEDICINE

## 2022-04-17 PROCEDURE — 99285 EMERGENCY DEPT VISIT HI MDM: CPT | Mod: 25

## 2022-04-17 PROCEDURE — 83690 ASSAY OF LIPASE: CPT | Performed by: EMERGENCY MEDICINE

## 2022-04-17 PROCEDURE — 84443 ASSAY THYROID STIM HORMONE: CPT | Performed by: EMERGENCY MEDICINE

## 2022-04-17 PROCEDURE — 93010 EKG 12-LEAD: ICD-10-PCS | Mod: ,,, | Performed by: SPECIALIST

## 2022-04-17 PROCEDURE — 82077 ASSAY SPEC XCP UR&BREATH IA: CPT | Performed by: EMERGENCY MEDICINE

## 2022-04-17 PROCEDURE — 82962 GLUCOSE BLOOD TEST: CPT

## 2022-04-17 PROCEDURE — 83605 ASSAY OF LACTIC ACID: CPT | Performed by: EMERGENCY MEDICINE

## 2022-04-17 PROCEDURE — U0002 COVID-19 LAB TEST NON-CDC: HCPCS | Performed by: EMERGENCY MEDICINE

## 2022-04-17 PROCEDURE — 81000 URINALYSIS NONAUTO W/SCOPE: CPT | Mod: 59 | Performed by: EMERGENCY MEDICINE

## 2022-04-17 PROCEDURE — 85025 COMPLETE CBC W/AUTO DIFF WBC: CPT | Performed by: EMERGENCY MEDICINE

## 2022-04-17 PROCEDURE — 93010 ELECTROCARDIOGRAM REPORT: CPT | Mod: ,,, | Performed by: SPECIALIST

## 2022-04-17 PROCEDURE — 36415 COLL VENOUS BLD VENIPUNCTURE: CPT | Performed by: EMERGENCY MEDICINE

## 2022-04-17 PROCEDURE — 25000003 PHARM REV CODE 250: Performed by: EMERGENCY MEDICINE

## 2022-04-17 PROCEDURE — 80053 COMPREHEN METABOLIC PANEL: CPT | Performed by: EMERGENCY MEDICINE

## 2022-04-17 PROCEDURE — 93005 ELECTROCARDIOGRAM TRACING: CPT

## 2022-04-17 PROCEDURE — 80307 DRUG TEST PRSMV CHEM ANLYZR: CPT | Performed by: EMERGENCY MEDICINE

## 2022-04-17 RX ORDER — ACETAMINOPHEN 325 MG/1
650 TABLET ORAL EVERY 8 HOURS PRN
Status: DISCONTINUED | OUTPATIENT
Start: 2022-04-17 | End: 2022-04-19 | Stop reason: HOSPADM

## 2022-04-17 RX ORDER — SODIUM,POTASSIUM PHOSPHATES 280-250MG
2 POWDER IN PACKET (EA) ORAL
Status: DISCONTINUED | OUTPATIENT
Start: 2022-04-17 | End: 2022-04-19 | Stop reason: HOSPADM

## 2022-04-17 RX ORDER — ARIPIPRAZOLE 5 MG/1
10 TABLET ORAL DAILY
Status: DISCONTINUED | OUTPATIENT
Start: 2022-04-18 | End: 2022-04-19 | Stop reason: HOSPADM

## 2022-04-17 RX ORDER — ONDANSETRON 2 MG/ML
4 INJECTION INTRAMUSCULAR; INTRAVENOUS EVERY 6 HOURS PRN
Status: DISCONTINUED | OUTPATIENT
Start: 2022-04-17 | End: 2022-04-19 | Stop reason: HOSPADM

## 2022-04-17 RX ORDER — AMOXICILLIN 250 MG
1 CAPSULE ORAL 2 TIMES DAILY
Status: DISCONTINUED | OUTPATIENT
Start: 2022-04-17 | End: 2022-04-19 | Stop reason: HOSPADM

## 2022-04-17 RX ORDER — BUPROPION HYDROCHLORIDE 150 MG/1
150 TABLET ORAL DAILY
Status: DISCONTINUED | OUTPATIENT
Start: 2022-04-18 | End: 2022-04-19 | Stop reason: HOSPADM

## 2022-04-17 RX ORDER — MAG HYDROX/ALUMINUM HYD/SIMETH 200-200-20
30 SUSPENSION, ORAL (FINAL DOSE FORM) ORAL 4 TIMES DAILY PRN
Status: DISCONTINUED | OUTPATIENT
Start: 2022-04-17 | End: 2022-04-19 | Stop reason: HOSPADM

## 2022-04-17 RX ORDER — IBUPROFEN 200 MG
24 TABLET ORAL
Status: DISCONTINUED | OUTPATIENT
Start: 2022-04-17 | End: 2022-04-19 | Stop reason: HOSPADM

## 2022-04-17 RX ORDER — LANOLIN ALCOHOL/MO/W.PET/CERES
800 CREAM (GRAM) TOPICAL
Status: DISCONTINUED | OUTPATIENT
Start: 2022-04-17 | End: 2022-04-19 | Stop reason: HOSPADM

## 2022-04-17 RX ORDER — DIVALPROEX SODIUM 250 MG/1
250 TABLET, FILM COATED, EXTENDED RELEASE ORAL NIGHTLY
Status: DISCONTINUED | OUTPATIENT
Start: 2022-04-17 | End: 2022-04-19 | Stop reason: HOSPADM

## 2022-04-17 RX ORDER — IPRATROPIUM BROMIDE AND ALBUTEROL SULFATE 2.5; .5 MG/3ML; MG/3ML
3 SOLUTION RESPIRATORY (INHALATION) EVERY 6 HOURS PRN
Status: DISCONTINUED | OUTPATIENT
Start: 2022-04-17 | End: 2022-04-19 | Stop reason: HOSPADM

## 2022-04-17 RX ORDER — PROCHLORPERAZINE EDISYLATE 5 MG/ML
5 INJECTION INTRAMUSCULAR; INTRAVENOUS EVERY 6 HOURS PRN
Status: DISCONTINUED | OUTPATIENT
Start: 2022-04-17 | End: 2022-04-19 | Stop reason: HOSPADM

## 2022-04-17 RX ORDER — SIMETHICONE 80 MG
1 TABLET,CHEWABLE ORAL 4 TIMES DAILY PRN
Status: DISCONTINUED | OUTPATIENT
Start: 2022-04-17 | End: 2022-04-19 | Stop reason: HOSPADM

## 2022-04-17 RX ORDER — PANTOPRAZOLE SODIUM 40 MG/10ML
40 INJECTION, POWDER, LYOPHILIZED, FOR SOLUTION INTRAVENOUS DAILY
Status: DISCONTINUED | OUTPATIENT
Start: 2022-04-18 | End: 2022-04-19 | Stop reason: HOSPADM

## 2022-04-17 RX ORDER — NALOXONE HCL 0.4 MG/ML
0.02 VIAL (ML) INJECTION
Status: DISCONTINUED | OUTPATIENT
Start: 2022-04-17 | End: 2022-04-19 | Stop reason: HOSPADM

## 2022-04-17 RX ORDER — TALC
6 POWDER (GRAM) TOPICAL NIGHTLY PRN
Status: DISCONTINUED | OUTPATIENT
Start: 2022-04-17 | End: 2022-04-19 | Stop reason: HOSPADM

## 2022-04-17 RX ORDER — IBUPROFEN 200 MG
16 TABLET ORAL
Status: DISCONTINUED | OUTPATIENT
Start: 2022-04-17 | End: 2022-04-19 | Stop reason: HOSPADM

## 2022-04-17 RX ORDER — SODIUM CHLORIDE 0.9 % (FLUSH) 0.9 %
10 SYRINGE (ML) INJECTION EVERY 8 HOURS PRN
Status: DISCONTINUED | OUTPATIENT
Start: 2022-04-17 | End: 2022-04-19 | Stop reason: HOSPADM

## 2022-04-17 RX ORDER — INSULIN ASPART 100 [IU]/ML
1-10 INJECTION, SOLUTION INTRAVENOUS; SUBCUTANEOUS
Status: DISCONTINUED | OUTPATIENT
Start: 2022-04-17 | End: 2022-04-19 | Stop reason: HOSPADM

## 2022-04-17 RX ORDER — ENOXAPARIN SODIUM 100 MG/ML
40 INJECTION SUBCUTANEOUS EVERY 24 HOURS
Status: DISCONTINUED | OUTPATIENT
Start: 2022-04-18 | End: 2022-04-19 | Stop reason: HOSPADM

## 2022-04-17 RX ORDER — GLUCAGON 1 MG
1 KIT INJECTION
Status: DISCONTINUED | OUTPATIENT
Start: 2022-04-17 | End: 2022-04-19 | Stop reason: HOSPADM

## 2022-04-17 RX ORDER — ATORVASTATIN CALCIUM 40 MG/1
40 TABLET, FILM COATED ORAL NIGHTLY
Status: DISCONTINUED | OUTPATIENT
Start: 2022-04-17 | End: 2022-04-19 | Stop reason: HOSPADM

## 2022-04-17 RX ADMIN — SODIUM CHLORIDE 1000 ML: 9 INJECTION, SOLUTION INTRAVENOUS at 07:04

## 2022-04-17 NOTE — ED NOTES
Sarwat Colunga presents to the ED with complaints of confusion and dizziness.  His wife stated that he has not been making any sense today and he burned food.  Pt is AAOX4.  No pain at this time.

## 2022-04-17 NOTE — ED PROVIDER NOTES
Encounter Date: 4/17/2022    SCRIBE #1 NOTE: I, Connie Cosby, am scribing for, and in the presence of, Leonard Marcos MD.       History     Chief Complaint   Patient presents with    Dizziness    Weakness     Wife states he was not focused this am      Time seen by provider: 6:31 PM on 04/17/2022    Sarwat Colunga is a 61 y.o. male who presents to the ED with an onset of dizziness, abnormal behavior, illogical speech, poor balance, fatigue, and confusion since this morning. Patient reports taking Ativan this morning for his anxiety, stating he woke up feeling anxious. Patient's wife states patient seemed to be uncoordinated in his movement and recalls that the patient burned food and equipment while cooking today which is unusual. Patient reports multiple instances of falling today and difficulty maintaining balance. The patient's wife denies any unilateral weakness or numbness, or any other symptoms at this time.  PMHx of DM-II with associated polyneuropathy, bipolar disorder, HTN, and sleep apnea. No PSHx. Patient is not a smoker.       The history is provided by the spouse and the patient.     Review of patient's allergies indicates:   Allergen Reactions    Bactrim [sulfamethoxazole-trimethoprim] Other (See Comments)     Dizziness,lightheaded, heavy chest    Ciprofloxacin Other (See Comments)     CONFUSED, OFF BALANCE, WOKE UP AT NITE    Doxycycline Hives    Lamictal [lamotrigine] Rash    Trileptal [oxcarbazepine]      Past Medical History:   Diagnosis Date    Arthritis     Benign localized hyperplasia of prostate without urinary obstruction and other lower urinary tract symptoms (LUTS)     Body mass index 37.0-37.9, adult     Diabetes mellitus     Esophageal reflux     Hypertension     Other and unspecified hyperlipidemia     Other testicular hypofunction     Polyneuropathy in diabetes(357.2)     PONV (postoperative nausea and vomiting)     Rosacea     Sleep apnea     uses CPAP    Type II  or unspecified type diabetes mellitus with neurological manifestations, uncontrolled(250.62)      Past Surgical History:   Procedure Laterality Date    ANKLE SURGERY Right     ANKLE SURGERY Left     COLONOSCOPY N/A 5/17/2017    Procedure: COLONOSCOPY;  Surgeon: Carlos Hess MD;  Location: [facility];  Service: Endoscopy;  Laterality: N/A;    COLONOSCOPY W/ POLYPECTOMY      CYSTOSCOPY      CYSTOSCOPY N/A 10/23/2018    Procedure: CYSTOSCOPY;  Surgeon: Sarwat Viveros MD;  Location: [facility] OR;  Service: Urology;  Laterality: N/A;    CYSTOSCOPY WITH INSERTION OF MINIMALLY INVASIVE IMPLANT TO ENLARGE PROSTATIC URETHRA N/A 11/15/2018    Procedure: CYSTOSCOPY, WITH INSERTION OF UROLIFT IMPLANT;  Surgeon: Sarwat Viveros MD;  Location: [facility] OR;  Service: Urology;  Laterality: N/A;    DEBRIDEMENT TENNIS ELBOW      right    FOREARM SURGERY Right     tendon repair    FRACTURE SURGERY      right ankle orif    HERNIA REPAIR      umbillical    PROSTATE BIOPSY      QUADRICEPS REPAIR      right    TONSILLECTOMY      TRANSRECTAL ULTRASOUND EXAMINATION N/A 10/23/2018    Procedure: ULTRASOUND, RECTAL APPROACH;  Surgeon: Sarwat Viveros MD;  Location: [facility] OR;  Service: Urology;  Laterality: N/A;    TRICEPS TENDON RELEASE      tendon repair left tricep    VASECTOMY       Family History   Problem Relation Age of Onset    Pulmonary embolism Mother     No Known Problems Father     Heart disease Other     Heart attack Other     Hypertension Other     Hyperlipidemia Other     Arthritis Other     Clotting disorder Other     Mental illness Other     Diabetes Other     Cancer Other     Melanoma Neg Hx     Psoriasis Neg Hx     Lupus Neg Hx     Eczema Neg Hx      Social History     Tobacco Use    Smoking status: Never Smoker    Smokeless tobacco: Never Used   Substance Use Topics    Alcohol use: No    Drug use: No     Review of Systems   Constitutional: Negative for activity change, diaphoresis and  fever.   HENT: Negative for drooling, rhinorrhea, sore throat and trouble swallowing.    Eyes: Negative for pain and visual disturbance.   Respiratory: Negative for cough, shortness of breath and stridor.    Cardiovascular: Negative for chest pain and leg swelling.   Gastrointestinal: Negative for abdominal distention, abdominal pain, constipation and vomiting.   Genitourinary: Negative for dysuria, hematuria and penile discharge.   Musculoskeletal: Negative for gait problem.   Skin: Negative for rash.   Neurological: Positive for dizziness. Negative for seizures, facial asymmetry and headaches.   Psychiatric/Behavioral: Positive for behavioral problems and confusion. Negative for hallucinations and suicidal ideas.       Physical Exam     Initial Vitals [04/17/22 1753]   BP Pulse Resp Temp SpO2   (!) 142/69 97 20 97.8 °F (36.6 °C) 96 %      MAP       --         Physical Exam    Nursing note and vitals reviewed.  Constitutional: He appears well-developed. No distress.   HENT:   Head: Normocephalic and atraumatic.   Nose: Nose normal.   Eyes: EOM are normal.   Neck: Neck supple. No tracheal deviation present. No JVD present.   Cardiovascular: Normal rate, regular rhythm, normal heart sounds and intact distal pulses. Exam reveals no gallop and no friction rub.    No murmur heard.  Pulmonary/Chest: Breath sounds normal. No respiratory distress. He has no wheezes. He has no rhonchi. He has no rales.   Musculoskeletal:         General: Normal range of motion.      Cervical back: Neck supple.     Neurological: He is alert and oriented to person, place, and time. He has normal strength. No cranial nerve deficit or sensory deficit.   Cranial nerves II-XII grossly intact. Finger-to-nose normal. Tone normal. Sensation intact to light touch. No drift. No disdiadochokinesia. 5/5 BUE and BLE strength. Normal gait. Negative Romberg. Speech and cognition is normal. No focal neurologic deficit.   Skin: Skin is warm and dry.  Capillary refill takes less than 2 seconds. No rash noted.   Psychiatric: He has a normal mood and affect.         ED Course   Procedures  Labs Reviewed   CBC W/ AUTO DIFFERENTIAL - Abnormal; Notable for the following components:       Result Value    MCH 31.3 (*)     All other components within normal limits   COMPREHENSIVE METABOLIC PANEL - Abnormal; Notable for the following components:    Glucose 183 (*)     Total Bilirubin 1.1 (*)     eGFR if non  53 (*)     All other components within normal limits   URINALYSIS, REFLEX TO URINE CULTURE - Abnormal; Notable for the following components:    Glucose, UA 4+ (*)     All other components within normal limits    Narrative:     Specimen Source->Urine   LIPASE - Abnormal; Notable for the following components:    Lipase 73 (*)     All other components within normal limits   POCT GLUCOSE - Abnormal; Notable for the following components:    POCT Glucose 151 (*)     All other components within normal limits   INFLUENZA A & B BY MOLECULAR   TSH   LACTIC ACID, PLASMA   DRUG SCREEN PANEL, URINE EMERGENCY    Narrative:     Specimen Source->Urine   ALCOHOL,MEDICAL (ETHANOL)   TROPONIN I   URINALYSIS MICROSCOPIC    Narrative:     Specimen Source->Urine   POCT GLUCOSE MONITORING CONTINUOUS          Imaging Results          CT Head Without Contrast (Final result)  Result time 04/17/22 19:16:24    Final result by Martha Conner MD (04/17/22 19:16:24)                 Impression:      No appreciable acute abnormalities or significant focal findings as imaged.    Senescent atrophic changes.      Electronically signed by: Martha Conner  Date:    04/17/2022  Time:    19:16             Narrative:    EXAMINATION:  CT HEAD WITHOUT CONTRAST    CLINICAL HISTORY:  Mental status change, unknown cause;    TECHNIQUE:  Low dose axial images were obtained through the head.  Coronal and sagittal reformations were also performed. Contrast was not  administered.    COMPARISON:  MRI brain 09/29/2021, CT brain 09/28/2021    FINDINGS:  There is no evidence of acute intra or extra-axial hemorrhage or hematoma.  The gray-white matter junction differentiation is intact with no evidence of acute major arterial infarct.    The ventricles, cisterns and sulci are mildly prominent consistent with senescent atrophic changes.  No hydrocephalus is seen.  Findings appear stable.    There is no focal mass or mass effect.  The paranasal sinuses, mastoid air cells and middle ears appear clear as imaged.    Globes and retro bulbar structures appear symmetric and grossly intact.  Pituitary gland and posterior fossa structures grossly appear intact allowing for artifact.    Bony calvarium is unremarkable.                               X-Ray Chest AP Portable (In process)                  Medications   sodium chloride 0.9% bolus 1,000 mL (0 mLs Intravenous Stopped 4/17/22 2050)     Medical Decision Making:   History:   Old Medical Records: I decided to obtain old medical records.  Clinical Tests:   Lab Tests: Reviewed and Ordered  Medical Tests: Ordered and Reviewed          Scribe Attestation:   Scribe #1: I performed the above scribed service and the documentation accurately describes the services I performed. I attest to the accuracy of the note.        ED Course as of 04/17/22 2158   Sun Apr 17, 2022 2038 Impression:     No appreciable acute abnormalities or significant focal findings as imaged.     Senescent atrophic changes.        Electronically signed by: Martha Conner  Date:                                            04/17/2022  Time:                                           19:16 [BD]   2039 61-year-old male past medical history of diabetes, GERD, hypertension, hyperlipidemia, CPAP apnea, obesity, BPH, arthritis, presents today with confusion and unsteady gait.  Unclear onset of symptoms but appear to be intermittent over the past few days.  Patient is alert and  oriented but confused.  Nonfocal neurologic exam.  CT head as above no acute pathology.  Labs otherwise reassuring.  Unclear etiology of encephalopathy will admit to hospital medicine for further workup and management.  Urine drug screen pending at the time of admission. [BD]      ED Course User Index  [BD] Leonard Marcos MD            Attending Attestation:     Physician Attestation for Scribe:    I, Dr. Leonard Marcos, personally performed the services described in this documentation.   All medical record entries made by the scribe were at my direction and in my presence.   I have reviewed the chart and agree that the record is accurate and complete.   Leonard Marcos MD  8:44 PM 04/17/2022     DISCLAIMER: This note was prepared with [email or web address] voice recognition transcription software. Garbled syntax, mangled pronouns, and other bizarre constructions may be attributed to that software system.      Clinical Impression:   Final diagnoses:  [R41.82] AMS (altered mental status)  [G93.40] Encephalopathy (Primary)          ED Disposition Condition    Observation               Leonard Marcos MD  04/17/22 7829

## 2022-04-18 LAB
ALBUMIN SERPL BCP-MCNC: 3.7 G/DL (ref 3.5–5.2)
ALP SERPL-CCNC: 70 U/L (ref 55–135)
ALT SERPL W/O P-5'-P-CCNC: 24 U/L (ref 10–44)
ANION GAP SERPL CALC-SCNC: 10 MMOL/L (ref 8–16)
AST SERPL-CCNC: 16 U/L (ref 10–40)
BASOPHILS # BLD AUTO: 0.04 K/UL (ref 0–0.2)
BASOPHILS NFR BLD: 0.5 % (ref 0–1.9)
BILIRUB SERPL-MCNC: 1.1 MG/DL (ref 0.1–1)
BUN SERPL-MCNC: 21 MG/DL (ref 8–23)
CALCIUM SERPL-MCNC: 9.6 MG/DL (ref 8.7–10.5)
CHLORIDE SERPL-SCNC: 104 MMOL/L (ref 95–110)
CO2 SERPL-SCNC: 27 MMOL/L (ref 23–29)
CREAT SERPL-MCNC: 1.1 MG/DL (ref 0.5–1.4)
DIFFERENTIAL METHOD: ABNORMAL
EOSINOPHIL # BLD AUTO: 0.3 K/UL (ref 0–0.5)
EOSINOPHIL NFR BLD: 3.2 % (ref 0–8)
ERYTHROCYTE [DISTWIDTH] IN BLOOD BY AUTOMATED COUNT: 12.8 % (ref 11.5–14.5)
EST. GFR  (AFRICAN AMERICAN): >60 ML/MIN/1.73 M^2
EST. GFR  (NON AFRICAN AMERICAN): >60 ML/MIN/1.73 M^2
FOLATE SERPL-MCNC: 35.3 NG/ML (ref 4–24)
GLUCOSE SERPL-MCNC: 123 MG/DL (ref 70–110)
HCT VFR BLD AUTO: 43.6 % (ref 40–54)
HGB BLD-MCNC: 14.7 G/DL (ref 14–18)
IMM GRANULOCYTES # BLD AUTO: 0.03 K/UL (ref 0–0.04)
IMM GRANULOCYTES NFR BLD AUTO: 0.4 % (ref 0–0.5)
LYMPHOCYTES # BLD AUTO: 3.4 K/UL (ref 1–4.8)
LYMPHOCYTES NFR BLD: 40.8 % (ref 18–48)
MCH RBC QN AUTO: 31.1 PG (ref 27–31)
MCHC RBC AUTO-ENTMCNC: 33.7 G/DL (ref 32–36)
MCV RBC AUTO: 92 FL (ref 82–98)
MONOCYTES # BLD AUTO: 0.7 K/UL (ref 0.3–1)
MONOCYTES NFR BLD: 8.5 % (ref 4–15)
NEUTROPHILS # BLD AUTO: 3.9 K/UL (ref 1.8–7.7)
NEUTROPHILS NFR BLD: 46.6 % (ref 38–73)
NRBC BLD-RTO: 0 /100 WBC
PLATELET # BLD AUTO: 160 K/UL (ref 150–450)
PMV BLD AUTO: 9.6 FL (ref 9.2–12.9)
POCT GLUCOSE: 156 MG/DL (ref 70–110)
POCT GLUCOSE: 158 MG/DL (ref 70–110)
POTASSIUM SERPL-SCNC: 4.2 MMOL/L (ref 3.5–5.1)
PROT SERPL-MCNC: 6.3 G/DL (ref 6–8.4)
RBC # BLD AUTO: 4.73 M/UL (ref 4.6–6.2)
SODIUM SERPL-SCNC: 141 MMOL/L (ref 136–145)
VIT B12 SERPL-MCNC: 496 PG/ML (ref 210–950)
WBC # BLD AUTO: 8.31 K/UL (ref 3.9–12.7)

## 2022-04-18 PROCEDURE — 96372 THER/PROPH/DIAG INJ SC/IM: CPT | Mod: 59 | Performed by: NURSE PRACTITIONER

## 2022-04-18 PROCEDURE — 84207 ASSAY OF VITAMIN B-6: CPT | Performed by: INTERNAL MEDICINE

## 2022-04-18 PROCEDURE — 82607 VITAMIN B-12: CPT | Performed by: INTERNAL MEDICINE

## 2022-04-18 PROCEDURE — 94761 N-INVAS EAR/PLS OXIMETRY MLT: CPT

## 2022-04-18 PROCEDURE — G0378 HOSPITAL OBSERVATION PER HR: HCPCS

## 2022-04-18 PROCEDURE — 25000003 PHARM REV CODE 250: Performed by: NURSE PRACTITIONER

## 2022-04-18 PROCEDURE — 84425 ASSAY OF VITAMIN B-1: CPT | Performed by: INTERNAL MEDICINE

## 2022-04-18 PROCEDURE — 85025 COMPLETE CBC W/AUTO DIFF WBC: CPT | Performed by: NURSE PRACTITIONER

## 2022-04-18 PROCEDURE — 95816 EEG AWAKE AND DROWSY: CPT | Mod: 26,,, | Performed by: PSYCHIATRY & NEUROLOGY

## 2022-04-18 PROCEDURE — 96375 TX/PRO/DX INJ NEW DRUG ADDON: CPT

## 2022-04-18 PROCEDURE — 99900035 HC TECH TIME PER 15 MIN (STAT)

## 2022-04-18 PROCEDURE — C9113 INJ PANTOPRAZOLE SODIUM, VIA: HCPCS | Performed by: NURSE PRACTITIONER

## 2022-04-18 PROCEDURE — 96374 THER/PROPH/DIAG INJ IV PUSH: CPT

## 2022-04-18 PROCEDURE — 95816 PR EEG,W/AWAKE & DROWSY RECORD: ICD-10-PCS | Mod: 26,,, | Performed by: PSYCHIATRY & NEUROLOGY

## 2022-04-18 PROCEDURE — 82746 ASSAY OF FOLIC ACID SERUM: CPT | Performed by: INTERNAL MEDICINE

## 2022-04-18 PROCEDURE — 36415 COLL VENOUS BLD VENIPUNCTURE: CPT | Performed by: NURSE PRACTITIONER

## 2022-04-18 PROCEDURE — 80053 COMPREHEN METABOLIC PANEL: CPT | Performed by: NURSE PRACTITIONER

## 2022-04-18 PROCEDURE — 36415 COLL VENOUS BLD VENIPUNCTURE: CPT | Performed by: INTERNAL MEDICINE

## 2022-04-18 PROCEDURE — 63600175 PHARM REV CODE 636 W HCPCS: Performed by: NURSE PRACTITIONER

## 2022-04-18 RX ADMIN — PANTOPRAZOLE SODIUM 40 MG: 40 INJECTION, POWDER, LYOPHILIZED, FOR SOLUTION INTRAVENOUS at 08:04

## 2022-04-18 RX ADMIN — ENOXAPARIN SODIUM 40 MG: 100 INJECTION SUBCUTANEOUS at 05:04

## 2022-04-18 RX ADMIN — INSULIN ASPART 1 UNITS: 100 INJECTION, SOLUTION INTRAVENOUS; SUBCUTANEOUS at 08:04

## 2022-04-18 RX ADMIN — DIVALPROEX SODIUM 250 MG: 250 TABLET, EXTENDED RELEASE ORAL at 08:04

## 2022-04-18 RX ADMIN — ATORVASTATIN CALCIUM 40 MG: 40 TABLET, FILM COATED ORAL at 12:04

## 2022-04-18 RX ADMIN — BUPROPION HYDROCHLORIDE 150 MG: 150 TABLET, FILM COATED, EXTENDED RELEASE ORAL at 08:04

## 2022-04-18 RX ADMIN — ARIPIPRAZOLE 10 MG: 5 TABLET ORAL at 08:04

## 2022-04-18 RX ADMIN — ATORVASTATIN CALCIUM 40 MG: 40 TABLET, FILM COATED ORAL at 08:04

## 2022-04-18 RX ADMIN — DIVALPROEX SODIUM 250 MG: 250 TABLET, EXTENDED RELEASE ORAL at 12:04

## 2022-04-18 RX ADMIN — INSULIN ASPART 2 UNITS: 100 INJECTION, SOLUTION INTRAVENOUS; SUBCUTANEOUS at 05:04

## 2022-04-18 NOTE — ASSESSMENT & PLAN NOTE
Chronic, controlled.  Latest blood pressure and vitals reviewed-   Temp:  [97.8 °F (36.6 °C)]   Pulse:  [67-97]   Resp:  [16-20]   BP: (136-184)/()   SpO2:  [94 %-98 %] .   Home meds for hypertension were reviewed and noted below.   Hypertension Medications             chlorthalidone (HYGROTEN) 25 MG Tab TAKE 1 TABLET(25 MG) BY MOUTH EVERY DAY    cloNIDine (CATAPRES) 0.1 MG tablet Take 1 tablet by mouth as needed. IF BP elevated    lisinopriL (PRINIVIL,ZESTRIL) 5 MG tablet TAKE 1 TABLET BY MOUTH EVERY DAY          While in the hospital, will manage blood pressure as follows; Continue home antihypertensive regimen    Will utilize p.r.n. blood pressure medication only if patient's blood pressure greater than  180/110 and he develops symptoms such as worsening chest pain or shortness of breath.

## 2022-04-18 NOTE — CONSULTS
[facility]  Neurology  Consult Note        PATIENT NAME: Sarwat Colunga  MRN: [id number]  CSN: [id number]      TODAY'S DATE: 04/18/2022  ADMIT DATE: 4/17/2022                            CONSULTING PROVIDER: Rex Riggs MD  CONSULT REQUESTED BY: Ro Mathews MD      Reason for consult: Encephalopathy       History obtained from chart review and the patient.    HPI per EMR: Sarwat Colunga is a 61 y.o. male with a history of arthritis, DM type 2, bipolar,  and HTN who presents with dizziness, abnormal behavior, illogical speech, poor balance, and confusion since this morning. Patient's wife states patient appeared confused and was cooking when he left the food unattended and the stove was left on. This is not normal behavior for him and speech has not been organized. Patient states he notices he has been confused lately that it has worsened today. Patient has a history of anxiety and took Ativan this morning.  His wife reports recent episodes of falling today and maintaining balance. He denies LOC and no visible injuries. The patient's wife denies any unilateral weakness or numbness, or any other symptoms at this time. Urine drug screen in the ED negative and lab work mostly unremarkable except elevated lipase. Head CT showed no acute abnormality. Patient referred to hospital medicine and seen in the ED with his wife at bedside. Patient able to answer all questions appropriately although his wife reports his behavior is not his normal and he is fixated on her upcoming procedure. He denies chest pain, N/V/D, headache, and shortness of breath.    Neurology consult:  Patient was seen and examined by me this morning.  Patient's wife is at bedside helping with history.  He is currently alert oriented x3 and back to baseline.  Apparently yesterday after he woke up he was normal at about 10:00 a.m., he complained of feeling anxious and took an Ativan after which he started having confusion.   He know what he was talking nor he knew what he was doing.  Wife states that he turned on the stove and left the food unattended and did not turn it off which was not normal of him.  His confusion slowly worsened through the day for which she brought him to the ER.    He did not have any recent fevers, chills, nausea or vomiting.  He did not have any headache or vision loss or any unilateral weakness or sensory changes.  No falls or trauma in the recent past.    Currently on my exam he has no neurological deficits.    PREVIOUS MEDICAL HISTORY:  Past Medical History:   Diagnosis Date    Arthritis     Benign localized hyperplasia of prostate without urinary obstruction and other lower urinary tract symptoms (LUTS)     Body mass index 37.0-37.9, adult     Diabetes mellitus     Esophageal reflux     Hypertension     Other and unspecified hyperlipidemia     Other testicular hypofunction     Polyneuropathy in diabetes(357.2)     PONV (postoperative nausea and vomiting)     Rosacea     Sleep apnea     uses CPAP    Type II or unspecified type diabetes mellitus with neurological manifestations, uncontrolled(250.62)      PREVIOUS SURGICAL HISTORY:  Past Surgical History:   Procedure Laterality Date    ANKLE SURGERY Right     ANKLE SURGERY Left     COLONOSCOPY N/A 5/17/2017    Procedure: COLONOSCOPY;  Surgeon: Carlos Hess MD;  Location: [facility];  Service: Endoscopy;  Laterality: N/A;    COLONOSCOPY W/ POLYPECTOMY      CYSTOSCOPY      CYSTOSCOPY N/A 10/23/2018    Procedure: CYSTOSCOPY;  Surgeon: Sarwat Viveros MD;  Location: [facility];  Service: Urology;  Laterality: N/A;    CYSTOSCOPY WITH INSERTION OF MINIMALLY INVASIVE IMPLANT TO ENLARGE PROSTATIC URETHRA N/A 11/15/2018    Procedure: CYSTOSCOPY, WITH INSERTION OF UROLIFT IMPLANT;  Surgeon: Sarwat Viveros MD;  Location: [facility] OR;  Service: Urology;  Laterality: N/A;    DEBRIDEMENT TENNIS ELBOW      right    FOREARM SURGERY Right     tendon  repair    FRACTURE SURGERY      right ankle orif    HERNIA REPAIR      umbillical    PROSTATE BIOPSY      QUADRICEPS REPAIR      right    TONSILLECTOMY      TRANSRECTAL ULTRASOUND EXAMINATION N/A 10/23/2018    Procedure: ULTRASOUND, RECTAL APPROACH;  Surgeon: Sarwat Viveros MD;  Location: [facility];  Service: Urology;  Laterality: N/A;    TRICEPS TENDON RELEASE      tendon repair left tricep    VASECTOMY       FAMILY MEDICAL HISTORY:  Family History   Problem Relation Age of Onset    Pulmonary embolism Mother     No Known Problems Father     Heart disease Other     Heart attack Other     Hypertension Other     Hyperlipidemia Other     Arthritis Other     Clotting disorder Other     Mental illness Other     Diabetes Other     Cancer Other     Melanoma Neg Hx     Psoriasis Neg Hx     Lupus Neg Hx     Eczema Neg Hx      SOCIAL HISTORY:  Social History     Tobacco Use    Smoking status: Never Smoker    Smokeless tobacco: Never Used   Substance Use Topics    Alcohol use: No    Drug use: No     ALLERGIES:  Review of patient's allergies indicates:   Allergen Reactions    Bactrim [sulfamethoxazole-trimethoprim] Other (See Comments)     Dizziness,lightheaded, heavy chest    Ciprofloxacin Other (See Comments)     CONFUSED, OFF BALANCE, WOKE UP AT NITE    Doxycycline Hives    Lamictal [lamotrigine] Rash    Trileptal [oxcarbazepine]      HOME MEDICATIONS:  Prior to Admission medications    Medication Sig Start Date End Date Taking? Authorizing Provider   ARIPiprazole (ABILIFY) 10 MG Tab Take 10 mg by mouth nightly. 10/12/21  Yes Historical Provider   aspirin (ECOTRIN) 81 MG EC tablet Take 81 mg by mouth once daily.   Yes Historical Provider   atorvastatin (LIPITOR) 40 MG tablet TAKE 1 TABLET(40 MG) BY MOUTH EVERY EVENING  Patient taking differently: Take 40 mg by mouth every evening. TAKE 1 TABLET(40 MG) BY MOUTH EVERY EVENING 8/9/21  Yes Cynthia Mejia, OLGA, NP   buPROPion (WELLBUTRIN XL) 150  "MG TB24 tablet Take 150 mg by mouth once daily. 8/10/21  Yes Historical Provider   chlorthalidone (HYGROTEN) 25 MG Tab TAKE 1 TABLET(25 MG) BY MOUTH EVERY DAY 4/12/22  Yes Lydia Matamoros MD   cloNIDine (CATAPRES) 0.1 MG tablet Take 1 tablet by mouth as needed. IF BP elevated 4/10/22  Yes Historical Provider   dexmethylphenidate (FOCALIN XR) 30 mg 24 hr capsule Take 1 capsule by mouth once daily. 1/18/22  Yes Historical Provider   dexmethylphenidate (FOCALIN) 10 MG tablet Take 10 mg by mouth every evening. 1/18/22  Yes Historical Provider   divalproex ER (DEPAKOTE ER) 250 MG 24 hr tablet Take 250 mg by mouth every evening.   Yes Historical Provider   esomeprazole (NEXIUM) 20 MG capsule Take 20 mg by mouth before breakfast.   Yes Historical Provider   insulin degludec (TRESIBA FLEXTOUCH U-200) 200 unit/mL (3 mL) insulin pen 44 u qhs 2/9/22  Yes Cynthia Mejia DNP, NP   LORazepam (ATIVAN) 2 MG Tab Take 1-2 mg by mouth daily as needed. 3/30/22  Yes Historical Provider   metFORMIN (GLUCOPHAGE) 1000 MG tablet TAKE 1 TABLET(1000 MG) BY MOUTH TWICE DAILY WITH MEALS  Patient taking differently: Take 1,000 mg by mouth 2 (two) times daily with meals. 8/9/21  Yes Cynthia Mejia DNP, NP   piroxicam (FELDENE) 20 MG capsule Take 1 capsule (20 mg total) by mouth once daily. 3/18/22 3/18/23 Yes Dakotah Ferguson MD   pregabalin (LYRICA) 150 MG capsule Take 1 capsule (150 mg total) by mouth 2 (two) times daily. 8/17/21 4/18/22 Yes Lydia Matamoros MD   blunt needle, disposable 18 x 1 1/2 " Ndle 1 Syringe by Misc.(Non-Drug; Combo Route) route once a week. 1/27/22   Dakotah Ferguson MD   cyanocobalamin 1,000 mcg/mL injection Inject 1 mL (1,000 mcg total) into the skin every 7 days. 1/27/22 1/27/23  Dakotah Ferguson MD   dapagliflozin (FARXIGA) 5 mg Tab tablet Take 1 tablet (5 mg total) by mouth once daily. 3/11/22   Cynthia Mejia DNP, NP   ergocalciferol (ERGOCALCIFEROL) 50,000 unit Cap TAKE 2 CAPSULES BY MOUTH WEEKLY 12/26/21   " "JELLY Phoenix PA-C   folic acid-vit B6-vit B12 (FOLBEE) 2.5-25-1 mg Tab Take 1 tablet by mouth once daily. 1/27/22 4/27/22  Dakotah Ferguson MD   lisinopriL (PRINIVIL,ZESTRIL) 5 MG tablet TAKE 1 TABLET BY MOUTH EVERY DAY  Patient taking differently: Take 5 mg by mouth once daily. 8/11/21   JACKIE Esquivel   needle, disp, 30 gauge 30 gauge x 1/2" Ndle 1 each by Misc.(Non-Drug; Combo Route) route once a week. 1/27/22   Dakotah Ferguson MD   pen needle, diabetic (BD ULTRA-FINE MINI PEN NEEDLE) 31 gauge x 3/16" Ndle 1 each by Misc.(Non-Drug; Combo Route) route 4 (four) times daily. 2/9/22   Cynthia Mejia DNP, NP   sildenafiL (VIAGRA) 100 MG tablet Take 1 tablet (100 mg total) by mouth as needed for Erectile Dysfunction. 4/8/22 4/8/23  Sarwat Viveros MD   syringe, disposable, 1 mL Syrg 1 Syringe by Misc.(Non-Drug; Combo Route) route once a week. 1/27/22   Dakotah Ferguson MD   TRULICITY 1.5 mg/0.5 mL pen injector ADMINISTER 0.5 ML UNDER THE SKIN EVERY 7 DAYS  Patient taking differently: Inject 0.5 mg into the skin every 7 days. SUNDAYS 8/17/21   Cynthia Mejia DNP, NP     CURRENT SCHEDULED MEDICATIONS:   ARIPiprazole  10 mg Oral Daily    atorvastatin  40 mg Oral QHS    buPROPion  150 mg Oral Daily    divalproex ER  250 mg Oral QHS    enoxaparin  40 mg Subcutaneous Daily    pantoprazole  40 mg Intravenous Daily    senna-docusate 8.6-50 mg  1 tablet Oral BID     CURRENT INFUSIONS:    CURRENT PRN MEDICATIONS:  acetaminophen, albuterol-ipratropium, aluminum-magnesium hydroxide-simethicone, dextrose 10%, dextrose 10%, glucagon (human recombinant), glucose, glucose, insulin aspart U-100, magnesium oxide, magnesium oxide, melatonin, naloxone, ondansetron, potassium bicarbonate, potassium bicarbonate, potassium bicarbonate, potassium, sodium phosphates, potassium, sodium phosphates, potassium, sodium phosphates, prochlorperazine, simethicone, sodium chloride 0.9%    REVIEW OF SYSTEMS:  Please refer to the HPI " "for all pertinent positive and negative findings. A comprehensive review of all other systems was negative.       PHYSICAL EXAM:  Patient Vitals for the past 24 hrs:   BP Temp Temp src Pulse Resp SpO2 Height Weight   04/18/22 0820 -- -- -- 62 15 99 % -- --   04/18/22 0727 132/69 97.3 °F (36.3 °C) -- 61 20 98 % -- --   04/18/22 0451 (!) 142/61 96.6 °F (35.9 °C) -- (!) 56 -- 97 % -- --   04/17/22 2353 (!) 184/87 97.8 °F (36.6 °C) Oral 71 16 98 % -- --   04/17/22 2303 136/62 -- -- 67 18 95 % -- --   04/17/22 2233 (!) 168/83 -- -- 81 18 95 % -- --   04/17/22 2203 (!) 161/78 -- -- 78 18 95 % -- --   04/17/22 2132 (!) 168/78 -- -- 83 18 96 % -- --   04/17/22 2103 (!) 154/94 -- -- 88 20 97 % -- --   04/17/22 2033 (!) 162/78 -- -- 89 20 97 % -- --   04/17/22 2003 (!) 151/78 -- -- 85 20 97 % -- --   04/1958 (!) 142/79 -- -- 88 20 97 % -- --   04/17/22 1833 (!) 172/81 -- -- -- -- -- -- --   04/17/22 1828 -- -- -- 91 20 95 % -- --   04/17/22 1808 (!) 170/108 -- -- 92 20 (!) 94 % -- --   04/17/22 1753 (!) 142/69 97.8 °F (36.6 °C) Oral 97 20 96 % 5' 10" (1.778 m) 120.7 kg (266 lb)       GENERAL APPEARANCE: Alert, well-developed, well-nourished male in no acute distress.  HEENT: Normocephalic and atraumatic. PERRL. Oropharynx unremarkable.  PULM: Normal respiratory effort. No accessory muscle use.  CV: RRR.  ABDOMEN: Soft, nontender.  EXTREMITIES: No obvious signs of vascular compromise. Pulses present. No cyanosis, clubbing or edema.  SKIN: Clear; no rashes, lesions or skin breaks in exposed areas.    NEURO:  MENTAL STATUS: Patient awake and oriented to time, place, and person, recent/remote memory normal, attention span/concentration normal, speech fluent without paraphasic errors, good comprehension with appropriate thought content and fund of knowledge appropriate for patient's level of education.  Affect euthymic.    CRANIAL NERVES:  CN I: Not tested.  CN II: Fundoscopic exam deferred.  CN III, IV, VI: Pupils equal, " round and reactive to light.  Extraocular movements full and intact.  CN V: Facial sensation normal.  CN VII: Facial asymmetry absent.  CN VIII: Hearing grossly normal and equal bilaterally.  No skew deviation or pathologic nystagmus.  CN IX, X: Palate elevates symmetrically. Speech/articulation is clear without dysarthria.  CN XI: Shoulder shrug and chin rotation equal with good strength.  CN XII: Tongue protrusion midline.    MOTOR:  Bulk normal. Tone normal and symmetric throughout.  Abnormal movements absent.  Tremor: none present.  Strength 5/5 throughout.    REFLEXES:  DTRs 2+ throughout.  Plantar response downgoing bilaterally.  SENSATION:grossly intact throughout.  COORDINATION: normal finger-to-nose.  STATION: not tested.  GAIT: not tested.        Labs:  Recent Labs   Lab 04/17/22 1847 04/18/22  0422    141   K 4.3 4.2    104   CO2 26 27   BUN 20 21   CREATININE 1.4 1.1   * 123*   CALCIUM 9.5 9.6     Recent Labs   Lab 04/17/22 1847 04/18/22  0422   WBC 9.21 8.31   HGB 15.0 14.7   HCT 43.6 43.6    160     Recent Labs   Lab 04/17/22 1847 04/18/22  0422   ALBUMIN 3.9 3.7   PROT 6.7 6.3   BILITOT 1.1* 1.1*   ALKPHOS 79 70   ALT 26 24   AST 18 16     Lab Results   Component Value Date    INR 1.0 09/28/2021     Lab Results   Component Value Date    TRIG 83 09/29/2021    HDL 37 (L) 09/29/2021    CHOLHDL 34.6 09/29/2021     Lab Results   Component Value Date    HGBA1C 6.8 (H) 02/03/2022     No results found for: PROTEINCSF, GLUCCSF, WBCCSF    Imaging:  I have reviewed and interpreted the pertinent imaging and lab results.      X-Ray Chest AP Portable  Narrative: EXAMINATION:  XR CHEST AP PORTABLE    CLINICAL HISTORY:  Chest Pain;    TECHNIQUE:  Single frontal view of the chest was performed.    COMPARISON:  Chest of September 28, 2020    FINDINGS:  There is normal appearance of pulmonary vasculature and no pleural effusion or pneumothorax.  Small patchy infiltrates identified at both  lung bases    The cardiac silhouette is normal in size. The hilar and mediastinal contours are unremarkable.    Bones are intact.  Impression: Mild bibasilar patchy infiltrates demonstrated    Electronically signed by: Torey Graham MD  Date:    04/18/2022  Time:    07:55         ASSESSMENT & PLAN:    Encephalopathy    Workup:   CT head:  No acute intracranial abnormality  MRI brain:  Pending  EEG:  Pending  Encephalopathy labs including vitamin B1, B12, B6, folate and TSH ordered.    Plan:  · Admitted for workup and management of encephalopathy.  · Etiology of encephalopathy uncertain this time.  He does take many sedating medications and polypharmacy Vikki likely cause of his encephalopathy.  · EEG ordered and pending.  · Neuro checks q.4.  · Delirium precautions.  · Recommended to follow up with Psychiatry to adjust his medications that can alter his mental status.  · Will follow-up on the above tests and make additional recommendations.      Follow up Neurology in 2 weeks. Call [phone] to make an appointment.    Thank you kindly for including us in the care of this patient. Please do not hesitate to contact us with any questions.      52 minutes of care time has been spent evaluating with the patient. Time includes chart review not limited to diagnostic imaging, labs, and vitals, patient assessment, discussion with family and nursing, current order evaluations, and new order entries.       Rex Riggs MD  Neurology/vascular Neurology  Date of Service: 04/18/2022  9:49 AM        Please note: This note was transcribed using voice recognition software. Because of this technology there are often uinintended grammatical, spelling, and other transcription errors. Please disregard these errors.

## 2022-04-18 NOTE — SUBJECTIVE & OBJECTIVE
Past Medical History:   Diagnosis Date    Arthritis     Benign localized hyperplasia of prostate without urinary obstruction and other lower urinary tract symptoms (LUTS)     Body mass index 37.0-37.9, adult     Diabetes mellitus     Esophageal reflux     Hypertension     Other and unspecified hyperlipidemia     Other testicular hypofunction     Polyneuropathy in diabetes(357.2)     PONV (postoperative nausea and vomiting)     Rosacea     Sleep apnea     uses CPAP    Type II or unspecified type diabetes mellitus with neurological manifestations, uncontrolled(250.62)        Past Surgical History:   Procedure Laterality Date    ANKLE SURGERY Right     ANKLE SURGERY Left     COLONOSCOPY N/A 5/17/2017    Procedure: COLONOSCOPY;  Surgeon: Carlos Hess MD;  Location: [facility];  Service: Endoscopy;  Laterality: N/A;    COLONOSCOPY W/ POLYPECTOMY      CYSTOSCOPY      CYSTOSCOPY N/A 10/23/2018    Procedure: CYSTOSCOPY;  Surgeon: Sarwat Viveros MD;  Location: [facility] OR;  Service: Urology;  Laterality: N/A;    CYSTOSCOPY WITH INSERTION OF MINIMALLY INVASIVE IMPLANT TO ENLARGE PROSTATIC URETHRA N/A 11/15/2018    Procedure: CYSTOSCOPY, WITH INSERTION OF UROLIFT IMPLANT;  Surgeon: Sarwat Viveros MD;  Location: [facility] [place] OR;  Service: Urology;  Laterality: N/A;    DEBRIDEMENT TENNIS ELBOW      right    FOREARM SURGERY Right     tendon repair    FRACTURE SURGERY      right ankle orif    HERNIA REPAIR      umbillical    PROSTATE BIOPSY      QUADRICEPS REPAIR      right    TONSILLECTOMY      TRANSRECTAL ULTRASOUND EXAMINATION N/A 10/23/2018    Procedure: ULTRASOUND, RECTAL APPROACH;  Surgeon: Sarwat Viveros MD;  Location: [facility] OR;  Service: Urology;  Laterality: N/A;    TRICEPS TENDON RELEASE      tendon repair left tricep    VASECTOMY         Review of patient's allergies indicates:   Allergen Reactions    Bactrim [sulfamethoxazole-trimethoprim] Other (See Comments)     Dizziness,lightheaded, heavy chest    Ciprofloxacin  "Other (See Comments)     CONFUSED, OFF BALANCE, WOKE UP AT NITE    Doxycycline Hives    Lamictal [lamotrigine] Rash    Trileptal [oxcarbazepine]        No current facility-administered medications on file prior to encounter.     Current Outpatient Medications on File Prior to Encounter   Medication Sig    ARIPiprazole (ABILIFY) 10 MG Tab Take 10 mg by mouth nightly.    aspirin (ECOTRIN) 81 MG EC tablet Take 81 mg by mouth once daily.    atorvastatin (LIPITOR) 40 MG tablet TAKE 1 TABLET(40 MG) BY MOUTH EVERY EVENING (Patient taking differently: Take 40 mg by mouth every evening. TAKE 1 TABLET(40 MG) BY MOUTH EVERY EVENING)    buPROPion (WELLBUTRIN XL) 150 MG TB24 tablet Take 150 mg by mouth once daily.    chlorthalidone (HYGROTEN) 25 MG Tab TAKE 1 TABLET(25 MG) BY MOUTH EVERY DAY    cloNIDine (CATAPRES) 0.1 MG tablet Take 1 tablet by mouth as needed. IF BP elevated    dexmethylphenidate (FOCALIN XR) 30 mg 24 hr capsule Take 1 capsule by mouth once daily.    dexmethylphenidate (FOCALIN) 10 MG tablet Take 10 mg by mouth every evening.    divalproex ER (DEPAKOTE ER) 250 MG 24 hr tablet Take 250 mg by mouth every evening.    esomeprazole (NEXIUM) 20 MG capsule Take 20 mg by mouth before breakfast.    insulin degludec (TRESIBA FLEXTOUCH U-200) 200 unit/mL (3 mL) insulin pen 44 u qhs    LORazepam (ATIVAN) 2 MG Tab Take 1-2 mg by mouth daily as needed.    metFORMIN (GLUCOPHAGE) 1000 MG tablet TAKE 1 TABLET(1000 MG) BY MOUTH TWICE DAILY WITH MEALS (Patient taking differently: Take 1,000 mg by mouth 2 (two) times daily with meals.)    piroxicam (FELDENE) 20 MG capsule Take 1 capsule (20 mg total) by mouth once daily.    pregabalin (LYRICA) 150 MG capsule Take 1 capsule (150 mg total) by mouth 2 (two) times daily.    blunt needle, disposable 18 x 1 1/2 " Ndle 1 Syringe by Misc.(Non-Drug; Combo Route) route once a week.    cyanocobalamin 1,000 mcg/mL injection Inject 1 mL (1,000 mcg total) into the skin every 7 days.    " "dapagliflozin (FARXIGA) 5 mg Tab tablet Take 1 tablet (5 mg total) by mouth once daily.    ergocalciferol (ERGOCALCIFEROL) 50,000 unit Cap TAKE 2 CAPSULES BY MOUTH WEEKLY    folic acid-vit B6-vit B12 (FOLBEE) 2.5-25-1 mg Tab Take 1 tablet by mouth once daily.    lisinopriL (PRINIVIL,ZESTRIL) 5 MG tablet TAKE 1 TABLET BY MOUTH EVERY DAY (Patient taking differently: Take 5 mg by mouth once daily.)    needle, disp, 30 gauge 30 gauge x 1/2" Ndle 1 each by Misc.(Non-Drug; Combo Route) route once a week.    pen needle, diabetic (BD ULTRA-FINE MINI PEN NEEDLE) 31 gauge x 3/16" Ndle 1 each by Misc.(Non-Drug; Combo Route) route 4 (four) times daily.    sildenafiL (VIAGRA) 100 MG tablet Take 1 tablet (100 mg total) by mouth as needed for Erectile Dysfunction.    syringe, disposable, 1 mL Syrg 1 Syringe by Misc.(Non-Drug; Combo Route) route once a week.    TRULICITY 1.5 mg/0.5 mL pen injector ADMINISTER 0.5 ML UNDER THE SKIN EVERY 7 DAYS (Patient taking differently: Inject 0.5 mg into the skin every 7 days. SUNDAYS)     Family History       Problem Relation (Age of Onset)    Arthritis Other    Cancer Other    Clotting disorder Other    Diabetes Other    Heart attack Other    Heart disease Other    Hyperlipidemia Other    Hypertension Other    Mental illness Other    No Known Problems Father    Pulmonary embolism Mother          Tobacco Use    Smoking status: Never Smoker    Smokeless tobacco: Never Used   Substance and Sexual Activity    Alcohol use: No    Drug use: No    Sexual activity: Not on file     Review of Systems   Constitutional:  Negative for activity change, appetite change, chills and fever.   HENT:  Negative for congestion, sore throat and trouble swallowing.    Eyes:  Negative for photophobia and visual disturbance.   Respiratory:  Negative for cough, chest tightness and shortness of breath.    Cardiovascular:  Negative for chest pain, palpitations and leg swelling.   Gastrointestinal:  Negative for abdominal " pain, diarrhea and nausea.   Genitourinary:  Negative for dysuria, flank pain and hematuria.   Musculoskeletal:  Negative for back pain.   Neurological:  Positive for dizziness, speech difficulty and weakness. Negative for headaches.   Psychiatric/Behavioral:  Positive for confusion.    Objective:     Vital Signs (Most Recent):  Temp: 97.8 °F (36.6 °C) (04/17/22 2353)  Pulse: 71 (04/17/22 2353)  Resp: 16 (04/17/22 2353)  BP: (!) 184/87 (04/17/22 2353)  SpO2: 98 % (04/17/22 2353)   Vital Signs (24h Range):  Temp:  [97.8 °F (36.6 °C)] 97.8 °F (36.6 °C)  Pulse:  [67-97] 71  Resp:  [16-20] 16  SpO2:  [94 %-98 %] 98 %  BP: (136-184)/() 184/87     Weight: 120.7 kg (266 lb)  Body mass index is 38.17 kg/m².    Physical Exam  Vitals reviewed.   Constitutional:       Appearance: Normal appearance. He is normal weight.   HENT:      Head: Normocephalic.      Mouth/Throat:      Mouth: Mucous membranes are moist.      Pharynx: Oropharynx is clear.   Eyes:      Pupils: Pupils are equal, round, and reactive to light.   Cardiovascular:      Rate and Rhythm: Normal rate and regular rhythm.      Pulses: Normal pulses.   Pulmonary:      Effort: Pulmonary effort is normal.      Breath sounds: Normal breath sounds.   Abdominal:      General: Bowel sounds are normal.   Musculoskeletal:         General: Normal range of motion.      Cervical back: Normal range of motion.   Skin:     General: Skin is warm and dry.   Neurological:      Mental Status: He is alert and oriented to person, place, and time.      GCS: GCS eye subscore is 4. GCS verbal subscore is 5. GCS motor subscore is 6.      Cranial Nerves: Cranial nerves are intact.      Sensory: Sensation is intact.      Motor: Motor function is intact.      Comments: No focal neuro deficits   Psychiatric:         Mood and Affect: Mood normal.         CRANIAL NERVES     CN III, IV, VI   Pupils are equal, round, and reactive to light.     Significant Labs: All pertinent labs within the  past 24 hours have been reviewed.  CBC:   Recent Labs   Lab 04/17/22  1847   WBC 9.21   HGB 15.0   HCT 43.6        CMP:   Recent Labs   Lab 04/17/22  1847      K 4.3      CO2 26   *   BUN 20   CREATININE 1.4   CALCIUM 9.5   PROT 6.7   ALBUMIN 3.9   BILITOT 1.1*   ALKPHOS 79   AST 18   ALT 26   ANIONGAP 12   EGFRNONAA 53*       Significant Imaging: I have reviewed all pertinent imaging results/findings within the past 24 hours.  Imaging Results              CT Head Without Contrast (Final result)  Result time 04/17/22 19:16:24      Final result by Martha Conner MD (04/17/22 19:16:24)                   Impression:      No appreciable acute abnormalities or significant focal findings as imaged.    Senescent atrophic changes.      Electronically signed by: Martha Conner  Date:    04/17/2022  Time:    19:16               Narrative:    EXAMINATION:  CT HEAD WITHOUT CONTRAST    CLINICAL HISTORY:  Mental status change, unknown cause;    TECHNIQUE:  Low dose axial images were obtained through the head.  Coronal and sagittal reformations were also performed. Contrast was not administered.    COMPARISON:  MRI brain 09/29/2021, CT brain 09/28/2021    FINDINGS:  There is no evidence of acute intra or extra-axial hemorrhage or hematoma.  The gray-white matter junction differentiation is intact with no evidence of acute major arterial infarct.    The ventricles, cisterns and sulci are mildly prominent consistent with senescent atrophic changes.  No hydrocephalus is seen.  Findings appear stable.    There is no focal mass or mass effect.  The paranasal sinuses, mastoid air cells and middle ears appear clear as imaged.    Globes and retro bulbar structures appear symmetric and grossly intact.  Pituitary gland and posterior fossa structures grossly appear intact allowing for artifact.    Bony calvarium is unremarkable.                                       X-Ray Chest AP Portable (In process)

## 2022-04-18 NOTE — ASSESSMENT & PLAN NOTE
Patient's FSGs are uncontrolled due to hyperglycemia on current medication regimen.  Last A1c reviewed-   Lab Results   Component Value Date    HGBA1C 6.8 (H) 02/03/2022     Most recent fingerstick glucose reviewed-   Recent Labs   Lab 04/17/22  1926   POCTGLUCOSE 151*     Current correctional scale  Medium  Maintain anti-hyperglycemic dose as follows-   Antihyperglycemics (From admission, onward)            Start     Stop Route Frequency Ordered    04/17/22 2312  insulin aspart U-100 pen 1-10 Units         -- SubQ Before meals & nightly PRN 04/17/22 2213        Hold Oral hypoglycemics while patient is in the hospital.

## 2022-04-18 NOTE — H&P
[facility] Medicine  History & Physical    Patient Name: Sarwat Colunga  MRN: [id number]  Patient Class: OP- Observation  Admission Date: 4/17/2022  Attending Physician: Ro Mathews MD   Primary Care Provider: Lydia Matamoros MD         Patient information was obtained from patient, relative(s), past medical records and ER records.     Subjective:     Principal Problem:Encephalopathy    Chief Complaint:   Chief Complaint   Patient presents with    Dizziness    Weakness     Wife states he was not focused this am         HPI: Sarwat Colunga is a 62 y/o male with a PMHx of arthritis, DM type 2, bipolar,  and HTN who presents with dizziness, abnormal behavior, illogical speech, poor balance, and confusion since this morning. Patient's wife states patient appeared confused and was cooking when he left the food unattended and the stove was left on. This is not normal behavior for him and speech has not been organized. Patient states he notices he has been confused lately that it has worsened today. Patient has a history of anxiety and took Ativan this morning.  His wife reports recent episodes of falling today and maintaining balance. He denies LOC and no visible injuries. The patient's wife denies any unilateral weakness or numbness, or any other symptoms at this time. Urine drug screen in the ED negative and lab work mostly unremarkable except elevated lipase. Head CT showed no acute abnormality. Patient referred to hospital medicine and seen in the ED with his wife at bedside. Patient able to answer all questions appropriately although his wife reports his behavior is not his normal and he is fixated on her upcoming procedure. He denies chest pain, N/V/D, headache, and shortness of breath. Patient will be admitted to hospital medicine for neurology consult and MRI.       Past Medical History:   Diagnosis Date    Arthritis     Benign localized hyperplasia of prostate without  urinary obstruction and other lower urinary tract symptoms (LUTS)     Body mass index 37.0-37.9, adult     Diabetes mellitus     Esophageal reflux     Hypertension     Other and unspecified hyperlipidemia     Other testicular hypofunction     Polyneuropathy in diabetes(357.2)     PONV (postoperative nausea and vomiting)     Rosacea     Sleep apnea     uses CPAP    Type II or unspecified type diabetes mellitus with neurological manifestations, uncontrolled(250.62)        Past Surgical History:   Procedure Laterality Date    ANKLE SURGERY Right     ANKLE SURGERY Left     COLONOSCOPY N/A 5/17/2017    Procedure: COLONOSCOPY;  Surgeon: Carlos Hess MD;  Location[facility];  Service: Endoscopy;  Laterality: N/A;    COLONOSCOPY W/ POLYPECTOMY      CYSTOSCOPY      CYSTOSCOPY N/A 10/23/2018    Procedure: CYSTOSCOPY;  Surgeon: Sarwat Viveros MD;  Location: [facility] OR;  Service: Urology;  Laterality: N/A;    CYSTOSCOPY WITH INSERTION OF MINIMALLY INVASIVE IMPLANT TO ENLARGE PROSTATIC URETHRA N/A 11/15/2018    Procedure: CYSTOSCOPY, WITH INSERTION OF UROLIFT IMPLANT;  Surgeon: Sarwat Viveros MD;  Location: [facility] OR;  Service: Urology;  Laterality: N/A;    DEBRIDEMENT TENNIS ELBOW      right    FOREARM SURGERY Right     tendon repair    FRACTURE SURGERY      right ankle orif    HERNIA REPAIR      umbillical    PROSTATE BIOPSY      QUADRICEPS REPAIR      right    TONSILLECTOMY      TRANSRECTAL ULTRASOUND EXAMINATION N/A 10/23/2018    Procedure: ULTRASOUND, RECTAL APPROACH;  Surgeon: Sarwat Viveros MD;  Location: [facility];  Service: Urology;  Laterality: N/A;    TRICEPS TENDON RELEASE      tendon repair left tricep    VASECTOMY         Review of patient's allergies indicates:   Allergen Reactions    Bactrim [sulfamethoxazole-trimethoprim] Other (See Comments)     Dizziness,lightheaded, heavy chest    Ciprofloxacin Other (See Comments)     CONFUSED, OFF BALANCE, WOKE UP AT NITE     "Doxycycline Hives    Lamictal [lamotrigine] Rash    Trileptal [oxcarbazepine]        No current facility-administered medications on file prior to encounter.     Current Outpatient Medications on File Prior to Encounter   Medication Sig    ARIPiprazole (ABILIFY) 10 MG Tab Take 10 mg by mouth nightly.    aspirin (ECOTRIN) 81 MG EC tablet Take 81 mg by mouth once daily.    atorvastatin (LIPITOR) 40 MG tablet TAKE 1 TABLET(40 MG) BY MOUTH EVERY EVENING (Patient taking differently: Take 40 mg by mouth every evening. TAKE 1 TABLET(40 MG) BY MOUTH EVERY EVENING)    buPROPion (WELLBUTRIN XL) 150 MG TB24 tablet Take 150 mg by mouth once daily.    chlorthalidone (HYGROTEN) 25 MG Tab TAKE 1 TABLET(25 MG) BY MOUTH EVERY DAY    cloNIDine (CATAPRES) 0.1 MG tablet Take 1 tablet by mouth as needed. IF BP elevated    dexmethylphenidate (FOCALIN XR) 30 mg 24 hr capsule Take 1 capsule by mouth once daily.    dexmethylphenidate (FOCALIN) 10 MG tablet Take 10 mg by mouth every evening.    divalproex ER (DEPAKOTE ER) 250 MG 24 hr tablet Take 250 mg by mouth every evening.    esomeprazole (NEXIUM) 20 MG capsule Take 20 mg by mouth before breakfast.    insulin degludec (TRESIBA FLEXTOUCH U-200) 200 unit/mL (3 mL) insulin pen 44 u qhs    LORazepam (ATIVAN) 2 MG Tab Take 1-2 mg by mouth daily as needed.    metFORMIN (GLUCOPHAGE) 1000 MG tablet TAKE 1 TABLET(1000 MG) BY MOUTH TWICE DAILY WITH MEALS (Patient taking differently: Take 1,000 mg by mouth 2 (two) times daily with meals.)    piroxicam (FELDENE) 20 MG capsule Take 1 capsule (20 mg total) by mouth once daily.    pregabalin (LYRICA) 150 MG capsule Take 1 capsule (150 mg total) by mouth 2 (two) times daily.    blunt needle, disposable 18 x 1 1/2 " Ndle 1 Syringe by Misc.(Non-Drug; Combo Route) route once a week.    cyanocobalamin 1,000 mcg/mL injection Inject 1 mL (1,000 mcg total) into the skin every 7 days.    dapagliflozin (FARXIGA) 5 mg Tab tablet Take 1 tablet " "(5 mg total) by mouth once daily.    ergocalciferol (ERGOCALCIFEROL) 50,000 unit Cap TAKE 2 CAPSULES BY MOUTH WEEKLY    folic acid-vit B6-vit B12 (FOLBEE) 2.5-25-1 mg Tab Take 1 tablet by mouth once daily.    lisinopriL (PRINIVIL,ZESTRIL) 5 MG tablet TAKE 1 TABLET BY MOUTH EVERY DAY (Patient taking differently: Take 5 mg by mouth once daily.)    needle, disp, 30 gauge 30 gauge x 1/2" Ndle 1 each by Misc.(Non-Drug; Combo Route) route once a week.    pen needle, diabetic (BD ULTRA-FINE MINI PEN NEEDLE) 31 gauge x 3/16" Ndle 1 each by Misc.(Non-Drug; Combo Route) route 4 (four) times daily.    sildenafiL (VIAGRA) 100 MG tablet Take 1 tablet (100 mg total) by mouth as needed for Erectile Dysfunction.    syringe, disposable, 1 mL Syrg 1 Syringe by Misc.(Non-Drug; Combo Route) route once a week.    TRULICITY 1.5 mg/0.5 mL pen injector ADMINISTER 0.5 ML UNDER THE SKIN EVERY 7 DAYS (Patient taking differently: Inject 0.5 mg into the skin every 7 days. SUNDAYS)     Family History       Problem Relation (Age of Onset)    Arthritis Other    Cancer Other    Clotting disorder Other    Diabetes Other    Heart attack Other    Heart disease Other    Hyperlipidemia Other    Hypertension Other    Mental illness Other    No Known Problems Father    Pulmonary embolism Mother          Tobacco Use    Smoking status: Never Smoker    Smokeless tobacco: Never Used   Substance and Sexual Activity    Alcohol use: No    Drug use: No    Sexual activity: Not on file     Review of Systems   Constitutional:  Negative for activity change, appetite change, chills and fever.   HENT:  Negative for congestion, sore throat and trouble swallowing.    Eyes:  Negative for photophobia and visual disturbance.   Respiratory:  Negative for cough, chest tightness and shortness of breath.    Cardiovascular:  Negative for chest pain, palpitations and leg swelling.   Gastrointestinal:  Negative for abdominal pain, diarrhea and nausea.   Genitourinary: "  Negative for dysuria, flank pain and hematuria.   Musculoskeletal:  Negative for back pain.   Neurological:  Positive for dizziness, speech difficulty and weakness. Negative for headaches.   Psychiatric/Behavioral:  Positive for confusion.    Objective:     Vital Signs (Most Recent):  Temp: 97.8 °F (36.6 °C) (04/17/22 2353)  Pulse: 71 (04/17/22 2353)  Resp: 16 (04/17/22 2353)  BP: (!) 184/87 (04/17/22 2353)  SpO2: 98 % (04/17/22 2353)   Vital Signs (24h Range):  Temp:  [97.8 °F (36.6 °C)] 97.8 °F (36.6 °C)  Pulse:  [67-97] 71  Resp:  [16-20] 16  SpO2:  [94 %-98 %] 98 %  BP: (136-184)/() 184/87     Weight: 120.7 kg (266 lb)  Body mass index is 38.17 kg/m².    Physical Exam  Vitals reviewed.   Constitutional:       Appearance: Normal appearance. He is normal weight.   HENT:      Head: Normocephalic.      Mouth/Throat:      Mouth: Mucous membranes are moist.      Pharynx: Oropharynx is clear.   Eyes:      Pupils: Pupils are equal, round, and reactive to light.   Cardiovascular:      Rate and Rhythm: Normal rate and regular rhythm.      Pulses: Normal pulses.   Pulmonary:      Effort: Pulmonary effort is normal.      Breath sounds: Normal breath sounds.   Abdominal:      General: Bowel sounds are normal.   Musculoskeletal:         General: Normal range of motion.      Cervical back: Normal range of motion.   Skin:     General: Skin is warm and dry.   Neurological:      Mental Status: He is alert and oriented to person, place, and time.      GCS: GCS eye subscore is 4. GCS verbal subscore is 5. GCS motor subscore is 6.      Cranial Nerves: Cranial nerves are intact.      Sensory: Sensation is intact.      Motor: Motor function is intact.      Comments: No focal neuro deficits   Psychiatric:         Mood and Affect: Mood normal.         CRANIAL NERVES     CN III, IV, VI   Pupils are equal, round, and reactive to light.     Significant Labs: All pertinent labs within the past 24 hours have been reviewed.  CBC:    Recent Labs   Lab 04/17/22  1847   WBC 9.21   HGB 15.0   HCT 43.6        CMP:   Recent Labs   Lab 04/17/22  1847      K 4.3      CO2 26   *   BUN 20   CREATININE 1.4   CALCIUM 9.5   PROT 6.7   ALBUMIN 3.9   BILITOT 1.1*   ALKPHOS 79   AST 18   ALT 26   ANIONGAP 12   EGFRNONAA 53*       Significant Imaging: I have reviewed all pertinent imaging results/findings within the past 24 hours.  Imaging Results              CT Head Without Contrast (Final result)  Result time 04/17/22 19:16:24      Final result by Martha Conner MD (04/17/22 19:16:24)                   Impression:      No appreciable acute abnormalities or significant focal findings as imaged.    Senescent atrophic changes.      Electronically signed by: Martha Conner  Date:    04/17/2022  Time:    19:16               Narrative:    EXAMINATION:  CT HEAD WITHOUT CONTRAST    CLINICAL HISTORY:  Mental status change, unknown cause;    TECHNIQUE:  Low dose axial images were obtained through the head.  Coronal and sagittal reformations were also performed. Contrast was not administered.    COMPARISON:  MRI brain 09/29/2021, CT brain 09/28/2021    FINDINGS:  There is no evidence of acute intra or extra-axial hemorrhage or hematoma.  The gray-white matter junction differentiation is intact with no evidence of acute major arterial infarct.    The ventricles, cisterns and sulci are mildly prominent consistent with senescent atrophic changes.  No hydrocephalus is seen.  Findings appear stable.    There is no focal mass or mass effect.  The paranasal sinuses, mastoid air cells and middle ears appear clear as imaged.    Globes and retro bulbar structures appear symmetric and grossly intact.  Pituitary gland and posterior fossa structures grossly appear intact allowing for artifact.    Bony calvarium is unremarkable.                                       X-Ray Chest AP Portable (In process)                       Assessment/Plan:     *  Encephalopathy  Patient exhibiting confusion, altered mental status and abnormal behavior. Head CT showed no acute changes    MRI pending  Neuro checks q4  Neuro consult      Class 2 severe obesity due to excess calories with serious comorbidity and body mass index (BMI) of 36.0 to 36.9 in adult  Nutrition consulted. Most recent weight and BMI monitored-                         Measurements:  Wt Readings from Last 1 Encounters:   04/17/22 120.7 kg (266 lb)   Body mass index is 38.17 kg/m².    Recommendations:      Patient has been screened and assessed by RD. RD will follow patient.    Type 2 diabetes mellitus with stage 3 chronic kidney disease, with long-term current use of insulin  Patient's FSGs are uncontrolled due to hyperglycemia on current medication regimen.  Last A1c reviewed-   Lab Results   Component Value Date    HGBA1C 6.8 (H) 02/03/2022     Most recent fingerstick glucose reviewed-   Recent Labs   Lab 04/17/22  1926   POCTGLUCOSE 151*     Current correctional scale  Medium  Maintain anti-hyperglycemic dose as follows-   Antihyperglycemics (From admission, onward)            Start     Stop Route Frequency Ordered    04/17/22 2312  insulin aspart U-100 pen 1-10 Units         -- SubQ Before meals & nightly PRN 04/17/22 2213        Hold Oral hypoglycemics while patient is in the hospital.        Hypertension associated with diabetes  Chronic, controlled.  Latest blood pressure and vitals reviewed-   Temp:  [97.8 °F (36.6 °C)]   Pulse:  [67-97]   Resp:  [16-20]   BP: (136-184)/()   SpO2:  [94 %-98 %] .   Home meds for hypertension were reviewed and noted below.   Hypertension Medications             chlorthalidone (HYGROTEN) 25 MG Tab TAKE 1 TABLET(25 MG) BY MOUTH EVERY DAY    cloNIDine (CATAPRES) 0.1 MG tablet Take 1 tablet by mouth as needed. IF BP elevated    lisinopriL (PRINIVIL,ZESTRIL) 5 MG tablet TAKE 1 TABLET BY MOUTH EVERY DAY          While in the hospital, will manage blood pressure as  follows; Continue home antihypertensive regimen    Will utilize p.r.n. blood pressure medication only if patient's blood pressure greater than  180/110 and he develops symptoms such as worsening chest pain or shortness of breath.          VTE Risk Mitigation (From admission, onward)         Ordered     enoxaparin injection 40 mg  Daily         04/17/22 2213     IP VTE HIGH RISK PATIENT  Once         04/17/22 2213     Place sequential compression device  Until discontinued         04/17/22 2213                   Chasity Bess NP  Department of Hospital Medicine   [facility]

## 2022-04-18 NOTE — PROGRESS NOTES
The enoxaparin dose has been adjusted for Sarwat STEPHENSON Keanu [id number] according to the pharmacy practice protocol.      Based on the patient's Estimated Creatinine Clearance: 70.4 mL/min (based on SCr of 1.4 mg/dL)., Body mass index is 38.17 kg/m²., and weight= 120.7 kg (266 lb), the enoxaparin dose has been adjusted 40 mg every 24 hours for CrCl =/>30.    Thank you,  Wyatt Mccabe

## 2022-04-18 NOTE — ASSESSMENT & PLAN NOTE
Nutrition consulted. Most recent weight and BMI monitored-                         Measurements:  Wt Readings from Last 1 Encounters:   04/17/22 120.7 kg (266 lb)   Body mass index is 38.17 kg/m².    Recommendations:      Patient has been screened and assessed by RD. RD will follow patient.

## 2022-04-18 NOTE — PLAN OF CARE
POC/Meds reviewed, pt verbalized understanding. Vitals stable.  Afebrile.  Tele In place-NSR.   Repositions self. Hourly/Q2hr rounding performed, safety maintained. Bed in lowest position, wheels locked, SR up x2, call light in easy reach. No  complaints at this time. Will continue to monitor.

## 2022-04-18 NOTE — PROGRESS NOTES
[facility]  Daily Progress Note    Patient Name: Sarwat Colunga  MRN: [id number]  Patient Class: OP- Observation  Admission Date: 4/17/2022  Attending Physician: Ro Mathews MD   Primary Care Provider: Lydia Matamoros MD           Subjective:     Principal Problem:Encephalopathy    Chief Complaint:   Chief Complaint   Patient presents with    Dizziness    Weakness     Wife states he was not focused this am         HPI: Sarwat Colunga is a 62 y/o male with a PMHx of arthritis, DM type 2, bipolar,  and HTN who presents with dizziness, abnormal behavior, illogical speech, poor balance, and confusion since this morning. Patient's wife states patient appeared confused and was cooking when he left the food unattended and the stove was left on. This is not normal behavior for him and speech has not been organized. Patient states he notices he has been confused lately that it has worsened today. Patient has a history of anxiety and took Ativan this morning.  His wife reports recent episodes of falling today and maintaining balance. He denies LOC and no visible injuries. The patient's wife denies any unilateral weakness or numbness, or any other symptoms at this time. Urine drug screen in the ED negative and lab work mostly unremarkable except elevated lipase. Head CT showed no acute abnormality. Patient referred to hospital medicine and seen in the ED with his wife at bedside. Patient able to answer all questions appropriately although his wife reports his behavior is not his normal and he is fixated on her upcoming procedure. He denies chest pain, N/V/D, headache, and shortness of breath. Patient will be admitted to hospital medicine for neurology consult and MRI.           No chest pain, shortness of breath, lightheadedness. Tolerating oral intake. Cognitive status improved. Family at bedside agrees     NAD, AO3  NC, AT  RRR  CTAB  SNTND+BS  No edema   PERRL    Vitals,  labs and radiographs from past 24h reviewed and personally interpreted.         Assessment/Plan:     * Encephalopathy  Patient exhibiting confusion, altered mental status and abnormal behavior. Head CT showed no acute changes    MRI pending  EEG unremarkable  -Potentially polypharmacy   Neuro checks q4  Neuro consult      Class 2 severe obesity due to excess calories with serious comorbidity and body mass index (BMI) of 36.0 to 36.9 in adult  Nutrition consulted. Most recent weight and BMI monitored-                         Measurements:  Wt Readings from Last 1 Encounters:   04/17/22 120.7 kg (266 lb)   Body mass index is 38.17 kg/m².    Recommendations:      Patient has been screened and assessed by RD. RD will follow patient.    Type 2 diabetes mellitus with stage 3 chronic kidney disease, with long-term current use of insulin  Patient's FSGs are uncontrolled due to hyperglycemia on current medication regimen.  Last A1c reviewed-   Lab Results   Component Value Date    HGBA1C 6.8 (H) 02/03/2022     Most recent fingerstick glucose reviewed-   Recent Labs   Lab 04/17/22  1926   POCTGLUCOSE 151*     Current correctional scale  Medium  Maintain anti-hyperglycemic dose as follows-   Antihyperglycemics (From admission, onward)            Start     Stop Route Frequency Ordered    04/17/22 2312  insulin aspart U-100 pen 1-10 Units         -- SubQ Before meals & nightly PRN 04/17/22 2213        Hold Oral hypoglycemics while patient is in the hospital.        Hypertension associated with diabetes  Chronic, controlled.  Latest blood pressure and vitals reviewed-   Temp:  [97.8 °F (36.6 °C)]   Pulse:  [67-97]   Resp:  [16-20]   BP: (136-184)/()   SpO2:  [94 %-98 %] .   Home meds for hypertension were reviewed and noted below.   Hypertension Medications             chlorthalidone (HYGROTEN) 25 MG Tab TAKE 1 TABLET(25 MG) BY MOUTH EVERY DAY    cloNIDine (CATAPRES) 0.1 MG tablet Take 1 tablet by mouth as needed. IF BP  elevated    lisinopriL (PRINIVIL,ZESTRIL) 5 MG tablet TAKE 1 TABLET BY MOUTH EVERY DAY          While in the hospital, will manage blood pressure as follows; Continue home antihypertensive regimen    Will utilize p.r.n. blood pressure medication only if patient's blood pressure greater than  180/110 and he develops symptoms such as worsening chest pain or shortness of breath.        VTE Risk Mitigation (From admission, onward)         Ordered     enoxaparin injection 40 mg  Daily         04/17/22 2213     IP VTE HIGH RISK PATIENT  Once         04/17/22 2213     Place sequential compression device  Until discontinued         04/17/22 2213                   Ro Mathews MD  Department of Hospital Medicine   [facility]

## 2022-04-18 NOTE — CARE UPDATE
04/18/22 0820   Patient Assessment/Suction   Level of Consciousness (AVPU) alert   Respiratory Effort Normal   Expansion/Accessory Muscles/Retractions expansion symmetric   All Lung Fields Breath Sounds Anterior:   TATY Breath Sounds clear;diminished   RUL Breath Sounds clear;diminished   Rhythm/Pattern, Respiratory pattern regular   Cough Frequency no cough   PRE-TX-O2   O2 Device (Oxygen Therapy) room air   Oxygen Concentration (%) 21   SpO2 99 %   Pulse Oximetry Type Intermittent   $ Pulse Oximetry - Multiple Charge Pulse Oximetry - Multiple   Pulse 62   Resp 15   Positioning HOB elevated 30 degrees   Aerosol Therapy   $ Aerosol Therapy Charges PRN treatment not required   POx, neb prn

## 2022-04-18 NOTE — PROCEDURES
EEG REPORT      Sarwat Colunga  [id number]  1960    DATE OF SERVICE: 4/18/2022    ON     METHODOLOGY      Extended electroencephalographic recording is made while the patient is ambulatory and continuing normal daily activities.  Electrodes are placed according to the International 10-20 placement system and included T1 and T2 electrode placement.  Twenty four (24) channels of digital signal (sampling rate of 512/sec) was simultaneously recorded from the scalp including EKG and eye monitors.  Recording band pass was 0.1 to 100 hz and all data was stored digitally on the recorder.  The patient is instructed to press an event button when clinical symptoms occur and write the symptoms into a diary. Activation procedures which include photic stimulation, hyperventilation and instructing patients to perform simple task are done in selected patients.        The EEG is displayed on a monitor screen and can be reformatted into different montages for evaluation.  The entire recoding is submitted for computer assisted analysis to detect spike and electrographic seizure activity.  The entire recording is visually reviewed and the times identified by computer analysis as being spikes or seizures are reviewed again.  Compresses spectral analysis (CSA) is also performed on the activity recorded from each individual channel.  This is displayed as a power display of frequencies from 0 to 30 Hz over time.   The CSA analysis is done and displayed continuously.  This is reviewed for asymmetries in power between homologous areas of the scalp and for presence of changes in power which canbe seen when seizures occur.  Sections of suspected abnormalities on the CSA is then compared with the original EEG recording.  .     [employer] software was also utilized in the review of this study.  This software suite analyzes the EEG recording in multiple domains.  Coherence and rhythmicity is computed to identify EEG sections which may  contain organized seizures.  Each channel undergoes analysis to detect presence of spike and sharp waves which have special and morphological characteristic of epileptic activity.  The routine EEG recording is converted from spacial into frequency domain.  This is then displayed comparing homologous areas to identify areas of significant asymmetry.  Algorithm to identify non-cortically generated artifact is used to separate eye movement, EMG and other artifact from the EEG     Recording Times    A total of 00:26:19 hours of EEG was recorded.      EEG FINDINGS:  Background activity:   The background rhythm was characterized by alpha and anterior dominant beta activity with a 9Hz posterior dominant alpha rhythm at 30-70 microvolts.   Symmetry and continuity: the background was continuous and symmetric     Sleep:   No sleep transients were seen.    Activation procedures:   Photic stimulation was performed with no abnormalities seen    Abnormal activity:   No epileptiform discharges, periodic discharges, lateralized rhythmic delta activity or electrographic seizures were seen.    IMPRESSION:   Normal EEG of the waking state      Hernán Travis MD  Neurology-Epilepsy.  [facility].

## 2022-04-18 NOTE — ASSESSMENT & PLAN NOTE
Patient exhibiting confusion, altered mental status and abnormal behavior. Head CT showed no acute changes    MRI pending  Neuro checks q4  Neuro consult

## 2022-04-18 NOTE — HPI
Sarwat Colunag is a 60 y/o male with a PMHx of arthritis, DM type 2, bipolar,  and HTN who presents with dizziness, abnormal behavior, illogical speech, poor balance, and confusion since this morning. Patient's wife states patient appeared confused and was cooking when he left the food unattended and the stove was left on. This is not normal behavior for him and speech has not been organized. Patient states he notices he has been confused lately that it has worsened today. Patient has a history of anxiety and took Ativan this morning.  His wife reports recent episodes of falling today and maintaining balance. He denies LOC and no visible injuries. The patient's wife denies any unilateral weakness or numbness, or any other symptoms at this time. Urine drug screen in the ED negative and lab work mostly unremarkable except elevated lipase. Head CT showed no acute abnormality. Patient referred to hospital medicine and seen in the ED with his wife at bedside. Patient able to answer all questions appropriately although his wife reports his behavior is not his normal and he is fixated on her upcoming procedure. He denies chest pain, N/V/D, headache, and shortness of breath. Patient will be admitted to hospital medicine for neurology consult and MRI.

## 2022-04-18 NOTE — PLAN OF CARE
[facility]  Initial Discharge Assessment       Primary Care Provider: Lydia Matamoros MD    Admission Diagnosis: Encephalopathy [G93.40]  Chest pain [R07.9]  AMS (altered mental status) [R41.82]    Admission Date: 4/17/2022  Expected Discharge Date:       met with patient and spouse Tere at bedside to complete assessment. Demographics, PCP and insurance verified. Patient lives at home with his spouse. Patient has CPAP at home. No current HH. No dialysis. Case management to assist with any additional needs upon discharge. No further needs to address at this time.    Discharge Barriers Identified: None    Payor: BLUE CROSS BLUE SHIELD / Plan: BCBS OF [facility] OPEN ACCESS / Product Type: Commercial /     Extended Emergency Contact Information  Primary Emergency Contact: Tere Doss  Address: [address] [place] [address] [address]  Mobile Phone: [phone]  Relation: Spouse  Preferred language: English   needed? No  Secondary Emergency Contact: Hafsa Colunga  Address: [address] [place] [address]           [place]  Mobile Phone: [phone]  Relation: Daughter    Discharge Plan A: Home with family  Discharge Plan B: Home      [id number] - [place] - [address] AT [facility] [address]  [address] [place] [address]  Phone: [phone] Fax: [phone]    [id number] - [place] - [address]  [address]  [place] [address]  Phone: [phone] Fax: [phone]      Initial Assessment (most recent)     Adult Discharge Assessment - 04/18/22 1439        Discharge Assessment    Assessment Type Discharge Planning Assessment     Confirmed/corrected address, phone number and insurance Yes     Confirmed Demographics Correct on Facesheet     Source of Information patient;family     Does patient/caregiver understand observation status Yes      Lives With spouse     Do you expect to return to your current living situation? Yes     Do you have help at home or someone to help you manage your care at home? Yes     Who are your caregiver(s) and their phone number(s)? Tere Doss (Spouse)   [phone] (Mobile)     Prior to hospitilization cognitive status: Alert/Oriented     Current cognitive status: Alert/Oriented     Walking or Climbing Stairs Difficulty none     Dressing/Bathing Difficulty none     Home Accessibility wheelchair accessible     Equipment Currently Used at Home CPAP     Patient currently being followed by outpatient case management? No     Do you currently have service(s) that help you manage your care at home? No     Do you take prescription medications? Yes     Do you have prescription coverage? Yes     Do you have any problems affording any of your prescribed medications? No     Is the patient taking medications as prescribed? yes     How do you get to doctors appointments? car, drives self     Are you on dialysis? No     Do you take coumadin? No     Discharge Plan A Home with family     Discharge Plan B Home     DME Needed Upon Discharge  none     Discharge Plan discussed with: Patient     Discharge Barriers Identified None        Relationship/Environment    Name(s) of Who Lives With Patient Tere Doss (Spouse)   [phone] (Mobile)

## 2022-04-19 VITALS
RESPIRATION RATE: 18 BRPM | HEIGHT: 70 IN | SYSTOLIC BLOOD PRESSURE: 170 MMHG | HEART RATE: 74 BPM | TEMPERATURE: 97 F | DIASTOLIC BLOOD PRESSURE: 81 MMHG | OXYGEN SATURATION: 96 % | BODY MASS INDEX: 38.08 KG/M2 | WEIGHT: 266 LBS

## 2022-04-19 LAB
CHOLEST SERPL-MCNC: 114 MG/DL (ref 120–199)
CHOLEST/HDLC SERPL: 3.4 {RATIO} (ref 2–5)
ESTIMATED AVG GLUCOSE: 171 MG/DL (ref 68–131)
HBA1C MFR BLD: 7.6 % (ref 4–5.6)
HDLC SERPL-MCNC: 34 MG/DL (ref 40–75)
HDLC SERPL: 29.8 % (ref 20–50)
LDLC SERPL CALC-MCNC: 56.2 MG/DL (ref 63–159)
NONHDLC SERPL-MCNC: 80 MG/DL
POCT GLUCOSE: 126 MG/DL (ref 70–110)
POCT GLUCOSE: 149 MG/DL (ref 70–110)
TRIGL SERPL-MCNC: 119 MG/DL (ref 30–150)

## 2022-04-19 PROCEDURE — 96376 TX/PRO/DX INJ SAME DRUG ADON: CPT

## 2022-04-19 PROCEDURE — 99900035 HC TECH TIME PER 15 MIN (STAT)

## 2022-04-19 PROCEDURE — 25000003 PHARM REV CODE 250: Performed by: INTERNAL MEDICINE

## 2022-04-19 PROCEDURE — C9113 INJ PANTOPRAZOLE SODIUM, VIA: HCPCS | Performed by: NURSE PRACTITIONER

## 2022-04-19 PROCEDURE — G0378 HOSPITAL OBSERVATION PER HR: HCPCS

## 2022-04-19 PROCEDURE — 97165 OT EVAL LOW COMPLEX 30 MIN: CPT

## 2022-04-19 PROCEDURE — 95819 EEG AWAKE AND ASLEEP: CPT

## 2022-04-19 PROCEDURE — 97161 PT EVAL LOW COMPLEX 20 MIN: CPT

## 2022-04-19 PROCEDURE — 80061 LIPID PANEL: CPT | Performed by: INTERNAL MEDICINE

## 2022-04-19 PROCEDURE — 36415 COLL VENOUS BLD VENIPUNCTURE: CPT | Performed by: INTERNAL MEDICINE

## 2022-04-19 PROCEDURE — 25000003 PHARM REV CODE 250: Performed by: NURSE PRACTITIONER

## 2022-04-19 PROCEDURE — 63600175 PHARM REV CODE 636 W HCPCS: Performed by: NURSE PRACTITIONER

## 2022-04-19 PROCEDURE — 94761 N-INVAS EAR/PLS OXIMETRY MLT: CPT

## 2022-04-19 PROCEDURE — 83036 HEMOGLOBIN GLYCOSYLATED A1C: CPT | Performed by: INTERNAL MEDICINE

## 2022-04-19 RX ORDER — LISINOPRIL 10 MG/1
20 TABLET ORAL DAILY
Status: DISCONTINUED | OUTPATIENT
Start: 2022-04-19 | End: 2022-04-19 | Stop reason: HOSPADM

## 2022-04-19 RX ORDER — LISINOPRIL 40 MG/1
40 TABLET ORAL DAILY
Qty: 30 TABLET | Refills: 2 | Status: SHIPPED | OUTPATIENT
Start: 2022-04-19 | End: 2022-04-26

## 2022-04-19 RX ORDER — INSULIN DEGLUDEC 200 U/ML
INJECTION, SOLUTION SUBCUTANEOUS
Qty: 3 PEN | Refills: 12 | Status: SHIPPED | OUTPATIENT
Start: 2022-04-19 | End: 2022-08-24

## 2022-04-19 RX ORDER — CHLORTHALIDONE 25 MG/1
25 TABLET ORAL DAILY
Status: DISCONTINUED | OUTPATIENT
Start: 2022-04-19 | End: 2022-04-19 | Stop reason: HOSPADM

## 2022-04-19 RX ADMIN — ARIPIPRAZOLE 10 MG: 5 TABLET ORAL at 09:04

## 2022-04-19 RX ADMIN — BUPROPION HYDROCHLORIDE 150 MG: 150 TABLET, FILM COATED, EXTENDED RELEASE ORAL at 09:04

## 2022-04-19 RX ADMIN — CHLORTHALIDONE 25 MG: 25 TABLET ORAL at 09:04

## 2022-04-19 RX ADMIN — DOCUSATE SODIUM AND SENNOSIDES 1 TABLET: 8.6; 5 TABLET, FILM COATED ORAL at 09:04

## 2022-04-19 RX ADMIN — PANTOPRAZOLE SODIUM 40 MG: 40 INJECTION, POWDER, LYOPHILIZED, FOR SOLUTION INTRAVENOUS at 09:04

## 2022-04-19 RX ADMIN — LISINOPRIL 20 MG: 10 TABLET ORAL at 09:04

## 2022-04-19 NOTE — PLAN OF CARE
POC reviewed, patient alert and oriented x 4, able to make needs known. Patient denies pain or discomfort. Medications given  Patient ambulates stand by assist to bathroom Call light and personal items within reach. Bed locked in lowest position, will continue to monitor. Telemetry monitored

## 2022-04-19 NOTE — PLAN OF CARE
Problem: Adult Inpatient Plan of Care  Goal: Plan of Care Review  Outcome: Ongoing, Progressing     Problem: Adult Inpatient Plan of Care  Goal: Patient-Specific Goal (Individualized)  Outcome: Ongoing, Progressing     Problem: Adult Inpatient Plan of Care  Goal: Optimal Comfort and Wellbeing  Outcome: Ongoing, Progressing     Problem: Diabetes Comorbidity  Goal: Blood Glucose Level Within Targeted Range  Outcome: Ongoing, Progressing     POC continues, patient alert and oriented x 4, able to make needs known. Patient denies pain or discomfort. Medications given as ordered. Patient ambulates stand by assist to bathroom. Patient currently in bed with eyes closed, NAD noted. Call light and personal items within reach. Bed locked in lowest position, will continue to monitor.

## 2022-04-19 NOTE — PT/OT/SLP EVAL
Occupational Therapy   Evaluation and Discharge Note    Name: Sarwat Colunga  MRN: [id number]  Admitting Diagnosis:  Encephalopathy   Recent Surgery: * No surgery found *      Recommendations:     Discharge Recommendations: home  Discharge Equipment Recommendations:  none  Barriers to discharge:  None    Assessment:     Sarwat Colunga is a 61 y.o. male with a medical diagnosis of Encephalopathy. At this time, patient is independent with ADLs. Patient does not require further acute OT services.     Plan:     During this hospitalization, patient does not require further acute OT services.  Please re-consult if situation changes.    · Plan of Care Reviewed with: patient    Subjective     Chief Complaint: none  Patient/Family Comments/goals: none    Occupational Profile:  Living Environment: Patient lives with wife and 18 y/o daughter in a [facility].   Previous level of function: Patient was independent with ADLs and mobility.   Equipment Used at home:  none  Assistance upon Discharge: Patient will receive assistance from family if needed.    Pain/Comfort:  · Pain Rating 1: 0/10  · Pain Rating Post-Intervention 1: 0/10    Patients cultural, spiritual, Cheondoism conflicts given the current situation:      Objective:     Communicated with: nurse Retana prior to session.  Patient found HOB elevated with telemetry, peripheral IV upon OT entry to room.    General Precautions: Standard, fall   Orthopedic Precautions:N/A   Braces: N/A  Respiratory Status: Room air     Occupational Performance:    Bed Mobility:    · Patient completed Scooting/Bridging with modified independence  · Patient completed Supine to Sit with modified independence  · Patient completed Sit to Supine with modified independence    Functional Mobility/Transfers:  · Patient completed Sit <> Stand Transfer with modified independence  with  no assistive device   · Patient completed Toilet Transfer Stand Pivot technique with modified independence with  no  AD    Activities of Daily Living:  · Grooming: independence with oral and facial hygiene while standing at sink  · Lower Body Dressing: independence to don/doff socks while seated EOB    Cognitive/Visual Perceptual:  Cognitive/Psychosocial Skills:     -       Oriented to: x4   -       Follows Commands/attention:Follows multistep  commands  -       Communication: clear/fluent  -       Safety awareness/insight to disability: intact   -       Mood/Affect/Coping skills/emotional control: Appropriate to situation and Cooperative  Visual/Perceptual:      -Intact     Physical Exam:  Postural examination/scapula alignment:    -       Rounded shoulders  -       Forward head  Upper Extremity Range of Motion:     -       Right Upper Extremity: WFL  -       Left Upper Extremity: WFL  Upper Extremity Strength:    -       Right Upper Extremity: WFL  -       Left Upper Extremity: WFL   Strength:    -       Right Upper Extremity: WFL  -       Left Upper Extremity: WFL  Fine Motor Coordination:    -       Intact  Gross motor coordination:   WFL    AMPAC 6 Click ADL:  AMPAC Total Score: 24    Education:    Patient left HOB elevated with all lines intact, call button in reach and nurse notified    GOALS:   Multidisciplinary Problems     Occupational Therapy Goals     Not on file                History:     Past Medical History:   Diagnosis Date    Arthritis     Benign localized hyperplasia of prostate without urinary obstruction and other lower urinary tract symptoms (LUTS)     Body mass index 37.0-37.9, adult     Diabetes mellitus     Esophageal reflux     Hypertension     Other and unspecified hyperlipidemia     Other testicular hypofunction     Polyneuropathy in diabetes(357.2)     PONV (postoperative nausea and vomiting)     Rosacea     Sleep apnea     uses CPAP    Type II or unspecified type diabetes mellitus with neurological manifestations, uncontrolled(250.62)        Past Surgical History:   Procedure  Laterality Date    ANKLE SURGERY Right     ANKLE SURGERY Left     COLONOSCOPY N/A 5/17/2017    Procedure: COLONOSCOPY;  Surgeon: Carlos Hess MD;  Location: [facility];  Service: Endoscopy;  Laterality: N/A;    COLONOSCOPY W/ POLYPECTOMY      CYSTOSCOPY      CYSTOSCOPY N/A 10/23/2018    Procedure: CYSTOSCOPY;  Surgeon: Sarwat Viveros MD;  [facility]: [facility];  Service: Urology;  Laterality: N/A;    CYSTOSCOPY WITH INSERTION OF MINIMALLY INVASIVE IMPLANT TO ENLARGE PROSTATIC URETHRA N/A 11/15/2018    Procedure: CYSTOSCOPY, WITH INSERTION OF UROLIFT IMPLANT;  Surgeon: Sarwat Viveros MD;  Location: [facility] OR;  Service: Urology;  Laterality: N/A;    DEBRIDEMENT TENNIS ELBOW      right    FOREARM SURGERY Right     tendon repair    FRACTURE SURGERY      right ankle orif    HERNIA REPAIR      umbillical    PROSTATE BIOPSY      QUADRICEPS REPAIR      right    TONSILLECTOMY      TRANSRECTAL ULTRASOUND EXAMINATION N/A 10/23/2018    Procedure: ULTRASOUND, RECTAL APPROACH;  Surgeon: Sarwat Viveros MD;  Location: [facility];  Service: Urology;  Laterality: N/A;    TRICEPS TENDON RELEASE      tendon repair left tricep    VASECTOMY         Time Tracking:     OT Date of Treatment: 04/19/22  OT Start Time: 1004  OT Stop Time: 1012  OT Total Time (min): 8 min    Billable Minutes:Evaluation 8    4/19/2022

## 2022-04-19 NOTE — PROGRESS NOTES
[facility]  Progress Note  Date: 2022 9:40 AM            Patient Name: Sarwat Colunga   MRN: [id number]   : 1960    AGE: 61 y.o.    LOS: 0 days Hospital Day: 3  Admit date: 2022  6:01 PM         HPI per EMR: Sarwat Colunga is a 61 y.o. male with a history of arthritis, DM type 2, bipolar,  and HTN who presents with dizziness, abnormal behavior, illogical speech, poor balance, and confusion since this morning. Patient's wife states patient appeared confused and was cooking when he left the food unattended and the stove was left on. This is not normal behavior for him and speech has not been organized. Patient states he notices he has been confused lately that it has worsened today. Patient has a history of anxiety and took Ativan this morning.  His wife reports recent episodes of falling today and maintaining balance. He denies LOC and no visible injuries. The patient's wife denies any unilateral weakness or numbness, or any other symptoms at this time. Urine drug screen in the ED negative and lab work mostly unremarkable except elevated lipase. Head CT showed no acute abnormality. Patient referred to hospital medicine and seen in the ED with his wife at bedside. Patient able to answer all questions appropriately although his wife reports his behavior is not his normal and he is fixated on her upcoming procedure. He denies chest pain, N/V/D, headache, and shortness of breath.     Neurology consult:  Patient was seen and examined by me this morning.  Patient's wife is at bedside helping with history.  He is currently alert oriented x3 and back to baseline.  Apparently yesterday after he woke up he was normal at about 10:00 a.m., he complained of feeling anxious and took an Ativan after which he started having confusion.  He know what he was talking nor he knew what he was doing.  Wife states that he turned on the stove and left the food unattended and did not turn it off which  was not normal of him.  His confusion slowly worsened through the day for which she brought him to the ER.     He did not have any recent fevers, chills, nausea or vomiting.  He did not have any headache or vision loss or any unilateral weakness or sensory changes.  No falls or trauma in the recent past.     Currently on my exam he has no neurological deficits.    04/19/2022: No acute events overnight. Patient was seen and examined by me this morning. Neuro exam is normal.  He had no further episodes of confusion       Vitals:  Patient Vitals for the past 24 hrs:   BP Temp Temp src Pulse Resp SpO2   04/19/22 0746 -- -- -- -- -- 95 %   04/19/22 0721 (!) 192/97 98.1 °F (36.7 °C) -- 64 18 96 %   04/19/22 0404 (!) 173/78 96.4 °F (35.8 °C) Oral 64 18 (!) 94 %   04/19/22 0236 -- -- -- -- -- (!) 94 %   04/19/22 0036 -- -- -- -- -- (!) 94 %   04/18/22 2322 (!) 166/77 97.3 °F (36.3 °C) Oral 73 18 95 %   04/18/22 2100 -- -- -- -- -- 98 %   04/18/22 1934 (!) 188/91 96.7 °F (35.9 °C) Oral 71 19 98 %   04/18/22 1554 (!) 159/74 97.2 °F (36.2 °C) -- 80 19 96 %   04/18/22 1149 139/67 96.7 °F (35.9 °C) -- 63 19 98 %     PHYSICAL EXAM:     GENERAL APPEARANCE: Well-developed, well-nourished male in no acute distress.  HEENT: Normocephalic and atraumatic. PERRL. Oropharynx unremarkable.  PULM: Comfortable on room air.  CV: RRR.  ABDOMEN: Soft, nontender.  EXTREMITIES: No signs of vascular compromise. Pulses present. No cyanosis, clubbing or edema.  SKIN: Clear; no rashes, lesions or skin breaks in exposed areas.      NEURO:   MENTAL STATUS: Patient awake and oriented to time, place, and person. Affect normal.  CRANIAL NERVES II-XII: Pupils equal, round and reactive to light. Extraocular movements full and intact. No facial asymmetry.  MOTOR: Normal bulk. Tone normal and symmetrical throughout.  No abnormal movements. No tremor.   Strength 5/5 throughout unless specified below.  REFLEXES: DTRs 2+; normal and symmetric throughout.    SENSATION: Sensation grossly intact to fine touch.  COORDINATION: Finger-to-nose normal for age and symmetric.  STATION: Romberg deferred.  GAIT: Deferred.    CURRENT SCHEDULED MEDICATIONS:   ARIPiprazole  10 mg Oral Daily    atorvastatin  40 mg Oral QHS    buPROPion  150 mg Oral Daily    chlorthalidone  25 mg Oral Daily    divalproex ER  250 mg Oral QHS    enoxaparin  40 mg Subcutaneous Daily    lisinopriL  20 mg Oral Daily    pantoprazole  40 mg Intravenous Daily    senna-docusate 8.6-50 mg  1 tablet Oral BID     CURRENT INFUSIONS:    DATA:  Recent Labs   Lab 04/17/22 1847 04/18/22  0422    141   K 4.3 4.2    104   CO2 26 27   BUN 20 21   CREATININE 1.4 1.1   * 123*   CALCIUM 9.5 9.6   AST 18 16   ALT 26 24     Recent Labs   Lab 04/17/22 1847 04/18/22  0422   WBC 9.21 8.31   HGB 15.0 14.7   HCT 43.6 43.6    160     No results found for: PROTEINCSF, GLUCCSF, WBCCSF, RBCCSF  Hemoglobin A1C   Date Value Ref Range Status   02/03/2022 6.8 (H) 4.0 - 5.6 % Final     Comment:     ADA Screening Guidelines:  5.7-6.4%  Consistent with prediabetes  >or=6.5%  Consistent with diabetes    High levels of fetal hemoglobin interfere with the HbA1C  assay. Heterozygous hemoglobin variants (HbS, HgC, etc)do  not significantly interfere with this assay.   However, presence of multiple variants may affect accuracy.     09/29/2021 6.0 4.5 - 6.2 % Final     Comment:     According to ADA guidelines, hemoglobin A1C <7.0% represents  optimal control in non-pregnant diabetic patients.  Different  metrics may apply to specific populations.   Standards of Medical Care in Diabetes - 2016.    For the purpose of screening for the presence of diabetes:  <5.7%     Consistent with the absence of diabetes  5.7-6.4%  Consistent with increasing risk for diabetes   (prediabetes)  >or=6.5%  Consistent with diabetes    Currently no consensus exists for use of hemoglobin A1C  for diagnosis of diabetes for children.      08/14/2021 5.5 4.0 - 5.6 % Final     Comment:     ADA Screening Guidelines:  5.7-6.4%  Consistent with prediabetes  >or=6.5%  Consistent with diabetes    High levels of fetal hemoglobin interfere with the HbA1C  assay. Heterozygous hemoglobin variants (HbS, HgC, etc)do  not significantly interfere with this assay.   However, presence of multiple variants may affect accuracy.              I have personally reviewed and interpreted the pertinent imaging and lab results.  Imaging Results          MRI Brain Without Contrast (Final result)  Result time 04/18/22 18:29:29    Final result by Daphne Snider MD (04/18/22 18:29:29)                 Impression:      There is no evidence acute intracranial process.      Electronically signed by: Daphne Snider MD  Date:    04/18/2022  Time:    18:29             Narrative:    EXAMINATION:  MRI BRAIN WITHOUT CONTRAST    CLINICAL HISTORY:  Mental status change, unknown cause;    TECHNIQUE:  Multiplanar multisequence MR imaging of the brain was performed without contrast.    COMPARISON:  None.    FINDINGS:  There is no evidence of extra-axial fluid collection, midline shift, mass or mass effect.  There are no regions of restricted diffusion.  There is no evidence acute intracranial hemorrhage.                               CT Head Without Contrast (Final result)  Result time 04/17/22 19:16:24    Final result by Martha Conner MD (04/17/22 19:16:24)                 Impression:      No appreciable acute abnormalities or significant focal findings as imaged.    Senescent atrophic changes.      Electronically signed by: Martha Conner  Date:    04/17/2022  Time:    19:16             Narrative:    EXAMINATION:  CT HEAD WITHOUT CONTRAST    CLINICAL HISTORY:  Mental status change, unknown cause;    TECHNIQUE:  Low dose axial images were obtained through the head.  Coronal and sagittal reformations were also performed. Contrast was not administered.    COMPARISON:  MRI brain  09/29/2021, CT brain 09/28/2021    FINDINGS:  There is no evidence of acute intra or extra-axial hemorrhage or hematoma.  The gray-white matter junction differentiation is intact with no evidence of acute major arterial infarct.    The ventricles, cisterns and sulci are mildly prominent consistent with senescent atrophic changes.  No hydrocephalus is seen.  Findings appear stable.    There is no focal mass or mass effect.  The paranasal sinuses, mastoid air cells and middle ears appear clear as imaged.    Globes and retro bulbar structures appear symmetric and grossly intact.  Pituitary gland and posterior fossa structures grossly appear intact allowing for artifact.    Bony calvarium is unremarkable.                               X-Ray Chest AP Portable (Final result)  Result time 04/18/22 07:55:12    Final result by Torey Graham Jr., MD (04/18/22 07:55:12)                 Impression:      Mild bibasilar patchy infiltrates demonstrated      Electronically signed by: Torey Graham MD  Date:    04/18/2022  Time:    07:55             Narrative:    EXAMINATION:  XR CHEST AP PORTABLE    CLINICAL HISTORY:  Chest Pain;    TECHNIQUE:  Single frontal view of the chest was performed.    COMPARISON:  Chest of September 28, 2020    FINDINGS:  There is normal appearance of pulmonary vasculature and no pleural effusion or pneumothorax.  Small patchy infiltrates identified at both lung bases    The cardiac silhouette is normal in size. The hilar and mediastinal contours are unremarkable.    Bones are intact.                               RADIOLOGY REPORT (Final result)  Result time 04/18/22 12:44:49    Final result by Unknown User (04/18/22 12:44:49)                                          ASSESSMENT AND PLAN:    Encephalopathy     Workup:   CT head:  No acute intracranial abnormality  MRI brain:   no acute intracranial abnormality  EEG:   normal EEG in awake state  Encephalopathy labs including vitamin B1 pending, B12  normal 496( 210-950), B6 pending, folate 35.3H(4-24) and TSH normal.     Plan:  · Admitted for workup and management of encephalopathy.  · Etiology of encephalopathy uncertain this time.  He does take many sedating medications which were prescribed by Psychiatry and polypharmacy is a likely cause of his encephalopathy/delirium.  Currently patient is back to his normal mental status  · EEG and MRI as above  · Neuro checks q.4.  · Delirium precautions.  · Recommended to follow up with Psychiatry to adjust his medications that can alter his mental status.  · No further neurological recommendations at this time.  Will follow patient as needed        37 minutes of care time has been spent evaluating with the patient. Time includes chart review not limited to diagnostic imaging, labs, and vitals, patient assessment, discussion with family and nursing, current order evaluations, and new order entries.      Rex Riggs MD  Neurology/vascular Neurology  Date of Service: 04/19/2022  9:40 AM    Please note: This note was transcribed using voice recognition software. Because of this technology there are often uinintended grammatical, spelling, and other transcription errors. Please disregard these errors.

## 2022-04-19 NOTE — PLAN OF CARE
Per rounds, pt ready for DC, pending PT/OT eval.   Summary of Care

 Created on:2019



Patient:Joe Archuleta

Sex:Male

:1973

External Reference #:[id number]





Demographics







 Address  [address]



   [place] [address]

 

 Mobile Phone  [phone]

 

 Home Phone  [phone]

 

 Phone  [phone]

 

 Preferred Language  English

 

 Marital Status  

 

 Mormon Affiliation  Unknown

 

 Race  White

 

 Ethnic Group   or 









Author







 Organization  [facility]

 

 Address  [address] [place] [address]









Support







 Name  Relationship  Address  Phone

 

 Mandie Blair  Unavailable  [address]  [phone]



     [place] [address]  









Care Team Providers







 Name  Role  Phone

 

 Veronica Huff MD  Primary Care Provider  [phone]









Encounter Details







 Date  Type  Department  Care Team  Description

 

 2019  Orders Only  [facility]



  Doctor Unassigned, No  



     [address] [facility]



  Name



  



     [place] [address]  [address] [place] [address]  







Allergies

No Known Allergiesdocumented as of this encounter (statuses as of 2019)



Medications

No known medicationsdocumented as of this encounter (statuses as of 2019)



Active Problems







 Problem  Noted Date

 

 Hyperglycemia  2019

 

 Alcoholic cirrhosis  2019

 

 Chronic hepatitis C without hepatic coma  2019

 

 Chronic abdominal pain  2019

 

 Anxiety  2019

 

 Diabetes mellitus type 2 with complications, uncontrolled  2019

 

 Hyperammonemia  2019

 

 Obesity (BMI 30-39.9)  2019

 

 Hepatic encephalopathy  2015

 

 S/P TIPS (transjugular intrahepatic portosystemic shunt)  2015

 

 History of CVA (cerebrovascular accident)  2015

 

 History of MI (myocardial infarction)  2015



documented as of this encounter (statuses as of 2019)



Resolved Problems







 Problem  Noted Date  Resolved Date

 

 History of alcoholism  2019

 

 Hospice care patient  2015



documented as of this encounter (statuses as of 2019)



Immunizations







 Name  Administration Dates  Next Due

 

 Influenza Virus Vaccine Quad IM 3+ YRS  2015  

 

 Pneumococcal Polysaccharide, PPSV23 (PNEUMOVAX)  2015  



documented as of this encounter



Social History







 Tobacco Use  Types  Packs/Day  Years Used  Date

 

 Never Smoker        









 Smokeless Tobacco: Never Used      









 Alcohol Use  Drinks/Week  oz/Week  Comments

 

 Not Currently      









 Education  Answer  Date Recorded

 

 What is the highest level of school you have completed or the  9th grade  



 highest degree you have received?    









 Financial Resource Strain  Answer  Date Recorded

 

 How hard is it for you to pay for the very basics like  Somewhat hard  2019



 food, housing, medical care, and heating?    









 Food Insecurity  Answer  Date Recorded

 

 Within the past 12 months, you worried that your food would  Never true  2019



 run out before you got money to buy more.    

 

 Within the past 12 months, the food you bought just didn't  Never true  2019



 last and you didn't have money to get more.    









 Transportation Needs  Answer  Date Recorded

 

 In the past 12 months, has lack of transportation kept you from  No  2019



 medical appointments or from getting medications?    

 

 In the past 12 months, has lack of transportation kept you from  No  2019



 meetings, work, or getting things needed for daily living?    









 Sex Assigned at Birth  Date Recorded

 

 Not on file  









 Job Start Date  Occupation  Industry

 

 Not on file  Not on file  Not on file









 Travel History  Travel Start  Travel End









 No recent travel history available.



documented as of this encounter



Last Filed Vital Signs

Not on filedocumented in this encounter



Plan of Treatment







 Date  Type  Specialty  Care Team  Description

 

 2019  Office Visit  Family Medicine  Veronica Huff MD



  



       [address] [place] [address] [place] [id number] [phone] (Fax)  









 Health Maintenance  Due Date  Last Done  Comments

 

 EYE EXAM  1981    

 

 LDL-C  1981    

 

 URINE MICROALBUMIN  1981    

 

 FOOT EXAM  1989    

 

 DTaP,Tdap,and Td Vaccines ( -  1990    



 Tdap)      

 

 INFLUENZA VACCINE  2019  

 

 HgA1C  2020, 2019  

 

 CREATININE (SERUM)  2020, 2019,  



     2019, Additional history  



     exists  

 

 PNEUMOCOCCAL 0-64 YEARS COMBINED  Completed  2015  



 SERIES      



documented as of this encounter



Procedures







 Procedure Name  Priority  Date/Time  Associated Diagnosis  Comments

 

 CONSENT/REFUSAL FOR  Routine  2019  3:59 PM CDT    



 DIAGNOSIS AND TREATMENT        



documented in this encounter



Results

Not on filedocumented in this encounter



Insurance







 Payer  Benefit Plan /  Subscriber ID  Effective Dates  Phone  Address  Type



   Group          

 

 [employer] [place] [place]  xxxxxxxxx  2019-Present  [phone]  [address]  Medicaid [place]        [id number] [place] [address]  



           [phone]  



documented as of this encounter

## 2022-04-19 NOTE — PLAN OF CARE
Pt has been cleared by CM for DC.     PCP appt scheduled- 4/26/22 at 9:45a    OP Ambulatory referrals to Cardiology and Physical Therapy entered.    No other CM needs noted.       04/19/22 1201   Final Note   Assessment Type Final Discharge Note   Anticipated Discharge Disposition Home   What phone number can be called within the next 1-3 days to see how you are doing after discharge? [phone]   Hospital Resources/Appts/Education Provided Appointments scheduled by Navigator/Coordinator;Appointments scheduled and added to AVS   Post-Acute Status   Discharge Delays None known at this time

## 2022-04-19 NOTE — PT/OT/SLP EVAL
Physical Therapy Evaluation and Discharge Note    Patient Name:  Sarwat Colunga   MRN:  [id number]    Recommendations:     Discharge Recommendations:  home, outpatient PT   Discharge Equipment Recommendations: none   Barriers to discharge: patient has impaired sensation bilateral distal LE due to neurpathy so would benefit from OP PT work on balance and mobility safety.    Assessment:     Sarwat Colunga is a 61 y.o. male admitted with a medical diagnosis of Encephalopathy. .  At this time, patient is functioning at their prior level of function and does not require further acute PT services.     Recent Surgery: * No surgery found *      Plan:     During this hospitalization, patient does not require further acute PT services.  Please re-consult if situation changes.      Subjective     Chief Complaint: numb feet    Patient/Family Comments/goals: gohome  Pain/Comfort:  ·      Patients cultural, spiritual, Denominational conflicts given the current situation:      Living Environment:  Currently lives with spouse in 1 story home.  Prior to admission, patients level of function was modified independent.  Equipment used at home: CPAP.  DME owned (not currently used): none.  Upon discharge, patient will have assistance from family.    Objective:     Communicated with nurse prior to session.  Patient found supine with bed alarm upon PT entry to room.    General Precautions: Standard, fall   Orthopedic Precautions:N/A   Braces:     Respiratory Status: Room air    Exams:  · RLE ROM: WFL  · RLE Strength: Deficits: 4+/5 overall  · LLE ROM: WFL  · LLE Strength: Deficits: 4+/5 overall    Functional Mobility:  · Bed Mobility:     · Supine to Sit: independence  · Sit to Supine: independence  · Transfers:     · Sit to Stand:  independence with no AD  · Gait: x 150 feet with no AD with independence    AM-PAC 6 CLICK MOBILITY  Total Score:24       Therapeutic Activities and Exercises:   none given    AM-PAC 6 CLICK MOBILITY  Total  Score:24     Patient left supine with call button in reach, nurse notified and family present.    GOALS:   Multidisciplinary Problems     Physical Therapy Goals     Not on file                History:     Past Medical History:   Diagnosis Date    Arthritis     Benign localized hyperplasia of prostate without urinary obstruction and other lower urinary tract symptoms (LUTS)     Body mass index 37.0-37.9, adult     Diabetes mellitus     Esophageal reflux     Hypertension     Other and unspecified hyperlipidemia     Other testicular hypofunction     Polyneuropathy in diabetes(357.2)     PONV (postoperative nausea and vomiting)     Rosacea     Sleep apnea     uses CPAP    Type II or unspecified type diabetes mellitus with neurological manifestations, uncontrolled(250.62)        Past Surgical History:   Procedure Laterality Date    ANKLE SURGERY Right     ANKLE SURGERY Left     COLONOSCOPY N/A 5/17/2017    Procedure: COLONOSCOPY;  Surgeon: Carlos Hess MD;  Location: [facility];  Service: Endoscopy;  Laterality: N/A;    COLONOSCOPY W/ POLYPECTOMY      CYSTOSCOPY      CYSTOSCOPY N/A 10/23/2018    Procedure: CYSTOSCOPY;  Surgeon: Sarwat Viveros MD;  [facility];  Service: Urology;  Laterality: N/A;    CYSTOSCOPY WITH INSERTION OF MINIMALLY INVASIVE IMPLANT TO ENLARGE PROSTATIC URETHRA N/A 11/15/2018    Procedure: CYSTOSCOPY, WITH INSERTION OF UROLIFT IMPLANT;  Surgeon: Sarwat Viveros MD;  Location: [facility] OR;  Service: Urology;  Laterality: N/A;    DEBRIDEMENT TENNIS ELBOW      right    FOREARM SURGERY Right     tendon repair    FRACTURE SURGERY      right ankle orif    HERNIA REPAIR      umbillical    PROSTATE BIOPSY      QUADRICEPS REPAIR      right    TONSILLECTOMY      TRANSRECTAL ULTRASOUND EXAMINATION N/A 10/23/2018    Procedure: ULTRASOUND, RECTAL APPROACH;  Surgeon: Sarwat Viveros MD;  Location: [facility];  Service: Urology;  Laterality: N/A;    TRICEPS TENDON RELEASE       tendon repair left tricep    VASECTOMY         Time Tracking:     PT Received On: 04/19/22  PT Start Time: 1043     PT Stop Time: 1100  PT Total Time (min): 17 min     Billable Minutes: Evaluation 17      04/19/2022

## 2022-04-19 NOTE — DISCHARGE INSTRUCTIONS
Follow up with Primary MD Resume previous home medications Eat well Stay Hydrated If you have any problems go to [facility] Emergency [facility]

## 2022-04-20 ENCOUNTER — PATIENT MESSAGE (OUTPATIENT)
Dept: FAMILY MEDICINE | Facility: CLINIC | Age: 62
End: 2022-04-20
Payer: COMMERCIAL

## 2022-04-20 ENCOUNTER — TELEPHONE (OUTPATIENT)
Dept: FAMILY MEDICINE | Facility: CLINIC | Age: 62
End: 2022-04-20
Payer: COMMERCIAL

## 2022-04-22 LAB
PYRIDOXAL SERPL-MCNC: 58 UG/L (ref 5–50)
VIT B1 BLD-MCNC: 84 UG/L (ref 38–122)

## 2022-05-03 ENCOUNTER — PATIENT MESSAGE (OUTPATIENT)
Dept: ENDOCRINOLOGY | Facility: CLINIC | Age: 62
End: 2022-05-03
Payer: COMMERCIAL

## 2022-05-11 ENCOUNTER — TELEPHONE (OUTPATIENT)
Dept: ENDOCRINOLOGY | Facility: CLINIC | Age: 62
End: 2022-05-11
Payer: COMMERCIAL

## 2022-05-11 DIAGNOSIS — Z79.4 TYPE 2 DIABETES MELLITUS WITH STAGE 3A CHRONIC KIDNEY DISEASE, WITH LONG-TERM CURRENT USE OF INSULIN: Primary | ICD-10-CM

## 2022-05-11 DIAGNOSIS — E11.22 TYPE 2 DIABETES MELLITUS WITH STAGE 3A CHRONIC KIDNEY DISEASE, WITH LONG-TERM CURRENT USE OF INSULIN: Primary | ICD-10-CM

## 2022-05-11 DIAGNOSIS — N18.31 TYPE 2 DIABETES MELLITUS WITH STAGE 3A CHRONIC KIDNEY DISEASE, WITH LONG-TERM CURRENT USE OF INSULIN: Primary | ICD-10-CM

## 2022-05-11 RX ORDER — BLOOD-GLUCOSE SENSOR
EACH MISCELLANEOUS
Qty: 9 EACH | Refills: 4 | Status: SHIPPED | OUTPATIENT
Start: 2022-05-11 | End: 2023-02-23

## 2022-05-11 RX ORDER — BLOOD-GLUCOSE,RECEIVER,CONT
EACH MISCELLANEOUS
Qty: 1 EACH | Refills: 0 | Status: SHIPPED | OUTPATIENT
Start: 2022-05-11 | End: 2023-08-15

## 2022-05-11 RX ORDER — BLOOD-GLUCOSE TRANSMITTER
EACH MISCELLANEOUS
Qty: 1 EACH | Refills: 3 | Status: SHIPPED | OUTPATIENT
Start: 2022-05-11 | End: 2023-08-15

## 2022-05-11 RX ORDER — INSULIN PUMP SYRINGE, 3 ML
EACH MISCELLANEOUS
Qty: 1 EACH | Refills: 0 | Status: SHIPPED | OUTPATIENT
Start: 2022-05-11 | End: 2023-05-11

## 2022-05-12 NOTE — DISCHARGE SUMMARY
[facility] Hospital Medicine  Discharge Summary      Patient Name: Sarwat Colunga  MRN: [id number]  Admission Date: 4/17/2022  Hospital Length of Stay: 0 days  Discharge Date and Time: 4/19  Attending Physician: No att. providers found   Discharging Provider: Ro Mathews MD  Primary Care Provider: Lydia Matamoros MD        HPI:     Sarwat Colunga is a 60 y/o male with a PMHx of arthritis, DM type 2, bipolar,  and HTN who presents with dizziness, abnormal behavior, illogical speech, poor balance, and confusion since this morning. Patient's wife states patient appeared confused and was cooking when he left the food unattended and the stove was left on. This is not normal behavior for him and speech has not been organized. Patient states he notices he has been confused lately that it has worsened today. Patient has a history of anxiety and took Ativan this morning.  His wife reports recent episodes of falling today and maintaining balance. He denies LOC and no visible injuries. The patient's wife denies any unilateral weakness or numbness, or any other symptoms at this time. Urine drug screen in the ED negative and lab work mostly unremarkable except elevated lipase. Head CT showed no acute abnormality. Patient referred to hospital medicine and seen in the ED with his wife at bedside. Patient able to answer all questions appropriately although his wife reports his behavior is not his normal and he is fixated on her upcoming procedure. He denies chest pain, N/V/D, headache, and shortness of breath. Patient will be admitted to hospital medicine for neurology consult and MRI.          * No surgery found *      Hospital Course:        Encephalopathy 2/2 polypharmacy   Patient exhibiting confusion, altered mental status and abnormal behavior all resolved by the morning after admission; Head CT showed no acute changes     MRI unremarkable  EEG unremarkable            Class 2 severe obesity due to  excess calories with serious comorbidity and body mass index (BMI) of 36.0 to 36.9 in adult  Nutrition consulted. Most recent weight and BMI monitored-                              Measurements:      Wt Readings from Last 1 Encounters:   04/17/22 120.7 kg (266 lb)   Body mass index is 38.17 kg/m².     Recommendations:       Patient has been screened and assessed by RD. RD will follow patient.     Type 2 diabetes mellitus with stage 3 chronic kidney disease, with long-term current use of insulin  Patient's FSGs are uncontrolled due to hyperglycemia on current medication regimen.  Last A1c reviewed-         Lab Results   Component Value Date     HGBA1C 6.8 (H) 02/03/2022      Most recent fingerstick glucose reviewed-   Recent Labs   Lab 04/17/22  1926   POCTGLUCOSE 151*      Current correctional scale  Medium  Maintain anti-hyperglycemic dose as follows-              Antihyperglycemics (From admission, onward)                            Start     Stop Route Frequency Ordered     04/17/22 2312   insulin aspart U-100 pen 1-10 Units         -- SubQ Before meals & nightly PRN 04/17/22 2213          Hold Oral hypoglycemics while patient is in the hospital.   Discharge recommendation as in med rec below         Hypertension associated with diabetes  Chronic, controlled.  Latest blood pressure and vitals reviewed-   Temp:  [97.8 °F (36.6 °C)]   Pulse:  [67-97]   Resp:  [16-20]   BP: (136-184)/()   SpO2:  [94 %-98 %] .   Home meds for hypertension were reviewed and noted below.         Hypertension Medications                 chlorthalidone (HYGROTEN) 25 MG Tab TAKE 1 TABLET(25 MG) BY MOUTH EVERY DAY     cloNIDine (CATAPRES) 0.1 MG tablet Take 1 tablet by mouth as needed. IF BP elevated     lisinopriL (PRINIVIL,ZESTRIL) 5 MG tablet TAKE 1 TABLET BY MOUTH EVERY DAY             While in the hospital, will manage blood pressure as follows; Continue home antihypertensive regimen     Will utilize p.r.n. blood pressure  medication only if patient's blood pressure greater than  180/110 and he develops symptoms such as worsening chest pain or shortness of breath.               VTE Risk Mitigation (From admission, onward)                  Ordered        enoxaparin injection 40 mg  Daily         04/17/22 2213        IP VTE HIGH RISK PATIENT  Once         04/17/22 2213        Place sequential compression device  Until discontinued         04/17/22 2213                               Consults:   Consults (From admission, onward)        Status Ordering Provider     Inpatient consult to Neurology  Once        Provider:  Rex Riggs MD    Completed MARISSA GONZALES          Final Active Diagnoses:    Diagnosis Date Noted POA    PRINCIPAL PROBLEM:  Encephalopathy [G93.40] 04/17/2022 Yes    Class 2 severe obesity due to excess calories with serious comorbidity and body mass index (BMI) of 36.0 to 36.9 in adult [E66.01, Z68.36] 02/09/2022 Not Applicable    Type 2 diabetes mellitus with stage 3 chronic kidney disease, with long-term current use of insulin [E11.22, N18.30, Z79.4] 09/10/2019 Not Applicable    Hypertension associated with diabetes [E11.59, I15.2] 01/04/2016 Yes      Problems Resolved During this Admission:      Discharged Condition: stable    Disposition: Home or Self Care    Follow Up:   Follow-up Information     Lydia Matamoros MD. Go on 4/26/2022.    Specialty: Internal Medicine  Why: Post Hospital Follow Up- 4/26/22 at 9:45a, you will see Gwen Zhou NP. BP and diabetes management. Requires labs (CBC,  CMP, Mg, Phos)  Contact information:  [address]  [facility] [address]  [phone]                       Patient Instructions:      Ambulatory referral/consult to Cardiology   Standing Status: Future   Referral Priority: Routine Referral Type: Consultation   Referral Reason: Specialty Services Required   Requested Specialty: Cardiology   Number of Visits Requested: 1     Ambulatory referral/consult  to Physical/Occupational Therapy   Standing Status: Future   Referral Priority: Routine Referral Type: Physical Medicine   Referral Reason: Specialty Services Required   Requested Specialty: Physical Therapy   Number of Visits Requested: 1     Diet diabetic     Notify your health care provider if you experience any of the following:  temperature >100.4     Notify your health care provider if you experience any of the following:  persistent nausea and vomiting or diarrhea     Notify your health care provider if you experience any of the following:  severe uncontrolled pain     Notify your health care provider if you experience any of the following:  difficulty breathing or increased cough     Notify your health care provider if you experience any of the following:  severe persistent headache     Notify your health care provider if you experience any of the following:  worsening rash     Notify your health care provider if you experience any of the following:  persistent dizziness, light-headedness, or visual disturbances     Notify your health care provider if you experience any of the following:  increased confusion or weakness     Activity as tolerated     Medications:  Reconciled Home Medications:      Medication List      CHANGE how you take these medications    atorvastatin 40 MG tablet  Commonly known as: LIPITOR  TAKE 1 TABLET(40 MG) BY MOUTH EVERY EVENING  What changed: additional instructions     TRESIBA FLEXTOUCH U-200 200 unit/mL (3 mL) insulin pen  Generic drug: insulin degludec  In the hospital you have only required an insulin sliding scale.     Previously: 44 u qhs  What changed: additional instructions     TRULICITY 1.5 mg/0.5 mL pen injector  Generic drug: dulaglutide  ADMINISTER 0.5 ML UNDER THE SKIN EVERY 7 DAYS  What changed:   · how much to take  · when to take this  · additional instructions        CONTINUE taking these medications    ARIPiprazole 10 MG Tab  Commonly known as: ABILIFY  Take 10  "mg by mouth nightly.     aspirin 81 MG EC tablet  Commonly known as: ECOTRIN  Take 81 mg by mouth once daily.     blunt needle, disposable 18 x 1 1/2 " Ndle  1 Syringe by Misc.(Non-Drug; Combo Route) route once a week.     buPROPion 150 MG TB24 tablet  Commonly known as: WELLBUTRIN XL  Take 150 mg by mouth once daily.     chlorthalidone 25 MG Tab  Commonly known as: HYGROTEN  TAKE 1 TABLET(25 MG) BY MOUTH EVERY DAY     cyanocobalamin 1,000 mcg/mL injection  Inject 1 mL (1,000 mcg total) into the skin every 7 days.     * dexmethylphenidate 10 MG tablet  Commonly known as: FOCALIN  Take 10 mg by mouth every evening.     * dexmethylphenidate 30 mg 24 hr capsule  Commonly known as: Focalin XR  Take 1 capsule by mouth once daily.     divalproex  MG 24 hr tablet  Commonly known as: DEPAKOTE ER  Take 250 mg by mouth every evening.     ergocalciferol 50,000 unit Cap  Commonly known as: ERGOCALCIFEROL  TAKE 2 CAPSULES BY MOUTH WEEKLY     esomeprazole 20 MG capsule  Commonly known as: NEXIUM  Take 20 mg by mouth before breakfast.     FARXIGA 5 mg Tab tablet  Generic drug: dapagliflozin  Take 1 tablet (5 mg total) by mouth once daily.     metFORMIN 1000 MG tablet  Commonly known as: GLUCOPHAGE  TAKE 1 TABLET(1000 MG) BY MOUTH TWICE DAILY WITH MEALS     needle (disp) 30 gauge 30 gauge x 1/2" Ndle  1 each by Misc.(Non-Drug; Combo Route) route once a week.     pen needle, diabetic 31 gauge x 3/16" Ndle  Commonly known as: BD ULTRA-FINE MINI PEN NEEDLE  1 each by Misc.(Non-Drug; Combo Route) route 4 (four) times daily.     piroxicam 20 MG capsule  Commonly known as: FELDENE  Take 1 capsule (20 mg total) by mouth once daily.     sildenafiL 100 MG tablet  Commonly known as: VIAGRA  Take 1 tablet (100 mg total) by mouth as needed for Erectile Dysfunction.     syringe (disposable) 1 mL Syrg  1 Syringe by Misc.(Non-Drug; Combo Route) route once a week.         * This list has 2 medication(s) that are the same as other medications " prescribed for you. Read the directions carefully, and ask your doctor or other care provider to review them with you.            STOP taking these medications    lisinopriL 5 MG tablet  Commonly known as: PRINIVIL,ZESTRIL     LORazepam 2 MG Tab  Commonly known as: ATIVAN     pregabalin 150 MG capsule  Commonly known as: LYRICA        ASK your doctor about these medications    FOLBEE 2.5-25-1 mg Tab  Generic drug: folic acid-vit B6-vit B12  Take 1 tablet by mouth once daily.  Ask about: Should I take this medication?              Pending Diagnostic Studies:     None        Indwelling Lines/Drains at time of discharge:   Lines/Drains/Airways     None                 Time spent on the discharge of patient: 35 minutes         Ro Mathews MD  Department of Hospital Medicine  [facility]

## 2022-05-25 ENCOUNTER — OFFICE VISIT (OUTPATIENT)
Dept: PODIATRY | Facility: CLINIC | Age: 62
End: 2022-05-25
Payer: COMMERCIAL

## 2022-05-25 DIAGNOSIS — I87.2 VENOUS INSUFFICIENCY OF BOTH LOWER EXTREMITIES: ICD-10-CM

## 2022-05-25 DIAGNOSIS — E11.49 DIABETES MELLITUS TYPE 2 WITH NEUROLOGICAL MANIFESTATIONS: Primary | ICD-10-CM

## 2022-05-25 DIAGNOSIS — L85.1 ACQUIRED KERATODERMA: ICD-10-CM

## 2022-05-25 DIAGNOSIS — L60.2 ONYCHOGRYPOSIS: ICD-10-CM

## 2022-05-25 DIAGNOSIS — M21.619 BUNION: ICD-10-CM

## 2022-05-25 DIAGNOSIS — M20.42 HAMMER TOES OF BOTH FEET: ICD-10-CM

## 2022-05-25 DIAGNOSIS — M20.41 HAMMER TOES OF BOTH FEET: ICD-10-CM

## 2022-05-25 PROCEDURE — 1160F RVW MEDS BY RX/DR IN RCRD: CPT | Mod: CPTII,S$GLB,, | Performed by: PODIATRIST

## 2022-05-25 PROCEDURE — 1159F MED LIST DOCD IN RCRD: CPT | Mod: CPTII,S$GLB,, | Performed by: PODIATRIST

## 2022-05-25 PROCEDURE — 99499 NO LOS: ICD-10-PCS | Mod: S$GLB,,, | Performed by: PODIATRIST

## 2022-05-25 PROCEDURE — 1160F PR REVIEW ALL MEDS BY PRESCRIBER/CLIN PHARMACIST DOCUMENTED: ICD-10-PCS | Mod: CPTII,S$GLB,, | Performed by: PODIATRIST

## 2022-05-25 PROCEDURE — 11721 ROUTINE FOOT CARE: ICD-10-PCS | Mod: 59,S$GLB,, | Performed by: PODIATRIST

## 2022-05-25 PROCEDURE — 3051F PR MOST RECENT HEMOGLOBIN A1C LEVEL 7.0 - < 8.0%: ICD-10-PCS | Mod: CPTII,S$GLB,, | Performed by: PODIATRIST

## 2022-05-25 PROCEDURE — 99499 UNLISTED E&M SERVICE: CPT | Mod: S$GLB,,, | Performed by: PODIATRIST

## 2022-05-25 PROCEDURE — 11055 PARING/CUTG B9 HYPRKER LES 1: CPT | Mod: S$GLB,,, | Performed by: PODIATRIST

## 2022-05-25 PROCEDURE — 1159F PR MEDICATION LIST DOCUMENTED IN MEDICAL RECORD: ICD-10-PCS | Mod: CPTII,S$GLB,, | Performed by: PODIATRIST

## 2022-05-25 PROCEDURE — 4010F ACE/ARB THERAPY RXD/TAKEN: CPT | Mod: CPTII,S$GLB,, | Performed by: PODIATRIST

## 2022-05-25 PROCEDURE — 3051F HG A1C>EQUAL 7.0%<8.0%: CPT | Mod: CPTII,S$GLB,, | Performed by: PODIATRIST

## 2022-05-25 PROCEDURE — 11055 ROUTINE FOOT CARE: ICD-10-PCS | Mod: S$GLB,,, | Performed by: PODIATRIST

## 2022-05-25 PROCEDURE — 11721 DEBRIDE NAIL 6 OR MORE: CPT | Mod: 59,S$GLB,, | Performed by: PODIATRIST

## 2022-05-25 PROCEDURE — 4010F PR ACE/ARB THEARPY RXD/TAKEN: ICD-10-PCS | Mod: CPTII,S$GLB,, | Performed by: PODIATRIST

## 2022-05-25 NOTE — PATIENT INSTRUCTIONS
Your Diabetes Foot Care Program    Every day you depend on your feet to keep you moving. But when you have diabetes, your feet need special care. Even a small foot problem can become very serious. So dont take your feet for granted. By working with your diabetes healthcare team, you can learn how to protect your feet and keep them healthy.  Evaluating your feet  An evaluation helps your healthcare provider check the condition of your feet. The evaluation includes a review of your diabetes history and overall health. It may also include a foot exam, X-rays, or other tests. These can help show problems beneath the skin that you cant see or feel.  Medical history  You will be asked about your overall health and any history of foot problems. Youll also discuss your diabetes history, such as whether your blood sugar level has changed over time. It also includes questions about sensations of pain, tingling, pins and needles, or numbness. Your healthcare provider will also want to know if you have high blood pressure and heart disease, or if you smoke. Be sure to mention any medicines (including over-the-counter), supplements, or herbal remedies you take.  Foot exam  A foot exam checks the condition of different parts of your foot. First, your skin and nails are examined for any signs of infection. Blood flow is checked by feeling for the pulses in each foot. You may also have tests to study the nerves in the foot. These include using a small filament (wire) to see how sensitive your feet are. In certain cases, you will be asked to walk a short distance to check for bone, joint, and muscle problems.  Diagnostic tests  If needed, your healthcare provider will suggest certain tests to learn more about your feet. These include:  Doppler tests to measure blood flow in the feet and lower leg.  X-rays, which can show bone or joint problems.  Other imaging tests, such as an MRI (magnetic resonance imaging), bone scan, and CT  (computed tomography) scan. These can help show bone infections.  Other tests, such as vascular tests, which study the blood flow in your feet and legs. You may also have nerve studies to learn how sensitive your feet are.  Creating a foot care program  Based on the evaluation, your healthcare provider will create a foot care program for you. Your program may be as simple as starting a daily self-care routine and changing the types of shoes your wear. It may also involve treating minor foot problems, such as a corn or blister. In some cases, surgery will be needed to treat an infection or mechanical problems, such as hammer toes.  Preventing problems  When you have diabetes, its easier to prevent problems than to treat them later on. So see your healthcare team for regular checkups and foot care. Your healthcare team can also help you learn more about caring for your feet at home. For example, you may be told to avoid walking barefoot. Or you may be told that special footwear is needed to protect your feet.  Have regular checkups  Foot problems can develop quickly. So be sure to follow your healthcare teams schedule for regular checkups. During office visits, take off your shoes and socks as soon as you get in the exam room. Ask your healthcare provider to examine your feet for problems. This will make it easier to find and treat small skin irritations before they get worse. Regular checkups can also help keep track of the blood flow and feeling in your feet. If you have neuropathy (lack of feeling in your feet), you will need to have checkups more often.  Learn about self-care  The more you know about diabetes and your feet, the easier it will be to prevent problems. Members of your healthcare team can teach you how to inspect your feet and teach you to look for warning signs. They can also give you other foot care tips. During office visits, be sure to ask any questions you have.  Date Last Reviewed: 7/1/2016  ©  8164-8799 [employer]. [address] [place] [address]. All rights reserved. This information is not intended as a substitute for professional medical care. Always follow your healthcare professional's instructions.       What Are Mallet, Hammer, and Claw Toes?  Mallet, hammer, and claw toes are most often caused by wearing shoes that are too short or heels that are too high. This jams the toes against the front of the shoe and causes one or more joints to bend. Rarely, disease can cause the joints in the toes to bend. Mallet, hammer, and claw toes are among the most common toe problems. They occur most often in the longest of the four smaller toes.      Inside your toes  There are 3 bones in each of your 4 smaller toes. Where 2 bones connect is called a joint. Normally the toes lie flat. But pressure on the toes or the front of the foot can cause one or more joints to bend. This curls the toe. Toes that stay curled are called mallet toes, hammer toes, or claw toes, depending on which joints are bent.    Symptoms  You may feel pain in the toe or in the ball of your foot. A corn (a hard growth of skin on the top of the toe) may form where the toe rubs against the top of the shoe. Or a callus (a hard growth of skin on the bottom of the foot) may form under the tip of the toe or on the ball of the foot. Corns and calluses can also be painful.    Date Last Reviewed: 10/18/2015  © 1200-5482 [employer]. [address] [place] [address]. All rights reserved. This information is not intended as a substitute for professional medical care. Always follow your healthcare professional's instructions.

## 2022-05-25 NOTE — PROGRESS NOTES
[address]  [place] [address]  Phone: [phone]   [phone]    Patient's PCP:Lydia Matamoros MD  Referring Provider: No ref. provider found    Subjective:      Chief Complaint:: Nail Care (Routine q9 diabetic nail care)    HPI  Sarwat Colunga is a 61 y.o. male who presents today for routine q9 diabetic nail trimming. No new complaints related to feet since last visit    Systemic Doctor: Cynthia Mejia NP  Date Last Seen: 02/09/2022  Blood Sugar: 130  Hemoglobin A1c: 7.6 4/19/2022      Past Surgical History:   Procedure Laterality Date    ANKLE SURGERY Right     ANKLE SURGERY Left     COLONOSCOPY N/A 5/17/2017    Procedure: COLONOSCOPY;  Surgeon: Carlos Hess MD;  Location: [facility];  Service: Endoscopy;  Laterality: N/A;    COLONOSCOPY W/ POLYPECTOMY      CYSTOSCOPY      CYSTOSCOPY N/A 10/23/2018    Procedure: CYSTOSCOPY;  Surgeon: Sarwat Viveros MD;  Location: [facility];  Service: Urology;  Laterality: N/A;    CYSTOSCOPY WITH INSERTION OF MINIMALLY INVASIVE IMPLANT TO ENLARGE PROSTATIC URETHRA N/A 11/15/2018    Procedure: CYSTOSCOPY, WITH INSERTION OF UROLIFT IMPLANT;  Surgeon: Sarwat Viveros MD;  Location: [facility];  Service: Urology;  Laterality: N/A;    DEBRIDEMENT TENNIS ELBOW      right    FOREARM SURGERY Right     tendon repair    FRACTURE SURGERY      right ankle orif    HERNIA REPAIR      umbillical    PROSTATE BIOPSY      QUADRICEPS REPAIR      right    TONSILLECTOMY      TRANSRECTAL ULTRASOUND EXAMINATION N/A 10/23/2018    Procedure: ULTRASOUND, RECTAL APPROACH;  Surgeon: Sarwat Viveros MD;  Location: [facility];  Service: Urology;  Laterality: N/A;    TRICEPS TENDON RELEASE      tendon repair left tricep    VASECTOMY       Past Medical History:   Diagnosis Date    Arthritis     Benign localized hyperplasia of prostate without urinary obstruction and other lower urinary tract symptoms (LUTS)     Body mass index 37.0-37.9, adult     Diabetes  mellitus     Esophageal reflux     History of TIA (transient ischemic attack) 04/17/2022    Hypertension     Other and unspecified hyperlipidemia     Other testicular hypofunction     Polyneuropathy in diabetes(357.2)     PONV (postoperative nausea and vomiting)     Rosacea     Sleep apnea     uses CPAP    Type II or unspecified type diabetes mellitus with neurological manifestations, uncontrolled(250.62)      Family History   Problem Relation Age of Onset    Pulmonary embolism Mother     No Known Problems Father     Heart disease Other     Heart attack Other     Hypertension Other     Hyperlipidemia Other     Arthritis Other     Clotting disorder Other     Mental illness Other     Diabetes Other     Cancer Other     Melanoma Neg Hx     Psoriasis Neg Hx     Lupus Neg Hx     Eczema Neg Hx         Social History:   Marital Status:   Alcohol History:  reports no history of alcohol use.  Tobacco History:  reports that he has never smoked. He has never used smokeless tobacco.  Drug History:  reports no history of drug use.    Review of patient's allergies indicates:   Allergen Reactions    Bactrim [sulfamethoxazole-trimethoprim] Other (See Comments)     Dizziness,lightheaded, heavy chest    Ciprofloxacin Other (See Comments)     CONFUSED, OFF BALANCE, WOKE UP AT NITE    Doxycycline Hives    Lamictal [lamotrigine] Rash    Trileptal [oxcarbazepine]        Current Outpatient Medications   Medication Sig Dispense Refill    ARIPiprazole (ABILIFY) 10 MG Tab Take 10 mg by mouth nightly.      aspirin (ECOTRIN) 81 MG EC tablet Take 81 mg by mouth once daily.      atorvastatin (LIPITOR) 40 MG tablet TAKE 1 TABLET(40 MG) BY MOUTH EVERY EVENING (Patient taking differently: Take 40 mg by mouth every evening. TAKE 1 TABLET(40 MG) BY MOUTH EVERY EVENING) 90 tablet 4    blood sugar diagnostic Strp Checks 4 times a day 400 strip 4    blood-glucose meter (FREESTYLE FREEDOM LITE) kit Use as  "instructed 1 each 0    blood-glucose meter,continuous (DEXCOM G6 ) Misc Dispense 1  1 each 0    blood-glucose sensor (DEXCOM G6 SENSOR) Bibiana Dispense 3 sensors/month 9 each 4    blood-glucose transmitter (DEXCOM G6 TRANSMITTER) Bibiana Dispense 1 transmitter. Use 1 transmitter every 90 days. 1 each 3    blunt needle, disposable 18 x 1 1/2 " Ndle 1 Syringe by Misc.(Non-Drug; Combo Route) route once a week. 25 each 3    buPROPion (WELLBUTRIN XL) 150 MG TB24 tablet Take 150 mg by mouth once daily.      celecoxib (CELEBREX) 100 MG capsule Take 100 mg by mouth once daily.      chlorthalidone (HYGROTEN) 25 MG Tab TAKE 1 TABLET(25 MG) BY MOUTH EVERY DAY 90 tablet 3    cloNIDine (CATAPRES) 0.1 MG tablet 1 po BID prn BP above 180/100      cyanocobalamin 1,000 mcg/mL injection Inject 1 mL (1,000 mcg total) into the skin every 7 days. 12 mL 3    dapagliflozin (FARXIGA) 5 mg Tab tablet Take 1 tablet (5 mg total) by mouth once daily. 90 tablet 4    dexmethylphenidate (FOCALIN XR) 30 mg 24 hr capsule Take 1 capsule by mouth once daily.      dexmethylphenidate (FOCALIN) 10 MG tablet Take 10 mg by mouth every evening.      divalproex ER (DEPAKOTE ER) 250 MG 24 hr tablet Take 250 mg by mouth every evening.      ergocalciferol (ERGOCALCIFEROL) 50,000 unit Cap TAKE 2 CAPSULES BY MOUTH WEEKLY 24 capsule 4    esomeprazole (NEXIUM) 20 MG capsule Take 20 mg by mouth before breakfast.      FOLBEE 2.5-25-1 mg Tab Take 1 tablet by mouth once daily.      insulin degludec (TRESIBA FLEXTOUCH U-200) 200 unit/mL (3 mL) insulin pen In the hospital you have only required an insulin sliding scale.     Previously: 44 u qhs 3 pen 12    lisinopriL (PRINIVIL,ZESTRIL) 5 MG tablet Take 1 tablet (5 mg total) by mouth once daily.      LORazepam (ATIVAN) 2 MG Tab Take 1-2 mg by mouth daily as needed.      metFORMIN (GLUCOPHAGE) 1000 MG tablet TAKE 1 TABLET(1000 MG) BY MOUTH TWICE DAILY WITH MEALS (Patient taking differently: " "Take 1,000 mg by mouth 2 (two) times daily with meals.) 180 tablet 3    needle, disp, 30 gauge 30 gauge x 1/2" Ndle 1 each by Misc.(Non-Drug; Combo Route) route once a week. 25 each 3    pen needle, diabetic (BD ULTRA-FINE MINI PEN NEEDLE) 31 gauge x 3/16" Ndle 1 each by Misc.(Non-Drug; Combo Route) route 4 (four) times daily. 100 each 11    piroxicam (FELDENE) 20 MG capsule Take 1 capsule (20 mg total) by mouth once daily. 30 capsule 11    pregabalin (LYRICA) 150 MG capsule Take 1 capsule (150 mg total) by mouth 2 (two) times daily. 180 capsule 1    sildenafiL (VIAGRA) 100 MG tablet Take 1 tablet (100 mg total) by mouth as needed for Erectile Dysfunction. 9 tablet 10    syringe, disposable, 1 mL Syrg 1 Syringe by Misc.(Non-Drug; Combo Route) route once a week. 25 each 0    TRULICITY 1.5 mg/0.5 mL pen injector ADMINISTER 0.5 ML UNDER THE SKIN EVERY 7 DAYS (Patient taking differently: Inject 0.5 mg into the skin every 7 days. SUNDAYS) 12 pen 3     No current facility-administered medications for this visit.       Review of Systems   Constitutional: Negative.  Negative for chills, fatigue, fever and unexpected weight change.   HENT: Negative.  Negative for hearing loss and trouble swallowing.    Eyes: Negative.  Negative for photophobia and visual disturbance.   Respiratory: Negative.  Negative for cough, shortness of breath and wheezing.    Cardiovascular: Negative for chest pain, palpitations and leg swelling.   Gastrointestinal: Negative.  Negative for abdominal pain and nausea.   Endocrine: Negative.    Genitourinary: Negative.  Negative for dysuria and frequency.   Musculoskeletal: Negative.  Negative for arthralgias, back pain, gait problem, joint swelling and myalgias.   Skin: Negative.  Negative for rash and wound.   Neurological: Positive for numbness. Negative for tremors, seizures, weakness and headaches.   Hematological: Negative.  Does not bruise/bleed easily.   Psychiatric/Behavioral: Negative.  "         Objective:        Physical Exam:   Foot Exam    General  General Appearance: appears stated age and healthy   Orientation: alert and oriented to person, place, and time   Affect: appropriate   Gait: unimpaired       Right Foot/Ankle     Inspection and Palpation  Ecchymosis: none  Tenderness: none   Swelling: (Mild edema of the lower extremity)  Arch: normal  Hammertoes: second toe, third toe, fourth toe and fifth toe  Skin Exam: callus;   Neurovascular  Dorsalis pedis: 2+  Posterior tibial: 2+  Capillary Refill: 2+  Varicose veins: present  Saphenous nerve sensation: diminished  Tibial nerve sensation: diminished  Superficial peroneal nerve sensation: diminished  Deep peroneal nerve sensation: diminished  Sural nerve sensation: diminished    Edema  Type of edema: non-pitting    Muscle Strength  Ankle dorsiflexion: 5  Ankle plantar flexion: 5  Ankle inversion: 5  Ankle eversion: 5  Great toe extension: 5  Great toe flexion: 5    Range of Motion    Normal right ankle ROM    Tests  Anterior drawer: negative   Talar tilt: negative   PT Tinel's sign: negative    Paresthesia: positive  Comments  Nails 1 through 5 are thickened, discolored, dystrophic, and elongated with subungual debris    Skin is thin, shiny, and atrophic with diminished pedal hair growth       Left Foot/Ankle      Inspection and Palpation  Ecchymosis: none  Tenderness: none   Swelling: (Mild edema of the lower extremity)  Arch: normal  Hammertoes: second toe, third toe, fourth toe and fifth toe  Hallux valgus: yes  Skin Exam: callus; no drainage and no erythema   Neurovascular  Dorsalis pedis: 2+  Posterior tibial: 2+  Capillary refill: 2+  Varicose veins: present  Saphenous nerve sensation: diminished  Tibial nerve sensation: diminished  Superficial peroneal nerve sensation: diminished  Deep peroneal nerve sensation: diminished  Sural nerve sensation: diminished    Edema  Type of edema: non-pitting    Muscle Strength  Ankle dorsiflexion:  5  Ankle plantar flexion: 5  Ankle inversion: 5  Ankle eversion: 5  Great toe extension: 5  Great toe flexion: 5    Range of Motion    Normal left ankle ROM    Tests  Anterior drawer: negative   Talar tilt: negative   PT Tinel's sign: negative  Paresthesia: positive  Comments  Nails 1 through 5 are thickened, discolored, dystrophic, and elongated with subungual debris    Skin is thin, shiny, and atrophic with diminished pedal hair growth     Physical Exam  Cardiovascular:      Pulses:           Dorsalis pedis pulses are 2+ on the right side and 2+ on the left side.        Posterior tibial pulses are 2+ on the right side and 2+ on the left side.   Musculoskeletal:      Left foot: Bunion present.   Feet:      Right foot:      Skin integrity: Callus present.      Left foot:      Skin integrity: Callus present. No erythema.               Right Ankle/Foot Exam     Range of Motion   The patient has normal right ankle ROM.    Comments:  Nails 1 through 5 are thickened, discolored, dystrophic, and elongated with subungual debris    Skin is thin, shiny, and atrophic with diminished pedal hair growth       Left Ankle/Foot Exam     Range of Motion   The patient has normal left ankle ROM.     Comments:  Nails 1 through 5 are thickened, discolored, dystrophic, and elongated with subungual debris    Skin is thin, shiny, and atrophic with diminished pedal hair growth       Muscle Strength   Right Lower Extremity   Ankle Dorsiflexion:  5   Plantar flexion:  5/5  Left Lower Extremity   Ankle Dorsiflexion:  5   Plantar flexion:  5/5     Reflexes     Left Side  Paresthesia: present    Right Side   Paresthesia: present    Vascular Exam     Right Pulses  Dorsalis Pedis:      2+  Posterior Tibial:      2+        Left Pulses  Dorsalis Pedis:      2+  Posterior Tibial:      2+             Imaging: none            Assessment:       1. Diabetes mellitus type 2 with neurological manifestations    2. Venous insufficiency of both lower  "extremities    3. Hammer toes of both feet    4. Bunion - Left Foot    5. Onychogryposis    6. Acquired keratoderma      Plan:   Diabetes mellitus type 2 with neurological manifestations    Venous insufficiency of both lower extremities    Hammer toes of both feet    Bunion - Left Foot    Onychogryposis    Acquired keratoderma      Follow up if symptoms worsen or fail to improve.    Routine Foot Care    Date/Time: 5/25/2022 4:20 PM  Performed by: Alexi Khan DPM  Authorized by: Alexi Khan DPM     Time out: Immediately prior to procedure a "time out" was called to verify the correct patient, procedure, equipment, support staff and site/side marked as required.    Consent Done?:  Not Needed  Hyperkeratotic Skin Lesions?: Yes    Number of trimmed lesions:  1  Location(s):  Right 2nd Toe    Nail Care Type:  Debride  Location(s): All  (Left 1st Toe, Left 3rd Toe, Left 2nd Toe, Left 4th Toe, Left 5th Toe, Right 1st Toe, Right 2nd Toe, Right 3rd Toe, Right 4th Toe and Right 5th Toe)  Patient tolerance:  Patient tolerated the procedure well with no immediate complications     Instruments Used: Nail Nipper   Manually reduced with electric .                 Counseling:     I provided patient education verbally regarding:   Patient diagnosis, treatment options, as well as alternatives, risks, and benefits.     This note was created using Dragon voice recognition software that occasionally misinterpreted phrases or words.               "

## 2022-05-28 ENCOUNTER — PATIENT MESSAGE (OUTPATIENT)
Dept: RHEUMATOLOGY | Facility: CLINIC | Age: 62
End: 2022-05-28
Payer: COMMERCIAL

## 2022-05-28 ENCOUNTER — PATIENT MESSAGE (OUTPATIENT)
Dept: PODIATRY | Facility: CLINIC | Age: 62
End: 2022-05-28
Payer: COMMERCIAL

## 2022-05-31 NOTE — TELEPHONE ENCOUNTER
Treatment is not necessary from a medical standpoint.  If he would like some medication I can send some in however, it is not very effective and can take up to year to see results.

## 2022-06-07 ENCOUNTER — PATIENT MESSAGE (OUTPATIENT)
Dept: PODIATRY | Facility: CLINIC | Age: 62
End: 2022-06-07
Payer: COMMERCIAL

## 2022-06-07 ENCOUNTER — PATIENT MESSAGE (OUTPATIENT)
Dept: RHEUMATOLOGY | Facility: CLINIC | Age: 62
End: 2022-06-07
Payer: COMMERCIAL

## 2022-07-09 ENCOUNTER — LAB VISIT (OUTPATIENT)
Dept: LAB | Facility: HOSPITAL | Age: 62
End: 2022-07-09
Attending: UROLOGY
Payer: COMMERCIAL

## 2022-07-09 DIAGNOSIS — R31.29 MICROHEMATURIA: ICD-10-CM

## 2022-07-11 ENCOUNTER — LAB VISIT (OUTPATIENT)
Dept: LAB | Facility: HOSPITAL | Age: 62
End: 2022-07-11
Attending: UROLOGY
Payer: COMMERCIAL

## 2022-07-11 ENCOUNTER — TELEPHONE (OUTPATIENT)
Dept: UROLOGY | Facility: CLINIC | Age: 62
End: 2022-07-11
Payer: COMMERCIAL

## 2022-07-11 DIAGNOSIS — R31.29 MICROHEMATURIA: Primary | ICD-10-CM

## 2022-07-11 DIAGNOSIS — R97.20 ELEVATED PSA: Primary | ICD-10-CM

## 2022-07-11 LAB
BACTERIA #/AREA URNS HPF: NORMAL /HPF
BILIRUB UR QL STRIP: NEGATIVE
CLARITY UR: CLEAR
COLOR UR: YELLOW
GLUCOSE UR QL STRIP: ABNORMAL
HGB UR QL STRIP: NEGATIVE
KETONES UR QL STRIP: NEGATIVE
LEUKOCYTE ESTERASE UR QL STRIP: NEGATIVE
MICROSCOPIC COMMENT: NORMAL
NITRITE UR QL STRIP: NEGATIVE
PH UR STRIP: 6 [PH] (ref 5–8)
PROT UR QL STRIP: NEGATIVE
RBC #/AREA URNS HPF: 2 /HPF (ref 0–4)
SP GR UR STRIP: 1.01 (ref 1–1.03)
SQUAMOUS #/AREA URNS HPF: 1 /HPF
URN SPEC COLLECT METH UR: ABNORMAL
UROBILINOGEN UR STRIP-ACNC: NEGATIVE EU/DL
WBC #/AREA URNS HPF: 3 /HPF (ref 0–5)
YEAST URNS QL MICRO: NORMAL

## 2022-07-11 PROCEDURE — 81000 URINALYSIS NONAUTO W/SCOPE: CPT | Performed by: UROLOGY

## 2022-07-11 NOTE — TELEPHONE ENCOUNTER
----- Message from Bridgett Tillman sent at 7/11/2022  1:59 PM CDT -----  Regarding: returning call  Contact: patient  Type:  Patient Returning Call    Who Called:  patient  Who Left Message for Patient:  Gia  Does the patient know what this is regarding?:  unknown  Best Call Back Number:  [phone] (home)   Additional Information:  Please call patient. Thanks!

## 2022-08-04 ENCOUNTER — LAB VISIT (OUTPATIENT)
Dept: LAB | Facility: HOSPITAL | Age: 62
End: 2022-08-04
Attending: NURSE PRACTITIONER
Payer: COMMERCIAL

## 2022-08-04 DIAGNOSIS — N18.31 TYPE 2 DIABETES MELLITUS WITH STAGE 3A CHRONIC KIDNEY DISEASE, WITH LONG-TERM CURRENT USE OF INSULIN: ICD-10-CM

## 2022-08-04 DIAGNOSIS — E11.22 TYPE 2 DIABETES MELLITUS WITH STAGE 3A CHRONIC KIDNEY DISEASE, WITH LONG-TERM CURRENT USE OF INSULIN: ICD-10-CM

## 2022-08-04 DIAGNOSIS — Z79.4 TYPE 2 DIABETES MELLITUS WITH STAGE 3A CHRONIC KIDNEY DISEASE, WITH LONG-TERM CURRENT USE OF INSULIN: ICD-10-CM

## 2022-08-09 ENCOUNTER — TELEPHONE (OUTPATIENT)
Dept: ENDOCRINOLOGY | Facility: CLINIC | Age: 62
End: 2022-08-09

## 2022-08-10 ENCOUNTER — PATIENT MESSAGE (OUTPATIENT)
Dept: ENDOCRINOLOGY | Facility: CLINIC | Age: 62
End: 2022-08-10
Payer: COMMERCIAL

## 2022-08-11 ENCOUNTER — LAB VISIT (OUTPATIENT)
Dept: LAB | Facility: HOSPITAL | Age: 62
End: 2022-08-11
Attending: NURSE PRACTITIONER
Payer: COMMERCIAL

## 2022-08-11 DIAGNOSIS — N18.6 TYPE 2 DIABETES MELLITUS WITH END-STAGE RENAL DISEASE: Primary | ICD-10-CM

## 2022-08-11 DIAGNOSIS — N18.31 STAGE 3A CHRONIC KIDNEY DISEASE: ICD-10-CM

## 2022-08-11 DIAGNOSIS — E11.22 TYPE 2 DIABETES MELLITUS WITH END-STAGE RENAL DISEASE: Primary | ICD-10-CM

## 2022-08-11 DIAGNOSIS — Z79.4 ENCOUNTER FOR LONG-TERM (CURRENT) USE OF INSULIN: ICD-10-CM

## 2022-08-11 PROCEDURE — 82570 ASSAY OF URINE CREATININE: CPT | Performed by: NURSE PRACTITIONER

## 2022-08-11 PROCEDURE — 82043 UR ALBUMIN QUANTITATIVE: CPT | Performed by: NURSE PRACTITIONER

## 2022-08-12 LAB
ALBUMIN/CREAT UR: NORMAL UG/MG (ref 0–30)
CREAT UR-MCNC: 29 MG/DL (ref 23–375)
MICROALBUMIN UR DL<=1MG/L-MCNC: <5 UG/ML

## 2022-08-24 ENCOUNTER — OFFICE VISIT (OUTPATIENT)
Dept: ENDOCRINOLOGY | Facility: CLINIC | Age: 62
End: 2022-08-24
Payer: COMMERCIAL

## 2022-08-24 DIAGNOSIS — E11.69 HYPERLIPIDEMIA ASSOCIATED WITH TYPE 2 DIABETES MELLITUS: ICD-10-CM

## 2022-08-24 DIAGNOSIS — E11.49 DIABETIC RADICULOPATHY: ICD-10-CM

## 2022-08-24 DIAGNOSIS — Z79.4 TYPE 2 DIABETES MELLITUS WITH STAGE 3A CHRONIC KIDNEY DISEASE, WITH LONG-TERM CURRENT USE OF INSULIN: Primary | ICD-10-CM

## 2022-08-24 DIAGNOSIS — I15.2 HYPERTENSION ASSOCIATED WITH DIABETES: ICD-10-CM

## 2022-08-24 DIAGNOSIS — M54.10 DIABETIC RADICULOPATHY: ICD-10-CM

## 2022-08-24 DIAGNOSIS — N18.31 TYPE 2 DIABETES MELLITUS WITH STAGE 3A CHRONIC KIDNEY DISEASE, WITH LONG-TERM CURRENT USE OF INSULIN: Primary | ICD-10-CM

## 2022-08-24 DIAGNOSIS — E11.49 DIABETES MELLITUS TYPE 2 WITH NEUROLOGICAL MANIFESTATIONS: ICD-10-CM

## 2022-08-24 DIAGNOSIS — E11.59 HYPERTENSION ASSOCIATED WITH DIABETES: ICD-10-CM

## 2022-08-24 DIAGNOSIS — E78.5 HYPERLIPIDEMIA ASSOCIATED WITH TYPE 2 DIABETES MELLITUS: ICD-10-CM

## 2022-08-24 DIAGNOSIS — E66.01 CLASS 2 SEVERE OBESITY DUE TO EXCESS CALORIES WITH SERIOUS COMORBIDITY AND BODY MASS INDEX (BMI) OF 36.0 TO 36.9 IN ADULT: ICD-10-CM

## 2022-08-24 DIAGNOSIS — E11.22 TYPE 2 DIABETES MELLITUS WITH STAGE 3A CHRONIC KIDNEY DISEASE, WITH LONG-TERM CURRENT USE OF INSULIN: Primary | ICD-10-CM

## 2022-08-24 PROCEDURE — 3066F NEPHROPATHY DOC TX: CPT | Mod: CPTII,95,, | Performed by: NURSE PRACTITIONER

## 2022-08-24 PROCEDURE — 99214 PR OFFICE/OUTPT VISIT, EST, LEVL IV, 30-39 MIN: ICD-10-PCS | Mod: 95,,, | Performed by: NURSE PRACTITIONER

## 2022-08-24 PROCEDURE — 3061F NEG MICROALBUMINURIA REV: CPT | Mod: CPTII,95,, | Performed by: NURSE PRACTITIONER

## 2022-08-24 PROCEDURE — 3044F HG A1C LEVEL LT 7.0%: CPT | Mod: CPTII,95,, | Performed by: NURSE PRACTITIONER

## 2022-08-24 PROCEDURE — 99214 OFFICE O/P EST MOD 30 MIN: CPT | Mod: 95,,, | Performed by: NURSE PRACTITIONER

## 2022-08-24 PROCEDURE — 4010F ACE/ARB THERAPY RXD/TAKEN: CPT | Mod: CPTII,95,, | Performed by: NURSE PRACTITIONER

## 2022-08-24 PROCEDURE — 3066F PR DOCUMENTATION OF TREATMENT FOR NEPHROPATHY: ICD-10-PCS | Mod: CPTII,95,, | Performed by: NURSE PRACTITIONER

## 2022-08-24 PROCEDURE — 3044F PR MOST RECENT HEMOGLOBIN A1C LEVEL <7.0%: ICD-10-PCS | Mod: CPTII,95,, | Performed by: NURSE PRACTITIONER

## 2022-08-24 PROCEDURE — 3061F PR NEG MICROALBUMINURIA RESULT DOCUMENTED/REVIEW: ICD-10-PCS | Mod: CPTII,95,, | Performed by: NURSE PRACTITIONER

## 2022-08-24 PROCEDURE — 4010F PR ACE/ARB THEARPY RXD/TAKEN: ICD-10-PCS | Mod: CPTII,95,, | Performed by: NURSE PRACTITIONER

## 2022-08-24 RX ORDER — INSULIN DEGLUDEC 200 U/ML
INJECTION, SOLUTION SUBCUTANEOUS
Qty: 3 PEN | Refills: 12
Start: 2022-08-24 | End: 2022-09-15

## 2022-08-24 RX ORDER — METFORMIN HYDROCHLORIDE 1000 MG/1
1000 TABLET ORAL 2 TIMES DAILY WITH MEALS
Qty: 180 TABLET | Refills: 3 | Status: SHIPPED | OUTPATIENT
Start: 2022-08-24 | End: 2023-08-21 | Stop reason: SDUPTHER

## 2022-08-24 NOTE — PROGRESS NOTES
CC: This 61 y.o. male presents for management of diabetes  and chronic conditions pending review including HTN, HLP, CKD 3, Obesity, DM PN, DANNY    HPI: He was diagnosed with T2DM in 2005. Has never been hospitalized r/t DM.  Family hx of DM: maybe dad?, sister's pre-diabetes   He has had his COVID vaccines and booster    No acute events since his last visit   Started using Dexcom G6 but no sharing yet- will send invite   Fasting   Hypoglycemic symptoms - yes 40s overnight if doesn't snack at night - treating hypos w Ding Dongs     Diet: Eats 2-3 Meals a day, snacks- banana bread,ding dongs   typically skips lunch  Exercise: PT for back 2 days a week   CURRENT DM MEDS: metformin 1000 mg bid, Tresiba 44 u qhs, farxiga 5 mg, Trulicity 1.5 mg weekly (Sundays)   Vial/pen:  Uses  pen  Glucometer type:   Freestyle      Standards of Care:  Eye exam: Dr Dickerson  11/2021  +DR-       Retired      Wife is a retired psychiatrist     ROS:   Gen: Appetite good, + weight loss    Eyes: Denies visual disturbances  Resp: no SOB or LEYVA, no cough  Cardiac: No palpitations, chest pain, no edema   GI: No nausea or vomiting, diarrhea, constipation, or abdominal pain.  /GYN: 1+ nocturia, no burning or pain.   MS/Neuro: + numbness/ tingling in BLE;speech clear    PE:  GENERAL: Well developed, well nourished.  PSYCH: AAOx3, appropriate mood and affect, pleasant expression, conversant, appears relaxed, well groomed.   EYES: Conjunctiva, corneas clear  NECK: Supple, trachea midline   ABDOMEN: Soft, non-tender, non-distended   SKIN:   no acanthosis nigracans.  FOOT EXAMINATION: 8/18/2021  + foot deformity- left hammer toe + callus formation, +Onychomycosis, moist area noted between 1st and 2nd toe on left foot.  Decreased hair growth present over toes/feet.    Protective sensation absent bilaterally with 10 gram monofilament and absent vibratory sensation bilaterally, +1 dorsalis pedis and posterior pulses  noted.  Seeing Dr Khan today     Personally reviewed Past Medical, Surgical, Social History.    There were no vitals taken for this visit.       Personally reviewed the below labs:      Chemistry        Component Value Date/Time     08/04/2022 1349    K 4.0 08/04/2022 1349     08/04/2022 1349    CO2 32 (H) 08/04/2022 1349    BUN 14 08/04/2022 1349    CREATININE 1.1 08/04/2022 1349    GLU 61 (L) 08/04/2022 1349        Component Value Date/Time    CALCIUM 9.2 08/04/2022 1349    ALKPHOS 57 08/04/2022 1349    AST 17 08/04/2022 1349    ALT 20 08/04/2022 1349    BILITOT 0.5 08/04/2022 1349    ESTGFRAFRICA >60 07/09/2022 0942    EGFRNONAA 53 (A) 07/09/2022 0942        Lab Results   Component Value Date    TSH 0.710 04/17/2022       Recent Labs   Lab 08/04/22  1349   LDL Cholesterol 57.2 L   HDL 41   Cholesterol 112 L        Results for orders placed or performed in visit on 08/14/21   Vitamin D   Result Value Ref Range    Vit D, 25-Hydroxy 50 30 - 96 ng/mL     No results found for this or any previous visit.      Lab Results   Component Value Date    MICALBCREAT Unable to calculate 08/11/2022       Hemoglobin A1C   Date Value Ref Range Status   08/04/2022 6.4 (H) 4.0 - 5.6 % Final     Comment:     ADA Screening Guidelines:  5.7-6.4%  Consistent with prediabetes  >or=6.5%  Consistent with diabetes    High levels of fetal hemoglobin interfere with the HbA1C  assay. Heterozygous hemoglobin variants (HbS, HgC, etc)do  not significantly interfere with this assay.   However, presence of multiple variants may affect accuracy.     04/19/2022 7.6 (H) 4.0 - 5.6 % Final     Comment:     ADA Screening Guidelines:  5.7-6.4%  Consistent with prediabetes  >or=6.5%  Consistent with diabetes    High levels of fetal hemoglobin interfere with the HbA1C  assay. Heterozygous hemoglobin variants (HbS, HgC, etc)do  not significantly interfere with this assay.   However, presence of multiple variants may affect accuracy.      02/03/2022 6.8 (H) 4.0 - 5.6 % Final     Comment:     ADA Screening Guidelines:  5.7-6.4%  Consistent with prediabetes  >or=6.5%  Consistent with diabetes    High levels of fetal hemoglobin interfere with the HbA1C  assay. Heterozygous hemoglobin variants (HbS, HgC, etc)do  not significantly interfere with this assay.   However, presence of multiple variants may affect accuracy.          ASSESSMENT and PLAN:    1. T2DM controlled w DM PN, CKD 3-    Decrease Tresiba to U200 40 u qhs, ContinueTrulicity 1.5 mg weekly, metformin,and farxiga   Continue Dexcom G6- Notify me for any continued hypoglycemia     2. HTN -   continue meds as previously prescribed and monitor.     3. HLP -   on statin therapy, LFTs WNL     4. Obesity- There is no height or weight on file to calculate BMI.  Exercise limited by back, decrease snacking    5 DANNY- wears his cpap machine     6. Vitamin d deficiency - Continue ergo weekly (2 tabs),      Follow-up: in 6 months with lab prior

## 2022-08-26 NOTE — PATIENT INSTRUCTIONS
Your Diabetes Foot Care Program    Every day you depend on your feet to keep you moving. But when you have diabetes, your feet need special care. Even a small foot problem can become very serious. So dont take your feet for granted. By working with your diabetes healthcare team, you can learn how to protect your feet and keep them healthy.  Evaluating your feet  An evaluation helps your healthcare provider check the condition of your feet. The evaluation includes a review of your diabetes history and overall health. It may also include a foot exam, X-rays, or other tests. These can help show problems beneath the skin that you cant see or feel.  Medical history  You will be asked about your overall health and any history of foot problems. Youll also discuss your diabetes history, such as whether your blood sugar level has changed over time. It also includes questions about sensations of pain, tingling, pins and needles, or numbness. Your healthcare provider will also want to know if you have high blood pressure and heart disease, or if you smoke. Be sure to mention any medicines (including over-the-counter), supplements, or herbal remedies you take.  Foot exam  A foot exam checks the condition of different parts of your foot. First, your skin and nails are examined for any signs of infection. Blood flow is checked by feeling for the pulses in each foot. You may also have tests to study the nerves in the foot. These include using a small filament (wire) to see how sensitive your feet are. In certain cases, you will be asked to walk a short distance to check for bone, joint, and muscle problems.  Diagnostic tests  If needed, your healthcare provider will suggest certain tests to learn more about your feet. These include:  Doppler tests to measure blood flow in the feet and lower leg.  X-rays, which can show bone or joint problems.  Other imaging tests, such as an MRI (magnetic resonance imaging), bone scan, and CT  (computed tomography) scan. These can help show bone infections.  Other tests, such as vascular tests, which study the blood flow in your feet and legs. You may also have nerve studies to learn how sensitive your feet are.  Creating a foot care program  Based on the evaluation, your healthcare provider will create a foot care program for you. Your program may be as simple as starting a daily self-care routine and changing the types of shoes your wear. It may also involve treating minor foot problems, such as a corn or blister. In some cases, surgery will be needed to treat an infection or mechanical problems, such as hammer toes.  Preventing problems  When you have diabetes, its easier to prevent problems than to treat them later on. So see your healthcare team for regular checkups and foot care. Your healthcare team can also help you learn more about caring for your feet at home. For example, you may be told to avoid walking barefoot. Or you may be told that special footwear is needed to protect your feet.  Have regular checkups  Foot problems can develop quickly. So be sure to follow your healthcare teams schedule for regular checkups. During office visits, take off your shoes and socks as soon as you get in the exam room. Ask your healthcare provider to examine your feet for problems. This will make it easier to find and treat small skin irritations before they get worse. Regular checkups can also help keep track of the blood flow and feeling in your feet. If you have neuropathy (lack of feeling in your feet), you will need to have checkups more often.  Learn about self-care  The more you know about diabetes and your feet, the easier it will be to prevent problems. Members of your healthcare team can teach you how to inspect your feet and teach you to look for warning signs. They can also give you other foot care tips. During office visits, be sure to ask any questions you have.  Date Last Reviewed: 7/1/2016  ©  8392-7697 [employer]. [address] [place] [address], [place] [address]. All rights reserved. This information is not intended as a substitute for professional medical care. Always follow your healthcare professional's instructions.       Bunion    You have a bunion. This is a bony bump at the base of your big toe, along the inside edge of your foot. As the bump gets bigger, it can become red, swollen, and painful with shoe wear.  Bunions may occur if you wear shoes that are too tight and pinch your toes together. High heels may make this worse. In some cases a bunion is due to poor alignment of the foot and ankle. This puts extra weight on the instep of each foot.  Once a bunion forms, it changes the way weight is spread all across your foot. This causes the bunion to get worse over time. The big toe will bend more and more toward the other toes.  A minor bunion can be treated by:  Wearing properly fitting shoes  Using bunion pads  Wearing shoe inserts, called orthotics, to better align the foot and ankle  Physical therapy with ultrasound or whirlpool baths can ease pain, redness, and swelling. Severe cases may require surgery. If you dont treat what is causing the bunion, it may get larger and more painful.  Home care  Limit high heels. These shoes force your foot forward, crowding the toes together.  Switch to comfortable shoes with a wide toe area. Or have your existing shoes stretched by a shoe repair shop.  Avoid shoes that are tight, narrow, or pointed.  If you are flat-footed, using arch supports may help prevent further deformity. The best shoe inserts are the ones custom made by a foot specialist, called a podiatrist, or other healthcare provider.  Put a bunion pad over the bunion to ease pressure of your shoe against the bunion. You can buy these pads at most pharmacies without a prescription  To reduce pain and swelling, apply an ice pack over the injured area for 15 to 20 minutes. Do this every  1 to 2 hours the first day. Keep using ice 3 to 4 times a day until the pain and swelling goes away.  To make an ice pack, put ice cubes in a sealed zip-lock plastic bag. Wrap the bag in a clean, thin towel or cloth. Never put ice or an ice pack directly on the skin.  You may use over-the-counter pain medicine to control pain, unless another medicine was prescribed. Talk with your provider before using these medicines if you have chronic liver or kidney disease, or ever had a stomach ulcer or GI (gastrointestinal) bleeding.  Follow-up care  Follow up with a podiatrist or foot doctor, or as advised.  If X-rays were taken, you will be notified of any new findings that may affect your care.  When to seek medical care  Contact your healthcare provider if any of the following occur:  Increasing pain or redness around the base of the big toe  Painful ingrown toenail, with redness and swelling or pus around the nail  Date Last Reviewed: 11/21/2015  © 0387-7482 [email or web address]. [address] [place] [address], [place] [address]. All rights reserved. This information is not intended as a substitute for professional medical care. Always follow your healthcare professional's instructions.

## 2022-08-29 ENCOUNTER — OFFICE VISIT (OUTPATIENT)
Dept: PODIATRY | Facility: CLINIC | Age: 62
End: 2022-08-29
Payer: COMMERCIAL

## 2022-08-29 VITALS — WEIGHT: 258 LBS | HEIGHT: 70 IN | OXYGEN SATURATION: 95 % | BODY MASS INDEX: 36.94 KG/M2 | HEART RATE: 85 BPM

## 2022-08-29 DIAGNOSIS — M21.619 BUNION: ICD-10-CM

## 2022-08-29 DIAGNOSIS — I87.2 VENOUS INSUFFICIENCY OF BOTH LOWER EXTREMITIES: ICD-10-CM

## 2022-08-29 DIAGNOSIS — E11.49 DIABETES MELLITUS TYPE 2 WITH NEUROLOGICAL MANIFESTATIONS: Primary | ICD-10-CM

## 2022-08-29 DIAGNOSIS — L60.2 ONYCHOGRYPOSIS: ICD-10-CM

## 2022-08-29 DIAGNOSIS — M20.42 HAMMER TOES OF BOTH FEET: ICD-10-CM

## 2022-08-29 DIAGNOSIS — L85.1 ACQUIRED KERATODERMA: ICD-10-CM

## 2022-08-29 DIAGNOSIS — M20.41 HAMMER TOES OF BOTH FEET: ICD-10-CM

## 2022-08-29 PROCEDURE — 4010F ACE/ARB THERAPY RXD/TAKEN: CPT | Mod: CPTII,S$GLB,, | Performed by: PODIATRIST

## 2022-08-29 PROCEDURE — 3044F HG A1C LEVEL LT 7.0%: CPT | Mod: CPTII,S$GLB,, | Performed by: PODIATRIST

## 2022-08-29 PROCEDURE — 11721 ROUTINE FOOT CARE: ICD-10-PCS | Mod: 59,S$GLB,, | Performed by: PODIATRIST

## 2022-08-29 PROCEDURE — 1160F RVW MEDS BY RX/DR IN RCRD: CPT | Mod: CPTII,S$GLB,, | Performed by: PODIATRIST

## 2022-08-29 PROCEDURE — 11056 PARNG/CUTG B9 HYPRKR LES 2-4: CPT | Mod: S$GLB,,, | Performed by: PODIATRIST

## 2022-08-29 PROCEDURE — 1160F PR REVIEW ALL MEDS BY PRESCRIBER/CLIN PHARMACIST DOCUMENTED: ICD-10-PCS | Mod: CPTII,S$GLB,, | Performed by: PODIATRIST

## 2022-08-29 PROCEDURE — 1159F PR MEDICATION LIST DOCUMENTED IN MEDICAL RECORD: ICD-10-PCS | Mod: CPTII,S$GLB,, | Performed by: PODIATRIST

## 2022-08-29 PROCEDURE — 11721 DEBRIDE NAIL 6 OR MORE: CPT | Mod: 59,S$GLB,, | Performed by: PODIATRIST

## 2022-08-29 PROCEDURE — 4010F PR ACE/ARB THEARPY RXD/TAKEN: ICD-10-PCS | Mod: CPTII,S$GLB,, | Performed by: PODIATRIST

## 2022-08-29 PROCEDURE — 99499 UNLISTED E&M SERVICE: CPT | Mod: S$GLB,,, | Performed by: PODIATRIST

## 2022-08-29 PROCEDURE — 3061F PR NEG MICROALBUMINURIA RESULT DOCUMENTED/REVIEW: ICD-10-PCS | Mod: CPTII,S$GLB,, | Performed by: PODIATRIST

## 2022-08-29 PROCEDURE — 1159F MED LIST DOCD IN RCRD: CPT | Mod: CPTII,S$GLB,, | Performed by: PODIATRIST

## 2022-08-29 PROCEDURE — 11056 ROUTINE FOOT CARE: ICD-10-PCS | Mod: S$GLB,,, | Performed by: PODIATRIST

## 2022-08-29 PROCEDURE — 3066F PR DOCUMENTATION OF TREATMENT FOR NEPHROPATHY: ICD-10-PCS | Mod: CPTII,S$GLB,, | Performed by: PODIATRIST

## 2022-08-29 PROCEDURE — 3008F PR BODY MASS INDEX (BMI) DOCUMENTED: ICD-10-PCS | Mod: CPTII,S$GLB,, | Performed by: PODIATRIST

## 2022-08-29 PROCEDURE — 3061F NEG MICROALBUMINURIA REV: CPT | Mod: CPTII,S$GLB,, | Performed by: PODIATRIST

## 2022-08-29 PROCEDURE — 3044F PR MOST RECENT HEMOGLOBIN A1C LEVEL <7.0%: ICD-10-PCS | Mod: CPTII,S$GLB,, | Performed by: PODIATRIST

## 2022-08-29 PROCEDURE — 3008F BODY MASS INDEX DOCD: CPT | Mod: CPTII,S$GLB,, | Performed by: PODIATRIST

## 2022-08-29 PROCEDURE — 99499 NO LOS: ICD-10-PCS | Mod: S$GLB,,, | Performed by: PODIATRIST

## 2022-08-29 PROCEDURE — 3066F NEPHROPATHY DOC TX: CPT | Mod: CPTII,S$GLB,, | Performed by: PODIATRIST

## 2022-08-29 RX ORDER — DIVALPROEX SODIUM 500 MG/1
TABLET, FILM COATED, EXTENDED RELEASE ORAL
COMMUNITY
Start: 2022-08-25

## 2022-08-29 RX ORDER — DEXTROAMPHETAMINE SACCHARATE, AMPHETAMINE ASPARTATE MONOHYDRATE, DEXTROAMPHETAMINE SULFATE AND AMPHETAMINE SULFATE 7.5; 7.5; 7.5; 7.5 MG/1; MG/1; MG/1; MG/1
CAPSULE, EXTENDED RELEASE ORAL EVERY MORNING
COMMUNITY
Start: 2022-08-25 | End: 2024-02-15

## 2022-08-29 RX ORDER — BUPROPION HYDROCHLORIDE 300 MG/1
150 TABLET ORAL DAILY
COMMUNITY
Start: 2022-08-15

## 2022-08-29 RX ORDER — IBUPROFEN 800 MG/1
800 TABLET ORAL 3 TIMES DAILY
COMMUNITY
Start: 2022-08-05 | End: 2022-10-25

## 2022-08-29 RX ORDER — MUPIROCIN 20 MG/G
OINTMENT TOPICAL 3 TIMES DAILY
COMMUNITY
Start: 2022-06-23

## 2022-08-29 NOTE — PROGRESS NOTES
"  [address] [facility] [address]  [place] [address]  Phone: [phone]   [phone]    Patient's PCP:Lydia Matamoros MD  Referring Provider: No ref. provider found    Subjective:      Chief Complaint:: Routine Foot Care    HPI  Sarwat Colunga is a 61 y.o. male who presents today for routine q9 diabetic nail trimming. No new complaints related to feet since last visit     Systemic Doctor: Cynthia Mejia NP  Date Last Seen: 08/24/2022  Blood Sugar: 167  Hemoglobin A1c: 6.4 08/04/2022      Vitals:    08/29/22 1639   Pulse: 85   SpO2: 95%   Weight: 117 kg (258 lb)   Height: 5' 10" (1.778 m)   PainSc: 0-No pain      Shoe Size:     Past Surgical History:   Procedure Laterality Date    ANKLE SURGERY Right     ANKLE SURGERY Left     COLONOSCOPY N/A 5/17/2017    Procedure: COLONOSCOPY;  Surgeon: Carlos Hess MD;  Location: [facility];  Service: Endoscopy;  Laterality: N/A;    COLONOSCOPY W/ POLYPECTOMY      CYSTOSCOPY      CYSTOSCOPY N/A 10/23/2018    Procedure: CYSTOSCOPY;  Surgeon: Sarwat Viveros MD;  Location: [facility] OR;  Service: Urology;  Laterality: N/A;    CYSTOSCOPY WITH INSERTION OF MINIMALLY INVASIVE IMPLANT TO ENLARGE PROSTATIC URETHRA N/A 11/15/2018    Procedure: CYSTOSCOPY, WITH INSERTION OF UROLIFT IMPLANT;  Surgeon: Sarwat Viveros MD;  Location: [facility] OR;  Service: Urology;  Laterality: N/A;    DEBRIDEMENT TENNIS ELBOW      right    FOREARM SURGERY Right     tendon repair    FRACTURE SURGERY      right ankle orif    HERNIA REPAIR      umbillical    PROSTATE BIOPSY      QUADRICEPS REPAIR      right    TONSILLECTOMY      TRANSRECTAL ULTRASOUND EXAMINATION N/A 10/23/2018    Procedure: ULTRASOUND, RECTAL APPROACH;  Surgeon: Sarwat Viveros MD;  Location: [facility] OR;  Service: Urology;  Laterality: N/A;    TRICEPS TENDON RELEASE      tendon repair left tricep    VASECTOMY       Past Medical History:   Diagnosis Date    Arthritis     Benign localized hyperplasia of prostate without urinary " obstruction and other lower urinary tract symptoms (LUTS)     Body mass index 37.0-37.9, adult     Diabetes mellitus     Esophageal reflux     History of TIA (transient ischemic attack) 04/17/2022    Hypertension     Other and unspecified hyperlipidemia     Other testicular hypofunction     Polyneuropathy in diabetes(357.2)     PONV (postoperative nausea and vomiting)     Rosacea     Sleep apnea     uses CPAP    Type II or unspecified type diabetes mellitus with neurological manifestations, uncontrolled(250.62)      Family History   Problem Relation Age of Onset    Pulmonary embolism Mother     No Known Problems Father     Heart disease Other     Heart attack Other     Hypertension Other     Hyperlipidemia Other     Arthritis Other     Clotting disorder Other     Mental illness Other     Diabetes Other     Cancer Other     Melanoma Neg Hx     Psoriasis Neg Hx     Lupus Neg Hx     Eczema Neg Hx         Social History:   Marital Status:   Alcohol History:  reports no history of alcohol use.  Tobacco History:  reports that he has never smoked. He has never used smokeless tobacco.  Drug History:  reports no history of drug use.    Review of patient's allergies indicates:   Allergen Reactions    Bactrim [sulfamethoxazole-trimethoprim] Other (See Comments)     Dizziness,lightheaded, heavy chest    Ciprofloxacin Other (See Comments)     CONFUSED, OFF BALANCE, WOKE UP AT NITE    Doxycycline Hives    Lamictal [lamotrigine] Rash    Trileptal [oxcarbazepine]        Current Outpatient Medications   Medication Sig Dispense Refill    ARIPiprazole (ABILIFY) 10 MG Tab Take 10 mg by mouth nightly.      aspirin (ECOTRIN) 81 MG EC tablet Take 81 mg by mouth once daily.      atorvastatin (LIPITOR) 40 MG tablet TAKE 1 TABLET(40 MG) BY MOUTH EVERY EVENING 90 tablet 4    blood sugar diagnostic Strp Checks 4 times a day 400 strip 4    blood-glucose meter (FREESTYLE FREEDOM LITE) kit Use as instructed 1 each 0    blood-glucose  "meter,continuous (DEXCOM G6 ) Misc Dispense 1  1 each 0    blood-glucose sensor (DEXCOM G6 SENSOR) Bibiana Dispense 3 sensors/month 9 each 4    blood-glucose transmitter (DEXCOM G6 TRANSMITTER) Bibiana Dispense 1 transmitter. Use 1 transmitter every 90 days. 1 each 3    blunt needle, disposable 18 x 1 1/2 " Ndle 1 Syringe by Misc.(Non-Drug; Combo Route) route once a week. 25 each 3    buPROPion (WELLBUTRIN XL) 150 MG TB24 tablet Take 300 mg by mouth once daily.      buPROPion (WELLBUTRIN XL) 300 MG 24 hr tablet Take 150 mg by mouth once daily.      celecoxib (CELEBREX) 100 MG capsule TAKE 1 CAPSULE(100 MG) BY MOUTH EVERY DAY 30 capsule 4    chlorthalidone (HYGROTEN) 25 MG Tab TAKE 1 TABLET(25 MG) BY MOUTH EVERY DAY 90 tablet 3    cloNIDine (CATAPRES) 0.1 MG tablet 1 po BID prn BP above 180/100      cyanocobalamin 1,000 mcg/mL injection Inject 1 mL (1,000 mcg total) into the skin every 7 days. 12 mL 3    dapagliflozin (FARXIGA) 5 mg Tab tablet Take 1 tablet (5 mg total) by mouth once daily. 90 tablet 4    dexmethylphenidate (FOCALIN XR) 30 mg 24 hr capsule Take 1 capsule by mouth once daily.      dexmethylphenidate (FOCALIN) 10 MG tablet Take 10 mg by mouth every evening.      dextroamphetamine-amphetamine (ADDERALL XR) 30 MG 24 hr capsule Take by mouth every morning.      divalproex 500 MG Tb24       divalproex ER (DEPAKOTE ER) 250 MG 24 hr tablet Take 750 mg by mouth every evening.      ergocalciferol (ERGOCALCIFEROL) 50,000 unit Cap TAKE 2 CAPSULES BY MOUTH WEEKLY 24 capsule 4    esomeprazole (NEXIUM) 20 MG capsule Take 20 mg by mouth before breakfast.      FOLBEE 2.5-25-1 mg Tab Take 1 tablet by mouth once daily.      insulin degludec (TRESIBA FLEXTOUCH U-200) 200 unit/mL (3 mL) insulin pen 40 u qhs 3 pen 12    lisinopriL (PRINIVIL,ZESTRIL) 5 MG tablet Take 1 tablet (5 mg total) by mouth once daily. 90 tablet 3    LORazepam (ATIVAN) 2 MG Tab Take 1-2 mg by mouth daily as needed.      metFORMIN " "(GLUCOPHAGE) 1000 MG tablet Take 1 tablet (1,000 mg total) by mouth 2 (two) times daily with meals. 180 tablet 3    mupirocin (BACTROBAN) 2 % ointment Apply topically 3 (three) times daily.      needle, disp, 30 gauge 30 gauge x 1/2" Ndle 1 each by Misc.(Non-Drug; Combo Route) route once a week. 25 each 3    pen needle, diabetic (BD ULTRA-FINE MINI PEN NEEDLE) 31 gauge x 3/16" Ndle 1 each by Misc.(Non-Drug; Combo Route) route 4 (four) times daily. 100 each 11    piroxicam (FELDENE) 20 MG capsule Take 1 capsule (20 mg total) by mouth once daily. 30 capsule 11    pregabalin (LYRICA) 150 MG capsule Take 1 capsule (150 mg total) by mouth 2 (two) times daily. 180 capsule 1    sildenafiL (VIAGRA) 100 MG tablet Take 1 tablet (100 mg total) by mouth as needed for Erectile Dysfunction. 9 tablet 10    syringe, disposable, 1 mL Syrg 1 Syringe by Misc.(Non-Drug; Combo Route) route once a week. 25 each 0    TRULICITY 1.5 mg/0.5 mL pen injector INJECT 1.5MG UNDER THE SKIN EVERY 7 DAYS 12 pen 4    ibuprofen (ADVIL,MOTRIN) 800 MG tablet Take 800 mg by mouth 3 (three) times daily.       No current facility-administered medications for this visit.       Review of Systems   Constitutional:  Negative for chills, fatigue, fever and unexpected weight change.   HENT:  Negative for hearing loss and trouble swallowing.    Eyes:  Negative for photophobia and visual disturbance.   Respiratory:  Negative for cough, shortness of breath and wheezing.    Cardiovascular:  Positive for leg swelling. Negative for chest pain and palpitations.   Gastrointestinal:  Negative for abdominal pain and nausea.   Genitourinary:  Negative for dysuria and frequency.   Musculoskeletal:  Positive for arthralgias, joint swelling and myalgias. Negative for back pain and gait problem.   Skin:  Negative for rash and wound.   Neurological:  Positive for numbness. Negative for tremors, seizures, weakness and headaches.   Hematological:  Bruises/bleeds easily.   "     Objective:        Physical Exam:   Foot Exam    General  General Appearance: appears stated age and healthy   Orientation: alert and oriented to person, place, and time   Affect: appropriate   Gait: unimpaired       Right Foot/Ankle     Inspection and Palpation  Ecchymosis: none  Tenderness: none   Swelling: (mild edema of the lower extremity)  Arch: normal  Hammertoes: second toe, third toe, fourth toe and fifth toe  Skin Exam: callus;   Neurovascular  Dorsalis pedis: 2+  Posterior tibial: 2+  Capillary Refill: 2+  Varicose veins: present  Saphenous nerve sensation: diminished  Tibial nerve sensation: diminished  Superficial peroneal nerve sensation: diminished  Deep peroneal nerve sensation: diminished  Sural nerve sensation: diminished    Edema  Type of edema: non-pitting    Muscle Strength  Ankle dorsiflexion: 5  Ankle plantar flexion: 5  Ankle inversion: 5  Ankle eversion: 5  Great toe extension: 5  Great toe flexion: 5    Range of Motion    Normal right ankle ROM    Tests  Anterior drawer: negative   Talar tilt: negative   PT Tinel's sign: negative    Paresthesia: positive  Comments  Nails 1 through 5 are thickened, discolored, dystrophic, and elongated with subungual debris    Skin is thin, shiny, and atrophic with diminished pedal hair growth       Left Foot/Ankle      Inspection and Palpation  Ecchymosis: none  Tenderness: none   Swelling: (mild edema of the lower extremity)  Arch: normal  Hammertoes: second toe, third toe, fourth toe and fifth toe  Hallux valgus: yes  Skin Exam: callus; no drainage and no erythema   Neurovascular  Dorsalis pedis: 2+  Posterior tibial: 2+  Capillary refill: 2+  Varicose veins: present  Saphenous nerve sensation: diminished  Tibial nerve sensation: diminished  Superficial peroneal nerve sensation: diminished  Deep peroneal nerve sensation: diminished  Sural nerve sensation: diminished    Edema  Type of edema: non-pitting    Muscle Strength  Ankle dorsiflexion: 5  Ankle  plantar flexion: 5  Ankle inversion: 5  Ankle eversion: 5  Great toe extension: 5  Great toe flexion: 5    Range of Motion    Normal left ankle ROM    Tests  Anterior drawer: negative   Talar tilt: negative   PT Tinel's sign: negative  Paresthesia: positive  Comments  Nails 1 through 5 are thickened, discolored, dystrophic, and elongated with subungual debris    Skin is thin, shiny, and atrophic with diminished pedal hair growth   Physical Exam  Cardiovascular:      Pulses:           Dorsalis pedis pulses are 2+ on the right side and 2+ on the left side.        Posterior tibial pulses are 2+ on the right side and 2+ on the left side.   Musculoskeletal:      Left foot: Bunion present.   Feet:      Right foot:      Skin integrity: Callus present.      Left foot:      Skin integrity: Callus present. No erythema.             Right Ankle/Foot Exam     Range of Motion   The patient has normal right ankle ROM.    Comments:  Nails 1 through 5 are thickened, discolored, dystrophic, and elongated with subungual debris    Skin is thin, shiny, and atrophic with diminished pedal hair growth       Left Ankle/Foot Exam     Range of Motion   The patient has normal left ankle ROM.     Comments:  Nails 1 through 5 are thickened, discolored, dystrophic, and elongated with subungual debris    Skin is thin, shiny, and atrophic with diminished pedal hair growth       Muscle Strength   Right Lower Extremity   Ankle Dorsiflexion:  5   Plantar flexion:  5/5  Left Lower Extremity   Ankle Dorsiflexion:  5   Plantar flexion:  5/5     Reflexes     Left Side  Paresthesia: present    Right Side   Paresthesia: present    Vascular Exam     Right Pulses  Dorsalis Pedis:      2+  Posterior Tibial:      2+        Left Pulses  Dorsalis Pedis:      2+  Posterior Tibial:      2+         Imaging: none            Assessment:       1. Diabetes mellitus type 2 with neurological manifestations    2. Venous insufficiency of both lower extremities    3. Hammer  "toes of both feet    4. Bunion - Left Foot    5. Onychogryposis    6. Acquired keratoderma      Plan:   Diabetes mellitus type 2 with neurological manifestations  -     Routine Foot Care    Venous insufficiency of both lower extremities  -     Routine Foot Care    Hammer toes of both feet    Bunion - Left Foot    Onychogryposis  -     Routine Foot Care    Acquired keratoderma    Follow up if symptoms worsen or fail to improve.    Routine Foot Care    Date/Time: 8/29/2022 4:20 PM  Performed by: Alexi Khan DPM  Authorized by: Alexi Khan DPM     Time out: Immediately prior to procedure a "time out" was called to verify the correct patient, procedure, equipment, support staff and site/side marked as required.    Consent Done?:  Not Needed  Hyperkeratotic Skin Lesions?: Yes    Number of trimmed lesions:  2  Location(s):  Right 1st Toe and Left 2nd Toe    Nail Care Type:  Debride  Location(s): All  (Left 1st Toe, Left 3rd Toe, Left 2nd Toe, Left 4th Toe, Left 5th Toe, Right 1st Toe, Right 2nd Toe, Right 3rd Toe, Right 4th Toe and Right 5th Toe)  Patient tolerance:  Patient tolerated the procedure well with no immediate complications     Instruments Used: Nail Nipper   Manually reduced with electric .             Counseling:     I provided patient education verbally regarding:   Patient diagnosis, treatment options, as well as alternatives, risks, and benefits.     This note was created using Dragon voice recognition software that occasionally misinterpreted phrases or words.                 "

## 2022-09-14 ENCOUNTER — PATIENT MESSAGE (OUTPATIENT)
Dept: ENDOCRINOLOGY | Facility: CLINIC | Age: 62
End: 2022-09-14
Payer: COMMERCIAL

## 2022-09-15 ENCOUNTER — TELEPHONE (OUTPATIENT)
Dept: ENDOCRINOLOGY | Facility: CLINIC | Age: 62
End: 2022-09-15
Payer: COMMERCIAL

## 2022-09-15 DIAGNOSIS — Z79.4 TYPE 2 DIABETES MELLITUS WITH STAGE 3A CHRONIC KIDNEY DISEASE, WITH LONG-TERM CURRENT USE OF INSULIN: ICD-10-CM

## 2022-09-15 DIAGNOSIS — N18.31 TYPE 2 DIABETES MELLITUS WITH STAGE 3A CHRONIC KIDNEY DISEASE, WITH LONG-TERM CURRENT USE OF INSULIN: ICD-10-CM

## 2022-09-15 DIAGNOSIS — E11.22 TYPE 2 DIABETES MELLITUS WITH STAGE 3A CHRONIC KIDNEY DISEASE, WITH LONG-TERM CURRENT USE OF INSULIN: ICD-10-CM

## 2022-09-15 RX ORDER — INSULIN DEGLUDEC 200 U/ML
INJECTION, SOLUTION SUBCUTANEOUS
Qty: 10 PEN | Refills: 4 | Status: SHIPPED | OUTPATIENT
Start: 2022-09-15 | End: 2023-08-15

## 2022-10-25 ENCOUNTER — OFFICE VISIT (OUTPATIENT)
Dept: RHEUMATOLOGY | Facility: CLINIC | Age: 62
End: 2022-10-25
Payer: COMMERCIAL

## 2022-10-25 VITALS
BODY MASS INDEX: 35.93 KG/M2 | SYSTOLIC BLOOD PRESSURE: 138 MMHG | HEIGHT: 70 IN | DIASTOLIC BLOOD PRESSURE: 85 MMHG | WEIGHT: 251 LBS

## 2022-10-25 DIAGNOSIS — M25.552 HIP PAIN, BILATERAL: Primary | ICD-10-CM

## 2022-10-25 DIAGNOSIS — Z79.4 TYPE 2 DIABETES MELLITUS WITH STAGE 3A CHRONIC KIDNEY DISEASE, WITH LONG-TERM CURRENT USE OF INSULIN: ICD-10-CM

## 2022-10-25 DIAGNOSIS — M25.551 HIP PAIN, BILATERAL: Primary | ICD-10-CM

## 2022-10-25 DIAGNOSIS — E53.8 B12 DEFICIENCY: ICD-10-CM

## 2022-10-25 DIAGNOSIS — N18.31 TYPE 2 DIABETES MELLITUS WITH STAGE 3A CHRONIC KIDNEY DISEASE, WITH LONG-TERM CURRENT USE OF INSULIN: ICD-10-CM

## 2022-10-25 DIAGNOSIS — M47.9 SPONDYLOSIS: ICD-10-CM

## 2022-10-25 DIAGNOSIS — E11.22 TYPE 2 DIABETES MELLITUS WITH STAGE 3A CHRONIC KIDNEY DISEASE, WITH LONG-TERM CURRENT USE OF INSULIN: ICD-10-CM

## 2022-10-25 PROCEDURE — 3061F NEG MICROALBUMINURIA REV: CPT | Mod: CPTII,S$GLB,, | Performed by: INTERNAL MEDICINE

## 2022-10-25 PROCEDURE — 3079F DIAST BP 80-89 MM HG: CPT | Mod: CPTII,S$GLB,, | Performed by: INTERNAL MEDICINE

## 2022-10-25 PROCEDURE — 3008F BODY MASS INDEX DOCD: CPT | Mod: CPTII,S$GLB,, | Performed by: INTERNAL MEDICINE

## 2022-10-25 PROCEDURE — 99999 PR PBB SHADOW E&M-EST. PATIENT-LVL II: ICD-10-PCS | Mod: PBBFAC,,, | Performed by: INTERNAL MEDICINE

## 2022-10-25 PROCEDURE — 3066F NEPHROPATHY DOC TX: CPT | Mod: CPTII,S$GLB,, | Performed by: INTERNAL MEDICINE

## 2022-10-25 PROCEDURE — 3066F PR DOCUMENTATION OF TREATMENT FOR NEPHROPATHY: ICD-10-PCS | Mod: CPTII,S$GLB,, | Performed by: INTERNAL MEDICINE

## 2022-10-25 PROCEDURE — 3079F PR MOST RECENT DIASTOLIC BLOOD PRESSURE 80-89 MM HG: ICD-10-PCS | Mod: CPTII,S$GLB,, | Performed by: INTERNAL MEDICINE

## 2022-10-25 PROCEDURE — 99999 PR PBB SHADOW E&M-EST. PATIENT-LVL II: CPT | Mod: PBBFAC,,, | Performed by: INTERNAL MEDICINE

## 2022-10-25 PROCEDURE — 99213 OFFICE O/P EST LOW 20 MIN: CPT | Mod: S$GLB,,, | Performed by: INTERNAL MEDICINE

## 2022-10-25 PROCEDURE — 4010F ACE/ARB THERAPY RXD/TAKEN: CPT | Mod: CPTII,S$GLB,, | Performed by: INTERNAL MEDICINE

## 2022-10-25 PROCEDURE — 1159F PR MEDICATION LIST DOCUMENTED IN MEDICAL RECORD: ICD-10-PCS | Mod: CPTII,S$GLB,, | Performed by: INTERNAL MEDICINE

## 2022-10-25 PROCEDURE — 3008F PR BODY MASS INDEX (BMI) DOCUMENTED: ICD-10-PCS | Mod: CPTII,S$GLB,, | Performed by: INTERNAL MEDICINE

## 2022-10-25 PROCEDURE — 3075F PR MOST RECENT SYSTOLIC BLOOD PRESS GE 130-139MM HG: ICD-10-PCS | Mod: CPTII,S$GLB,, | Performed by: INTERNAL MEDICINE

## 2022-10-25 PROCEDURE — 3075F SYST BP GE 130 - 139MM HG: CPT | Mod: CPTII,S$GLB,, | Performed by: INTERNAL MEDICINE

## 2022-10-25 PROCEDURE — 4010F PR ACE/ARB THEARPY RXD/TAKEN: ICD-10-PCS | Mod: CPTII,S$GLB,, | Performed by: INTERNAL MEDICINE

## 2022-10-25 PROCEDURE — 3044F HG A1C LEVEL LT 7.0%: CPT | Mod: CPTII,S$GLB,, | Performed by: INTERNAL MEDICINE

## 2022-10-25 PROCEDURE — 99213 PR OFFICE/OUTPT VISIT, EST, LEVL III, 20-29 MIN: ICD-10-PCS | Mod: S$GLB,,, | Performed by: INTERNAL MEDICINE

## 2022-10-25 PROCEDURE — 3044F PR MOST RECENT HEMOGLOBIN A1C LEVEL <7.0%: ICD-10-PCS | Mod: CPTII,S$GLB,, | Performed by: INTERNAL MEDICINE

## 2022-10-25 PROCEDURE — 1159F MED LIST DOCD IN RCRD: CPT | Mod: CPTII,S$GLB,, | Performed by: INTERNAL MEDICINE

## 2022-10-25 PROCEDURE — 3061F PR NEG MICROALBUMINURIA RESULT DOCUMENTED/REVIEW: ICD-10-PCS | Mod: CPTII,S$GLB,, | Performed by: INTERNAL MEDICINE

## 2022-10-25 RX ORDER — CELECOXIB 100 MG/1
100 CAPSULE ORAL DAILY
Qty: 30 CAPSULE | Refills: 4 | Status: SHIPPED | OUTPATIENT
Start: 2022-10-25 | End: 2024-02-15

## 2022-10-25 ASSESSMENT — ROUTINE ASSESSMENT OF PATIENT INDEX DATA (RAPID3)
FATIGUE SCORE: 0
TOTAL RAPID3 SCORE: 3.39
MDHAQ FUNCTION SCORE: 0.8
PATIENT GLOBAL ASSESSMENT SCORE: 2.5
PAIN SCORE: 5
PSYCHOLOGICAL DISTRESS SCORE: 0

## 2022-10-25 NOTE — PROGRESS NOTES
Subjective:       Patient ID: Sarwat Colunga is a 61 y.o. male.    Chief Complaint: No chief complaint on file.    Follow up  60 yo male with a history of neck pain, back pain. OA of the .he started noticing back pain, neck is stiff.  Osteoarthritis   Patient complains of arthralgias and myalgias for which has been present for a few years. both MTP(s): 1st, 2nd, 3rd, 4th and 5th, is described as aching, pulsating, shooting and throbbing, and is constant, moderate Associated symptoms include: crepitation, decreased range of motion, edema, effusion, tenderness and warmth.dx with DM x 16 yrs and severe neuropathy.    Review of Systems   Constitutional:  Positive for activity change and fatigue. Negative for appetite change, chills, diaphoresis, fever and unexpected weight change.   HENT:  Negative for congestion, ear pain, facial swelling, mouth sores, nosebleeds, postnasal drip, rhinorrhea, sinus pressure, sneezing, sore throat, tinnitus, trouble swallowing and voice change.    Eyes:  Negative for pain, discharge, redness, itching and visual disturbance.   Respiratory:  Negative for apnea, cough, chest tightness, shortness of breath and wheezing.    Cardiovascular:  Negative for chest pain, palpitations and leg swelling.   Gastrointestinal:  Negative for abdominal pain, constipation, diarrhea, nausea and vomiting.   Endocrine: Negative for cold intolerance, heat intolerance, polydipsia and polyuria.   Genitourinary:  Negative for decreased urine volume, difficulty urinating, dysuria, flank pain, frequency, genital sores, hematuria and urgency.   Musculoskeletal:  Positive for back pain, gait problem, joint swelling, myalgias, neck pain and neck stiffness. Negative for arthralgias.   Skin:  Positive for rash. Negative for pallor and wound.   Allergic/Immunologic: Negative for immunocompromised state.   Neurological:  Negative for dizziness, tremors, seizures, syncope, weakness, numbness and headaches.  "  Hematological:  Negative for adenopathy. Does not bruise/bleed easily.   Psychiatric/Behavioral:  Negative for sleep disturbance and suicidal ideas. The patient is not nervous/anxious.        Objective:   /85   Ht 5' 10" (1.778 m)   Wt 113.9 kg (251 lb)   BMI 36.01 kg/m²      Physical Exam   Constitutional: He is oriented to person, place, and time. No distress.   HENT:   Head: Normocephalic and atraumatic.   Mouth/Throat: Oropharynx is clear and moist.   Eyes: Pupils are equal, round, and reactive to light.   Neck: No thyromegaly present.   Cardiovascular: Normal rate, regular rhythm and normal heart sounds. Exam reveals no gallop and no friction rub.   No murmur heard.  Pulmonary/Chest: Breath sounds normal. He has no wheezes. He has no rales. He exhibits no tenderness.   Abdominal: There is no abdominal tenderness. There is no rebound and no guarding.   Musculoskeletal:         General: Tenderness present.      Right shoulder: Tenderness present.      Left shoulder: Tenderness present.      Right elbow: Tenderness present.      Left elbow: Normal.      Left wrist: Normal.      Cervical back: Neck supple.      Right knee: Swelling and effusion present.      Left knee: Swelling and effusion present.   Lymphadenopathy:     He has no cervical adenopathy.   Neurological: He is alert and oriented to person, place, and time. He displays weakness. He exhibits abnormal muscle tone.   Reflex Scores:       Patellar reflexes are 2+ on the right side and 2+ on the left side.  Skin: No rash noted. No erythema. No pallor.   Psychiatric: Mood, affect and judgment normal.   Vitals reviewed.      Right Side Rheumatological Exam     Examination finds the 2nd MCP, 3rd MCP, 4th MCP and 5th MCP normal.    The patient is tender to palpation of the shoulder and elbow    He has swelling of the knee    The patient has an enlarged wrist, 1st PIP, 2nd PIP, 3rd PIP, 4th PIP and 5th PIP    Shoulder Exam   Tenderness Location: " acromioclavicular joint and posterior shoulder    Range of Motion   Active abduction:  abnormal   Adduction: abnormal  Sensation: normal    Knee Exam     Range of Motion   Extension:  normal   Flexion:  normal   Patellofemoral Crepitus: positive  Effusion: positive  Sensation: normal    Hip Exam   Tenderness Location: anterior and posterior    Range of Motion   Extension:  abnormal   Flexion:  abnormal   Sensation: normal    Elbow/Wrist Exam   Tenderness Location: no tenderness    Range of Motion   Elbow   Flexion:  normal   Sensation: normal    Muscle Strength (0-5 scale):  Neck Flexion:  3  Neck Extension: 3  Deltoid:  3  Biceps: 3/5   Triceps:  3  Quadriceps:  3   Distal Lower Extremity: 3    Left Side Rheumatological Exam     Examination finds the elbow, wrist, 1st MCP, 2nd MCP, 3rd MCP, 4th MCP and 5th MCP normal.    The patient is tender to palpation of the shoulder.    He has swelling of the knee    The patient has an enlarged 1st PIP, 2nd PIP, 3rd PIP, 4th PIP and 5th PIP.    Shoulder Exam   Tenderness Location: acromioclavicular joint and posterior shoulder    Range of Motion   Active abduction:  abnormal   Extension:  abnormal   Sensation: normal    Knee Exam     Range of Motion   Extension:  normal   Flexion:  normal     Patellofemoral Crepitus: positive  Effusion: positive  Sensation: normal    Hip Exam   Tenderness Location: anterior and posterior    Range of Motion   Extension:  abnormal   Flexion:  abnormal   Sensation: normal    Elbow/Wrist Exam     Range of Motion   Elbow   Flexion:  normal   Sensation: normal    Muscle Strength (0-5 scale):  Neck Flexion:  3  Neck Extension: 3  Deltoid:  3  Biceps: 3/5   Triceps:  3  Quadriceps:  3   Distal Lower Extremity: 3      Back/Neck Exam   General Inspection   Gait: normal       Tenderness Right paramedian tenderness of the Lower C-Spine, Lower L-Spine, Upper C-Spine, Upper L-Spine and SI Joint.Left paramedian tenderness of the Lower C-Spine, Lower L-Spine,  Upper C-Spine, Upper L-Spine and SI Joint.    Back Range of Motion   Extension:  abnormal  Flexion:  abnormal  Lateral Bend Right:  abnormal  Lateral Bend Left:  abnormal  Rotation Right:  abnormal  Rotation Left:  abnormal    Neck Range of Motion   Flexion:  Limited and moderate  Extension:  Limited and moderate  Right Lateral Bend: abnormal  Left Lateral Bend: abnormal  Right Rotation: abnormal  Left Rotation: abnormal     Results for orders placed or performed in visit on 08/11/22   Microalbumin/creatinine urine ratio   Result Value Ref Range    Microalbumin, Urine <5.0 ug/mL    Creatinine, Urine 29.0 23.0 - 375.0 mg/dL    Microalb/Creat Ratio Unable to calculate 0.0 - 30.0 ug/mg     Collected Updated Procedure    08/04/2022 1349 08/04/2022 1445 Comprehensive Metabolic Panel [[id number]]   (Abnormal)   Blood    Component Value Units   Sodium 144 mmol/L   Potassium 4.0 mmol/L   Chloride 104 mmol/L   CO2 32 High  mmol/L   Glucose 61 Low  mg/dL   BUN 14 mg/dL   Creatinine 1.1 mg/dL   Calcium 9.2 mg/dL   Total Protein 6.3 g/dL   Albumin 3.6 g/dL   Total Bilirubin 0.5  mg/dL   Alkaline Phosphatase 57 U/L   AST 17 U/L   ALT 20 U/L   Anion Gap 8 mmol/L   eGFR >60 mL/min/1.73 m^2          08/11/2022 1701 08/12/2022 0045 Microalbumin/creatinine urine ratio [[id number]]    Urine    Component Value Units   Microalbumin, Urine <5.0 ug/mL   Creatinine, Urine 29.0 mg/dL   Microalb/Creat Ratio Unable to calculate ug/mg          08/04/2022 1349 08/04/2022 1445 Lipid Panel [[id number]]   (Abnormal)   Blood    Component Value Units   Cholesterol 112 Low   mg/dL   Triglycerides 69  mg/dL   HDL 41  mg/dL   LDL Cholesterol 57.2 Low   mg/dL   HDL/Cholesterol Ratio 36.6 %   Total Cholesterol/HDL Ratio 2.7    Non-HDL Cholesterol 71  mg/dL          08/04/2022 1349 08/05/2022 0001 Hemoglobin A1C [[id number]]   (Abnormal)   Blood    Component Value Units   Hemoglobin A1C 6.4 High   %   Estimated Avg Glucose 137 High  mg/dL            reviewed  labs with patient during this visit      Assessment:       1. Hip pain, bilateral    2. B12 deficiency    3. Spondylosis    4. Type 2 diabetes mellitus with stage 3a chronic kidney disease, with long-term current use of insulin              Plan:       Diagnoses and all orders for this visit:    Hip pain, bilateral  -     CBC Auto Differential; Future  -     Comprehensive Metabolic Panel; Future  -     Sedimentation rate; Future  -     C-Reactive Protein; Future  -     TSH; Future  -     T4, Free; Future  -     T3; Future  -     X-Ray Hips Bilateral 2 [employer] AP Pelvis; Future    B12 deficiency  -     celecoxib (CELEBREX) 100 MG capsule; Take 1 capsule (100 mg total) by mouth once daily.  -     CBC Auto Differential; Future  -     Comprehensive Metabolic Panel; Future  -     Sedimentation rate; Future  -     C-Reactive Protein; Future  -     TSH; Future  -     T4, Free; Future  -     T3; Future  -     X-Ray Hips Bilateral 2 [employer] AP Pelvis; Future    Spondylosis  -     celecoxib (CELEBREX) 100 MG capsule; Take 1 capsule (100 mg total) by mouth once daily.  -     CBC Auto Differential; Future  -     Comprehensive Metabolic Panel; Future  -     Sedimentation rate; Future  -     C-Reactive Protein; Future  -     TSH; Future  -     T4, Free; Future  -     T3; Future  -     X-Ray Hips Bilateral 2 [employer] AP Pelvis; Future    Type 2 diabetes mellitus with stage 3a chronic kidney disease, with long-term current use of insulin  -     celecoxib (CELEBREX) 100 MG capsule; Take 1 capsule (100 mg total) by mouth once daily.  -     CBC Auto Differential; Future  -     Comprehensive Metabolic Panel; Future  -     Sedimentation rate; Future  -     C-Reactive Protein; Future  -     TSH; Future  -     T4, Free; Future  -     T3; Future  -     X-Ray Hips Bilateral 2 [employer] AP Pelvis; Future

## 2022-11-05 ENCOUNTER — HOSPITAL ENCOUNTER (OUTPATIENT)
Dept: RADIOLOGY | Facility: HOSPITAL | Age: 62
Discharge: HOME | End: 2022-11-05
Attending: INTERNAL MEDICINE
Payer: COMMERCIAL

## 2022-11-05 DIAGNOSIS — M25.551 HIP PAIN, BILATERAL: ICD-10-CM

## 2022-11-05 DIAGNOSIS — M47.9 SPONDYLOSIS: ICD-10-CM

## 2022-11-05 DIAGNOSIS — E11.22 TYPE 2 DIABETES MELLITUS WITH STAGE 3A CHRONIC KIDNEY DISEASE, WITH LONG-TERM CURRENT USE OF INSULIN: ICD-10-CM

## 2022-11-05 DIAGNOSIS — Z79.4 TYPE 2 DIABETES MELLITUS WITH STAGE 3A CHRONIC KIDNEY DISEASE, WITH LONG-TERM CURRENT USE OF INSULIN: ICD-10-CM

## 2022-11-05 DIAGNOSIS — N18.31 TYPE 2 DIABETES MELLITUS WITH STAGE 3A CHRONIC KIDNEY DISEASE, WITH LONG-TERM CURRENT USE OF INSULIN: ICD-10-CM

## 2022-11-05 DIAGNOSIS — M25.552 HIP PAIN, BILATERAL: ICD-10-CM

## 2022-11-05 DIAGNOSIS — E53.8 B12 DEFICIENCY: ICD-10-CM

## 2022-11-05 PROCEDURE — 73521 X-RAY EXAM HIPS BI 2 VIEWS: CPT | Mod: TC,FY

## 2022-11-05 PROCEDURE — 73521 XR HIPS BILATERAL 2 VIEW INCL AP PELVIS: ICD-10-PCS | Mod: 26,,, | Performed by: RADIOLOGY

## 2022-11-05 PROCEDURE — 73521 X-RAY EXAM HIPS BI 2 VIEWS: CPT | Mod: 26,,, | Performed by: RADIOLOGY

## 2022-11-07 ENCOUNTER — PATIENT MESSAGE (OUTPATIENT)
Dept: ENDOCRINOLOGY | Facility: CLINIC | Age: 62
End: 2022-11-07
Payer: COMMERCIAL

## 2023-01-11 ENCOUNTER — LAB VISIT (OUTPATIENT)
Dept: LAB | Facility: HOSPITAL | Age: 63
End: 2023-01-11
Attending: UROLOGY
Payer: COMMERCIAL

## 2023-01-11 DIAGNOSIS — R97.20 ELEVATED PSA: ICD-10-CM

## 2023-01-11 LAB
PROSTATE SPECIFIC ANTIGEN, TOTAL: 5.3 NG/ML (ref 0–4)
PSA FREE MFR SERPL: 27.92 %
PSA FREE SERPL-MCNC: 1.48 NG/ML (ref 0–1.5)

## 2023-01-11 PROCEDURE — 84153 ASSAY OF PSA TOTAL: CPT | Performed by: UROLOGY

## 2023-01-11 PROCEDURE — 36415 COLL VENOUS BLD VENIPUNCTURE: CPT | Performed by: UROLOGY

## 2023-01-14 ENCOUNTER — PATIENT MESSAGE (OUTPATIENT)
Dept: UROLOGY | Facility: CLINIC | Age: 63
End: 2023-01-14

## 2023-01-14 DIAGNOSIS — R97.20 ELEVATED PSA: Primary | ICD-10-CM

## 2023-01-17 NOTE — TELEPHONE ENCOUNTER
See mychart message regarding psa  Per last plan, will need mri prostate  Schedule at [facility] with Cr on arrival per avail

## 2023-01-17 NOTE — TELEPHONE ENCOUNTER
Spoke w pt and informed of md candelario   Pt scheduled for mri and lab on arrival   In 14 days pt vu and agreed with date scheduled

## 2023-01-31 ENCOUNTER — HOSPITAL ENCOUNTER (OUTPATIENT)
Dept: RADIOLOGY | Facility: HOSPITAL | Age: 63
Discharge: HOME | End: 2023-01-31
Attending: UROLOGY
Payer: COMMERCIAL

## 2023-01-31 DIAGNOSIS — R97.20 ELEVATED PSA: ICD-10-CM

## 2023-01-31 PROCEDURE — A9585 GADOBUTROL INJECTION: HCPCS | Performed by: UROLOGY

## 2023-01-31 PROCEDURE — 72197 MRI PELVIS W/O & W/DYE: CPT | Mod: 26,,, | Performed by: RADIOLOGY

## 2023-01-31 PROCEDURE — 72197 MRI PELVIS W/O & W/DYE: CPT | Mod: TC

## 2023-01-31 PROCEDURE — 72197 MRI PROSTATE W W/O CONTRAST: ICD-10-PCS | Mod: 26,,, | Performed by: RADIOLOGY

## 2023-01-31 PROCEDURE — 25500020 PHARM REV CODE 255: Performed by: UROLOGY

## 2023-01-31 RX ORDER — GADOBUTROL 604.72 MG/ML
10 INJECTION INTRAVENOUS
Status: COMPLETED | OUTPATIENT
Start: 2023-01-31 | End: 2023-01-31

## 2023-01-31 RX ADMIN — GADOBUTROL 10 ML: 604.72 INJECTION INTRAVENOUS at 08:01

## 2023-02-03 ENCOUNTER — PATIENT MESSAGE (OUTPATIENT)
Dept: UROLOGY | Facility: CLINIC | Age: 63
End: 2023-02-03

## 2023-02-03 DIAGNOSIS — R97.20 ELEVATED PSA: Primary | ICD-10-CM

## 2023-02-05 NOTE — TELEPHONE ENCOUNTER
TUTORizet message to pt below regarding mri results    --> please set psa free/total lab visit in 6 mos then 1 yr reminder with another psa prior        Can review our last visit note for full details of our previous discussion  Though on review of your psa vs your mri, here is my take  Your psa is of normal psa density for your large prostate - which is now 74 grams (up from 40-50 at time of bph intervention), and is likely rebound growth from finasteride.     As your free psa is 27-28% (and 30%+ is normal with single digit risk of malignany), and your psa is normal for your prostate size <10%, the fact that there is a 0.8cc (<1mL or <1g of your 74g prostate) that is equivocal 3/5 with prior negative biopsy, I would recommend continuing to trends psa free/total every 6 months and follow up in 1 year after 2 further values. Referral for mri fusion prostate biopsy at this time given above factors would be low yield, and may not be necessary. Certainly if psa rises above expected for prostate size, or free psa drops to concerning value, would reassess.     For now will have office schedule lab visit in 6 months, and place 1 yr reminder, then on review in 6 months will plan the next 6 monthd lab and visit

## 2023-02-15 ENCOUNTER — OFFICE VISIT (OUTPATIENT)
Dept: PODIATRY | Facility: CLINIC | Age: 63
End: 2023-02-15
Payer: COMMERCIAL

## 2023-02-15 VITALS — WEIGHT: 254 LBS | RESPIRATION RATE: 16 BRPM | BODY MASS INDEX: 36.36 KG/M2 | HEIGHT: 70 IN

## 2023-02-15 DIAGNOSIS — M21.619 BUNION: ICD-10-CM

## 2023-02-15 DIAGNOSIS — I87.2 VENOUS INSUFFICIENCY OF BOTH LOWER EXTREMITIES: ICD-10-CM

## 2023-02-15 DIAGNOSIS — L85.1 ACQUIRED KERATODERMA: ICD-10-CM

## 2023-02-15 DIAGNOSIS — M20.41 HAMMER TOES OF BOTH FEET: ICD-10-CM

## 2023-02-15 DIAGNOSIS — E11.49 DIABETES MELLITUS TYPE 2 WITH NEUROLOGICAL MANIFESTATIONS: Primary | ICD-10-CM

## 2023-02-15 DIAGNOSIS — L60.2 ONYCHOGRYPOSIS: ICD-10-CM

## 2023-02-15 DIAGNOSIS — M20.42 HAMMER TOES OF BOTH FEET: ICD-10-CM

## 2023-02-15 PROCEDURE — 11056 PARNG/CUTG B9 HYPRKR LES 2-4: CPT | Mod: S$GLB,,, | Performed by: PODIATRIST

## 2023-02-15 PROCEDURE — 11721 DEBRIDE NAIL 6 OR MORE: CPT | Mod: 59,S$GLB,, | Performed by: PODIATRIST

## 2023-02-15 PROCEDURE — 1159F PR MEDICATION LIST DOCUMENTED IN MEDICAL RECORD: ICD-10-PCS | Mod: CPTII,S$GLB,, | Performed by: PODIATRIST

## 2023-02-15 PROCEDURE — 3008F PR BODY MASS INDEX (BMI) DOCUMENTED: ICD-10-PCS | Mod: CPTII,S$GLB,, | Performed by: PODIATRIST

## 2023-02-15 PROCEDURE — 3008F BODY MASS INDEX DOCD: CPT | Mod: CPTII,S$GLB,, | Performed by: PODIATRIST

## 2023-02-15 PROCEDURE — 1160F RVW MEDS BY RX/DR IN RCRD: CPT | Mod: CPTII,S$GLB,, | Performed by: PODIATRIST

## 2023-02-15 PROCEDURE — 1159F MED LIST DOCD IN RCRD: CPT | Mod: CPTII,S$GLB,, | Performed by: PODIATRIST

## 2023-02-15 PROCEDURE — 99499 NO LOS: ICD-10-PCS | Mod: S$GLB,,, | Performed by: PODIATRIST

## 2023-02-15 PROCEDURE — 99499 UNLISTED E&M SERVICE: CPT | Mod: S$GLB,,, | Performed by: PODIATRIST

## 2023-02-15 PROCEDURE — 11056 ROUTINE FOOT CARE: ICD-10-PCS | Mod: S$GLB,,, | Performed by: PODIATRIST

## 2023-02-15 PROCEDURE — 11721 ROUTINE FOOT CARE: ICD-10-PCS | Mod: 59,S$GLB,, | Performed by: PODIATRIST

## 2023-02-15 PROCEDURE — 1160F PR REVIEW ALL MEDS BY PRESCRIBER/CLIN PHARMACIST DOCUMENTED: ICD-10-PCS | Mod: CPTII,S$GLB,, | Performed by: PODIATRIST

## 2023-02-15 NOTE — PATIENT INSTRUCTIONS
Your Diabetes Foot Care Program    Every day you depend on your feet to keep you moving. But when you have diabetes, your feet need special care. Even a small foot problem can become very serious. So dont take your feet for granted. By working with your diabetes healthcare team, you can learn how to protect your feet and keep them healthy.  Evaluating your feet  An evaluation helps your healthcare provider check the condition of your feet. The evaluation includes a review of your diabetes history and overall health. It may also include a foot exam, X-rays, or other tests. These can help show problems beneath the skin that you cant see or feel.  Medical history  You will be asked about your overall health and any history of foot problems. Youll also discuss your diabetes history, such as whether your blood sugar level has changed over time. It also includes questions about sensations of pain, tingling, pins and needles, or numbness. Your healthcare provider will also want to know if you have high blood pressure and heart disease, or if you smoke. Be sure to mention any medicines (including over-the-counter), supplements, or herbal remedies you take.  Foot exam  A foot exam checks the condition of different parts of your foot. First, your skin and nails are examined for any signs of infection. Blood flow is checked by feeling for the pulses in each foot. You may also have tests to study the nerves in the foot. These include using a small filament (wire) to see how sensitive your feet are. In certain cases, you will be asked to walk a short distance to check for bone, joint, and muscle problems.  Diagnostic tests  If needed, your healthcare provider will suggest certain tests to learn more about your feet. These include:  Doppler tests to measure blood flow in the feet and lower leg.  X-rays, which can show bone or joint problems.  Other imaging tests, such as an MRI (magnetic resonance imaging), bone scan, and CT  (computed tomography) scan. These can help show bone infections.  Other tests, such as vascular tests, which study the blood flow in your feet and legs. You may also have nerve studies to learn how sensitive your feet are.  Creating a foot care program  Based on the evaluation, your healthcare provider will create a foot care program for you. Your program may be as simple as starting a daily self-care routine and changing the types of shoes your wear. It may also involve treating minor foot problems, such as a corn or blister. In some cases, surgery will be needed to treat an infection or mechanical problems, such as hammer toes.  Preventing problems  When you have diabetes, its easier to prevent problems than to treat them later on. So see your healthcare team for regular checkups and foot care. Your healthcare team can also help you learn more about caring for your feet at home. For example, you may be told to avoid walking barefoot. Or you may be told that special footwear is needed to protect your feet.  Have regular checkups  Foot problems can develop quickly. So be sure to follow your healthcare teams schedule for regular checkups. During office visits, take off your shoes and socks as soon as you get in the exam room. Ask your healthcare provider to examine your feet for problems. This will make it easier to find and treat small skin irritations before they get worse. Regular checkups can also help keep track of the blood flow and feeling in your feet. If you have neuropathy (lack of feeling in your feet), you will need to have checkups more often.  Learn about self-care  The more you know about diabetes and your feet, the easier it will be to prevent problems. Members of your healthcare team can teach you how to inspect your feet and teach you to look for warning signs. They can also give you other foot care tips. During office visits, be sure to ask any questions you have.  Date Last Reviewed: 7/1/2016  ©  7246-6629 [employer]. [address] [place] [address], [place] [address]. All rights reserved. This information is not intended as a substitute for professional medical care. Always follow your healthcare professional's instructions.       Bunion    You have a bunion. This is a bony bump at the base of your big toe, along the inside edge of your foot. As the bump gets bigger, it can become red, swollen, and painful with shoe wear.  Bunions may occur if you wear shoes that are too tight and pinch your toes together. High heels may make this worse. In some cases a bunion is due to poor alignment of the foot and ankle. This puts extra weight on the instep of each foot.  Once a bunion forms, it changes the way weight is spread all across your foot. This causes the bunion to get worse over time. The big toe will bend more and more toward the other toes.  A minor bunion can be treated by:  Wearing properly fitting shoes  Using bunion pads  Wearing shoe inserts, called orthotics, to better align the foot and ankle  Physical therapy with ultrasound or whirlpool baths can ease pain, redness, and swelling. Severe cases may require surgery. If you dont treat what is causing the bunion, it may get larger and more painful.  Home care  Limit high heels. These shoes force your foot forward, crowding the toes together.  Switch to comfortable shoes with a wide toe area. Or have your existing shoes stretched by a shoe repair shop.  Avoid shoes that are tight, narrow, or pointed.  If you are flat-footed, using arch supports may help prevent further deformity. The best shoe inserts are the ones custom made by a foot specialist, called a podiatrist, or other healthcare provider.  Put a bunion pad over the bunion to ease pressure of your shoe against the bunion. You can buy these pads at most pharmacies without a prescription  To reduce pain and swelling, apply an ice pack over the injured area for 15 to 20 minutes. Do this every  1 to 2 hours the first day. Keep using ice 3 to 4 times a day until the pain and swelling goes away.  To make an ice pack, put ice cubes in a sealed zip-lock plastic bag. Wrap the bag in a clean, thin towel or cloth. Never put ice or an ice pack directly on the skin.  You may use over-the-counter pain medicine to control pain, unless another medicine was prescribed. Talk with your provider before using these medicines if you have chronic liver or kidney disease, or ever had a stomach ulcer or GI (gastrointestinal) bleeding.  Follow-up care  Follow up with a podiatrist or foot doctor, or as advised.  If X-rays were taken, you will be notified of any new findings that may affect your care.  When to seek medical care  Contact your healthcare provider if any of the following occur:  Increasing pain or redness around the base of the big toe  Painful ingrown toenail, with redness and swelling or pus around the nail  Date Last Reviewed: 11/21/2015  © 7056-9472 [employer]. [address] [place] [address], [place] [address]. All rights reserved. This information is not intended as a substitute for professional medical care. Always follow your healthcare professional's instructions.

## 2023-02-15 NOTE — PROGRESS NOTES
"  [address] [facility] [address]  [place] [address]  Phone: [phone]   [phone]    Patient's PCP:Lydia Matamoros MD  Referring Provider: No ref. provider found    Subjective:      Chief Complaint:: Nail Care (Q9)    HPI  Sarwat Colunga is a 62 y.o. male who presents today today for routine q9 diabetic nail trimming. No new complaints related to feet since last visit     Systemic Doctor: Cynthia Mejia NP  Date Last Seen: 08/24/2022  Blood Sugar: has not checked today  Hemoglobin A1c: 6.4 08/04/2022    Vitals:    02/15/23 1625   Resp: 16   Weight: 115.2 kg (254 lb)   Height: 5' 10" (1.778 m)   PainSc: 0-No pain      Shoe Size:     Past Surgical History:   Procedure Laterality Date    ANKLE SURGERY Right     ANKLE SURGERY Left     COLONOSCOPY N/A 5/17/2017    Procedure: COLONOSCOPY;  Surgeon: Carlos Hess MD;  Location: [facility];  Service: Endoscopy;  Laterality: N/A;    COLONOSCOPY W/ POLYPECTOMY      CYSTOSCOPY      CYSTOSCOPY N/A 10/23/2018    Procedure: CYSTOSCOPY;  Surgeon: Sarwat Viveros MD;  Location: [facility] OR;  Service: Urology;  Laterality: N/A;    CYSTOSCOPY WITH INSERTION OF MINIMALLY INVASIVE IMPLANT TO ENLARGE PROSTATIC URETHRA N/A 11/15/2018    Procedure: CYSTOSCOPY, WITH INSERTION OF UROLIFT IMPLANT;  Surgeon: Sarwat Viveros MD;  Location: [facility] OR;  Service: Urology;  Laterality: N/A;    DEBRIDEMENT TENNIS ELBOW      right    FOREARM SURGERY Right     tendon repair    FRACTURE SURGERY      right ankle orif    HERNIA REPAIR      umbillical    PROSTATE BIOPSY      QUADRICEPS REPAIR      right    TONSILLECTOMY      TRANSRECTAL ULTRASOUND EXAMINATION N/A 10/23/2018    Procedure: ULTRASOUND, RECTAL APPROACH;  Surgeon: Sarwat Viveros MD;  Location: [facility] OR;  Service: Urology;  Laterality: N/A;    TRICEPS TENDON RELEASE      tendon repair left tricep    VASECTOMY       Past Medical History:   Diagnosis Date    Arthritis     Benign localized hyperplasia of prostate without urinary " obstruction and other lower urinary tract symptoms (LUTS)     Body mass index 37.0-37.9, adult     Diabetes mellitus     Esophageal reflux     History of TIA (transient ischemic attack) 04/17/2022    Hypertension     Other and unspecified hyperlipidemia     Other testicular hypofunction     Polyneuropathy in diabetes(357.2)     PONV (postoperative nausea and vomiting)     Rosacea     Sleep apnea     uses CPAP    Type II or unspecified type diabetes mellitus with neurological manifestations, uncontrolled(250.62)      Family History   Problem Relation Age of Onset    Pulmonary embolism Mother     No Known Problems Father     Heart disease Other     Heart attack Other     Hypertension Other     Hyperlipidemia Other     Arthritis Other     Clotting disorder Other     Mental illness Other     Diabetes Other     Cancer Other     Melanoma Neg Hx     Psoriasis Neg Hx     Lupus Neg Hx     Eczema Neg Hx         Social History:   Marital Status:   Alcohol History:  reports no history of alcohol use.  Tobacco History:  reports that he has never smoked. He has never used smokeless tobacco.  Drug History:  reports no history of drug use.    Review of patient's allergies indicates:   Allergen Reactions    Bactrim [sulfamethoxazole-trimethoprim] Other (See Comments)     Dizziness,lightheaded, heavy chest    Ciprofloxacin Other (See Comments)     CONFUSED, OFF BALANCE, WOKE UP AT NITE    Doxycycline Hives    Lamotrigine Rash     Other reaction(s): Unknown    Trileptal [oxcarbazepine]        Current Outpatient Medications   Medication Sig Dispense Refill    ARIPiprazole (ABILIFY) 10 MG Tab Take 10 mg by mouth nightly.      aspirin (ECOTRIN) 81 MG EC tablet Take 81 mg by mouth once daily.      atorvastatin (LIPITOR) 40 MG tablet TAKE 1 TABLET(40 MG) BY MOUTH EVERY EVENING 90 tablet 4    blood sugar diagnostic Strp Checks 4 times a day 400 strip 4    blood-glucose meter (FREESTYLE FREEDOM LITE) kit Use as instructed 1 each 0     "blood-glucose meter,continuous (DEXCOM G6 ) Misc Dispense 1  1 each 0    blood-glucose sensor (DEXCOM G6 SENSOR) Bibiana Dispense 3 sensors/month 9 each 4    blood-glucose transmitter (DEXCOM G6 TRANSMITTER) Bibiana Dispense 1 transmitter. Use 1 transmitter every 90 days. 1 each 3    blunt needle, disposable 18 x 1 1/2 " Ndle 1 Syringe by Misc.(Non-Drug; Combo Route) route once a week. 25 each 3    buPROPion (WELLBUTRIN XL) 300 MG 24 hr tablet Take 150 mg by mouth once daily.      celecoxib (CELEBREX) 100 MG capsule Take 1 capsule (100 mg total) by mouth once daily. 30 capsule 4    chlorthalidone (HYGROTEN) 25 MG Tab TAKE 1 TABLET(25 MG) BY MOUTH EVERY DAY 90 tablet 3    cloNIDine (CATAPRES) 0.1 MG tablet 1 po BID prn BP above 180/100      dapagliflozin (FARXIGA) 5 mg Tab tablet Take 1 tablet (5 mg total) by mouth once daily. 90 tablet 4    dexmethylphenidate (FOCALIN XR) 30 mg 24 hr capsule Take 1 capsule by mouth once daily.      dexmethylphenidate (FOCALIN) 10 MG tablet Take 10 mg by mouth every evening.      dextroamphetamine-amphetamine (ADDERALL XR) 30 MG 24 hr capsule Take by mouth every morning.      divalproex 500 MG Tb24       ergocalciferol (ERGOCALCIFEROL) 50,000 unit Cap TAKE 2 CAPSULES BY MOUTH WEEKLY 24 capsule 4    esomeprazole (NEXIUM) 20 MG capsule Take 20 mg by mouth before breakfast.      FOLBEE 2.5-25-1 mg Tab Take 1 tablet by mouth once daily.      insulin degludec (TRESIBA FLEXTOUCH U-200) 200 unit/mL (3 mL) insulin pen 40 u qhs 10 pen 4    lisinopriL (PRINIVIL,ZESTRIL) 5 MG tablet Take 1 tablet (5 mg total) by mouth once daily. 90 tablet 3    LORazepam (ATIVAN) 2 MG Tab Take 1-2 mg by mouth daily as needed.      metFORMIN (GLUCOPHAGE) 1000 MG tablet Take 1 tablet (1,000 mg total) by mouth 2 (two) times daily with meals. 180 tablet 3    mupirocin (BACTROBAN) 2 % ointment Apply topically 3 (three) times daily.      needle, disp, 30 gauge 30 gauge x 1/2" Ndle 1 each by Misc.(Non-Drug; " "Combo Route) route once a week. 25 each 3    pen needle, diabetic (BD ULTRA-FINE MINI PEN NEEDLE) 31 gauge x 3/16" Ndle 1 each by Misc.(Non-Drug; Combo Route) route 4 (four) times daily. 100 each 11    pregabalin (LYRICA) 150 MG capsule Take 1 capsule (150 mg total) by mouth 2 (two) times daily. 180 capsule 1    sildenafiL (VIAGRA) 100 MG tablet Take 1 tablet (100 mg total) by mouth as needed for Erectile Dysfunction. 9 tablet 10    syringe, disposable, 1 mL Syrg 1 Syringe by Misc.(Non-Drug; Combo Route) route once a week. 25 each 0    TRULICITY 1.5 mg/0.5 mL pen injector INJECT 1.5MG UNDER THE SKIN EVERY 7 DAYS 12 pen 4     No current facility-administered medications for this visit.       Review of Systems   Constitutional:  Negative for chills, fatigue, fever and unexpected weight change.   HENT:  Negative for hearing loss and trouble swallowing.    Eyes:  Negative for photophobia and visual disturbance.   Respiratory:  Negative for cough, shortness of breath and wheezing.    Cardiovascular:  Positive for leg swelling. Negative for chest pain and palpitations.   Gastrointestinal:  Negative for abdominal pain and nausea.   Genitourinary:  Negative for dysuria and frequency.   Musculoskeletal:  Positive for arthralgias, joint swelling and myalgias. Negative for back pain and gait problem.   Skin:  Negative for rash and wound.   Neurological:  Positive for numbness. Negative for tremors, seizures, weakness and headaches.   Hematological:  Bruises/bleeds easily.       Objective:        Physical Exam:   Foot Exam    General  General Appearance: appears stated age and healthy   Orientation: alert and oriented to person, place, and time   Affect: appropriate   Gait: unimpaired       Right Foot/Ankle     Inspection and Palpation  Ecchymosis: none  Tenderness: none   Swelling: (mild edema of the lower extremity)  Arch: normal  Hammertoes: second toe, third toe, fourth toe and fifth toe  Skin Exam: callus; "   Neurovascular  Dorsalis pedis: 2+  Posterior tibial: 2+  Capillary Refill: 2+  Varicose veins: present  Saphenous nerve sensation: diminished  Tibial nerve sensation: diminished  Superficial peroneal nerve sensation: diminished  Deep peroneal nerve sensation: diminished  Sural nerve sensation: diminished    Edema  Type of edema: non-pitting    Muscle Strength  Ankle dorsiflexion: 5  Ankle plantar flexion: 5  Ankle inversion: 5  Ankle eversion: 5  Great toe extension: 5  Great toe flexion: 5    Range of Motion    Normal right ankle ROM    Tests  Anterior drawer: negative   Talar tilt: negative   PT Tinel's sign: negative    Paresthesia: positive  Comments  Nails 1 through 5 are thickened, discolored, dystrophic, and elongated with subungual debris    Skin is thin, shiny, and atrophic with diminished pedal hair growth       Left Foot/Ankle      Inspection and Palpation  Ecchymosis: none  Tenderness: none   Swelling: (mild edema of the lower extremity)  Arch: normal  Hammertoes: second toe, third toe, fourth toe and fifth toe  Hallux valgus: yes  Skin Exam: callus; no drainage and no erythema   Neurovascular  Dorsalis pedis: 2+  Posterior tibial: 2+  Capillary refill: 2+  Varicose veins: present  Saphenous nerve sensation: diminished  Tibial nerve sensation: diminished  Superficial peroneal nerve sensation: diminished  Deep peroneal nerve sensation: diminished  Sural nerve sensation: diminished    Edema  Type of edema: non-pitting    Muscle Strength  Ankle dorsiflexion: 5  Ankle plantar flexion: 5  Ankle inversion: 5  Ankle eversion: 5  Great toe extension: 5  Great toe flexion: 5    Range of Motion    Normal left ankle ROM    Tests  Anterior drawer: negative   Talar tilt: negative   PT Tinel's sign: negative  Paresthesia: positive  Comments  Nails 1 through 5 are thickened, discolored, dystrophic, and elongated with subungual debris    Skin is thin, shiny, and atrophic with diminished pedal hair growth   Physical  Exam  Cardiovascular:      Pulses:           Dorsalis pedis pulses are 2+ on the right side and 2+ on the left side.        Posterior tibial pulses are 2+ on the right side and 2+ on the left side.   Musculoskeletal:      Left foot: Bunion present.   Feet:      Right foot:      Skin integrity: Callus present.      Left foot:      Skin integrity: Callus present. No erythema.             Right Ankle/Foot Exam     Range of Motion   The patient has normal right ankle ROM.    Comments:  Nails 1 through 5 are thickened, discolored, dystrophic, and elongated with subungual debris    Skin is thin, shiny, and atrophic with diminished pedal hair growth       Left Ankle/Foot Exam     Range of Motion   The patient has normal left ankle ROM.     Comments:  Nails 1 through 5 are thickened, discolored, dystrophic, and elongated with subungual debris    Skin is thin, shiny, and atrophic with diminished pedal hair growth       Muscle Strength   Right Lower Extremity   Ankle Dorsiflexion:  5   Plantar flexion:  5/5  Left Lower Extremity   Ankle Dorsiflexion:  5   Plantar flexion:  5/5     Reflexes     Left Side  Paresthesia: present    Right Side   Paresthesia: present    Vascular Exam     Right Pulses  Dorsalis Pedis:      2+  Posterior Tibial:      2+        Left Pulses  Dorsalis Pedis:      2+  Posterior Tibial:      2+                  Assessment:       1. Diabetes mellitus type 2 with neurological manifestations    2. Venous insufficiency of both lower extremities    3. Hammer toes of both feet    4. Onychogryposis    5. Acquired keratoderma    6. Bunion - Left Foot      Plan:   Diabetes mellitus type 2 with neurological manifestations    Venous insufficiency of both lower extremities    Hammer toes of both feet    Onychogryposis    Acquired keratoderma    Bunion - Left Foot      Follow up if symptoms worsen or fail to improve.    Routine Foot Care    Date/Time: 2/15/2023 4:20 PM  Performed by: Alexi Khan DPM  Authorized by:  "Alexi Khan DPM     Time out: Immediately prior to procedure a "time out" was called to verify the correct patient, procedure, equipment, support staff and site/side marked as required.    Consent Done?:  Not Needed  Hyperkeratotic Skin Lesions?: Yes    Number of trimmed lesions:  4  Location(s):  Left 2nd Toe, Right 2nd Toe, Right 1st Toe and Right 1st Metatarsal Head    Nail Care Type:  Debride  Location(s): All  (Left 1st Toe, Left 3rd Toe, Left 2nd Toe, Left 4th Toe, Left 5th Toe, Right 1st Toe, Right 2nd Toe, Right 3rd Toe, Right 4th Toe and Right 5th Toe)  Patient tolerance:  Patient tolerated the procedure well with no immediate complications     Instruments Used: Nail Nipper   Manually reduced with electric .             Counseling:     I provided patient education verbally regarding:   Patient diagnosis, treatment options, as well as alternatives, risks, and benefits.     This note was created using Dragon voice recognition software that occasionally misinterpreted phrases or words.                 "

## 2023-02-16 ENCOUNTER — HOSPITAL ENCOUNTER (EMERGENCY)
Facility: HOSPITAL | Age: 63
Discharge: HOME | End: 2023-02-16
Attending: EMERGENCY MEDICINE
Payer: COMMERCIAL

## 2023-02-16 ENCOUNTER — LAB VISIT (OUTPATIENT)
Dept: LAB | Facility: HOSPITAL | Age: 63
End: 2023-02-16
Payer: COMMERCIAL

## 2023-02-16 VITALS
RESPIRATION RATE: 18 BRPM | SYSTOLIC BLOOD PRESSURE: 140 MMHG | WEIGHT: 254 LBS | HEIGHT: 71 IN | BODY MASS INDEX: 35.56 KG/M2 | HEART RATE: 78 BPM | OXYGEN SATURATION: 99 % | TEMPERATURE: 98 F | DIASTOLIC BLOOD PRESSURE: 87 MMHG

## 2023-02-16 DIAGNOSIS — Z79.4 TYPE 2 DIABETES MELLITUS WITH STAGE 3A CHRONIC KIDNEY DISEASE, WITH LONG-TERM CURRENT USE OF INSULIN: ICD-10-CM

## 2023-02-16 DIAGNOSIS — S81.812A LACERATION OF LEFT LOWER EXTREMITY, INITIAL ENCOUNTER: Primary | ICD-10-CM

## 2023-02-16 DIAGNOSIS — E11.22 TYPE 2 DIABETES MELLITUS WITH STAGE 3A CHRONIC KIDNEY DISEASE, WITH LONG-TERM CURRENT USE OF INSULIN: ICD-10-CM

## 2023-02-16 DIAGNOSIS — N18.31 TYPE 2 DIABETES MELLITUS WITH STAGE 3A CHRONIC KIDNEY DISEASE, WITH LONG-TERM CURRENT USE OF INSULIN: ICD-10-CM

## 2023-02-16 DIAGNOSIS — S89.92XA LEFT LEG INJURY, INITIAL ENCOUNTER: ICD-10-CM

## 2023-02-16 PROCEDURE — 90715 TDAP VACCINE 7 YRS/> IM: CPT | Performed by: EMERGENCY MEDICINE

## 2023-02-16 PROCEDURE — 36415 COLL VENOUS BLD VENIPUNCTURE: CPT | Mod: PO | Performed by: NURSE PRACTITIONER

## 2023-02-16 PROCEDURE — 99284 EMERGENCY DEPT VISIT MOD MDM: CPT | Mod: 25

## 2023-02-16 PROCEDURE — 83036 HEMOGLOBIN GLYCOSYLATED A1C: CPT | Performed by: NURSE PRACTITIONER

## 2023-02-16 PROCEDURE — 25000003 PHARM REV CODE 250: Performed by: EMERGENCY MEDICINE

## 2023-02-16 PROCEDURE — 63600175 PHARM REV CODE 636 W HCPCS: Performed by: EMERGENCY MEDICINE

## 2023-02-16 PROCEDURE — 90471 IMMUNIZATION ADMIN: CPT | Performed by: EMERGENCY MEDICINE

## 2023-02-16 RX ORDER — LIDOCAINE HYDROCHLORIDE 10 MG/ML
10 INJECTION INFILTRATION; PERINEURAL
Status: COMPLETED | OUTPATIENT
Start: 2023-02-16 | End: 2023-02-16

## 2023-02-16 RX ORDER — CEPHALEXIN 250 MG/1
500 CAPSULE ORAL
Status: COMPLETED | OUTPATIENT
Start: 2023-02-16 | End: 2023-02-16

## 2023-02-16 RX ORDER — CEPHALEXIN 500 MG/1
500 CAPSULE ORAL 4 TIMES DAILY
Qty: 20 CAPSULE | Refills: 0 | Status: SHIPPED | OUTPATIENT
Start: 2023-02-16 | End: 2023-02-21

## 2023-02-16 RX ADMIN — TETANUS TOXOID, REDUCED DIPHTHERIA TOXOID AND ACELLULAR PERTUSSIS VACCINE, ADSORBED 0.5 ML: 5; 2.5; 8; 8; 2.5 SUSPENSION INTRAMUSCULAR at 04:02

## 2023-02-16 RX ADMIN — LIDOCAINE HYDROCHLORIDE 10 ML: 10 INJECTION, SOLUTION INFILTRATION; PERINEURAL at 04:02

## 2023-02-16 RX ADMIN — CEPHALEXIN 500 MG: 250 CAPSULE ORAL at 04:02

## 2023-02-16 NOTE — ED PROVIDER NOTES
Encounter Date: 2/16/2023    SCRIBE #1 NOTE: IJacqueline, am scribing for, and in the presence of,  Bao Lozano MD.     History     Chief Complaint   Patient presents with    Laceration     Left lower leg wound after dropping heavy box on leg     Time seen by provider: 3:56 PM on 02/16/2023    Sarwat Colunga is a 62 y.o. male with a PMHx of DM and HTN who presents to the ED for evaluation of his LLE s/p an injury PTA.  Patient reports accidentally dropping a heavy cardboard box (~28 lbs) full of cat litter on his leg over the anterior tib/fib region.  He states he was holding the box between himself and the wall in an attempt to open the door, when he dropped the box injuring his LLE in the process.  Patient is uncertain if his tetanus vaccination is UTD.  The patient confirms chronic intermittent numbness to the bilateral feet secondary to neuropathy, but denies gait abnormalities or any other symptoms at this time.  PSHx includes L ankle surgery.      The history is provided by the patient.   Review of patient's allergies indicates:   Allergen Reactions    Bactrim [sulfamethoxazole-trimethoprim] Other (See Comments)     Dizziness,lightheaded, heavy chest    Ciprofloxacin Other (See Comments)     CONFUSED, OFF BALANCE, WOKE UP AT NITE    Doxycycline Hives    Lamotrigine Rash     Other reaction(s): Unknown    Trileptal [oxcarbazepine]      Past Medical History:   Diagnosis Date    Arthritis     Benign localized hyperplasia of prostate without urinary obstruction and other lower urinary tract symptoms (LUTS)     Body mass index 37.0-37.9, adult     Diabetes mellitus     Esophageal reflux     History of TIA (transient ischemic attack) 04/17/2022    Hypertension     Other and unspecified hyperlipidemia     Other testicular hypofunction     Polyneuropathy in diabetes(357.2)     PONV (postoperative nausea and vomiting)     Rosacea     Sleep apnea     uses CPAP    Type II or unspecified type diabetes  mellitus with neurological manifestations, uncontrolled(250.62)      Past Surgical History:   Procedure Laterality Date    ANKLE SURGERY Right     ANKLE SURGERY Left     COLONOSCOPY N/A 5/17/2017    Procedure: COLONOSCOPY;  Surgeon: Carlos Hess MD;  Location: [facility] ENDO;  Service: Endoscopy;  Laterality: N/A;    COLONOSCOPY W/ POLYPECTOMY      CYSTOSCOPY      CYSTOSCOPY N/A 10/23/2018    Procedure: CYSTOSCOPY;  Surgeon: Sarwat Viveros MD;  Location: [facility] OR;  Service: Urology;  Laterality: N/A;    CYSTOSCOPY WITH INSERTION OF MINIMALLY INVASIVE IMPLANT TO ENLARGE PROSTATIC URETHRA N/A 11/15/2018    Procedure: CYSTOSCOPY, WITH INSERTION OF UROLIFT IMPLANT;  Surgeon: Sarwat Viveros MD;  Location: [facility] OR;  Service: Urology;  Laterality: N/A;    DEBRIDEMENT TENNIS ELBOW      right    FOREARM SURGERY Right     tendon repair    FRACTURE SURGERY      right ankle orif    HERNIA REPAIR      umbillical    PROSTATE BIOPSY      QUADRICEPS REPAIR      right    TONSILLECTOMY      TRANSRECTAL ULTRASOUND EXAMINATION N/A 10/23/2018    Procedure: ULTRASOUND, RECTAL APPROACH;  Surgeon: Sarwat Viveros MD;  Location: [facility] OR;  Service: Urology;  Laterality: N/A;    TRICEPS TENDON RELEASE      tendon repair left tricep    VASECTOMY       Family History   Problem Relation Age of Onset    Pulmonary embolism Mother     No Known Problems Father     Heart disease Other     Heart attack Other     Hypertension Other     Hyperlipidemia Other     Arthritis Other     Clotting disorder Other     Mental illness Other     Diabetes Other     Cancer Other     Melanoma Neg Hx     Psoriasis Neg Hx     Lupus Neg Hx     Eczema Neg Hx      Social History     Tobacco Use    Smoking status: Never    Smokeless tobacco: Never   Substance Use Topics    Alcohol use: No    Drug use: No     Review of Systems   Constitutional:  Negative for fever.   HENT:  Negative for sore throat.    Respiratory:  Negative for shortness of breath.     Cardiovascular:  Negative for chest pain.   Gastrointestinal:  Negative for nausea.   Genitourinary:  Negative for dysuria.   Musculoskeletal:  Negative for back pain and gait problem.   Skin:  Positive for wound (LLE). Negative for rash.   Neurological:  Positive for numbness (chronic and intermittent over the bilateral feet). Negative for weakness.   Hematological:  Does not bruise/bleed easily.     Physical Exam     Initial Vitals [02/16/23 1538]   BP Pulse Resp Temp SpO2   136/71 82 20 98 °F (36.7 °C) 99 %      MAP       --         Physical Exam    Nursing note and vitals reviewed.  Constitutional: He appears well-developed and well-nourished. He is not diaphoretic. No distress.   HENT:   Head: Normocephalic and atraumatic.   Mouth/Throat: Oropharynx is clear and moist.   Eyes: Conjunctivae are normal.   Neck: Neck supple.   Cardiovascular:  Normal rate, regular rhythm, normal heart sounds and intact distal pulses.     Exam reveals no gallop and no friction rub.       No murmur heard.  Pulses:       Dorsalis pedis pulses are 2+ on the left side.        Posterior tibial pulses are 2+ on the left side.   Pulmonary/Chest: Breath sounds normal. He has no wheezes. He has no rhonchi. He has no rales.   Abdominal: Abdomen is soft. He exhibits no distension. There is no abdominal tenderness.   Musculoskeletal:         General: Normal range of motion.      Cervical back: Neck supple.      Left ankle: Normal range of motion.      Comments: Intact flexion and extension of the L ankle.       Neurological: He is alert and oriented to person, place, and time. He has normal strength.   5/5 strength over the BUE's and BLE's.  Diminished sensation to light touch over the LLE, but symmetric with opposite side.     Skin: Laceration noted. No rash noted. No erythema.   5 cm distal anterior tibial laceration with no tendon exposure or active bleeding.         ED Course   Procedures  Labs Reviewed - No data to display       Imaging  Results              X-Ray Tibia Fibula 2 View Left (Final result)  Result time 02/16/23 17:15:22      Final result by Torey Graham Jr., MD (02/16/23 17:15:22)                   Impression:      Mild DJD at the left knee.  Prior surgery at the sinus tarsi otherwise negative x-rays of the left lower leg      Electronically signed by: Torey Graham MD  Date:    02/16/2023  Time:    17:15               Narrative:    EXAMINATION:  XR TIBIA FIBULA 2 VIEW LEFT    CLINICAL HISTORY:  Unspecified injury of left lower leg, initial encounter    TECHNIQUE:  AP and lateral views of the left tibia and fibula were performed.    COMPARISON:  None.    FINDINGS:  A fracture or other osseous abnormality the left tibia or fibula is not seen.  Mild DJD is noted at the left knee.  A device is been placed in the sinus tarsi at the left hindfoot.  Soft tissue abnormalities are not seen.                                  (radiology reading, visualized by me)       Medications   Tdap (BOOSTRIX) vaccine injection 0.5 mL (0.5 mLs Intramuscular Given 2/16/23 1606)   LIDOcaine HCL 10 mg/ml (1%) injection 10 mL (10 mLs Infiltration Given 2/16/23 1606)   cephALEXin capsule 500 mg (500 mg Oral Given 2/16/23 1606)     Medical Decision Making:   History:   Old Medical Records: I decided to obtain old medical records.  Clinical Tests:   Radiological Study: Ordered and Reviewed        Scribe Attestation:   Scribe #1: I performed the above scribed service and the documentation accurately describes the services I performed. I attest to the accuracy of the note.      ED Course as of 02/16/23 1847   Thu Feb 16, 2023   1713 XR tib-fib:  No fx or dislocation.  Radioopaque FB in foot noted. No other FB evident. (Independently interpreted by me)    The patient was informed of the incidental finding(s) as well as the need for PCP or specialist follow-up for reevaluation and possible further investigation or monitoring. [MR]   2743 LACERATION REPAIR:   Analgesia prior to repair using infiltration 2% lidocaine without epinephrine.  The wound was copiously irrigated using normal saline and explored without sign of foreign body.  A 5 cm laceration on the left leg was repaired using normal saline via simple interrupted technique.  7 sutures total:  1 horizontal mattress to decrease tension followed by 6 simple interrupted sutures.  There was good approximation of the wound margins.  The patient tolerated the procedure well and there were no recognized complications.   [MR]      ED Course User Index  [MR] Bao Lozano MD          I, Dr. Bao Lozano, personally performed the services described in this documentation. All medical record entries made by the scribe were at my direction and in my presence.  I have reviewed the chart and agree that the record reflects my personal performance and is accurate and complete. Bao Lozano MD.  6:45 PM 02/16/2023    Sarwat Colunga is a 62 y.o. male presenting with left leg injury.  I doubt fracture dislocation.  There is no distal neurovascular compromise.  Wound is high-risk for infection based on patient's poor likely microvascular issues related to diabetes associated with history of neuropathy.  There is no swelling of the wound.  It is copiously irrigated.  There is no sign of foreign body.  Prophylactic antibiotics initiated with wound closure here.  Follow-up for reassessment.  Detailed return precautions reviewed.         Clinical Impression:   Final diagnoses:  [S89.92XA] Left leg injury, initial encounter  [S81.812A] Laceration of left lower extremity, initial encounter (Primary)        ED Disposition Condition    Discharge Stable          ED Prescriptions       Medication Sig Dispense Start Date End Date Auth. Provider    cephALEXin (KEFLEX) 500 MG capsule Take 1 capsule (500 mg total) by mouth 4 (four) times daily. for 5 days 20 capsule 2/16/2023 2/21/2023 Bao Lozano MD           Follow-up Information       Follow up With Specialties Details Why Contact Info    Lydia Matamoros MD Internal Medicine  Next week [address] [facility] [id number] [phone]               Bao Lozano MD  02/16/23 9993

## 2023-02-17 LAB
ESTIMATED AVG GLUCOSE: 134 MG/DL (ref 68–131)
HBA1C MFR BLD: 6.3 % (ref 4–5.6)

## 2023-02-22 NOTE — PROGRESS NOTES
CC: This 62 y.o. male presents for management of diabetes  and chronic conditions pending review including HTN, HLP, CKD 3, Obesity, DM PN, DANNY    HPI: He was diagnosed with T2DM in 2005. Has never been hospitalized r/t DM.  Family hx of DM: maybe dad?, sister's pre-diabetes   He has had his COVID vaccines and booster      His wife passed away on 1/25th, he does have a son who is leaving with him  Planning on selling his house and moving to [place] to be nearer his brother  Off Dexcom since late December when his wife was admitted to the hospital  No checking his bg  Denies and hypoglycemia     Diet: Eats 1 Meals a day, snacks-nothing really    typically skips lunch  Exercise: none  CURRENT DM MEDS: metformin 1000 mg bid, Tresiba 40 u qhs, farxiga 5 mg, Trulicity 1.5 mg weekly (Sundays)   Vial/pen:  Uses  pen  Glucometer type:   Freestyle      Standards of Care:  Eye exam: Dr Dickerson  2022  +DR-       Retired      ROS:   Gen: Decreased appetite, + weight loss 16 lbs   Eyes: Denies visual disturbances  Resp: no SOB or LEYVA, no cough  Cardiac: No palpitations, chest pain, no edema   GI: No nausea or vomiting, diarrhea, constipation, or abdominal pain.  /GYN: 1+ nocturia, no burning or pain.   MS/Neuro: + numbness/ tingling in BLE;speech clear    PE:  GENERAL: Well developed, well nourished.  PSYCH: AAOx3, appropriate mood and affect, pleasant expression, conversant, appears relaxed, well groomed.   EYES: Conjunctiva, corneas clear  NECK: Supple, trachea midline   ABDOMEN: Soft, non-tender, non-distended   SKIN:   no acanthosis nigracans.  FOOT EXAMINATION: 2/23/2023  + foot deformity- left hammer toe + callus formation, +Onychomycosis,    Decreased hair growth present over toes/feet.    Protective sensation absent bilaterally with 10 gram monofilament and absent vibratory sensation bilaterally, +1 dorsalis pedis and posterior pulses noted.        Personally reviewed Past Medical, Surgical,  "Social History.    /80 (BP Location: Left arm, Patient Position: Sitting, BP Method: Large (Manual))   Pulse 82   Temp 97.7 °F (36.5 °C) (Oral)   Ht 5' 11" (1.803 m)   Wt 108.6 kg (239 lb 5 oz)   SpO2 99%   BMI 33.38 kg/m²        Personally reviewed the below labs:      Chemistry        Component Value Date/Time     08/04/2022 1349    K 4.0 08/04/2022 1349     08/04/2022 1349    CO2 32 (H) 08/04/2022 1349    BUN 14 08/04/2022 1349    CREATININE 1.3 01/31/2023 0707    GLU 61 (L) 08/04/2022 1349        Component Value Date/Time    CALCIUM 9.2 08/04/2022 1349    ALKPHOS 57 08/04/2022 1349    AST 17 08/04/2022 1349    ALT 20 08/04/2022 1349    BILITOT 0.5 08/04/2022 1349    ESTGFRAFRICA >60 07/09/2022 0942    EGFRNONAA 53 (A) 07/09/2022 0942        Lab Results   Component Value Date    TSH 0.710 04/17/2022       Recent Labs   Lab 08/04/22  1349   LDL Cholesterol 57.2 L   HDL 41   Cholesterol 112 L        Results for orders placed or performed in visit on 08/14/21   Vitamin D   Result Value Ref Range    Vit D, 25-Hydroxy 50 30 - 96 ng/mL     No results found for this or any previous visit.      Lab Results   Component Value Date    MICALBCREAT Unable to calculate 08/11/2022       Hemoglobin A1C   Date Value Ref Range Status   02/16/2023 6.3 (H) 4.0 - 5.6 % Final     Comment:     ADA Screening Guidelines:  5.7-6.4%  Consistent with prediabetes  >or=6.5%  Consistent with diabetes    High levels of fetal hemoglobin interfere with the HbA1C  assay. Heterozygous hemoglobin variants (HbS, HgC, etc)do  not significantly interfere with this assay.   However, presence of multiple variants may affect accuracy.     08/04/2022 6.4 (H) 4.0 - 5.6 % Final     Comment:     ADA Screening Guidelines:  5.7-6.4%  Consistent with prediabetes  >or=6.5%  Consistent with diabetes    High levels of fetal hemoglobin interfere with the HbA1C  assay. Heterozygous hemoglobin variants (HbS, HgC, etc)do  not significantly " interfere with this assay.   However, presence of multiple variants may affect accuracy.     04/19/2022 7.6 (H) 4.0 - 5.6 % Final     Comment:     ADA Screening Guidelines:  5.7-6.4%  Consistent with prediabetes  >or=6.5%  Consistent with diabetes    High levels of fetal hemoglobin interfere with the HbA1C  assay. Heterozygous hemoglobin variants (HbS, HgC, etc)do  not significantly interfere with this assay.   However, presence of multiple variants may affect accuracy.          ASSESSMENT and PLAN:    1. T2DM controlled w DM PN, CKD 3-    Upgrade to Dexcom G7  Start checking BG 1-2 times a day  Let me know if he has any hypoglycemia    2. HTN -  Controlled, continue meds as previously prescribed and monitor.     3. HLP -   on statin therapy      4. Obesity- Body mass index is 33.38 kg/m².  Continue weight loss    5 DANNY- wears his cpap machine    6. Depression- chronic- has family support during his grieving process, no thoughts of hurting himself or others         Follow-up: in 6 months with lab prior

## 2023-02-23 ENCOUNTER — OFFICE VISIT (OUTPATIENT)
Dept: ENDOCRINOLOGY | Facility: CLINIC | Age: 63
End: 2023-02-23
Payer: COMMERCIAL

## 2023-02-23 VITALS
BODY MASS INDEX: 33.5 KG/M2 | WEIGHT: 239.31 LBS | TEMPERATURE: 98 F | HEART RATE: 82 BPM | HEIGHT: 71 IN | DIASTOLIC BLOOD PRESSURE: 80 MMHG | SYSTOLIC BLOOD PRESSURE: 130 MMHG | OXYGEN SATURATION: 99 %

## 2023-02-23 DIAGNOSIS — Z79.4 TYPE 2 DIABETES MELLITUS WITH STAGE 3A CHRONIC KIDNEY DISEASE, WITH LONG-TERM CURRENT USE OF INSULIN: Primary | ICD-10-CM

## 2023-02-23 DIAGNOSIS — E11.22 TYPE 2 DIABETES MELLITUS WITH STAGE 3A CHRONIC KIDNEY DISEASE, WITH LONG-TERM CURRENT USE OF INSULIN: Primary | ICD-10-CM

## 2023-02-23 DIAGNOSIS — I15.2 HYPERTENSION ASSOCIATED WITH DIABETES: ICD-10-CM

## 2023-02-23 DIAGNOSIS — E78.5 HYPERLIPIDEMIA ASSOCIATED WITH TYPE 2 DIABETES MELLITUS: ICD-10-CM

## 2023-02-23 DIAGNOSIS — N18.31 TYPE 2 DIABETES MELLITUS WITH STAGE 3A CHRONIC KIDNEY DISEASE, WITH LONG-TERM CURRENT USE OF INSULIN: Primary | ICD-10-CM

## 2023-02-23 DIAGNOSIS — E11.59 HYPERTENSION ASSOCIATED WITH DIABETES: ICD-10-CM

## 2023-02-23 DIAGNOSIS — E11.49 DIABETES MELLITUS TYPE 2 WITH NEUROLOGICAL MANIFESTATIONS: ICD-10-CM

## 2023-02-23 DIAGNOSIS — E11.69 HYPERLIPIDEMIA ASSOCIATED WITH TYPE 2 DIABETES MELLITUS: ICD-10-CM

## 2023-02-23 DIAGNOSIS — E11.40 TYPE 2 DIABETES, CONTROLLED, WITH NEUROPATHY: ICD-10-CM

## 2023-02-23 DIAGNOSIS — E66.09 CLASS 1 OBESITY DUE TO EXCESS CALORIES WITHOUT SERIOUS COMORBIDITY WITH BODY MASS INDEX (BMI) OF 33.0 TO 33.9 IN ADULT: ICD-10-CM

## 2023-02-23 PROBLEM — E66.812 CLASS 2 SEVERE OBESITY DUE TO EXCESS CALORIES WITH SERIOUS COMORBIDITY AND BODY MASS INDEX (BMI) OF 36.0 TO 36.9 IN ADULT: Status: RESOLVED | Noted: 2022-02-09 | Resolved: 2023-02-23

## 2023-02-23 PROBLEM — E66.811 CLASS 1 OBESITY DUE TO EXCESS CALORIES WITHOUT SERIOUS COMORBIDITY WITH BODY MASS INDEX (BMI) OF 33.0 TO 33.9 IN ADULT: Status: ACTIVE | Noted: 2023-02-23

## 2023-02-23 PROBLEM — E66.01 CLASS 2 SEVERE OBESITY DUE TO EXCESS CALORIES WITH SERIOUS COMORBIDITY AND BODY MASS INDEX (BMI) OF 36.0 TO 36.9 IN ADULT: Status: RESOLVED | Noted: 2022-02-09 | Resolved: 2023-02-23

## 2023-02-23 PROCEDURE — 3008F PR BODY MASS INDEX (BMI) DOCUMENTED: ICD-10-PCS | Mod: CPTII,S$GLB,, | Performed by: NURSE PRACTITIONER

## 2023-02-23 PROCEDURE — 99999 PR PBB SHADOW E&M-EST. PATIENT-LVL III: CPT | Mod: PBBFAC,,, | Performed by: NURSE PRACTITIONER

## 2023-02-23 PROCEDURE — 3075F PR MOST RECENT SYSTOLIC BLOOD PRESS GE 130-139MM HG: ICD-10-PCS | Mod: CPTII,S$GLB,, | Performed by: NURSE PRACTITIONER

## 2023-02-23 PROCEDURE — 99214 PR OFFICE/OUTPT VISIT, EST, LEVL IV, 30-39 MIN: ICD-10-PCS | Mod: S$GLB,,, | Performed by: NURSE PRACTITIONER

## 2023-02-23 PROCEDURE — 99999 PR PBB SHADOW E&M-EST. PATIENT-LVL III: ICD-10-PCS | Mod: PBBFAC,,, | Performed by: NURSE PRACTITIONER

## 2023-02-23 PROCEDURE — 3008F BODY MASS INDEX DOCD: CPT | Mod: CPTII,S$GLB,, | Performed by: NURSE PRACTITIONER

## 2023-02-23 PROCEDURE — 99214 OFFICE O/P EST MOD 30 MIN: CPT | Mod: S$GLB,,, | Performed by: NURSE PRACTITIONER

## 2023-02-23 PROCEDURE — 3075F SYST BP GE 130 - 139MM HG: CPT | Mod: CPTII,S$GLB,, | Performed by: NURSE PRACTITIONER

## 2023-02-23 PROCEDURE — 3079F PR MOST RECENT DIASTOLIC BLOOD PRESSURE 80-89 MM HG: ICD-10-PCS | Mod: CPTII,S$GLB,, | Performed by: NURSE PRACTITIONER

## 2023-02-23 PROCEDURE — 1159F MED LIST DOCD IN RCRD: CPT | Mod: CPTII,S$GLB,, | Performed by: NURSE PRACTITIONER

## 2023-02-23 PROCEDURE — 1159F PR MEDICATION LIST DOCUMENTED IN MEDICAL RECORD: ICD-10-PCS | Mod: CPTII,S$GLB,, | Performed by: NURSE PRACTITIONER

## 2023-02-23 PROCEDURE — 3079F DIAST BP 80-89 MM HG: CPT | Mod: CPTII,S$GLB,, | Performed by: NURSE PRACTITIONER

## 2023-02-23 PROCEDURE — 3044F HG A1C LEVEL LT 7.0%: CPT | Mod: CPTII,S$GLB,, | Performed by: NURSE PRACTITIONER

## 2023-02-23 PROCEDURE — 3044F PR MOST RECENT HEMOGLOBIN A1C LEVEL <7.0%: ICD-10-PCS | Mod: CPTII,S$GLB,, | Performed by: NURSE PRACTITIONER

## 2023-02-23 RX ORDER — PEN NEEDLE, DIABETIC 30 GX3/16"
1 NEEDLE, DISPOSABLE MISCELLANEOUS 4 TIMES DAILY
Qty: 100 EACH | Refills: 11 | Status: SHIPPED | OUTPATIENT
Start: 2023-02-23 | End: 2023-08-21 | Stop reason: SDUPTHER

## 2023-02-23 RX ORDER — BLOOD-GLUCOSE SENSOR
EACH MISCELLANEOUS
Qty: 9 EACH | Refills: 4 | Status: SHIPPED | OUTPATIENT
Start: 2023-02-23 | End: 2023-08-21 | Stop reason: SDUPTHER

## 2023-02-23 RX ORDER — DAPAGLIFLOZIN 5 MG/1
5 TABLET, FILM COATED ORAL DAILY
Qty: 90 TABLET | Refills: 4 | Status: SHIPPED | OUTPATIENT
Start: 2023-02-23 | End: 2024-02-15 | Stop reason: SDUPTHER

## 2023-02-28 ENCOUNTER — OFFICE VISIT (OUTPATIENT)
Dept: URGENT CARE | Facility: CLINIC | Age: 63
End: 2023-02-28
Payer: COMMERCIAL

## 2023-02-28 VITALS
WEIGHT: 238 LBS | OXYGEN SATURATION: 98 % | RESPIRATION RATE: 16 BRPM | HEIGHT: 71 IN | HEART RATE: 73 BPM | DIASTOLIC BLOOD PRESSURE: 69 MMHG | SYSTOLIC BLOOD PRESSURE: 111 MMHG | BODY MASS INDEX: 33.32 KG/M2 | TEMPERATURE: 98 F

## 2023-02-28 DIAGNOSIS — Z48.02 VISIT FOR SUTURE REMOVAL: Primary | ICD-10-CM

## 2023-02-28 PROCEDURE — 99214 PR OFFICE/OUTPT VISIT, EST, LEVL IV, 30-39 MIN: ICD-10-PCS | Mod: S$GLB,,, | Performed by: STUDENT IN AN ORGANIZED HEALTH CARE EDUCATION/TRAINING PROGRAM

## 2023-02-28 PROCEDURE — 99214 OFFICE O/P EST MOD 30 MIN: CPT | Mod: S$GLB,,, | Performed by: STUDENT IN AN ORGANIZED HEALTH CARE EDUCATION/TRAINING PROGRAM

## 2023-02-28 NOTE — PROGRESS NOTES
"Subjective:       Patient ID: Sarwat Colunga is a 62 y.o. male.    Vitals:  height is 5' 11" (1.803 m) and weight is 108 kg (238 lb). His oral temperature is 98 °F (36.7 °C). His blood pressure is 111/69 and his pulse is 73. His respiration is 16 and oxygen saturation is 98%.     Chief Complaint: Suture / Staple Removal    Pt states "Got sutures placed at the ER on 2-16-23 due to dropping a 25lb box of cat litter on left leg and is here to get them removed; slight swelling and pain, no drainage."    Ambulatory to room with complaint of stitches to left lower extremity.  States dropped a large box of cat litter on his leg on the 16th, he presented to a local emergency department where he received 7 sutures.  Requesting to have sutures removed today.    Suture / Staple Removal      Skin:  Positive for laceration.        Suture removal     Objective:      Physical Exam   Constitutional: He is oriented to person, place, and time. He appears well-developed. He is cooperative.   HENT:   Head: Normocephalic and atraumatic.   Ears:   Right Ear: Hearing, tympanic membrane, external ear and ear canal normal.   Left Ear: Hearing, tympanic membrane, external ear and ear canal normal.   Nose: Nose normal. No mucosal edema or nasal deformity. No epistaxis. Right sinus exhibits no maxillary sinus tenderness and no frontal sinus tenderness. Left sinus exhibits no maxillary sinus tenderness and no frontal sinus tenderness.   Mouth/Throat: Uvula is midline, oropharynx is clear and moist and mucous membranes are normal. No trismus in the jaw. Normal dentition. No uvula swelling.   Eyes: Conjunctivae and lids are normal.   Neck: Trachea normal and phonation normal. Neck supple.   Cardiovascular: Normal rate, regular rhythm, normal heart sounds and normal pulses.   Pulmonary/Chest: Effort normal and breath sounds normal.   Abdominal: Normal appearance and bowel sounds are normal. Soft.   Musculoskeletal: Normal range of motion.        "  General: Normal range of motion.   Neurological: He is alert and oriented to person, place, and time. He exhibits normal muscle tone.   Skin: Skin is warm, dry and intact.         Comments: Closed laceration to left lower extremity proximal to ankle.  Wound is healing, there is scab in place, there 7 sutures.  Wound is approximated, surrounding area is mildly erythematous, there is minimal edema, there is no purulent drainage.  There is no area of fluctuation.   Psychiatric: His speech is normal and behavior is normal. Judgment and thought content normal.   Nursing note and vitals reviewed.      Assessment:       1. Visit for suture removal          Plan:         Visit for suture removal         Seven sutures were removed, patient tolerated this well, wound is healing and approximated, there is no evidence of infection.

## 2023-03-24 ENCOUNTER — TELEPHONE (OUTPATIENT)
Dept: PHARMACY | Facility: CLINIC | Age: 63
End: 2023-03-24
Payer: COMMERCIAL

## 2023-04-11 ENCOUNTER — TELEPHONE (OUTPATIENT)
Dept: PODIATRY | Facility: CLINIC | Age: 63
End: 2023-04-11
Payer: COMMERCIAL

## 2023-04-17 ENCOUNTER — PATIENT MESSAGE (OUTPATIENT)
Dept: ENDOCRINOLOGY | Facility: CLINIC | Age: 63
End: 2023-04-17
Payer: COMMERCIAL

## 2023-04-17 DIAGNOSIS — Z79.4 TYPE 2 DIABETES MELLITUS WITH STAGE 3A CHRONIC KIDNEY DISEASE, WITH LONG-TERM CURRENT USE OF INSULIN: ICD-10-CM

## 2023-04-17 DIAGNOSIS — E11.22 TYPE 2 DIABETES MELLITUS WITH STAGE 3A CHRONIC KIDNEY DISEASE, WITH LONG-TERM CURRENT USE OF INSULIN: ICD-10-CM

## 2023-04-17 DIAGNOSIS — N18.31 TYPE 2 DIABETES MELLITUS WITH STAGE 3A CHRONIC KIDNEY DISEASE, WITH LONG-TERM CURRENT USE OF INSULIN: ICD-10-CM

## 2023-04-17 RX ORDER — DAPAGLIFLOZIN 5 MG/1
5 TABLET, FILM COATED ORAL DAILY
Qty: 90 TABLET | Refills: 4 | Status: CANCELLED | OUTPATIENT
Start: 2023-04-17

## 2023-04-17 NOTE — TELEPHONE ENCOUNTER
LV 2/23/23. LL 2/16/23.   Medication  dapagliflozin (FARXIGA) 5 mg Tab tablet [[id number]]  Outpatient Medication Detail     Disp Refills Start End KANNAN   dapagliflozin (FARXIGA) 5 mg Tab tablet 90 tablet 4 2/23/2023  No   Sig - Route: Take 1 tablet (5 mg total) by mouth once daily. - Oral   Sent to pharmacy as: dapagliflozin (FARXIGA) 5 mg Tab tablet   Class: Normal   Order: [id number]   Date/Time Signed: 2/23/2023 08:10       E-Prescribing Status: Receipt confirmed by pharmacy (2/23/2023  8:10 AM CST)     Pharmacy    [id number] - [place], [place] - [address]    Associated Diagnoses    Type 2 diabetes mellitus with stage 3a chronic kidney disease, with long-term current use of

## 2023-04-18 ENCOUNTER — PATIENT MESSAGE (OUTPATIENT)
Dept: ENDOCRINOLOGY | Facility: CLINIC | Age: 63
End: 2023-04-18
Payer: COMMERCIAL

## 2023-07-24 ENCOUNTER — PATIENT MESSAGE (OUTPATIENT)
Dept: ENDOCRINOLOGY | Facility: CLINIC | Age: 63
End: 2023-07-24
Payer: COMMERCIAL

## 2023-08-08 ENCOUNTER — LAB VISIT (OUTPATIENT)
Dept: LAB | Facility: HOSPITAL | Age: 63
End: 2023-08-08
Attending: NURSE PRACTITIONER
Payer: COMMERCIAL

## 2023-08-08 ENCOUNTER — LAB VISIT (OUTPATIENT)
Dept: LAB | Facility: HOSPITAL | Age: 63
End: 2023-08-08
Payer: COMMERCIAL

## 2023-08-08 DIAGNOSIS — Z79.4 TYPE 2 DIABETES MELLITUS WITH STAGE 3A CHRONIC KIDNEY DISEASE, WITH LONG-TERM CURRENT USE OF INSULIN: ICD-10-CM

## 2023-08-08 DIAGNOSIS — N18.31 TYPE 2 DIABETES MELLITUS WITH STAGE 3A CHRONIC KIDNEY DISEASE, WITH LONG-TERM CURRENT USE OF INSULIN: ICD-10-CM

## 2023-08-08 DIAGNOSIS — E11.22 TYPE 2 DIABETES MELLITUS WITH STAGE 3A CHRONIC KIDNEY DISEASE, WITH LONG-TERM CURRENT USE OF INSULIN: ICD-10-CM

## 2023-08-08 LAB
ALBUMIN SERPL BCP-MCNC: 4.1 G/DL (ref 3.5–5.2)
ALBUMIN/CREAT UR: 6.3 UG/MG (ref 0–30)
ALP SERPL-CCNC: 71 U/L (ref 55–135)
ALT SERPL W/O P-5'-P-CCNC: 17 U/L (ref 10–44)
ANION GAP SERPL CALC-SCNC: 11 MMOL/L (ref 8–16)
AST SERPL-CCNC: 18 U/L (ref 10–40)
BILIRUB SERPL-MCNC: 0.7 MG/DL (ref 0.1–1)
BUN SERPL-MCNC: 20 MG/DL (ref 8–23)
CALCIUM SERPL-MCNC: 9.6 MG/DL (ref 8.7–10.5)
CHLORIDE SERPL-SCNC: 96 MMOL/L (ref 95–110)
CHOLEST SERPL-MCNC: 122 MG/DL (ref 120–199)
CHOLEST/HDLC SERPL: 3.6 {RATIO} (ref 2–5)
CO2 SERPL-SCNC: 29 MMOL/L (ref 23–29)
CREAT SERPL-MCNC: 1.4 MG/DL (ref 0.5–1.4)
CREAT UR-MCNC: 158 MG/DL (ref 23–375)
EST. GFR  (NO RACE VARIABLE): 56.1 ML/MIN/1.73 M^2
ESTIMATED AVG GLUCOSE: 120 MG/DL (ref 68–131)
GLUCOSE SERPL-MCNC: 97 MG/DL (ref 70–110)
HBA1C MFR BLD: 5.8 % (ref 4–5.6)
HDLC SERPL-MCNC: 34 MG/DL (ref 40–75)
HDLC SERPL: 27.9 % (ref 20–50)
LDLC SERPL CALC-MCNC: 64.4 MG/DL (ref 63–159)
MICROALBUMIN UR DL<=1MG/L-MCNC: 10 UG/ML
NONHDLC SERPL-MCNC: 88 MG/DL
POTASSIUM SERPL-SCNC: 3.9 MMOL/L (ref 3.5–5.1)
PROT SERPL-MCNC: 7.2 G/DL (ref 6–8.4)
SODIUM SERPL-SCNC: 136 MMOL/L (ref 136–145)
TRIGL SERPL-MCNC: 118 MG/DL (ref 30–150)

## 2023-08-08 PROCEDURE — 82570 ASSAY OF URINE CREATININE: CPT | Performed by: NURSE PRACTITIONER

## 2023-08-08 PROCEDURE — 80053 COMPREHEN METABOLIC PANEL: CPT | Performed by: NURSE PRACTITIONER

## 2023-08-08 PROCEDURE — 80061 LIPID PANEL: CPT | Performed by: NURSE PRACTITIONER

## 2023-08-08 PROCEDURE — 36415 COLL VENOUS BLD VENIPUNCTURE: CPT | Mod: PO | Performed by: NURSE PRACTITIONER

## 2023-08-08 PROCEDURE — 83036 HEMOGLOBIN GLYCOSYLATED A1C: CPT | Performed by: NURSE PRACTITIONER

## 2023-08-09 ENCOUNTER — LAB VISIT (OUTPATIENT)
Dept: LAB | Facility: HOSPITAL | Age: 63
End: 2023-08-09
Attending: UROLOGY
Payer: COMMERCIAL

## 2023-08-09 DIAGNOSIS — R97.20 ELEVATED PSA: ICD-10-CM

## 2023-08-09 PROCEDURE — 36415 COLL VENOUS BLD VENIPUNCTURE: CPT | Performed by: UROLOGY

## 2023-08-09 PROCEDURE — 84154 ASSAY OF PSA FREE: CPT | Performed by: UROLOGY

## 2023-08-10 LAB
PROSTATE SPECIFIC ANTIGEN, TOTAL: 6.3 NG/ML
PSA FREE MFR SERPL: 0.14 RATIO
PSA FREE SERPL IA-MCNC: 0.9 NG/ML

## 2023-08-14 NOTE — PROGRESS NOTES
CC: This 62 y.o. male presents for management of diabetes  and chronic conditions pending review including HTN, HLP, CKD 3, Obesity, DM PN, DANNY    HPI: He was diagnosed with T2DM in 2005. Has never been hospitalized r/t DM.  Family hx of DM: maybe dad?, sister's pre-diabetes    His wife passed away earlier this year, he is moving to [place] in ~ 1 week to be closer to his brother  ~3 weeks ago started all of a sudden having hypoglycemia  Stopped all diabetes meds for ~ 1 week, then bg started to com back  He resumed meds then   He is wearing a Dexcom G7- See download in Media tab  1% Very High   10% High   85% In Range   3% Low   <1% Very Low   Hypoglycemia noted overnight  Also lover readings 70-80s noted during the day, rare pp excursions      Diet: Eats 3 Meals a day, snacks- none  Exercise: none  CURRENT DM MEDS: metformin 1000 mg bid, Tresiba 18 u qhs, farxiga 5 mg qam   Vial/pen:  Uses  pen  Glucometer type:   Freestyle      Standards of Care:  Eye exam: Dr Dickerson  2022  +DR-   due- he will schedule     Retired        ROS:   Gen: Decreased appetite, + weight stable  Eyes: Denies visual disturbances  Resp: no SOB or LEYVA, no cough  Cardiac: No palpitations, chest pain   GI: No nausea or vomiting, diarrhea, constipation .  /GYN: 1+ nocturia, no burning or pain.   MS/Neuro: + numbness/ tingling in BLE;speech clear    PE:  GENERAL: Well developed, well nourished.  PSYCH: AAOx3, appropriate mood and affect, pleasant expression, conversant, appears relaxed, well groomed.   EYES: Conjunctiva, corneas clear  NECK: Supple, trachea midline   ABDOMEN: Soft, non-tender, non-distended   SKIN:   no acanthosis nigracans.  FOOT EXAMINATION: 2/23/2023  + foot deformity- left hammer toe + callus formation, +Onychomycosis,    Decreased hair growth present over toes/feet.   Protective sensation absent bilaterally with 10 gram monofilament and absent vibratory sensation bilaterally, +1 dorsalis pedis and  "posterior pulses noted.        Personally reviewed Past Medical, Surgical, Social History.    /62 (BP Method: X-Large (Manual))   Pulse 68   Ht 5' 11" (1.803 m)   Wt 109.4 kg (241 lb 2.9 oz)   SpO2 99%   BMI 33.64 kg/m²        Personally reviewed the below labs:      Chemistry        Component Value Date/Time     08/08/2023 0953    K 3.9 08/08/2023 0953    CL 96 08/08/2023 0953    CO2 29 08/08/2023 0953    BUN 20 08/08/2023 0953    CREATININE 1.4 08/08/2023 0953    GLU 97 08/08/2023 0953        Component Value Date/Time    CALCIUM 9.6 08/08/2023 0953    ALKPHOS 71 08/08/2023 0953    AST 18 08/08/2023 0953    ALT 17 08/08/2023 0953    BILITOT 0.7 08/08/2023 0953    ESTGFRAFRICA >60 07/09/2022 0942    EGFRNONAA 53 (A) 07/09/2022 0942        Lab Results   Component Value Date    TSH 0.710 04/17/2022       Recent Labs   Lab 08/08/23  0953   LDL Cholesterol 64.4   HDL 34 L   Cholesterol 122        Results for orders placed or performed in visit on 08/14/21   Vitamin D   Result Value Ref Range    Vit D, 25-Hydroxy 50 30 - 96 ng/mL     No results found for this or any previous visit.      Lab Results   Component Value Date    MICALBCREAT 6.3 08/08/2023       Hemoglobin A1C   Date Value Ref Range Status   08/08/2023 5.8 (H) 4.0 - 5.6 % Final     Comment:     ADA Screening Guidelines:  5.7-6.4%  Consistent with prediabetes  >or=6.5%  Consistent with diabetes    High levels of fetal hemoglobin interfere with the HbA1C  assay. Heterozygous hemoglobin variants (HbS, HgC, etc)do  not significantly interfere with this assay.   However, presence of multiple variants may affect accuracy.     02/16/2023 6.3 (H) 4.0 - 5.6 % Final     Comment:     ADA Screening Guidelines:  5.7-6.4%  Consistent with prediabetes  >or=6.5%  Consistent with diabetes    High levels of fetal hemoglobin interfere with the HbA1C  assay. Heterozygous hemoglobin variants (HbS, HgC, etc)do  not significantly interfere with this assay. "   However, presence of multiple variants may affect accuracy.     08/04/2022 6.4 (H) 4.0 - 5.6 % Final     Comment:     ADA Screening Guidelines:  5.7-6.4%  Consistent with prediabetes  >or=6.5%  Consistent with diabetes    High levels of fetal hemoglobin interfere with the HbA1C  assay. Heterozygous hemoglobin variants (HbS, HgC, etc)do  not significantly interfere with this assay.   However, presence of multiple variants may affect accuracy.          ASSESSMENT and PLAN:    1. T2DM controlled w DM PN, CKD 3-    Decrease Tresiba to 15 units qhs, Continue metformin and farxiga   Ok to Decrease tresiba to 12 u qhs if continues to have hypos  Notify me for further issues  Continue Dexcom G7  Recently stopped Abilify which can induce hyperglycemia, so explains the reduction in insulin     2. HTN -  Controlled, continue meds as previously prescribed and monitor.     3. HLP -   on statin therapy      4. Obesity- Body mass index is 33.64 kg/m². Resume weight loss, encouraged exercise     5 DANNY- wears his cpap machine    6. Depression- chronic-stable has family support, no thoughts of hurting himself or others            Follow-up: in 6 months with lab prior

## 2023-08-15 ENCOUNTER — OFFICE VISIT (OUTPATIENT)
Dept: ENDOCRINOLOGY | Facility: CLINIC | Age: 63
End: 2023-08-15
Payer: COMMERCIAL

## 2023-08-15 VITALS
HEART RATE: 68 BPM | HEIGHT: 71 IN | OXYGEN SATURATION: 99 % | DIASTOLIC BLOOD PRESSURE: 62 MMHG | WEIGHT: 241.19 LBS | SYSTOLIC BLOOD PRESSURE: 114 MMHG | BODY MASS INDEX: 33.77 KG/M2

## 2023-08-15 DIAGNOSIS — E78.5 HYPERLIPIDEMIA ASSOCIATED WITH TYPE 2 DIABETES MELLITUS: ICD-10-CM

## 2023-08-15 DIAGNOSIS — I15.2 HYPERTENSION ASSOCIATED WITH DIABETES: ICD-10-CM

## 2023-08-15 DIAGNOSIS — N18.31 TYPE 2 DIABETES MELLITUS WITH STAGE 3A CHRONIC KIDNEY DISEASE, WITH LONG-TERM CURRENT USE OF INSULIN: Primary | ICD-10-CM

## 2023-08-15 DIAGNOSIS — E11.22 TYPE 2 DIABETES MELLITUS WITH STAGE 3A CHRONIC KIDNEY DISEASE, WITH LONG-TERM CURRENT USE OF INSULIN: Primary | ICD-10-CM

## 2023-08-15 DIAGNOSIS — E11.69 HYPERLIPIDEMIA ASSOCIATED WITH TYPE 2 DIABETES MELLITUS: ICD-10-CM

## 2023-08-15 DIAGNOSIS — E11.49 DIABETES MELLITUS TYPE 2 WITH NEUROLOGICAL MANIFESTATIONS: ICD-10-CM

## 2023-08-15 DIAGNOSIS — Z79.4 TYPE 2 DIABETES MELLITUS WITH STAGE 3A CHRONIC KIDNEY DISEASE, WITH LONG-TERM CURRENT USE OF INSULIN: Primary | ICD-10-CM

## 2023-08-15 DIAGNOSIS — E11.59 HYPERTENSION ASSOCIATED WITH DIABETES: ICD-10-CM

## 2023-08-15 DIAGNOSIS — E66.09 CLASS 1 OBESITY DUE TO EXCESS CALORIES WITHOUT SERIOUS COMORBIDITY WITH BODY MASS INDEX (BMI) OF 33.0 TO 33.9 IN ADULT: ICD-10-CM

## 2023-08-15 PROBLEM — E55.9 VITAMIN D DEFICIENCY: Status: RESOLVED | Noted: 2019-12-10 | Resolved: 2023-08-15

## 2023-08-15 PROCEDURE — 1159F MED LIST DOCD IN RCRD: CPT | Mod: CPTII,S$GLB,, | Performed by: NURSE PRACTITIONER

## 2023-08-15 PROCEDURE — 99214 OFFICE O/P EST MOD 30 MIN: CPT | Mod: S$GLB,,, | Performed by: NURSE PRACTITIONER

## 2023-08-15 PROCEDURE — 3066F NEPHROPATHY DOC TX: CPT | Mod: CPTII,S$GLB,, | Performed by: NURSE PRACTITIONER

## 2023-08-15 PROCEDURE — 4010F PR ACE/ARB THEARPY RXD/TAKEN: ICD-10-PCS | Mod: CPTII,S$GLB,, | Performed by: NURSE PRACTITIONER

## 2023-08-15 PROCEDURE — 3074F SYST BP LT 130 MM HG: CPT | Mod: CPTII,S$GLB,, | Performed by: NURSE PRACTITIONER

## 2023-08-15 PROCEDURE — 3044F PR MOST RECENT HEMOGLOBIN A1C LEVEL <7.0%: ICD-10-PCS | Mod: CPTII,S$GLB,, | Performed by: NURSE PRACTITIONER

## 2023-08-15 PROCEDURE — 3066F PR DOCUMENTATION OF TREATMENT FOR NEPHROPATHY: ICD-10-PCS | Mod: CPTII,S$GLB,, | Performed by: NURSE PRACTITIONER

## 2023-08-15 PROCEDURE — 3078F PR MOST RECENT DIASTOLIC BLOOD PRESSURE < 80 MM HG: ICD-10-PCS | Mod: CPTII,S$GLB,, | Performed by: NURSE PRACTITIONER

## 2023-08-15 PROCEDURE — 1159F PR MEDICATION LIST DOCUMENTED IN MEDICAL RECORD: ICD-10-PCS | Mod: CPTII,S$GLB,, | Performed by: NURSE PRACTITIONER

## 2023-08-15 PROCEDURE — 95251 PR GLUCOSE MONITOR, 72 HOUR, PHYS INTERP: ICD-10-PCS | Mod: S$GLB,,, | Performed by: NURSE PRACTITIONER

## 2023-08-15 PROCEDURE — 3044F HG A1C LEVEL LT 7.0%: CPT | Mod: CPTII,S$GLB,, | Performed by: NURSE PRACTITIONER

## 2023-08-15 PROCEDURE — 99214 PR OFFICE/OUTPT VISIT, EST, LEVL IV, 30-39 MIN: ICD-10-PCS | Mod: S$GLB,,, | Performed by: NURSE PRACTITIONER

## 2023-08-15 PROCEDURE — 3008F BODY MASS INDEX DOCD: CPT | Mod: CPTII,S$GLB,, | Performed by: NURSE PRACTITIONER

## 2023-08-15 PROCEDURE — 3078F DIAST BP <80 MM HG: CPT | Mod: CPTII,S$GLB,, | Performed by: NURSE PRACTITIONER

## 2023-08-15 PROCEDURE — 3061F PR NEG MICROALBUMINURIA RESULT DOCUMENTED/REVIEW: ICD-10-PCS | Mod: CPTII,S$GLB,, | Performed by: NURSE PRACTITIONER

## 2023-08-15 PROCEDURE — 3074F PR MOST RECENT SYSTOLIC BLOOD PRESSURE < 130 MM HG: ICD-10-PCS | Mod: CPTII,S$GLB,, | Performed by: NURSE PRACTITIONER

## 2023-08-15 PROCEDURE — 95251 CONT GLUC MNTR ANALYSIS I&R: CPT | Mod: S$GLB,,, | Performed by: NURSE PRACTITIONER

## 2023-08-15 PROCEDURE — 99999 PR PBB SHADOW E&M-EST. PATIENT-LVL III: ICD-10-PCS | Mod: PBBFAC,,, | Performed by: NURSE PRACTITIONER

## 2023-08-15 PROCEDURE — 4010F ACE/ARB THERAPY RXD/TAKEN: CPT | Mod: CPTII,S$GLB,, | Performed by: NURSE PRACTITIONER

## 2023-08-15 PROCEDURE — 3008F PR BODY MASS INDEX (BMI) DOCUMENTED: ICD-10-PCS | Mod: CPTII,S$GLB,, | Performed by: NURSE PRACTITIONER

## 2023-08-15 PROCEDURE — 99999 PR PBB SHADOW E&M-EST. PATIENT-LVL III: CPT | Mod: PBBFAC,,, | Performed by: NURSE PRACTITIONER

## 2023-08-15 PROCEDURE — 3061F NEG MICROALBUMINURIA REV: CPT | Mod: CPTII,S$GLB,, | Performed by: NURSE PRACTITIONER

## 2023-08-15 RX ORDER — ERGOCALCIFEROL 1.25 MG/1
50000 CAPSULE ORAL
COMMUNITY
Start: 2023-07-14 | End: 2024-02-15

## 2023-08-15 RX ORDER — INSULIN DEGLUDEC 200 U/ML
INJECTION, SOLUTION SUBCUTANEOUS
Qty: 10 PEN | Refills: 4
Start: 2023-08-15 | End: 2023-12-12 | Stop reason: SDUPTHER

## 2023-08-21 ENCOUNTER — PATIENT MESSAGE (OUTPATIENT)
Dept: ENDOCRINOLOGY | Facility: CLINIC | Age: 63
End: 2023-08-21
Payer: COMMERCIAL

## 2023-08-21 DIAGNOSIS — Z79.4 TYPE 2 DIABETES MELLITUS WITH STAGE 3A CHRONIC KIDNEY DISEASE, WITH LONG-TERM CURRENT USE OF INSULIN: ICD-10-CM

## 2023-08-21 DIAGNOSIS — N18.31 TYPE 2 DIABETES MELLITUS WITH STAGE 3A CHRONIC KIDNEY DISEASE, WITH LONG-TERM CURRENT USE OF INSULIN: ICD-10-CM

## 2023-08-21 DIAGNOSIS — M54.10 DIABETIC RADICULOPATHY: ICD-10-CM

## 2023-08-21 DIAGNOSIS — E11.69 HYPERLIPIDEMIA ASSOCIATED WITH TYPE 2 DIABETES MELLITUS: ICD-10-CM

## 2023-08-21 DIAGNOSIS — E78.5 HYPERLIPIDEMIA ASSOCIATED WITH TYPE 2 DIABETES MELLITUS: ICD-10-CM

## 2023-08-21 DIAGNOSIS — E11.22 TYPE 2 DIABETES MELLITUS WITH STAGE 3A CHRONIC KIDNEY DISEASE, WITH LONG-TERM CURRENT USE OF INSULIN: ICD-10-CM

## 2023-08-21 DIAGNOSIS — Z91.89 CARDIOVASCULAR EVENT RISK: ICD-10-CM

## 2023-08-21 DIAGNOSIS — E11.49 DIABETIC RADICULOPATHY: ICD-10-CM

## 2023-08-21 RX ORDER — BLOOD-GLUCOSE SENSOR
EACH MISCELLANEOUS
Qty: 9 EACH | Refills: 4 | Status: SHIPPED | OUTPATIENT
Start: 2023-08-21 | End: 2023-11-01

## 2023-08-21 RX ORDER — ATORVASTATIN CALCIUM 40 MG/1
40 TABLET, FILM COATED ORAL NIGHTLY
Qty: 90 TABLET | Refills: 4 | Status: SHIPPED | OUTPATIENT
Start: 2023-08-21 | End: 2023-10-30

## 2023-08-21 RX ORDER — PEN NEEDLE, DIABETIC 30 GX3/16"
1 NEEDLE, DISPOSABLE MISCELLANEOUS 4 TIMES DAILY
Qty: 100 EACH | Refills: 11 | Status: SHIPPED | OUTPATIENT
Start: 2023-08-21

## 2023-08-21 RX ORDER — METFORMIN HYDROCHLORIDE 1000 MG/1
1000 TABLET ORAL 2 TIMES DAILY WITH MEALS
Qty: 180 TABLET | Refills: 3 | Status: SHIPPED | OUTPATIENT
Start: 2023-08-21

## 2023-08-22 ENCOUNTER — TELEPHONE (OUTPATIENT)
Dept: UROLOGY | Facility: CLINIC | Age: 63
End: 2023-08-22

## 2023-08-22 ENCOUNTER — OFFICE VISIT (OUTPATIENT)
Dept: UROLOGY | Facility: CLINIC | Age: 63
End: 2023-08-22
Payer: COMMERCIAL

## 2023-08-22 VITALS
WEIGHT: 241 LBS | HEART RATE: 63 BPM | BODY MASS INDEX: 33.74 KG/M2 | DIASTOLIC BLOOD PRESSURE: 77 MMHG | SYSTOLIC BLOOD PRESSURE: 108 MMHG | HEIGHT: 71 IN

## 2023-08-22 DIAGNOSIS — R97.20 ELEVATED PSA: Primary | ICD-10-CM

## 2023-08-22 PROCEDURE — 3074F PR MOST RECENT SYSTOLIC BLOOD PRESSURE < 130 MM HG: ICD-10-PCS | Mod: CPTII,S$GLB,, | Performed by: UROLOGY

## 2023-08-22 PROCEDURE — 3008F PR BODY MASS INDEX (BMI) DOCUMENTED: ICD-10-PCS | Mod: CPTII,S$GLB,, | Performed by: UROLOGY

## 2023-08-22 PROCEDURE — 99999 PR PBB SHADOW E&M-EST. PATIENT-LVL III: ICD-10-PCS | Mod: PBBFAC,,, | Performed by: UROLOGY

## 2023-08-22 PROCEDURE — 1160F RVW MEDS BY RX/DR IN RCRD: CPT | Mod: CPTII,S$GLB,, | Performed by: UROLOGY

## 2023-08-22 PROCEDURE — 3061F NEG MICROALBUMINURIA REV: CPT | Mod: CPTII,S$GLB,, | Performed by: UROLOGY

## 2023-08-22 PROCEDURE — 99213 PR OFFICE/OUTPT VISIT, EST, LEVL III, 20-29 MIN: ICD-10-PCS | Mod: S$GLB,,, | Performed by: UROLOGY

## 2023-08-22 PROCEDURE — 99999 PR PBB SHADOW E&M-EST. PATIENT-LVL III: CPT | Mod: PBBFAC,,, | Performed by: UROLOGY

## 2023-08-22 PROCEDURE — 3061F PR NEG MICROALBUMINURIA RESULT DOCUMENTED/REVIEW: ICD-10-PCS | Mod: CPTII,S$GLB,, | Performed by: UROLOGY

## 2023-08-22 PROCEDURE — 3044F HG A1C LEVEL LT 7.0%: CPT | Mod: CPTII,S$GLB,, | Performed by: UROLOGY

## 2023-08-22 PROCEDURE — 3008F BODY MASS INDEX DOCD: CPT | Mod: CPTII,S$GLB,, | Performed by: UROLOGY

## 2023-08-22 PROCEDURE — 99213 OFFICE O/P EST LOW 20 MIN: CPT | Mod: S$GLB,,, | Performed by: UROLOGY

## 2023-08-22 PROCEDURE — 3074F SYST BP LT 130 MM HG: CPT | Mod: CPTII,S$GLB,, | Performed by: UROLOGY

## 2023-08-22 PROCEDURE — 3078F PR MOST RECENT DIASTOLIC BLOOD PRESSURE < 80 MM HG: ICD-10-PCS | Mod: CPTII,S$GLB,, | Performed by: UROLOGY

## 2023-08-22 PROCEDURE — 1159F MED LIST DOCD IN RCRD: CPT | Mod: CPTII,S$GLB,, | Performed by: UROLOGY

## 2023-08-22 PROCEDURE — 4010F PR ACE/ARB THEARPY RXD/TAKEN: ICD-10-PCS | Mod: CPTII,S$GLB,, | Performed by: UROLOGY

## 2023-08-22 PROCEDURE — 3044F PR MOST RECENT HEMOGLOBIN A1C LEVEL <7.0%: ICD-10-PCS | Mod: CPTII,S$GLB,, | Performed by: UROLOGY

## 2023-08-22 PROCEDURE — 4010F ACE/ARB THERAPY RXD/TAKEN: CPT | Mod: CPTII,S$GLB,, | Performed by: UROLOGY

## 2023-08-22 PROCEDURE — 3078F DIAST BP <80 MM HG: CPT | Mod: CPTII,S$GLB,, | Performed by: UROLOGY

## 2023-08-22 PROCEDURE — 3066F PR DOCUMENTATION OF TREATMENT FOR NEPHROPATHY: ICD-10-PCS | Mod: CPTII,S$GLB,, | Performed by: UROLOGY

## 2023-08-22 PROCEDURE — 1159F PR MEDICATION LIST DOCUMENTED IN MEDICAL RECORD: ICD-10-PCS | Mod: CPTII,S$GLB,, | Performed by: UROLOGY

## 2023-08-22 PROCEDURE — 1160F PR REVIEW ALL MEDS BY PRESCRIBER/CLIN PHARMACIST DOCUMENTED: ICD-10-PCS | Mod: CPTII,S$GLB,, | Performed by: UROLOGY

## 2023-08-22 PROCEDURE — 3066F NEPHROPATHY DOC TX: CPT | Mod: CPTII,S$GLB,, | Performed by: UROLOGY

## 2023-08-22 NOTE — PROGRESS NOTES
Chief Complaint: elevated PSA    HPI:   08/22/2023 - returns today for follow-up, relocated from [place] after his wife passed away in January, PSA up to 6.3 but PSA density 0.08, voiding issues still well-controlled status post UroLift, denies gross hematuria or dysuria, not sexually active currently    History of BPH with obstructive LUTS, previously on TRT. Negative prostate biopsy in 7/2015 with psa 7.3 and 41g gland.   All follow up PSAs normal through 2018. He also had visual evidence of NÚÑEZ on cysto at that time, and finasteride was added to his flomax.   LUTS persisted and finasteride was added to his flomax. He had progressive urgency and mirabegron added. PVR 117cc, AUA SS 9/4 on all 3 meds. Did eliminate soda, make scheduled bathroom breaks, and start stool softener and urgency has significantly improved.   Cystoscopy and transrectal ultrasound evaluation on 10/23/18 revealed a 50.1 g prostate with significant lateral lobe obstruction with kissing lobes, without gross median lobe, mild intravesical extension. Underwent Urolift 11/15/18:    11/29/18 PVR 0cc by bladder scan, AUA SS: 10/1 (4 urgency; 3: frequency; 2: sleeping; 1: emptying)  12/31/18: He stopped his Flomax almost immediately after the procedure because of frequency.  He is still on finasteride. Though he did have initial urgency and frequency, these are calming down.  He no longer needs a diaper.  He can hold his urine.  PVR 0 cc. AUA symptom score:  3/1, pleased (1:  Frequency, urgency, nocturia)  DCed finateride. Planned 3 and 6 month follow up, but he never followed up    PMHx: HTN, HLD, CKD 3, Obesity, DM PN, DANNY    He returns today noting he had planned to reest care to discuss ED management but in interim has been found to have elevated psa  Dr Matamoros, pcp, ordered psa on 3/3/22 noting it to be elevated at 4.1 on 3/3/22  He has history of flomax and finasteride use which he discontinued both after urolift as above  Still urinating  well post urolift today with AUA SS: 4/0, delighted (2 urgency, 1 freq, sleeping)  Occ urgency with postponing. Emptying well. PVR 2cc  On chart review:  psa 1.1 in 2017 and prior since negative prostate biopsy with psa 7 in 2015  psa 2.1 in 2020 (no finasteride)  psa 4.1 on 3/3/22 (no finasteride)  Orthopedics did imaging and had concern for ankylosing spondylitis and established care with Rheumatology, Dr. Ferguson, who did not find evidence of this but does have concern for some other arthritis.  Noted BPH with elevated PSA and restarted finasteride at visit on 3/18/22, after the above PSA.  Follows with Cynthia Mejia for enocrinology. On farxiga. A1c has been rising from 5.5 to 6 to 6.8 most recently in feb 2022 but notes happy if <7  Udip with 500 gluc (did note sugary breakfast and poor dietary compliance) and trc bloood. Non/never smoker  Inquired about estrogen and lab review noted T 309 and Est 175  Has moderate ED as well, and has used viagra in past with only a little bit of help. Not sure of proper use viagra a little bit of help not sure of proper use     PMH:  Past Medical History:   Diagnosis Date    Arthritis     Benign localized hyperplasia of prostate without urinary obstruction and other lower urinary tract symptoms (LUTS)     Body mass index 37.0-37.9, adult     Diabetes mellitus     Esophageal reflux     History of TIA (transient ischemic attack) 04/17/2022    Hypertension     Other and unspecified hyperlipidemia     Other testicular hypofunction     Polyneuropathy in diabetes(357.2)     PONV (postoperative nausea and vomiting)     Rosacea     Sleep apnea     uses CPAP    Type II or unspecified type diabetes mellitus with neurological manifestations, uncontrolled(250.62)        PSH:  Past Surgical History:   Procedure Laterality Date    ANKLE SURGERY Right     ANKLE SURGERY Left     COLONOSCOPY N/A 5/17/2017    Procedure: COLONOSCOPY;  Surgeon: Carlos Hess MD;  Location: [facility];  Service:  Endoscopy;  Laterality: N/A;    COLONOSCOPY W/ POLYPECTOMY      CYSTOSCOPY      CYSTOSCOPY N/A 10/23/2018    Procedure: CYSTOSCOPY;  Surgeon: Sarwat Viveros MD;  Location: [facility] OR;  Service: Urology;  Laterality: N/A;    CYSTOSCOPY WITH INSERTION OF MINIMALLY INVASIVE IMPLANT TO ENLARGE PROSTATIC URETHRA N/A 11/15/2018    Procedure: CYSTOSCOPY, WITH INSERTION OF UROLIFT IMPLANT;  Surgeon: Sarwat Viveros MD;  Location: [facility] OR;  Service: Urology;  Laterality: N/A;    DEBRIDEMENT TENNIS ELBOW      right    FOREARM SURGERY Right     tendon repair    FRACTURE SURGERY      right ankle orif    HERNIA REPAIR      umbillical    PROSTATE BIOPSY      QUADRICEPS REPAIR      right    TONSILLECTOMY      TRANSRECTAL ULTRASOUND EXAMINATION N/A 10/23/2018    Procedure: ULTRASOUND, RECTAL APPROACH;  Surgeon: Sarwat Viveros MD;  Location: [facility] OR;  Service: Urology;  Laterality: N/A;    TRICEPS TENDON RELEASE      tendon repair left tricep    VASECTOMY         Family History:  Family History   Problem Relation Age of Onset    Pulmonary embolism Mother     No Known Problems Father     Heart disease Other     Heart attack Other     Hypertension Other     Hyperlipidemia Other     Arthritis Other     Clotting disorder Other     Mental illness Other     Diabetes Other     Cancer Other     Melanoma Neg Hx     Psoriasis Neg Hx     Lupus Neg Hx     Eczema Neg Hx        Social History:  Social History     Tobacco Use    Smoking status: Never    Smokeless tobacco: Never   Substance Use Topics    Alcohol use: No    Drug use: No        Review of Systems:  General: No fever, chills  Skin: No rashes  Chest:  Denies cough and sputum production  Heart: Denies chest pain  Resp: Denies dyspnea  Abdomen: Denies diarrhea, abdominal pain, hematemesis, or blood in stool.  Musculoskeletal: No joint stiffness or swelling. Denies back pain.  : see HPI  Neuro: no dizziness or weakness    Allergies:  Bactrim [sulfamethoxazole-trimethoprim],  "Ciprofloxacin, Doxycycline, Lamotrigine, and Trileptal [oxcarbazepine]    Medications:    Current Outpatient Medications:     ARIPiprazole (ABILIFY) 10 MG Tab, Take 10 mg by mouth nightly., Disp: , Rfl:     aspirin (ECOTRIN) 81 MG EC tablet, Take 81 mg by mouth once daily., Disp: , Rfl:     atorvastatin (LIPITOR) 40 MG tablet, Take 1 tablet (40 mg total) by mouth every evening., Disp: 90 tablet, Rfl: 4    blood sugar diagnostic Strp, Checks 4 times a day, Disp: 400 strip, Rfl: 4    blood-glucose sensor (DEXCOM G7 SENSOR) Bibiana, Change sensor every 10 days, Disp: 9 each, Rfl: 4    blunt needle, disposable 18 x 1 1/2 " Ndle, 1 Syringe by Misc.(Non-Drug; Combo Route) route once a week., Disp: 25 each, Rfl: 3    buPROPion (WELLBUTRIN XL) 300 MG 24 hr tablet, Take 150 mg by mouth once daily., Disp: , Rfl:     celecoxib (CELEBREX) 100 MG capsule, Take 1 capsule (100 mg total) by mouth once daily., Disp: 30 capsule, Rfl: 4    chlorthalidone (HYGROTEN) 25 MG Tab, TAKE 1 TABLET BY MOUTH EVERY DAY, Disp: 90 tablet, Rfl: 3    cloNIDine (CATAPRES) 0.1 MG tablet, 1 po BID prn BP above 180/100, Disp: , Rfl:     dapagliflozin (FARXIGA) 5 mg Tab tablet, Take 1 tablet (5 mg total) by mouth once daily., Disp: 90 tablet, Rfl: 4    dexmethylphenidate (FOCALIN XR) 30 mg 24 hr capsule, Take 1 capsule by mouth once daily., Disp: , Rfl:     dexmethylphenidate (FOCALIN) 10 MG tablet, Take 10 mg by mouth every evening., Disp: , Rfl:     dextroamphetamine-amphetamine (ADDERALL XR) 30 MG 24 hr capsule, Take by mouth every morning., Disp: , Rfl:     divalproex 500 MG Tb24, , Disp: , Rfl:     ergocalciferol (ERGOCALCIFEROL) 50,000 unit Cap, Take 50,000 Units by mouth twice a week., Disp: , Rfl:     esomeprazole (NEXIUM) 20 MG capsule, Take 20 mg by mouth before breakfast., Disp: , Rfl:     FOLBEE 2.5-25-1 mg Tab, Take 1 tablet by mouth once daily., Disp: , Rfl:     insulin degludec (TRESIBA FLEXTOUCH U-200) 200 unit/mL (3 mL) insulin pen, 15 u " "qhs, Disp: 10 pen , Rfl: 4    lisinopriL (PRINIVIL,ZESTRIL) 5 MG tablet, Take 1 tablet (5 mg total) by mouth once daily., Disp: 90 tablet, Rfl: 3    LORazepam (ATIVAN) 2 MG Tab, Take 1-2 mg by mouth daily as needed., Disp: , Rfl:     metFORMIN (GLUCOPHAGE) 1000 MG tablet, Take 1 tablet (1,000 mg total) by mouth 2 (two) times daily with meals., Disp: 180 tablet, Rfl: 3    mupirocin (BACTROBAN) 2 % ointment, Apply topically 3 (three) times daily., Disp: , Rfl:     needle, disp, 30 gauge 30 gauge x 1/2" Ndle, 1 each by Misc.(Non-Drug; Combo Route) route once a week., Disp: 25 each, Rfl: 3    pen needle, diabetic (BD ULTRA-FINE MINI PEN NEEDLE) 31 gauge x 3/16" Ndle, 1 each by Misc.(Non-Drug; Combo Route) route 4 (four) times daily., Disp: 100 each, Rfl: 11    pregabalin (LYRICA) 150 MG capsule, Take 1 capsule (150 mg total) by mouth 2 (two) times daily., Disp: 180 capsule, Rfl: 1    syringe, disposable, 1 mL Syrg, 1 Syringe by Misc.(Non-Drug; Combo Route) route once a week., Disp: 25 each, Rfl: 0    TRULICITY 1.5 mg/0.5 mL pen injector, INJECT 1.5MG UNDER THE SKIN EVERY 7 DAYS, Disp: 12 pen, Rfl: 4    blood-glucose meter (FREESTYLE FREEDOM LITE) kit, Use as instructed, Disp: 1 each, Rfl: 0    sildenafiL (VIAGRA) 100 MG tablet, Take 1 tablet (100 mg total) by mouth as needed for Erectile Dysfunction., Disp: 9 tablet, Rfl: 10    Physical Exam:  Vitals:    08/22/23 1047   BP: 108/77   Pulse: 63     Body mass index is 33.61 kg/m².  General: awake, alert, cooperative  Head: NC/AT  Ears: external ears normal  Eyes: sclera normal  Lungs: normal inspiration, NAD  Heart: well-perfused  BEVERLY 4/22: 35g, no nodules, non-tender, symmetrical  Skin: The skin is warm and dry  Ext: No c/c/e.  Neuro: grossly intact, AOx3    RADIOLOGY:  MRI PROSTATE W W/O CONTRAST     CLINICAL HISTORY:  Patient with history of BPH with obstructing lower urinary tract symptoms, negative prostate biopsies in 2015.  Rising PSA.  Status post prostatic " urethral lift in 2018.     TECHNIQUE:  TECHNIQUE: Multiplanar, multisequence MR imaging of the prostate, before and after the administration of 10 cc Gadavist IV contrast     COMPARISON:  CT 07/07/2020     FINDINGS:  Prostate: The prostate measures 5.8 x 5.2 x 5.9cm corresponding to a computed volume of 76cc.     Peripheral zone: Compressed by the enlarged central gland.  There is mild misregistration between the axial T2 weighted images and the diffusion-weighted images/ADC map.  There is susceptibility artifact from metallic implants in the central gland.     Lesion (RAKESH) #1     Location: Right apex     Dimensions: 17 x 14 x 9 mm, 0.8 cc     T2-WI: Hazy decreased T2 signal series 6, images 21 and 22     DWI/ADC: Mildly decreased signal on the ADC map.  No corresponding high signal on the diffusion-weighted images.     DCE: No unique vascularity.     Extraprostatic extension: Not present     PI-RADS assessment category: 3     Central gland: BPH.  Limited evaluation secondary to susceptibility artifacts from urolift implants.  No focal suspicious lesion.     Neurovascular bundle: Unremarkable.     Seminal vesicles: Low in attenuation but symmetrical.     No adenopathy or morphologically suspicious lymph nodes are identified.  There are fat containing inguinal hernias.  No suspicious bone marrow signal abnormalities are present.  There is a stable bone island in the left ischial tuberosity.  Urinary bladder mildly trabeculated but otherwise unremarkable.     Impression:     Extensive BPH.  Solitary PI-RADS 3 lesion right apex, marked for potential biopsy.    LABS:  I personally reviewed the following lab values:  Lab Results   Component Value Date    WBC 8.31 04/18/2022    HGB 14.7 04/18/2022    HCT 43.6 04/18/2022     04/18/2022     08/08/2023    K 3.9 08/08/2023    CL 96 08/08/2023    CREATININE 1.4 08/08/2023    BUN 20 08/08/2023    CO2 29 08/08/2023    TSH 0.710 04/17/2022    PSA 4.1 (H) 03/03/2022     INR 1.0 09/28/2021    HGBA1C 5.8 (H) 08/08/2023    CHOL 122 08/08/2023    TRIG 118 08/08/2023    HDL 34 (L) 08/08/2023    ALT 17 08/08/2023    AST 18 08/08/2023     Assessment/Plan:   Sarwat Colunga is a 62 y.o. male with elevated PSA, prior negative biopsy, MRI shows PI-RADS three lesion right apex, PSA density 0.08 with a 74 g prostate, follow-up six months with PSA      Faisal Briones MD  Urology

## 2023-08-23 ENCOUNTER — PATIENT MESSAGE (OUTPATIENT)
Dept: ENDOCRINOLOGY | Facility: CLINIC | Age: 63
End: 2023-08-23
Payer: COMMERCIAL

## 2023-08-23 ENCOUNTER — OFFICE VISIT (OUTPATIENT)
Dept: PODIATRY | Facility: CLINIC | Age: 63
End: 2023-08-23
Payer: COMMERCIAL

## 2023-08-23 VITALS — HEIGHT: 71 IN | BODY MASS INDEX: 33.73 KG/M2 | WEIGHT: 240.94 LBS

## 2023-08-23 DIAGNOSIS — M20.42 HAMMER TOES OF BOTH FEET: ICD-10-CM

## 2023-08-23 DIAGNOSIS — E11.49 DIABETES MELLITUS TYPE 2 WITH NEUROLOGICAL MANIFESTATIONS: ICD-10-CM

## 2023-08-23 DIAGNOSIS — I87.2 VENOUS INSUFFICIENCY OF BOTH LOWER EXTREMITIES: ICD-10-CM

## 2023-08-23 DIAGNOSIS — E11.9 COMPREHENSIVE DIABETIC FOOT EXAMINATION, TYPE 2 DM, ENCOUNTER FOR: Primary | ICD-10-CM

## 2023-08-23 DIAGNOSIS — M20.41 HAMMER TOES OF BOTH FEET: ICD-10-CM

## 2023-08-23 DIAGNOSIS — M21.619 BUNION: ICD-10-CM

## 2023-08-23 DIAGNOSIS — L60.2 ONYCHOGRYPOSIS: ICD-10-CM

## 2023-08-23 PROCEDURE — 3008F BODY MASS INDEX DOCD: CPT | Mod: CPTII,S$GLB,, | Performed by: PODIATRIST

## 2023-08-23 PROCEDURE — 3044F HG A1C LEVEL LT 7.0%: CPT | Mod: CPTII,S$GLB,, | Performed by: PODIATRIST

## 2023-08-23 PROCEDURE — 11721 PR DEBRIDEMENT OF NAILS, 6 OR MORE: ICD-10-PCS | Mod: S$GLB,,, | Performed by: PODIATRIST

## 2023-08-23 PROCEDURE — 3044F PR MOST RECENT HEMOGLOBIN A1C LEVEL <7.0%: ICD-10-PCS | Mod: CPTII,S$GLB,, | Performed by: PODIATRIST

## 2023-08-23 PROCEDURE — 3061F NEG MICROALBUMINURIA REV: CPT | Mod: CPTII,S$GLB,, | Performed by: PODIATRIST

## 2023-08-23 PROCEDURE — 3066F NEPHROPATHY DOC TX: CPT | Mod: CPTII,S$GLB,, | Performed by: PODIATRIST

## 2023-08-23 PROCEDURE — 4010F PR ACE/ARB THEARPY RXD/TAKEN: ICD-10-PCS | Mod: CPTII,S$GLB,, | Performed by: PODIATRIST

## 2023-08-23 PROCEDURE — 99213 PR OFFICE/OUTPT VISIT, EST, LEVL III, 20-29 MIN: ICD-10-PCS | Mod: 25,S$GLB,, | Performed by: PODIATRIST

## 2023-08-23 PROCEDURE — 99999 PR PBB SHADOW E&M-EST. PATIENT-LVL IV: ICD-10-PCS | Mod: PBBFAC,,, | Performed by: PODIATRIST

## 2023-08-23 PROCEDURE — 3061F PR NEG MICROALBUMINURIA RESULT DOCUMENTED/REVIEW: ICD-10-PCS | Mod: CPTII,S$GLB,, | Performed by: PODIATRIST

## 2023-08-23 PROCEDURE — 1159F MED LIST DOCD IN RCRD: CPT | Mod: CPTII,S$GLB,, | Performed by: PODIATRIST

## 2023-08-23 PROCEDURE — 99999 PR PBB SHADOW E&M-EST. PATIENT-LVL IV: CPT | Mod: PBBFAC,,, | Performed by: PODIATRIST

## 2023-08-23 PROCEDURE — 3066F PR DOCUMENTATION OF TREATMENT FOR NEPHROPATHY: ICD-10-PCS | Mod: CPTII,S$GLB,, | Performed by: PODIATRIST

## 2023-08-23 PROCEDURE — 99213 OFFICE O/P EST LOW 20 MIN: CPT | Mod: 25,S$GLB,, | Performed by: PODIATRIST

## 2023-08-23 PROCEDURE — 11721 DEBRIDE NAIL 6 OR MORE: CPT | Mod: S$GLB,,, | Performed by: PODIATRIST

## 2023-08-23 PROCEDURE — 3008F PR BODY MASS INDEX (BMI) DOCUMENTED: ICD-10-PCS | Mod: CPTII,S$GLB,, | Performed by: PODIATRIST

## 2023-08-23 PROCEDURE — 1159F PR MEDICATION LIST DOCUMENTED IN MEDICAL RECORD: ICD-10-PCS | Mod: CPTII,S$GLB,, | Performed by: PODIATRIST

## 2023-08-23 PROCEDURE — 4010F ACE/ARB THERAPY RXD/TAKEN: CPT | Mod: CPTII,S$GLB,, | Performed by: PODIATRIST

## 2023-08-23 NOTE — PATIENT INSTRUCTIONS
Thank you for choosing [facility] and Dr. Evy Forbes and her team to take care of you.      I have provided further information for you about your diagnoses and/or aftercare for treatment.     You may receive a survey asking you about your visit today. We hope that we have provided you with a 5-star experience! If you felt that you received exemplary care today, please consider leaving us this feedback on the survey that you will receive in the next few days.     The survey might arrive via email, Smart Cubehart messages, text messages, or paper mail.    We sincerely appreciate you!      Sincerely,  Dr. Evy Forbes             Your Proactive Measures   You play a vital role in reducing complications. Follow these guidelines and contact your foot and ankle surgeon if you notice any problems:     1. Inspect your feet daily. If your eyesight is poor, have someone else do it for you. Inspect for:   Skin or nail problems: Look for cuts, scrapes, redness, drainage, swelling, bad odor, rash, discoloration, loss of hair on toes, injuries, or nail changes (deformed, striped, yellowed or discolored, thickened, or not growing).   Signs of fracture: If your foot is swollen, red, hot, or has changed in size, shape, or direction, see your foot and ankle surgeon immediately.   2. Dont ignore leg pain. Pain in the leg that occurs at night or with a little activity could mean you have a blocked artery. Seek care immediately.   3. Nail cutting. If you have any nail problems, hard nails, or reduced feeling in your feet, your toenails should be properly trimmed.   4. No bathroom surgery. Never trim calluses or corns yourself, and dont use over-the-counter medicated pads.   5. Keep floors free of sharp objects. Make sure there are no needles, insulin syringes, or other sharp objects on the floor.   6. Dont go barefoot. Wear shoes, indoors and outdoors.   7. Check shoes and socks. Shake out your shoes before putting them  on. Make sure your socks arent bunched up.   8. Have your circulation and sense of feeling tested. Your foot and ankle surgeon will perform tests to see if youve lost any feeling or circulation.       Diabetic Peripheral Neuropathy   What is Diabetic Peripheral Neuropathy?  Diabetic neuropathy is nerve damage caused by diabetes. When it affects the arms, hands, legs and feet it is known as diabetic peripheral neuropathy. Diabetic peripheral neuropathy is different from peripheral arterial disease (poor circulation), which affects the blood vessels rather than the nerves.   Three different groups of nerves can be affected by diabetic neuropathy:   Sensory nerves, which enable people to feel pain, temperature, and other sensations   Motor nerves, which control the muscles and give them their strength and tone   Autonomic nerves, which allow the body to perform certain involuntary functions, such as sweating.   Diabetic peripheral neuropathy doesnt emerge overnight. Instead, it usually develops slowly and worsens over time. Some patients have this condition long before they are diagnosed with diabetes. Having diabetes for several years may increase the likelihood of having diabetic neuropathy.   The loss of sensation and other problems associated with nerve damage make a patient prone to developing skin ulcers (open sores) that can become infected and may not heal. This serious complication of diabetes can lead to loss of a foot, a leg, or even a life.     Causes  The nerve damage that characterizes diabetic peripheral neuropathy is more common in patients with poorly managed diabetes. However, even diabetic patients who have excellent blood sugar (glucose) control can develop diabetic neuropathy. There are several theories as to why this occurs, including the possibilities that high blood glucose or constricted blood vessels produce damage to the nerves.   As diabetic peripheral neuropathy progresses, various  nerves are affected. These damaged nerves can cause problems that encourage development of ulcers. For example:   Motor Neuropathy (Deformity)    +    Ill-fitting shoes    +    Sensory Neuropathy (numbness)    =    Ulcers (sores)    Deformities (such as bunions or hammertoes) resulting from motor neuropathy may cause shoes to rub against toes, creating a sore. The numbness caused by sensory neuropathy can make the patient unaware that this is happening.   Because of numbness, a patient may not realize that he or she has stepped on a small object and cut the skin.   Cracked skin caused by autonomic neuropathy, combined with sensory neuropathys numbness and problems associated with motor neuropathy can lead to developing a sore.     Symptoms  Depending on the type(s) of nerves involved, one or more symptoms may be present in diabetic peripheral neuropathy.     For sensory neuropathy:   Numbness or tingling in the feet   Pain or discomfort in the feet or legs, including prickly, sharp pain or burning feet     For motor neuropathy:   Muscle weakness and loss of muscle tone in the feet and lower legs   Loss of balance   Changes in foot shape that can lead to areas of increased pressure     For autonomic neuropathy:   Dry feet   Cracked skin     Diagnosis  To diagnose diabetic peripheral neuropathy, the foot and ankle surgeon will obtain the patients history of symptoms and will perform simple in-office tests on the feet and legs. This evaluation may include assessment of the patients reflexes, ability to feel light touch, and ability to feel vibration. In some cases, additional neurologic tests may be ordered.     Treatment  First and foremost, treatment of diabetic peripheral neuropathy centers on control of the patients blood sugar level. In addition, various options are used to treat the painful symptoms.   Medications are available to help relieve specific symptoms, such as tingling or burning. Sometimes a  combination of different medications is used.   In some cases, the patient may also undergo physical therapy to help reduce balance problems or other symptoms .    Prevention  The patient plays a vital role in minimizing the risk of developing diabetic peripheral neuropathy and in preventing its possible consequences. Some important preventive measures include:   Keep blood sugar levels under control.   Wear well-fitting shoes to avoid getting sores.   Inspect your feet every day. If you notice any cuts, redness, blisters, or swelling, see your foot and ankle surgeon right away. This can prevent problems from becoming worse.   Visit your foot and ankle surgeon on a regular basis for an examination to help prevent the foot complications of diabetes.   Have periodic visits with your primary care physician or endocrinologist. The foot and ankle surgeon works together with these and other providers to prevent and treat complications from diabetes.     Diabetic Complications and Amputation Prevention   People with diabetes are prone to having foot problems, often because of two complications of diabetes: nerve damage (neuropathy) and poor circulation. Neuropathy causes loss of feeling in your feet, taking away your ability to feel pain and discomfort, so you may not detect an injury or irritation. Poor circulation in your feet reduces your ability to heal, making it hard for even a tiny cut to resist infection.   Having diabetes increases the risk of developing a wide range of foot problems. Furthermore, with diabetes, small foot problems can turn into serious complications.     When Is Amputation Necessary?  Even with preventative care and prompt treatment of infection and complications, there are instances when amputation is necessary to remove infected tissue, save a limb, or even save a life.     Diabetes-related Foot and Leg Problems     Infections and ulcers (sores) that dont heal. An ulcer is a sore in the skin  that may go all the way to the bone. Because of poor circulation and neuropathy in the feet, cuts or blisters can easily turn into ulcers that become infected and wont heal. This is a common - and serious - complication of diabetes and can lead to a loss of your foot, your leg, or your life.   Corns and calluses. When neuropathy is present, you cant tell if your shoes are causing pressure and producing corns or calluses. Corns and calluses must be properly treated or they can develop into ulcers.   Dry, cracked skin. Poor circulation and neuropathy can make your skin dry. This may seem harmless, but dry skin can result in cracks that may become sores and can lead to infection.   Nail disorders. Ingrown toenails (which curve into the skin on the sides of the nail) and fungal infections can go unnoticed because of loss of feeling. If they are not properly treated, they can lead to infection.   Hammertoes and bunions. Nerve damage affecting muscles can cause muscle weakness and loss of tone in the feet, resulting in hammertoes and bunions. If left untreated, these deformities can cause ulcers.   Charcot foot. This is a complex foot deformity. It develops as a result of loss of sensation and an undetected broken bone that leads to destruction of the soft tissue of the foot. Because of neuropathy, the pain of the fracture goes unnoticed and the patient continues to walk on the broken bone, making it worse. This disabling complication is so severe that surgery, and occasionally amputation, may become necessary.   Poor blood flow. In diabetes, the blood vessels below the knee often become narrow and restrict blood flow. This prevents wounds from healing and may cause tissue death.     What Your Foot and Ankle Surgeon Can Do  Your foot and ankle surgeon can help wounds heal, preventing amputation. There are many new surgical techniques available to save feet and legs, including joint reconstruction and wound healing  technologies. Getting regular foot checkups and seeking immediate help when you notice something can keep small problems from worsening. Your foot and ankle surgeon works together with other health care providers to prevent and treat complications from diabetes.

## 2023-08-23 NOTE — PROGRESS NOTES
PODIATRIC MEDICINE AND SURGERY     CHIEF COMPLAINT  Chief Complaint   Patient presents with    Nail Care     C/o nail care (trim), pt is diabetic, no pain, No new complaints related to feet since last visit. Last seen PCP 7/20/23 saw PCP NP : Ana Cook NP         HPI    SUBJECTIVE: Sarwat Colunga is a 62 y.o. male who  has a past medical history of Arthritis, Benign localized hyperplasia of prostate without urinary obstruction and other lower urinary tract symptoms (LUTS), Body mass index 37.0-37.9, adult, Diabetes mellitus, Esophageal reflux, History of TIA (transient ischemic attack) (04/17/2022), Hypertension, Other and unspecified hyperlipidemia, Other testicular hypofunction, Polyneuropathy in diabetes(357.2), PONV (postoperative nausea and vomiting), Rosacea, Sleep apnea, and Type II or unspecified type diabetes mellitus with neurological manifestations, uncontrolled(250.62). Sarwatpresents to clinic for high risk diabetic foot exam and care.  Sarwat admits numbness, burning, and/or tingling sensations in their feet. Patient admits to painful toenails aggravated by increased weight bearing, shoe gear, and pressure. States pain is relieved with routine debridements. Patient has no other pedal complaints at this time.    This patient has documented high risk feet requiring routine maintenance secondary to diabetes mellitis and those secondary complications of diabetes, as mentioned.      HgA1c:   Hemoglobin A1C   Date Value Ref Range Status   08/08/2023 5.8 (H) 4.0 - 5.6 % Final     Comment:     ADA Screening Guidelines:  5.7-6.4%  Consistent with prediabetes  >or=6.5%  Consistent with diabetes    High levels of fetal hemoglobin interfere with the HbA1C  assay. Heterozygous hemoglobin variants (HbS, HgC, etc)do  not significantly interfere with this assay.   However, presence of multiple variants may affect accuracy.     02/16/2023 6.3 (H) 4.0 - 5.6 % Final     Comment:     ADA Screening  Guidelines:  5.7-6.4%  Consistent with prediabetes  >or=6.5%  Consistent with diabetes    High levels of fetal hemoglobin interfere with the HbA1C  assay. Heterozygous hemoglobin variants (HbS, HgC, etc)do  not significantly interfere with this assay.   However, presence of multiple variants may affect accuracy.     08/04/2022 6.4 (H) 4.0 - 5.6 % Final     Comment:     ADA Screening Guidelines:  5.7-6.4%  Consistent with prediabetes  >or=6.5%  Consistent with diabetes    High levels of fetal hemoglobin interfere with the HbA1C  assay. Heterozygous hemoglobin variants (HbS, HgC, etc)do  not significantly interfere with this assay.   However, presence of multiple variants may affect accuracy.           PMH  Past Medical History:   Diagnosis Date    Arthritis     Benign localized hyperplasia of prostate without urinary obstruction and other lower urinary tract symptoms (LUTS)     Body mass index 37.0-37.9, adult     Diabetes mellitus     Esophageal reflux     History of TIA (transient ischemic attack) 04/17/2022    Hypertension     Other and unspecified hyperlipidemia     Other testicular hypofunction     Polyneuropathy in diabetes(357.2)     PONV (postoperative nausea and vomiting)     Rosacea     Sleep apnea     uses CPAP    Type II or unspecified type diabetes mellitus with neurological manifestations, uncontrolled(250.62)      Patient Active Problem List   Diagnosis    Rosacea    Rhinophyma    Esophageal reflux    Diabetes mellitus type 2 with neurological manifestations    Other testicular hypofunction    Elevated PSA    Hypertension associated with diabetes    Hyperlipidemia associated with type 2 diabetes mellitus    Cardiovascular event risk, ASCVD 10 year risk 23.1%, on atorvastatin 20 mg, 12/2015    DANNY on CPAP    History of colonic polyps    BPH (benign prostatic hyperplasia)    Type 2 diabetes mellitus with stage 3 chronic kidney disease, with long-term current use of insulin    Reactive depression     "Occasional tremors    Gait disturbance    Encephalopathy    Class 1 obesity due to excess calories without serious comorbidity with body mass index (BMI) of 33.0 to 33.9 in adult       MEDS  Current Outpatient Medications on File Prior to Visit   Medication Sig Dispense Refill    ARIPiprazole (ABILIFY) 10 MG Tab Take 10 mg by mouth nightly.      aspirin (ECOTRIN) 81 MG EC tablet Take 81 mg by mouth once daily.      atorvastatin (LIPITOR) 40 MG tablet Take 1 tablet (40 mg total) by mouth every evening. 90 tablet 4    blood sugar diagnostic Strp Checks 4 times a day 400 strip 4    blood-glucose sensor (DEXCOM G7 SENSOR) Bibiana Change sensor every 10 days 9 each 4    blunt needle, disposable 18 x 1 1/2 " Ndle 1 Syringe by Misc.(Non-Drug; Combo Route) route once a week. 25 each 3    buPROPion (WELLBUTRIN XL) 300 MG 24 hr tablet Take 150 mg by mouth once daily.      celecoxib (CELEBREX) 100 MG capsule Take 1 capsule (100 mg total) by mouth once daily. 30 capsule 4    chlorthalidone (HYGROTEN) 25 MG Tab TAKE 1 TABLET BY MOUTH EVERY DAY 90 tablet 3    cloNIDine (CATAPRES) 0.1 MG tablet 1 po BID prn BP above 180/100      dapagliflozin (FARXIGA) 5 mg Tab tablet Take 1 tablet (5 mg total) by mouth once daily. 90 tablet 4    dexmethylphenidate (FOCALIN XR) 30 mg 24 hr capsule Take 1 capsule by mouth once daily.      dexmethylphenidate (FOCALIN) 10 MG tablet Take 10 mg by mouth every evening.      dextroamphetamine-amphetamine (ADDERALL XR) 30 MG 24 hr capsule Take by mouth every morning.      divalproex 500 MG Tb24       ergocalciferol (ERGOCALCIFEROL) 50,000 unit Cap Take 50,000 Units by mouth twice a week.      esomeprazole (NEXIUM) 20 MG capsule Take 20 mg by mouth before breakfast.      FOLBEE 2.5-25-1 mg Tab Take 1 tablet by mouth once daily.      insulin degludec (TRESIBA FLEXTOUCH U-200) 200 unit/mL (3 mL) insulin pen 15 u qhs 10 pen 4    lisinopriL (PRINIVIL,ZESTRIL) 5 MG tablet Take 1 tablet (5 mg total) by mouth once " "daily. 90 tablet 3    LORazepam (ATIVAN) 2 MG Tab Take 1-2 mg by mouth daily as needed.      metFORMIN (GLUCOPHAGE) 1000 MG tablet Take 1 tablet (1,000 mg total) by mouth 2 (two) times daily with meals. 180 tablet 3    mupirocin (BACTROBAN) 2 % ointment Apply topically 3 (three) times daily.      needle, disp, 30 gauge 30 gauge x 1/2" Ndle 1 each by Misc.(Non-Drug; Combo Route) route once a week. 25 each 3    pen needle, diabetic (BD ULTRA-FINE MINI PEN NEEDLE) 31 gauge x 3/16" Ndle 1 each by Misc.(Non-Drug; Combo Route) route 4 (four) times daily. 100 each 11    pregabalin (LYRICA) 150 MG capsule Take 1 capsule (150 mg total) by mouth 2 (two) times daily. 180 capsule 1    syringe, disposable, 1 mL Syrg 1 Syringe by Misc.(Non-Drug; Combo Route) route once a week. 25 each 0    TRULICITY 1.5 mg/0.5 mL pen injector INJECT 1.5MG UNDER THE SKIN EVERY 7 DAYS 12 pen 4    blood-glucose meter (FREESTYLE FREEDOM LITE) kit Use as instructed 1 each 0    sildenafiL (VIAGRA) 100 MG tablet Take 1 tablet (100 mg total) by mouth as needed for Erectile Dysfunction. 9 tablet 10     No current facility-administered medications on file prior to visit.       PSH     Past Surgical History:   Procedure Laterality Date    ANKLE SURGERY Right     ANKLE SURGERY Left     COLONOSCOPY N/A 5/17/2017    Procedure: COLONOSCOPY;  Surgeon: Carlos Hess MD;  [facility];  Service: Endoscopy;  Laterality: N/A;    COLONOSCOPY W/ POLYPECTOMY      CYSTOSCOPY      CYSTOSCOPY N/A 10/23/2018    Procedure: CYSTOSCOPY;  Surgeon: Sarwat Viveros MD;  Location: [facility] OR;  Service: Urology;  Laterality: N/A;    CYSTOSCOPY WITH INSERTION OF MINIMALLY INVASIVE IMPLANT TO ENLARGE PROSTATIC URETHRA N/A 11/15/2018    Procedure: CYSTOSCOPY, WITH INSERTION OF UROLIFT IMPLANT;  Surgeon: Sarwat Viveros MD;  Location: [facility] OR;  Service: Urology;  Laterality: N/A;    DEBRIDEMENT TENNIS ELBOW      right    FOREARM SURGERY Right     tendon repair    FRACTURE " "SURGERY      right ankle orif    HERNIA REPAIR      umbillical    PROSTATE BIOPSY      QUADRICEPS REPAIR      right    TONSILLECTOMY      TRANSRECTAL ULTRASOUND EXAMINATION N/A 10/23/2018    Procedure: ULTRASOUND, RECTAL APPROACH;  Surgeon: Sarwat Viveros MD;  Location: [facility];  Service: Urology;  Laterality: N/A;    TRICEPS TENDON RELEASE      tendon repair left tricep    VASECTOMY          ALL  Review of patient's allergies indicates:   Allergen Reactions    Bactrim [sulfamethoxazole-trimethoprim] Other (See Comments)     Dizziness,lightheaded, heavy chest    Ciprofloxacin Other (See Comments)     CONFUSED, OFF BALANCE, WOKE UP AT NITE    Doxycycline Hives    Lamotrigine Rash     Other reaction(s): Unknown    Trileptal [oxcarbazepine]        SOC     Social History     Tobacco Use    Smoking status: Never    Smokeless tobacco: Never   Substance Use Topics    Alcohol use: No    Drug use: No         Family HX    Family History   Problem Relation Age of Onset    Pulmonary embolism Mother     No Known Problems Father     Heart disease Other     Heart attack Other     Hypertension Other     Hyperlipidemia Other     Arthritis Other     Clotting disorder Other     Mental illness Other     Diabetes Other     Cancer Other     Melanoma Neg Hx     Psoriasis Neg Hx     Lupus Neg Hx     Eczema Neg Hx             REVIEW OF SYSTEMS  General: Denies any fever or chills  Chest: Denies shortness of breath, wheezing, coughing, or sputum production  Heart: Denies chest pain.  As noted above and per history of current illness above, otherwise negative in the remainder of the 14 systems.     PHYSICAL EXAM  Vitals:    08/23/23 1531   Weight: 109.3 kg (240 lb 15.4 oz)   Height: 5' 11" (1.803 m)   PainSc: 0-No pain       GEN:  This patient is well-developed, well-nourished and appears stated age, well-oriented to person, place and time, and cooperative and pleasant on today's visit.      LOWER EXTREMITY    VASCULAR  DP pedal pulse 2/4 " RIGHT, LEFT2/4   PT pedal pulse 2/4 RIGHT, LEFT2/4  Capillary refill time immediate to the toes.   Feet are warm to the touch. Skin temperature warm to warm from proximally to distally   There are varicosities, telangiectasias noted to bilateral foot and ankle regions.   There are no ecchymoses noted to bilateral foot and ankle regions.   There is mild gross lower extremity edema.    DERMATOLOGIC  Skin moist with healthy texture and turgor.  Thickened, dystrophic, elongated, discolored toenails with subungal debris 1-5 b/l   There are no open ulcerations, lacerations, or fissures to bilateral foot and ankle regions. There are no signs of infection as there is no erythema, no proximal-extending lymphangiitis, no fluctuance, or crepitus noted on palpation to bilateral foot and ankle regions.   There is no interdigital maceration.   There are no hyperkeratotic lesions noted to feet.    NEUROLOGIC  Protective sensation intact at 6/10 sites upon examination with [place] Weinsten 5.07 g monofilament.  Propioception intact at 1st MTPJ b/l.  Achilles and patellar deep tendon reflexes intact  Babinski reflex absent    ORTHOPEDIC/BIOMECHANICAL  No symptomatic structural abnormalities noted. Muscle strength is 5/5 for foot inverters, everters, plantarflexors, and dorsiflexors. Muscle tone is normal.  I  nspection/palpation of bone, joints and muscles unremarkable.    ASSESSMENT  Diabetes mellitus type 2 with neurological manifestations    Venous insufficiency of both lower extremities    Hammer toes of both feet    Bunion - Left Foot    Onychogryposis        PLAN  -The patient was examined and evaulated  -Discuss presenting problems, etiology, pathologic processes and management options with patient today.   -I counseled the patient on their conditions, their implications and medical management. An in depth discussion on diabetic management, risk prevention, amputation prevention verbally and provided educational literature in  written format.  -With patient's permission, the elongated onychomycotic toenails, as outlined in the physical examination, were sharply debrided/trimmed with a double action nail nipper to their soft tissue attachment. If indicated, the nails were then smoothed down in thickness with an lea board to facilitate in further debridement removing all offending nail and subungual debris. Patient relates relief following the procedure.     I counseled the patient on his/her Diabetic Mellitus regarding today's clinical examination and objection findings. We did also discuss recent medication changes, pertinent labs and imaging evaluations and other medical consultation notes and progress notes. Greater than 50% of this visit was spent on counseling and coordination of care. Greater than 20 minutes of this appt. was spent on education about the diabetic foot, in relation to PVD and/or neuropathy, and the prevention of limb loss. Patient received these instructions in written format and discussed verbally.     Comprehensive in depth discussion provided in written format in regards to diabetic/at risk foot health and amputation prevention. Summary as noted below:  1. Anti-diabetic medications should be taken regularly to prevent complications.  2. Feet should be washed daily.  3. Lukewarm water should be used to wash feet.   4. The temperature of the water should be checked before washing feet.   5. Feet should be dried completely after washing and in between toes.  6. Talcum powder (Zeasorb ( should be used to keep the areas betweent he toes dry.   7. Lotion or moisturizing cream should be applied to the fet daily to prevent dryness of the skin. Examples provided to patient OTC.  8. Lotion should not be applied between the toes.  9. Socks should be changed daily. I recommend white socks in order to be able to note any drainage or bleeding if injury occurs.  10. Toenails should be trimmed straight across (if  applicable).  11. Feet should be inspected at least once a day.  12. Diabetic patients should wear comfortable shoes. Examples provided.   13. The inside of the shoes should be inspected before wearing them.  14. Diabetic patients should not walk barefoot.   15. Diabetic patients should consult their podiatrist if their feet have redness, blisters, cuts, or wounds.       Shoe gear is inspected and wear and proper fit/type. If applicable/covered, pt was provided prescription for diabetic shoes and/or custom molded inserts.  Shoe Fitting recommendations:  Have foot measured while standing.  Try on both shoes and walk around to be sure shoes are comfortable.  Allow at least a thumbs width of space at end of longest toe in shoes. Make sure you can wiggle toes inside shoe.  Try on shoes at end of the day due to foot swelling.   Look for shoes with a round and wide flexible  toe box, extra cushion, and thick/stiff heel. Check inside shoes and avoid thick seams.   Breathable anti-microbial or moisture wicking fabric is preferred. Leather uppers are optimal.     Patient is reminded of the importance of good nutrition and blood sugar control to help prevent podiatric complications of diabetes. I discussed proper blood glucose control and co-management with diabetes education team and patient's PCP and/or Endocrinology Advanced Practice Provider.  Patient  will continue to monitor the areas daily, inspect feet, wear protective shoe gear when ambulatory, moisturizer to maintain skin integrity.    Follow up as scheduled below or sooner if any problem arises with foot and/or ankle.       Disclaimer: This note was partially prepared using a voice recognition system and is likely to have sound alike errors within the text.        Future Appointments   Date Time Provider Department [place]   2/8/2024 10:00 AM LABORATORY, [facility] LAB [facility]   2/8/2024 10:20 AM SPECIMEN, [facility] SPECLAB [facility]   2/15/2024 11:00 AM Roberto  Cynthia MAE DNP, NP [facility] ENDOCRN [place]   2/19/2024  9:35 AM LABORATORY, [facility] HG LAB [place]   2/23/2024 10:15 AM Faisal Briones MD [facility] UROLOGY [facility]       Report Electronically Signed By:     Evy Forbes DPM   Podiatry  [facility]  8/23/2023

## 2023-09-14 ENCOUNTER — PATIENT MESSAGE (OUTPATIENT)
Dept: ENDOCRINOLOGY | Facility: CLINIC | Age: 63
End: 2023-09-14
Payer: COMMERCIAL

## 2023-10-29 DIAGNOSIS — E11.69 HYPERLIPIDEMIA ASSOCIATED WITH TYPE 2 DIABETES MELLITUS: ICD-10-CM

## 2023-10-29 DIAGNOSIS — Z91.89 CARDIOVASCULAR EVENT RISK: ICD-10-CM

## 2023-10-29 DIAGNOSIS — E78.5 HYPERLIPIDEMIA ASSOCIATED WITH TYPE 2 DIABETES MELLITUS: ICD-10-CM

## 2023-10-30 RX ORDER — ATORVASTATIN CALCIUM 40 MG/1
40 TABLET, FILM COATED ORAL NIGHTLY
Qty: 90 TABLET | Refills: 4 | Status: SHIPPED | OUTPATIENT
Start: 2023-10-30

## 2023-11-01 DIAGNOSIS — N18.31 TYPE 2 DIABETES MELLITUS WITH STAGE 3A CHRONIC KIDNEY DISEASE, WITH LONG-TERM CURRENT USE OF INSULIN: ICD-10-CM

## 2023-11-01 DIAGNOSIS — E11.22 TYPE 2 DIABETES MELLITUS WITH STAGE 3A CHRONIC KIDNEY DISEASE, WITH LONG-TERM CURRENT USE OF INSULIN: ICD-10-CM

## 2023-11-01 DIAGNOSIS — Z79.4 TYPE 2 DIABETES MELLITUS WITH STAGE 3A CHRONIC KIDNEY DISEASE, WITH LONG-TERM CURRENT USE OF INSULIN: ICD-10-CM

## 2023-11-01 RX ORDER — BLOOD-GLUCOSE TRANSMITTER
EACH MISCELLANEOUS
Qty: 1 EACH | Refills: 3 | Status: SHIPPED | OUTPATIENT
Start: 2023-11-01 | End: 2024-01-23

## 2023-11-01 RX ORDER — BLOOD-GLUCOSE SENSOR
EACH MISCELLANEOUS
Qty: 9 EACH | Refills: 3 | Status: SHIPPED | OUTPATIENT
Start: 2023-11-01 | End: 2024-01-23

## 2023-11-29 ENCOUNTER — OFFICE VISIT (OUTPATIENT)
Dept: PODIATRY | Facility: CLINIC | Age: 63
End: 2023-11-29
Payer: MEDICARE

## 2023-11-29 VITALS — WEIGHT: 240 LBS | HEIGHT: 71 IN | BODY MASS INDEX: 33.6 KG/M2

## 2023-11-29 DIAGNOSIS — E11.49 DIABETES MELLITUS TYPE 2 WITH NEUROLOGICAL MANIFESTATIONS: Primary | ICD-10-CM

## 2023-11-29 DIAGNOSIS — L60.2 ONYCHOGRYPOSIS: ICD-10-CM

## 2023-11-29 PROCEDURE — 99213 OFFICE O/P EST LOW 20 MIN: CPT | Mod: 25,S$GLB,, | Performed by: PODIATRIST

## 2023-11-29 PROCEDURE — 11721 DEBRIDE NAIL 6 OR MORE: CPT | Mod: S$GLB,,, | Performed by: PODIATRIST

## 2023-11-29 PROCEDURE — 99215 OFFICE O/P EST HI 40 MIN: CPT | Mod: PBBFAC | Performed by: PODIATRIST

## 2023-11-29 PROCEDURE — 99999 PR PBB SHADOW E&M-EST. PATIENT-LVL V: CPT | Mod: PBBFAC,,, | Performed by: PODIATRIST

## 2023-11-29 PROCEDURE — 99999 PR PBB SHADOW E&M-EST. PATIENT-LVL V: ICD-10-PCS | Mod: PBBFAC,,, | Performed by: PODIATRIST

## 2023-11-29 PROCEDURE — 11721 DEBRIDE NAIL 6 OR MORE: CPT | Mod: Q9,PBBFAC | Performed by: PODIATRIST

## 2023-12-12 ENCOUNTER — PATIENT MESSAGE (OUTPATIENT)
Dept: ENDOCRINOLOGY | Facility: CLINIC | Age: 63
End: 2023-12-12
Payer: MEDICARE

## 2023-12-12 DIAGNOSIS — N18.31 TYPE 2 DIABETES MELLITUS WITH STAGE 3A CHRONIC KIDNEY DISEASE, WITH LONG-TERM CURRENT USE OF INSULIN: ICD-10-CM

## 2023-12-12 DIAGNOSIS — E11.22 TYPE 2 DIABETES MELLITUS WITH STAGE 3A CHRONIC KIDNEY DISEASE, WITH LONG-TERM CURRENT USE OF INSULIN: Primary | ICD-10-CM

## 2023-12-12 DIAGNOSIS — Z79.4 TYPE 2 DIABETES MELLITUS WITH STAGE 3A CHRONIC KIDNEY DISEASE, WITH LONG-TERM CURRENT USE OF INSULIN: ICD-10-CM

## 2023-12-12 DIAGNOSIS — N18.31 TYPE 2 DIABETES MELLITUS WITH STAGE 3A CHRONIC KIDNEY DISEASE, WITH LONG-TERM CURRENT USE OF INSULIN: Primary | ICD-10-CM

## 2023-12-12 DIAGNOSIS — Z79.4 TYPE 2 DIABETES MELLITUS WITH STAGE 3A CHRONIC KIDNEY DISEASE, WITH LONG-TERM CURRENT USE OF INSULIN: Primary | ICD-10-CM

## 2023-12-12 DIAGNOSIS — E11.22 TYPE 2 DIABETES MELLITUS WITH STAGE 3A CHRONIC KIDNEY DISEASE, WITH LONG-TERM CURRENT USE OF INSULIN: ICD-10-CM

## 2023-12-12 RX ORDER — BLOOD-GLUCOSE SENSOR
EACH MISCELLANEOUS
Qty: 9 EACH | Refills: 3 | Status: SHIPPED | OUTPATIENT
Start: 2023-12-12 | End: 2024-01-23

## 2023-12-13 RX ORDER — INSULIN DEGLUDEC 200 U/ML
INJECTION, SOLUTION SUBCUTANEOUS
Qty: 10 PEN | Refills: 4 | Status: SHIPPED | OUTPATIENT
Start: 2023-12-13 | End: 2024-02-15

## 2023-12-14 NOTE — PROGRESS NOTES
PODIATRIC MEDICINE AND SURGERY     CHIEF COMPLAINT  Chief Complaint   Patient presents with    Routine Foot Care     3 month RFC, pt states no pain today, is a diabetic, pt last seen pcp Ana Cook NP 07/20/23         HPI    SUBJECTIVE: Sarwat Colunga is a 63 y.o. male who  has a past medical history of Arthritis, Benign localized hyperplasia of prostate without urinary obstruction and other lower urinary tract symptoms (LUTS), Body mass index 37.0-37.9, adult, Diabetes mellitus, Esophageal reflux, History of TIA (transient ischemic attack) (04/17/2022), Hypertension, Other and unspecified hyperlipidemia, Other testicular hypofunction, Polyneuropathy in diabetes(357.2), PONV (postoperative nausea and vomiting), Rosacea, Sleep apnea, and Type II or unspecified type diabetes mellitus with neurological manifestations, uncontrolled(250.62). Sarwatpresents to clinic for high risk diabetic foot exam and care.  Sarwat admits numbness, burning, and/or tingling sensations in their feet. Patient admits to painful toenails aggravated by increased weight bearing, shoe gear, and pressure. States pain is relieved with routine debridements. Patient has no other pedal complaints at this time.    This patient has documented high risk feet requiring routine maintenance secondary to diabetes mellitis and those secondary complications of diabetes, as mentioned.      HgA1c:   Hemoglobin A1C   Date Value Ref Range Status   08/08/2023 5.8 (H) 4.0 - 5.6 % Final     Comment:     ADA Screening Guidelines:  5.7-6.4%  Consistent with prediabetes  >or=6.5%  Consistent with diabetes    High levels of fetal hemoglobin interfere with the HbA1C  assay. Heterozygous hemoglobin variants (HbS, HgC, etc)do  not significantly interfere with this assay.   However, presence of multiple variants may affect accuracy.     02/16/2023 6.3 (H) 4.0 - 5.6 % Final     Comment:     ADA Screening Guidelines:  5.7-6.4%  Consistent with  prediabetes  >or=6.5%  Consistent with diabetes    High levels of fetal hemoglobin interfere with the HbA1C  assay. Heterozygous hemoglobin variants (HbS, HgC, etc)do  not significantly interfere with this assay.   However, presence of multiple variants may affect accuracy.     08/04/2022 6.4 (H) 4.0 - 5.6 % Final     Comment:     ADA Screening Guidelines:  5.7-6.4%  Consistent with prediabetes  >or=6.5%  Consistent with diabetes    High levels of fetal hemoglobin interfere with the HbA1C  assay. Heterozygous hemoglobin variants (HbS, HgC, etc)do  not significantly interfere with this assay.   However, presence of multiple variants may affect accuracy.           [facility]  Past Medical History:   Diagnosis Date    Arthritis     Benign localized hyperplasia of prostate without urinary obstruction and other lower urinary tract symptoms (LUTS)     Body mass index 37.0-37.9, adult     Diabetes mellitus     Esophageal reflux     History of TIA (transient ischemic attack) 04/17/2022    Hypertension     Other and unspecified hyperlipidemia     Other testicular hypofunction     Polyneuropathy in diabetes(357.2)     PONV (postoperative nausea and vomiting)     Rosacea     Sleep apnea     uses CPAP    Type II or unspecified type diabetes mellitus with neurological manifestations, uncontrolled(250.62)      Patient Active Problem List   Diagnosis    Rosacea    Rhinophyma    Esophageal reflux    Diabetes mellitus type 2 with neurological manifestations    Other testicular hypofunction    Elevated PSA    Hypertension associated with diabetes    Hyperlipidemia associated with type 2 diabetes mellitus    Cardiovascular event risk, ASCVD 10 year risk 23.1%, on atorvastatin 20 mg, 12/2015    DANNY on CPAP    History of colonic polyps    BPH (benign prostatic hyperplasia)    Type 2 diabetes mellitus with stage 3 chronic kidney disease, with long-term current use of insulin    Reactive depression    Occasional tremors    Gait disturbance     "Encephalopathy    Class 1 obesity due to excess calories without serious comorbidity with body mass index (BMI) of 33.0 to 33.9 in adult       MEDS  Current Outpatient Medications on File Prior to Visit   Medication Sig Dispense Refill    ARIPiprazole (ABILIFY) 10 MG Tab Take 10 mg by mouth nightly.      aspirin (ECOTRIN) 81 MG EC tablet Take 81 mg by mouth once daily.      atorvastatin (LIPITOR) 40 MG tablet TAKE 1 TABLET(40 MG) BY MOUTH EVERY EVENING 90 tablet 4    blood sugar diagnostic Strp Checks 4 times a day 400 strip 4    blood-glucose sensor (DEXCOM G6 SENSOR) Bibiana Change every 10 days 9 each 3    blood-glucose transmitter (DEXCOM G6 TRANSMITTER) Bibiana Change every 90 days 1 each 3    blunt needle, disposable 18 x 1 1/2 " Ndle 1 Syringe by Misc.(Non-Drug; Combo Route) route once a week. 25 each 3    buPROPion (WELLBUTRIN XL) 300 MG 24 hr tablet Take 150 mg by mouth once daily.      celecoxib (CELEBREX) 100 MG capsule Take 1 capsule (100 mg total) by mouth once daily. 30 capsule 4    chlorthalidone (HYGROTEN) 25 MG Tab TAKE 1 TABLET BY MOUTH EVERY DAY 90 tablet 3    cloNIDine (CATAPRES) 0.1 MG tablet 1 po BID prn BP above 180/100      dapagliflozin (FARXIGA) 5 mg Tab tablet Take 1 tablet (5 mg total) by mouth once daily. 90 tablet 4    dexmethylphenidate (FOCALIN XR) 30 mg 24 hr capsule Take 1 capsule by mouth once daily.      dexmethylphenidate (FOCALIN) 10 MG tablet Take 10 mg by mouth every evening.      dextroamphetamine-amphetamine (ADDERALL XR) 30 MG 24 hr capsule Take by mouth every morning.      divalproex 500 MG Tb24       ergocalciferol (ERGOCALCIFEROL) 50,000 unit Cap Take 50,000 Units by mouth twice a week.      esomeprazole (NEXIUM) 20 MG capsule Take 20 mg by mouth before breakfast.      FOLBEE 2.5-25-1 mg Tab Take 1 tablet by mouth once daily.      lisinopriL (PRINIVIL,ZESTRIL) 5 MG tablet Take 1 tablet (5 mg total) by mouth once daily. 90 tablet 3    LORazepam (ATIVAN) 2 MG Tab Take 1-2 mg by " "mouth daily as needed.      metFORMIN (GLUCOPHAGE) 1000 MG tablet Take 1 tablet (1,000 mg total) by mouth 2 (two) times daily with meals. 180 tablet 3    mupirocin (BACTROBAN) 2 % ointment Apply topically 3 (three) times daily.      needle, disp, 30 gauge 30 gauge x 1/2" Ndle 1 each by Misc.(Non-Drug; Combo Route) route once a week. 25 each 3    pen needle, diabetic (BD ULTRA-FINE MINI PEN NEEDLE) 31 gauge x 3/16" Ndle 1 each by Misc.(Non-Drug; Combo Route) route 4 (four) times daily. 100 each 11    pregabalin (LYRICA) 150 MG capsule Take 1 capsule (150 mg total) by mouth 2 (two) times daily. 180 capsule 1    syringe, disposable, 1 mL Syrg 1 Syringe by Misc.(Non-Drug; Combo Route) route once a week. 25 each 0    TRULICITY 1.5 mg/0.5 mL pen injector INJECT 1.5MG UNDER THE SKIN EVERY 7 DAYS 12 pen 4    blood-glucose meter (FREESTYLE FREEDOM LITE) kit Use as instructed 1 each 0    sildenafiL (VIAGRA) 100 MG tablet Take 1 tablet (100 mg total) by mouth as needed for Erectile Dysfunction. 9 tablet 10     No current facility-administered medications on file prior to visit.       PSH     Past Surgical History:   Procedure Laterality Date    ANKLE SURGERY Right     ANKLE SURGERY Left     COLONOSCOPY N/A 5/17/2017    Procedure: COLONOSCOPY;  Surgeon: Carlos Hess MD;  [facility]: [facility];  Service: Endoscopy;  Laterality: N/A;    COLONOSCOPY W/ POLYPECTOMY      CYSTOSCOPY      CYSTOSCOPY N/A 10/23/2018    Procedure: CYSTOSCOPY;  Surgeon: Sarwat Viveros MD;  Location: [facility] OR;  Service: Urology;  Laterality: N/A;    CYSTOSCOPY WITH INSERTION OF MINIMALLY INVASIVE IMPLANT TO ENLARGE PROSTATIC URETHRA N/A 11/15/2018    Procedure: CYSTOSCOPY, WITH INSERTION OF UROLIFT IMPLANT;  Surgeon: Sarwat Viveros MD;  Location: [facility] OR;  Service: Urology;  Laterality: N/A;    DEBRIDEMENT TENNIS ELBOW      right    FOREARM SURGERY Right     tendon repair    FRACTURE SURGERY      right ankle orif    HERNIA REPAIR      umbillical " "   PROSTATE BIOPSY      QUADRICEPS REPAIR      right    TONSILLECTOMY      TRANSRECTAL ULTRASOUND EXAMINATION N/A 10/23/2018    Procedure: ULTRASOUND, RECTAL APPROACH;  Surgeon: Sarwat Viveros MD;  Location: [facility];  Service: Urology;  Laterality: N/A;    TRICEPS TENDON RELEASE      tendon repair left tricep    VASECTOMY          ALL  Review of patient's allergies indicates:   Allergen Reactions    Bactrim [sulfamethoxazole-trimethoprim] Other (See Comments)     Dizziness,lightheaded, heavy chest    Ciprofloxacin Other (See Comments)     CONFUSED, OFF BALANCE, WOKE UP AT NITE    Doxycycline Hives    Lamotrigine Rash     Other reaction(s): Unknown    Trileptal [oxcarbazepine]        SOC     Social History     Tobacco Use    Smoking status: Never    Smokeless tobacco: Never   Substance Use Topics    Alcohol use: No    Drug use: No         Family HX    Family History   Problem Relation Age of Onset    Pulmonary embolism Mother     No Known Problems Father     Heart disease Other     Heart attack Other     Hypertension Other     Hyperlipidemia Other     Arthritis Other     Clotting disorder Other     Mental illness Other     Diabetes Other     Cancer Other     Melanoma Neg Hx     Psoriasis Neg Hx     Lupus Neg Hx     Eczema Neg Hx             REVIEW OF SYSTEMS  General: Denies any fever or chills  Chest: Denies shortness of breath, wheezing, coughing, or sputum production  Heart: Denies chest pain.  As noted above and per history of current illness above, otherwise negative in the remainder of the 14 systems.     PHYSICAL EXAM  Vitals:    11/29/23 1547   Weight: 108.9 kg (240 lb)   Height: 5' 11" (1.803 m)   PainSc: 0-No pain       GEN:  This patient is well-developed, well-nourished and appears stated age, well-oriented to person, place and time, and cooperative and pleasant on today's visit.      LOWER EXTREMITY    VASCULAR  DP pedal pulse 2/4 RIGHT, LEFT2/4   PT pedal pulse 2/4 RIGHT, LEFT2/4  Capillary refill " time immediate to the toes.   Feet are warm to the touch. Skin temperature warm to warm from proximally to distally   There are varicosities, telangiectasias noted to bilateral foot and ankle regions.   There are no ecchymoses noted to bilateral foot and ankle regions.   There is mild gross lower extremity edema.    DERMATOLOGIC  Skin moist with healthy texture and turgor.  Thickened, dystrophic, elongated, discolored toenails with subungal debris 1-5 b/l   There are no open ulcerations, lacerations, or fissures to bilateral foot and ankle regions. There are no signs of infection as there is no erythema, no proximal-extending lymphangiitis, no fluctuance, or crepitus noted on palpation to bilateral foot and ankle regions.   There is no interdigital maceration.   There are no hyperkeratotic lesions noted to feet.    NEUROLOGIC  Protective sensation intact at 6/10 sites upon examination with Claiborne Weinsten 5.07 g monofilament.  Propioception intact at 1st MTPJ b/l.  Achilles and patellar deep tendon reflexes intact  Babinski reflex absent    ORTHOPEDIC/BIOMECHANICAL  No symptomatic structural abnormalities noted. Muscle strength is 5/5 for foot inverters, everters, plantarflexors, and dorsiflexors. Muscle tone is normal.  I  nspection/palpation of bone, joints and muscles unremarkable.    ASSESSMENT  Diabetes mellitus type 2 with neurological manifestations    Onychogryposis          PLAN  -The patient was examined and evaulated  -Discuss presenting problems, etiology, pathologic processes and management options with patient today.   -I counseled the patient on their conditions, their implications and medical management. An in depth discussion on diabetic management, risk prevention, amputation prevention verbally and provided educational literature in written format.  -With patient's permission, the elongated onychomycotic toenails, as outlined in the physical examination, were sharply debrided/trimmed with a double  action nail nipper to their soft tissue attachment. If indicated, the nails were then smoothed down in thickness with an lea board to facilitate in further debridement removing all offending nail and subungual debris. Patient relates relief following the procedure.     Patient is reminded of the importance of good nutrition and blood sugar control to help prevent podiatric complications of diabetes. I discussed proper blood glucose control and co-management with diabetes education team and patient's PCP and/or Endocrinology Advanced Practice Provider.  Patient  will continue to monitor the areas daily, inspect feet, wear protective shoe gear when ambulatory, moisturizer to maintain skin integrity.    Follow up as scheduled below or sooner if any problem arises with foot and/or ankle.       Disclaimer: This note was partially prepared using a voice recognition system and is likely to have sound alike errors within the text.        Future Appointments   Date Time Provider Department Center   2/8/2024 10:00 AM LABORATORY, [facility] LAB [facility]   2/8/2024 10:20 AM SPECIMEN, [facility] SPECLAB [facility]   2/15/2024 11:00 AM Cynthia Mejia, DNP, NP [facility] ENDOCRN [place]   2/19/2024  9:35 AM LABORATORY, [facility] LAB [place]   2/23/2024 10:15 AM Faisal Briones MD [facility] UROLOGY [facility]   2/29/2024  3:00 PM Evy Forbes DPM [facility] POD [facility]       Report Electronically Signed By:     Evy Forbes DPM   Podiatry  [facility]-   12/13/2023

## 2023-12-23 ENCOUNTER — PATIENT MESSAGE (OUTPATIENT)
Dept: UROLOGY | Facility: CLINIC | Age: 63
End: 2023-12-23
Payer: MEDICARE

## 2023-12-27 ENCOUNTER — TELEPHONE (OUTPATIENT)
Dept: PODIATRY | Facility: CLINIC | Age: 63
End: 2023-12-27
Payer: MEDICARE

## 2023-12-27 NOTE — TELEPHONE ENCOUNTER
LVM to schedule pt, don't have anything available  ----- Message from So Garcia sent at 12/27/2023  9:03 AM CST -----  .Type:  Same Day Appointment Request    Caller is requesting a same day appointment.  Caller declined first available appointment listed below.    Name of Caller:Sarwat Colunga  When is the first available appointment?01/04/24  Symptoms:Sore on toe, diabetic  Best Call Back Number:[phone]  Additional Information: Andrei or anyone today. MRN [id number]

## 2023-12-31 ENCOUNTER — PATIENT MESSAGE (OUTPATIENT)
Dept: UROLOGY | Facility: CLINIC | Age: 63
End: 2023-12-31
Payer: MEDICARE

## 2024-01-17 ENCOUNTER — OFFICE VISIT (OUTPATIENT)
Dept: DERMATOLOGY | Facility: CLINIC | Age: 64
End: 2024-01-17
Payer: MEDICARE

## 2024-01-17 DIAGNOSIS — L71.9 ACNE ROSACEA: Primary | ICD-10-CM

## 2024-01-17 DIAGNOSIS — L71.9 ROSACEA: ICD-10-CM

## 2024-01-17 PROCEDURE — 99999 PR PBB SHADOW E&M-EST. PATIENT-LVL IV: CPT | Mod: PBBFAC,,, | Performed by: DERMATOLOGY

## 2024-01-17 PROCEDURE — 99204 OFFICE O/P NEW MOD 45 MIN: CPT | Mod: S$PBB,,, | Performed by: DERMATOLOGY

## 2024-01-17 PROCEDURE — 99214 OFFICE O/P EST MOD 30 MIN: CPT | Mod: PBBFAC | Performed by: DERMATOLOGY

## 2024-01-17 RX ORDER — IVERMECTIN 10 MG/G
CREAM TOPICAL
Qty: 45 G | Refills: 3 | Status: SHIPPED | OUTPATIENT
Start: 2024-01-17

## 2024-01-17 RX ORDER — ERYTHROMYCIN 20 MG/G
GEL TOPICAL
Qty: 60 G | Refills: 3 | Status: SHIPPED | OUTPATIENT
Start: 2024-01-17

## 2024-01-17 RX ORDER — DOXYCYCLINE 100 MG/1
CAPSULE ORAL
Qty: 30 CAPSULE | Refills: 2 | Status: SHIPPED | OUTPATIENT
Start: 2024-01-17 | End: 2024-04-16

## 2024-01-17 NOTE — PROGRESS NOTES
Subjective:      Patient ID:  Sarwat Colunga is a 63 y.o. male who presents for   Chief Complaint   Patient presents with    Rosacea     With lots of drainage and very painful.      Hx of rosacea and prurigo, last seen by Dr. Nunez on 8/24/18. Pt has hx of hives to doxycycline (allergies to other antibiotics).  He c/o frequent flares of the face x 2 weeks.  + use of hot sauce during suzie with flare of the face.       Prior tx: metrogel, laser treatment (improvement for years)        Review of Systems   Constitutional:  Negative for fever and chills.   Gastrointestinal:  Negative for nausea and vomiting.   Skin:  Positive for activity-related sunscreen use. Negative for daily sunscreen use and recent sunburn.   Hematologic/Lymphatic: Does not bruise/bleed easily.       Objective:   Physical Exam   Constitutional: He appears well-developed and well-nourished. No distress.   Neurological: He is alert and oriented to person, place, and time. He is not disoriented.   Psychiatric: He has a normal mood and affect.   Skin:   Areas Examined (abnormalities noted in diagram):   Head / Face Inspection Performed  Neck Inspection Performed  RUE Inspected  LUE Inspection Performed  Nails and Digits Inspection Performed            Diagram Legend     Surgical scar with no sign of skin cancer recurrence      Open and closed comedones      Inflammatory papules and pustules     Assessment / Plan:        Acne rosacea  Rosacea  -     ivermectin (SOOLANTRA) 1 % Crea; AAA face qhs  Dispense: 45 g; Refill: 3  -     erythromycin with ethanoL (EMGEL) 2 % gel; AAA of face qAM.  For rosacea/redness.  Dispense: 60 g; Refill: 3  -     doxycycline (MONODOX) 100 MG capsule; Take once daily with food.  May cause upset stomach.  Dispense: 30 capsule; Refill: 2  -     at patient insistence, plan to start doxcycyline.  Patient currently denies allergy to doxycycline.  Pt states he had rash on the hand while on doxycycline, but denies hx of  hives or generalized allergy to doxycycline.  Will start sooolantra qHS and emgel qAM and doxy 100 mg qD x 3-6 months.     Side effect profile of doxy reviewed including increased sun sensitivity and upset stomach.    Informed patient that if he starts to develop mouth or genital sores, changes in vision, pain in eyes, difficulty or pain with swallowing, difficulty or pain with urinating, blisters on the skin, or for any other concerns, then he should present to the emergency department. The patient acknowledged understanding.           Follow up in about 3 months (around 4/17/2024).

## 2024-01-19 ENCOUNTER — PATIENT MESSAGE (OUTPATIENT)
Dept: ENDOCRINOLOGY | Facility: CLINIC | Age: 64
End: 2024-01-19
Payer: MEDICARE

## 2024-01-23 ENCOUNTER — TELEPHONE (OUTPATIENT)
Dept: ENDOCRINOLOGY | Facility: CLINIC | Age: 64
End: 2024-01-23
Payer: MEDICARE

## 2024-01-23 DIAGNOSIS — N18.31 TYPE 2 DIABETES MELLITUS WITH STAGE 3A CHRONIC KIDNEY DISEASE, WITH LONG-TERM CURRENT USE OF INSULIN: ICD-10-CM

## 2024-01-23 DIAGNOSIS — E11.22 TYPE 2 DIABETES MELLITUS WITH STAGE 3A CHRONIC KIDNEY DISEASE, WITH LONG-TERM CURRENT USE OF INSULIN: ICD-10-CM

## 2024-01-23 DIAGNOSIS — Z79.4 TYPE 2 DIABETES MELLITUS WITH STAGE 3A CHRONIC KIDNEY DISEASE, WITH LONG-TERM CURRENT USE OF INSULIN: ICD-10-CM

## 2024-01-23 RX ORDER — TIRZEPATIDE 2.5 MG/.5ML
2.5 INJECTION, SOLUTION SUBCUTANEOUS
Qty: 4 PEN | Refills: 0 | Status: SHIPPED | OUTPATIENT
Start: 2024-01-23 | End: 2024-02-15

## 2024-01-23 RX ORDER — BLOOD-GLUCOSE SENSOR
EACH MISCELLANEOUS
Qty: 9 EACH | Refills: 3 | Status: SHIPPED | OUTPATIENT
Start: 2024-01-23 | End: 2024-02-15 | Stop reason: SDUPTHER

## 2024-02-08 ENCOUNTER — LAB VISIT (OUTPATIENT)
Dept: LAB | Facility: HOSPITAL | Age: 64
End: 2024-02-08
Attending: NURSE PRACTITIONER
Payer: COMMERCIAL

## 2024-02-08 ENCOUNTER — LAB VISIT (OUTPATIENT)
Dept: LAB | Facility: HOSPITAL | Age: 64
End: 2024-02-08
Payer: MEDICARE

## 2024-02-08 DIAGNOSIS — Z79.4 TYPE 2 DIABETES MELLITUS WITH STAGE 3A CHRONIC KIDNEY DISEASE, WITH LONG-TERM CURRENT USE OF INSULIN: ICD-10-CM

## 2024-02-08 DIAGNOSIS — N18.31 TYPE 2 DIABETES MELLITUS WITH STAGE 3A CHRONIC KIDNEY DISEASE, WITH LONG-TERM CURRENT USE OF INSULIN: ICD-10-CM

## 2024-02-08 DIAGNOSIS — E11.22 TYPE 2 DIABETES MELLITUS WITH STAGE 3A CHRONIC KIDNEY DISEASE, WITH LONG-TERM CURRENT USE OF INSULIN: ICD-10-CM

## 2024-02-08 LAB
ALBUMIN/CREAT UR: NORMAL UG/MG (ref 0–30)
CREAT UR-MCNC: 57 MG/DL (ref 23–375)
ESTIMATED AVG GLUCOSE: 154 MG/DL (ref 68–131)
HBA1C MFR BLD: 7 % (ref 4–5.6)
MICROALBUMIN UR DL<=1MG/L-MCNC: <5 UG/ML

## 2024-02-08 PROCEDURE — 83036 HEMOGLOBIN GLYCOSYLATED A1C: CPT | Performed by: NURSE PRACTITIONER

## 2024-02-08 PROCEDURE — 36415 COLL VENOUS BLD VENIPUNCTURE: CPT | Performed by: NURSE PRACTITIONER

## 2024-02-08 PROCEDURE — 82043 UR ALBUMIN QUANTITATIVE: CPT | Performed by: NURSE PRACTITIONER

## 2024-02-14 NOTE — PROGRESS NOTES
CC: This 63 y.o. male presents for management of diabetes  and chronic conditions pending review including HTN, HLP, CKD 3, Obesity, DM PN, DANNY    HPI: He was diagnosed with T2DM in 2005. Has never been hospitalized r/t DM.  Family hx of DM: maybe dad?, sister's pre-diabetes  His wife passed away 2023   Moved to [place] to be closer to his brother     Not checking bg regularly   Checked BG Monday 103  84 this am, after breakfast of gumbo bg is 166  No hypoglycemia  Reports weight down to 234 lbs      Diet: Eats 3 Meals a day, snacks- none  Exercise: working out 3 days a week  CURRENT DM MEDS: metformin 1000 mg bid, Tresiba 16 u qhs, farxiga 5 mg qam, Mounjaro 2.5 mg weekly (Sunday)   Vial/pen:  Uses  pen  Glucometer type:   Freestyle      Standards of Care:  Eye exam: Dr Dickerson  2022  +MATIAS marks- he will schedule     Retired        ROS:   Gen: Decreased appetite, + weight loss  Eyes: Denies visual disturbances  Resp: no SOB or LEYVA, no cough  Cardiac: No palpitations, chest pain   GI: No nausea or vomiting, diarrhea, constipation .  /GYN: 0-2+ nocturia, no burning or pain.   MS/Neuro: + numbness/ tingling in BLE;speech clear    PE:  GENERAL: Well developed, well nourished.  PSYCH: AAOx3, appropriate mood and affect, pleasant expression, conversant, appears relaxed, well groomed.   EYES: Conjunctiva, corneas clear  NECK: Supple, trachea midline    FOOT EXAMINATION: 2/23/2023  + foot deformity- left hammer toe + callus formation, +Onychomycosis,    Decreased hair growth present over toes/feet.   Protective sensation absent bilaterally with 10 gram monofilament and absent vibratory sensation bilaterally, +1 dorsalis pedis and posterior pulses noted.      Personally reviewed Past Medical, Surgical, Social History.    There were no vitals taken for this visit.     Personally reviewed the below labs:      Chemistry        Component Value Date/Time     08/08/2023 0953    K 3.9 08/08/2023 0953     CL 96 08/08/2023 0953    CO2 29 08/08/2023 0953    BUN 20 08/08/2023 0953    CREATININE 1.4 08/08/2023 0953    GLU 97 08/08/2023 0953        Component Value Date/Time    CALCIUM 9.6 08/08/2023 0953    ALKPHOS 71 08/08/2023 0953    AST 18 08/08/2023 0953    ALT 17 08/08/2023 0953    BILITOT 0.7 08/08/2023 0953    ESTGFRAFRICA >60 07/09/2022 0942    EGFRNONAA 53 (A) 07/09/2022 0942        Lab Results   Component Value Date    TSH 0.710 04/17/2022       Recent Labs   Lab 08/08/23  0953   LDL Cholesterol 64.4   HDL 34 L   Cholesterol 122        Results for orders placed or performed in visit on 08/14/21   Vitamin D   Result Value Ref Range    Vit D, 25-Hydroxy 50 30 - 96 ng/mL     No results found for this or any previous visit.      Lab Results   Component Value Date    MICALBCREAT Unable to calculate 02/08/2024       Hemoglobin A1C   Date Value Ref Range Status   02/08/2024 7.0 (H) 4.0 - 5.6 % Final     Comment:     ADA Screening Guidelines:  5.7-6.4%  Consistent with prediabetes  >or=6.5%  Consistent with diabetes    High levels of fetal hemoglobin interfere with the HbA1C  assay. Heterozygous hemoglobin variants (HbS, HgC, etc)do  not significantly interfere with this assay.   However, presence of multiple variants may affect accuracy.     08/08/2023 5.8 (H) 4.0 - 5.6 % Final     Comment:     ADA Screening Guidelines:  5.7-6.4%  Consistent with prediabetes  >or=6.5%  Consistent with diabetes    High levels of fetal hemoglobin interfere with the HbA1C  assay. Heterozygous hemoglobin variants (HbS, HgC, etc)do  not significantly interfere with this assay.   However, presence of multiple variants may affect accuracy.     02/16/2023 6.3 (H) 4.0 - 5.6 % Final     Comment:     ADA Screening Guidelines:  5.7-6.4%  Consistent with prediabetes  >or=6.5%  Consistent with diabetes    High levels of fetal hemoglobin interfere with the HbA1C  assay. Heterozygous hemoglobin variants (HbS, HgC, etc)do  not significantly  interfere with this assay.   However, presence of multiple variants may affect accuracy.          ASSESSMENT and PLAN:    1. T2DM controlled w DM PN, CKD 3-    Decrease Tresiba to 12 units qhs, Continue metformin and farxiga   Increase Mounjaro to 5 mg weekly   Notify me for further issues  Would like him to resume Dexcom G7- Rx sent to [facility]  Remains off of the Abilify     2. HTN -   continue meds as previously prescribed and monitor.     3. HLP -   on statin therapy      4. Obesity- There is no height or weight on file to calculate BMI. Continue weight loss and exercise     5 DANNY- wears his cpap machine    6. Depression- chronic-stable has family support, no thoughts of hurting himself or others          Follow-up: in 3 months with A1C, CMP, Lipid, UMCR - 10 am slot virtual ok

## 2024-02-15 ENCOUNTER — OFFICE VISIT (OUTPATIENT)
Dept: ENDOCRINOLOGY | Facility: CLINIC | Age: 64
End: 2024-02-15
Payer: MEDICARE

## 2024-02-15 DIAGNOSIS — N18.31 TYPE 2 DIABETES MELLITUS WITH STAGE 3A CHRONIC KIDNEY DISEASE, WITH LONG-TERM CURRENT USE OF INSULIN: Primary | ICD-10-CM

## 2024-02-15 DIAGNOSIS — Z79.4 TYPE 2 DIABETES MELLITUS WITH STAGE 3A CHRONIC KIDNEY DISEASE, WITH LONG-TERM CURRENT USE OF INSULIN: Primary | ICD-10-CM

## 2024-02-15 DIAGNOSIS — E11.22 TYPE 2 DIABETES MELLITUS WITH STAGE 3A CHRONIC KIDNEY DISEASE, WITH LONG-TERM CURRENT USE OF INSULIN: Primary | ICD-10-CM

## 2024-02-15 PROCEDURE — 99214 OFFICE O/P EST MOD 30 MIN: CPT | Mod: 95,,, | Performed by: NURSE PRACTITIONER

## 2024-02-15 RX ORDER — INSULIN DEGLUDEC 200 U/ML
INJECTION, SOLUTION SUBCUTANEOUS
Qty: 10 PEN | Refills: 4
Start: 2024-02-15 | End: 2024-05-10

## 2024-02-15 RX ORDER — BLOOD-GLUCOSE SENSOR
EACH MISCELLANEOUS
Qty: 9 EACH | Refills: 3 | Status: SHIPPED | OUTPATIENT
Start: 2024-02-15 | End: 2024-05-10

## 2024-02-15 RX ORDER — DAPAGLIFLOZIN 5 MG/1
5 TABLET, FILM COATED ORAL DAILY
Qty: 90 TABLET | Refills: 4 | Status: SHIPPED | OUTPATIENT
Start: 2024-02-15

## 2024-02-19 ENCOUNTER — PATIENT MESSAGE (OUTPATIENT)
Dept: ENDOCRINOLOGY | Facility: CLINIC | Age: 64
End: 2024-02-19
Payer: MEDICARE

## 2024-02-19 ENCOUNTER — LAB VISIT (OUTPATIENT)
Dept: LAB | Facility: HOSPITAL | Age: 64
End: 2024-02-19
Attending: UROLOGY
Payer: MEDICARE

## 2024-02-19 DIAGNOSIS — R97.20 ELEVATED PSA: ICD-10-CM

## 2024-02-19 LAB — COMPLEXED PSA SERPL-MCNC: 5 NG/ML (ref 0–4)

## 2024-02-19 PROCEDURE — 36415 COLL VENOUS BLD VENIPUNCTURE: CPT | Performed by: UROLOGY

## 2024-02-19 PROCEDURE — 84153 ASSAY OF PSA TOTAL: CPT | Performed by: UROLOGY

## 2024-02-19 RX ORDER — TIRZEPATIDE 5 MG/.5ML
INJECTION, SOLUTION SUBCUTANEOUS
Qty: 4 PEN | Refills: 1 | Status: SHIPPED | OUTPATIENT
Start: 2024-02-19 | End: 2024-05-10

## 2024-02-23 ENCOUNTER — OFFICE VISIT (OUTPATIENT)
Dept: UROLOGY | Facility: CLINIC | Age: 64
End: 2024-02-23
Payer: MEDICARE

## 2024-02-23 VITALS — WEIGHT: 240.06 LBS | HEIGHT: 71 IN | BODY MASS INDEX: 33.61 KG/M2

## 2024-02-23 DIAGNOSIS — R97.20 ELEVATED PSA: Primary | ICD-10-CM

## 2024-02-23 DIAGNOSIS — N52.9 ERECTILE DYSFUNCTION, UNSPECIFIED ERECTILE DYSFUNCTION TYPE: ICD-10-CM

## 2024-02-23 PROCEDURE — 99999 PR PBB SHADOW E&M-EST. PATIENT-LVL V: CPT | Mod: PBBFAC,,, | Performed by: UROLOGY

## 2024-02-23 PROCEDURE — 99215 OFFICE O/P EST HI 40 MIN: CPT | Mod: PBBFAC | Performed by: UROLOGY

## 2024-02-23 PROCEDURE — 99214 OFFICE O/P EST MOD 30 MIN: CPT | Mod: S$PBB,,, | Performed by: UROLOGY

## 2024-02-23 RX ORDER — TADALAFIL 20 MG/1
20 TABLET ORAL DAILY
Qty: 10 TABLET | Refills: 5 | Status: SHIPPED | OUTPATIENT
Start: 2024-02-23 | End: 2025-02-22

## 2024-02-23 NOTE — PROGRESS NOTES
Chief Complaint: elevated PSA    HPI:   02/23/2024 - returns today for follow-up, PSA down to five, wants to try Cialis, no new voiding issues no dysuria or gross hematuria    08/22/2023 - returns today for follow-up, relocated from [place] after his wife passed away in January, PSA up to 6.3 but PSA density 0.08, voiding issues still well-controlled status post UroLift, denies gross hematuria or dysuria, not sexually active currently    History of BPH with obstructive LUTS, previously on TRT. Negative prostate biopsy in 7/2015 with psa 7.3 and 41g gland.   All follow up PSAs normal through 2018. He also had visual evidence of NÚÑEZ on cysto at that time, and finasteride was added to his flomax.   LUTS persisted and finasteride was added to his flomax. He had progressive urgency and mirabegron added. PVR 117cc, AUA SS 9/4 on all 3 meds. Did eliminate soda, make scheduled bathroom breaks, and start stool softener and urgency has significantly improved.   Cystoscopy and transrectal ultrasound evaluation on 10/23/18 revealed a 50.1 g prostate with significant lateral lobe obstruction with kissing lobes, without gross median lobe, mild intravesical extension. Underwent Urolift 11/15/18:    11/29/18 PVR 0cc by bladder scan, AUA SS: 10/1 (4 urgency; 3: frequency; 2: sleeping; 1: emptying)  12/31/18: He stopped his Flomax almost immediately after the procedure because of frequency.  He is still on finasteride. Though he did have initial urgency and frequency, these are calming down.  He no longer needs a diaper.  He can hold his urine.  PVR 0 cc. AUA symptom score:  3/1, pleased (1:  Frequency, urgency, nocturia)  DCed finateride. Planned 3 and 6 month follow up, but he never followed up    PMHx: HTN, HLD, CKD 3, Obesity, DM PN, DANNY    He returns today noting he had planned to reest care to discuss ED management but in interim has been found to have elevated psa  Dr Matamoros, pcp, ordered psa on 3/3/22 noting it to be  elevated at 4.1 on 3/3/22  He has history of flomax and finasteride use which he discontinued both after urolift as above  Still urinating well post urolift today with AUA SS: 4/0, delighted (2 urgency, 1 freq, sleeping)  Occ urgency with postponing. Emptying well. PVR 2cc  On chart review:  psa 1.1 in 2017 and prior since negative prostate biopsy with psa 7 in 2015  psa 2.1 in 2020 (no finasteride)  psa 4.1 on 3/3/22 (no finasteride)  Orthopedics did imaging and had concern for ankylosing spondylitis and established care with Rheumatology, Dr. Ferguson, who did not find evidence of this but does have concern for some other arthritis.  Noted BPH with elevated PSA and restarted finasteride at visit on 3/18/22, after the above PSA.  Follows with Cynthia Mejia for enocrinology. On farxiga. A1c has been rising from 5.5 to 6 to 6.8 most recently in feb 2022 but notes happy if <7  Udip with 500 gluc (did note sugary breakfast and poor dietary compliance) and trc bloood. Non/never smoker  Inquired about estrogen and lab review noted T 309 and Est 175  Has moderate ED as well, and has used viagra in past with only a little bit of help. Not sure of proper use viagra a little bit of help not sure of proper use     PMH:  Past Medical History:   Diagnosis Date    Arthritis     Benign localized hyperplasia of prostate without urinary obstruction and other lower urinary tract symptoms (LUTS)     Body mass index 37.0-37.9, adult     Diabetes mellitus     Esophageal reflux     History of TIA (transient ischemic attack) 04/17/2022    Hypertension     Other and unspecified hyperlipidemia     Other testicular hypofunction     Polyneuropathy in diabetes(357.2)     PONV (postoperative nausea and vomiting)     Rosacea     Sleep apnea     uses CPAP    Type II or unspecified type diabetes mellitus with neurological manifestations, uncontrolled(250.62)        PSH:  Past Surgical History:   Procedure Laterality Date    ANKLE SURGERY Right      ANKLE SURGERY Left     COLONOSCOPY N/A 5/17/2017    Procedure: COLONOSCOPY;  Surgeon: Carlos Hess MD;  Location: [facility] ENDO;  Service: Endoscopy;  Laterality: N/A;    COLONOSCOPY W/ POLYPECTOMY      CYSTOSCOPY      CYSTOSCOPY N/A 10/23/2018    Procedure: CYSTOSCOPY;  Surgeon: Sarwat Viveros MD;  Location: [facility] OR;  Service: Urology;  Laterality: N/A;    CYSTOSCOPY WITH INSERTION OF MINIMALLY INVASIVE IMPLANT TO ENLARGE PROSTATIC URETHRA N/A 11/15/2018    Procedure: CYSTOSCOPY, WITH INSERTION OF UROLIFT IMPLANT;  Surgeon: Sarwat Viveros MD;  Location: [facility] OR;  Service: Urology;  Laterality: N/A;    DEBRIDEMENT TENNIS ELBOW      right    FOREARM SURGERY Right     tendon repair    FRACTURE SURGERY      right ankle orif    HERNIA REPAIR      umbillical    PROSTATE BIOPSY      QUADRICEPS REPAIR      right    TONSILLECTOMY      TRANSRECTAL ULTRASOUND EXAMINATION N/A 10/23/2018    Procedure: ULTRASOUND, RECTAL APPROACH;  Surgeon: Sarwat Viveros MD;  Location: [facility] OR;  Service: Urology;  Laterality: N/A;    TRICEPS TENDON RELEASE      tendon repair left tricep    VASECTOMY         Family History:  Family History   Problem Relation Age of Onset    Pulmonary embolism Mother     No Known Problems Father     Heart disease Other     Heart attack Other     Hypertension Other     Hyperlipidemia Other     Arthritis Other     Clotting disorder Other     Mental illness Other     Diabetes Other     Cancer Other     Melanoma Neg Hx     Psoriasis Neg Hx     Lupus Neg Hx     Eczema Neg Hx        Social History:  Social History     Tobacco Use    Smoking status: Never    Smokeless tobacco: Never   Substance Use Topics    Alcohol use: No    Drug use: No        Review of Systems:  General: No fever, chills  Skin: No rashes  Chest:  Denies cough and sputum production  Heart: Denies chest pain  Resp: Denies dyspnea  Abdomen: Denies diarrhea, abdominal pain, hematemesis, or blood in stool.  Musculoskeletal: No joint stiffness  or swelling. Denies back pain.  : see HPI  Neuro: no dizziness or weakness    Allergies:  Bactrim [sulfamethoxazole-trimethoprim], Ciprofloxacin, Lamotrigine, and Trileptal [oxcarbazepine]    Medications:    Current Outpatient Medications:     aspirin (ECOTRIN) 81 MG EC tablet, Take 81 mg by mouth once daily., Disp: , Rfl:     atorvastatin (LIPITOR) 40 MG tablet, TAKE 1 TABLET(40 MG) BY MOUTH EVERY EVENING, Disp: 90 tablet, Rfl: 4    blood sugar diagnostic Strp, Checks 4 times a day, Disp: 400 strip, Rfl: 4    blood-glucose meter (FREESTYLE FREEDOM LITE) kit, Use as instructed, Disp: 1 each, Rfl: 0    blood-glucose sensor (DEXCOM G7 SENSOR) Bibiana, Change sensor every 10 days, Disp: 9 each, Rfl: 3    buPROPion (WELLBUTRIN XL) 300 MG 24 hr tablet, Take 150 mg by mouth once daily., Disp: , Rfl:     chlorthalidone (HYGROTEN) 25 MG Tab, TAKE 1 TABLET BY MOUTH EVERY DAY, Disp: 90 tablet, Rfl: 3    cloNIDine (CATAPRES) 0.1 MG tablet, 1 po BID prn BP above 180/100 (Patient not taking: Reported on 1/17/2024), Disp: , Rfl:     dapagliflozin propanediol (FARXIGA) 5 mg Tab tablet, Take 1 tablet (5 mg total) by mouth once daily., Disp: 90 tablet, Rfl: 4    dexmethylphenidate (FOCALIN XR) 30 mg 24 hr capsule, Take 1 capsule by mouth once daily., Disp: , Rfl:     dexmethylphenidate (FOCALIN) 10 MG tablet, Take 10 mg by mouth every evening., Disp: , Rfl:     divalproex 500 MG Tb24, , Disp: , Rfl:     doxycycline (MONODOX) 100 MG capsule, Take once daily with food.  May cause upset stomach., Disp: 30 capsule, Rfl: 2    erythromycin with ethanoL (EMGEL) 2 % gel, AAA of face qAM.  For rosacea/redness., Disp: 60 g, Rfl: 3    FOLBEE 2.5-25-1 mg Tab, Take 1 tablet by mouth once daily., Disp: , Rfl:     insulin degludec (TRESIBA FLEXTOUCH U-200) 200 unit/mL (3 mL) insulin pen, 12 u qhs, Disp: 10 pen , Rfl: 4    ivermectin (SOOLANTRA) 1 % Crea, AAA face qhs, Disp: 45 g, Rfl: 3    lisinopriL (PRINIVIL,ZESTRIL) 5 MG tablet, Take 1 tablet (5  "mg total) by mouth once daily., Disp: 90 tablet, Rfl: 3    LORazepam (ATIVAN) 2 MG Tab, Take 1-2 mg by mouth daily as needed., Disp: , Rfl:     metFORMIN (GLUCOPHAGE) 1000 MG tablet, Take 1 tablet (1,000 mg total) by mouth 2 (two) times daily with meals., Disp: 180 tablet, Rfl: 3    mupirocin (BACTROBAN) 2 % ointment, Apply topically 3 (three) times daily., Disp: , Rfl:     needle, disp, 30 gauge 30 gauge x 1/2" Ndle, 1 each by Misc.(Non-Drug; Combo Route) route once a week., Disp: 25 each, Rfl: 3    pen needle, diabetic (BD ULTRA-FINE MINI PEN NEEDLE) 31 gauge x 3/16" Ndle, 1 each by Misc.(Non-Drug; Combo Route) route 4 (four) times daily., Disp: 100 each, Rfl: 11    pregabalin (LYRICA) 150 MG capsule, Take 1 capsule (150 mg total) by mouth 2 (two) times daily., Disp: 180 capsule, Rfl: 1    sildenafiL (VIAGRA) 100 MG tablet, Take 1 tablet (100 mg total) by mouth as needed for Erectile Dysfunction., Disp: 9 tablet, Rfl: 10    tirzepatide (MOUNJARO) 5 mg/0.5 mL PnIj, Inject 5 mg into the skin once weekly., Disp: 4 Pen, Rfl: 1    Physical Exam:  There were no vitals filed for this visit.    Body mass index is 33.48 kg/m².  General: awake, alert, cooperative  Head: NC/AT  Ears: external ears normal  Eyes: sclera normal  Lungs: normal inspiration, NAD  Heart: well-perfused  BEVERLY 4/22: 35g, no nodules, non-tender, symmetrical  Skin: The skin is warm and dry  Ext: No c/c/e.  Neuro: grossly intact, AOx3    RADIOLOGY:  MRI PROSTATE W W/O CONTRAST     CLINICAL HISTORY:  Patient with history of BPH with obstructing lower urinary tract symptoms, negative prostate biopsies in 2015.  Rising PSA.  Status post prostatic urethral lift in 2018.     TECHNIQUE:  TECHNIQUE: Multiplanar, multisequence MR imaging of the prostate, before and after the administration of 10 cc Gadavist IV contrast     COMPARISON:  CT 07/07/2020     FINDINGS:  Prostate: The prostate measures 5.8 x 5.2 x 5.9cm corresponding to a computed volume of 76cc.   "   Peripheral zone: Compressed by the enlarged central gland.  There is mild misregistration between the axial T2 weighted images and the diffusion-weighted images/ADC map.  There is susceptibility artifact from metallic implants in the central gland.     Lesion (RAKESH) #1     Location: Right apex     Dimensions: 17 x 14 x 9 mm, 0.8 cc     T2-WI: Hazy decreased T2 signal series 6, images 21 and 22     DWI/ADC: Mildly decreased signal on the ADC map.  No corresponding high signal on the diffusion-weighted images.     DCE: No unique vascularity.     Extraprostatic extension: Not present     PI-RADS assessment category: 3     Central gland: BPH.  Limited evaluation secondary to susceptibility artifacts from urolift implants.  No focal suspicious lesion.     Neurovascular bundle: Unremarkable.     Seminal vesicles: Low in attenuation but symmetrical.     No adenopathy or morphologically suspicious lymph nodes are identified.  There are fat containing inguinal hernias.  No suspicious bone marrow signal abnormalities are present.  There is a stable bone island in the left ischial tuberosity.  Urinary bladder mildly trabeculated but otherwise unremarkable.     Impression:     Extensive BPH.  Solitary PI-RADS 3 lesion right apex, marked for potential biopsy.    LABS:  I personally reviewed the following lab values:  Lab Results   Component Value Date    WBC 8.31 04/18/2022    HGB 14.7 04/18/2022    HCT 43.6 04/18/2022     04/18/2022     08/08/2023    K 3.9 08/08/2023    CL 96 08/08/2023    CREATININE 1.4 08/08/2023    BUN 20 08/08/2023    CO2 29 08/08/2023    TSH 0.710 04/17/2022    PSA 4.1 (H) 03/03/2022    INR 1.0 09/28/2021    HGBA1C 7.0 (H) 02/08/2024    CHOL 122 08/08/2023    TRIG 118 08/08/2023    HDL 34 (L) 08/08/2023    ALT 17 08/08/2023    AST 18 08/08/2023     Assessment/Plan:   Sarwat Colunga is a 63 y.o. male with elevated PSA, prior negative biopsy, MRI shows PI-RADS three lesion right apex, PSA  density 0.08 with a 74 g prostate, follow-up six months with PSA      ED - cialis RX    BPH - s/p urolift, doing well, continue to monitor    Faisal Briones MD  Urology

## 2024-04-14 DIAGNOSIS — L71.9 ROSACEA: ICD-10-CM

## 2024-04-14 DIAGNOSIS — L71.9 ACNE ROSACEA: ICD-10-CM

## 2024-04-16 RX ORDER — DOXYCYCLINE 100 MG/1
CAPSULE ORAL
Qty: 30 CAPSULE | Refills: 2 | Status: SHIPPED | OUTPATIENT
Start: 2024-04-16

## 2024-05-06 ENCOUNTER — PATIENT MESSAGE (OUTPATIENT)
Dept: ENDOCRINOLOGY | Facility: CLINIC | Age: 64
End: 2024-05-06
Payer: MEDICARE

## 2024-05-07 ENCOUNTER — PATIENT MESSAGE (OUTPATIENT)
Dept: INTERNAL MEDICINE | Facility: CLINIC | Age: 64
End: 2024-05-07
Payer: MEDICARE

## 2024-05-10 ENCOUNTER — TELEPHONE (OUTPATIENT)
Dept: ENDOCRINOLOGY | Facility: CLINIC | Age: 64
End: 2024-05-10
Payer: MEDICARE

## 2024-05-10 DIAGNOSIS — E11.22 TYPE 2 DIABETES MELLITUS WITH STAGE 3A CHRONIC KIDNEY DISEASE, WITH LONG-TERM CURRENT USE OF INSULIN: ICD-10-CM

## 2024-05-10 DIAGNOSIS — N18.31 TYPE 2 DIABETES MELLITUS WITH STAGE 3A CHRONIC KIDNEY DISEASE, WITH LONG-TERM CURRENT USE OF INSULIN: ICD-10-CM

## 2024-05-10 DIAGNOSIS — Z79.4 TYPE 2 DIABETES MELLITUS WITH STAGE 3A CHRONIC KIDNEY DISEASE, WITH LONG-TERM CURRENT USE OF INSULIN: ICD-10-CM

## 2024-05-10 RX ORDER — INSULIN DEGLUDEC 100 U/ML
INJECTION, SOLUTION SUBCUTANEOUS
Qty: 30 ML | Refills: 3 | Status: SHIPPED | OUTPATIENT
Start: 2024-05-10 | End: 2024-05-14

## 2024-05-10 RX ORDER — BLOOD-GLUCOSE SENSOR
EACH MISCELLANEOUS
Qty: 9 EACH | Refills: 3 | Status: SHIPPED | OUTPATIENT
Start: 2024-05-10

## 2024-05-10 RX ORDER — TIRZEPATIDE 2.5 MG/.5ML
2.5 INJECTION, SOLUTION SUBCUTANEOUS
Qty: 4 PEN | Refills: 0 | Status: SHIPPED | OUTPATIENT
Start: 2024-05-10

## 2024-05-14 ENCOUNTER — TELEPHONE (OUTPATIENT)
Dept: ENDOCRINOLOGY | Facility: CLINIC | Age: 64
End: 2024-05-14
Payer: MEDICARE

## 2024-05-14 ENCOUNTER — PATIENT MESSAGE (OUTPATIENT)
Dept: ENDOCRINOLOGY | Facility: CLINIC | Age: 64
End: 2024-05-14
Payer: MEDICARE

## 2024-05-14 DIAGNOSIS — N18.31 TYPE 2 DIABETES MELLITUS WITH STAGE 3A CHRONIC KIDNEY DISEASE, WITH LONG-TERM CURRENT USE OF INSULIN: ICD-10-CM

## 2024-05-14 DIAGNOSIS — Z79.4 TYPE 2 DIABETES MELLITUS WITH STAGE 3A CHRONIC KIDNEY DISEASE, WITH LONG-TERM CURRENT USE OF INSULIN: ICD-10-CM

## 2024-05-14 DIAGNOSIS — E11.22 TYPE 2 DIABETES MELLITUS WITH STAGE 3A CHRONIC KIDNEY DISEASE, WITH LONG-TERM CURRENT USE OF INSULIN: ICD-10-CM

## 2024-05-14 RX ORDER — INSULIN DEGLUDEC 100 U/ML
INJECTION, SOLUTION SUBCUTANEOUS
Qty: 30 ML | Refills: 3 | Status: SHIPPED | OUTPATIENT
Start: 2024-05-14

## 2024-05-26 ENCOUNTER — PATIENT MESSAGE (OUTPATIENT)
Dept: INTERNAL MEDICINE | Facility: CLINIC | Age: 64
End: 2024-05-26
Payer: MEDICARE

## 2024-05-30 ENCOUNTER — LAB VISIT (OUTPATIENT)
Dept: LAB | Facility: HOSPITAL | Age: 64
End: 2024-05-30
Payer: MEDICARE

## 2024-05-30 DIAGNOSIS — E11.22 TYPE 2 DIABETES MELLITUS WITH STAGE 3A CHRONIC KIDNEY DISEASE, WITH LONG-TERM CURRENT USE OF INSULIN: ICD-10-CM

## 2024-05-30 DIAGNOSIS — Z79.4 TYPE 2 DIABETES MELLITUS WITH STAGE 3A CHRONIC KIDNEY DISEASE, WITH LONG-TERM CURRENT USE OF INSULIN: ICD-10-CM

## 2024-05-30 DIAGNOSIS — N18.31 TYPE 2 DIABETES MELLITUS WITH STAGE 3A CHRONIC KIDNEY DISEASE, WITH LONG-TERM CURRENT USE OF INSULIN: ICD-10-CM

## 2024-05-30 LAB
ALBUMIN/CREAT UR: 7.5 UG/MG (ref 0–30)
CREAT UR-MCNC: 93 MG/DL (ref 23–375)
MICROALBUMIN UR DL<=1MG/L-MCNC: 7 UG/ML

## 2024-05-30 PROCEDURE — 82043 UR ALBUMIN QUANTITATIVE: CPT | Performed by: NURSE PRACTITIONER

## 2024-06-06 ENCOUNTER — TELEPHONE (OUTPATIENT)
Dept: ENDOCRINOLOGY | Facility: CLINIC | Age: 64
End: 2024-06-06
Payer: MEDICARE

## 2024-06-06 ENCOUNTER — PATIENT MESSAGE (OUTPATIENT)
Dept: ENDOCRINOLOGY | Facility: CLINIC | Age: 64
End: 2024-06-06
Payer: MEDICARE

## 2024-06-06 DIAGNOSIS — E11.22 TYPE 2 DIABETES MELLITUS WITH STAGE 3 CHRONIC KIDNEY DISEASE, WITH LONG-TERM CURRENT USE OF INSULIN, UNSPECIFIED WHETHER STAGE 3A OR 3B CKD: Primary | ICD-10-CM

## 2024-06-06 DIAGNOSIS — N18.30 TYPE 2 DIABETES MELLITUS WITH STAGE 3 CHRONIC KIDNEY DISEASE, WITH LONG-TERM CURRENT USE OF INSULIN, UNSPECIFIED WHETHER STAGE 3A OR 3B CKD: ICD-10-CM

## 2024-06-06 DIAGNOSIS — Z79.4 TYPE 2 DIABETES MELLITUS WITH STAGE 3 CHRONIC KIDNEY DISEASE, WITH LONG-TERM CURRENT USE OF INSULIN, UNSPECIFIED WHETHER STAGE 3A OR 3B CKD: Primary | ICD-10-CM

## 2024-06-06 DIAGNOSIS — E11.22 TYPE 2 DIABETES MELLITUS WITH STAGE 3 CHRONIC KIDNEY DISEASE, WITH LONG-TERM CURRENT USE OF INSULIN, UNSPECIFIED WHETHER STAGE 3A OR 3B CKD: ICD-10-CM

## 2024-06-06 DIAGNOSIS — Z79.4 TYPE 2 DIABETES MELLITUS WITH STAGE 3 CHRONIC KIDNEY DISEASE, WITH LONG-TERM CURRENT USE OF INSULIN, UNSPECIFIED WHETHER STAGE 3A OR 3B CKD: ICD-10-CM

## 2024-06-06 DIAGNOSIS — N18.30 TYPE 2 DIABETES MELLITUS WITH STAGE 3 CHRONIC KIDNEY DISEASE, WITH LONG-TERM CURRENT USE OF INSULIN, UNSPECIFIED WHETHER STAGE 3A OR 3B CKD: Primary | ICD-10-CM

## 2024-06-06 RX ORDER — TIRZEPATIDE 5 MG/.5ML
5 INJECTION, SOLUTION SUBCUTANEOUS
Qty: 4 PEN | Refills: 3 | Status: SHIPPED | OUTPATIENT
Start: 2024-06-06

## 2024-06-06 RX ORDER — TIRZEPATIDE 5 MG/.5ML
5 INJECTION, SOLUTION SUBCUTANEOUS
Qty: 4 PEN | Refills: 3 | Status: SHIPPED | OUTPATIENT
Start: 2024-06-06 | End: 2024-06-06 | Stop reason: SDUPTHER

## 2024-06-20 ENCOUNTER — OFFICE VISIT (OUTPATIENT)
Dept: ENDOCRINOLOGY | Facility: CLINIC | Age: 64
End: 2024-06-20
Payer: MEDICARE

## 2024-06-20 DIAGNOSIS — G47.33 OSA ON CPAP: ICD-10-CM

## 2024-06-20 DIAGNOSIS — E66.09 CLASS 1 OBESITY DUE TO EXCESS CALORIES WITHOUT SERIOUS COMORBIDITY WITH BODY MASS INDEX (BMI) OF 33.0 TO 33.9 IN ADULT: ICD-10-CM

## 2024-06-20 DIAGNOSIS — Z79.4 TYPE 2 DIABETES MELLITUS WITH STAGE 3A CHRONIC KIDNEY DISEASE, WITH LONG-TERM CURRENT USE OF INSULIN: Primary | ICD-10-CM

## 2024-06-20 DIAGNOSIS — E11.59 HYPERTENSION ASSOCIATED WITH DIABETES: ICD-10-CM

## 2024-06-20 DIAGNOSIS — E78.5 HYPERLIPIDEMIA ASSOCIATED WITH TYPE 2 DIABETES MELLITUS: ICD-10-CM

## 2024-06-20 DIAGNOSIS — E11.69 HYPERLIPIDEMIA ASSOCIATED WITH TYPE 2 DIABETES MELLITUS: ICD-10-CM

## 2024-06-20 DIAGNOSIS — I15.2 HYPERTENSION ASSOCIATED WITH DIABETES: ICD-10-CM

## 2024-06-20 DIAGNOSIS — E11.49 DIABETES MELLITUS TYPE 2 WITH NEUROLOGICAL MANIFESTATIONS: ICD-10-CM

## 2024-06-20 DIAGNOSIS — N18.31 TYPE 2 DIABETES MELLITUS WITH STAGE 3A CHRONIC KIDNEY DISEASE, WITH LONG-TERM CURRENT USE OF INSULIN: Primary | ICD-10-CM

## 2024-06-20 DIAGNOSIS — E11.22 TYPE 2 DIABETES MELLITUS WITH STAGE 3A CHRONIC KIDNEY DISEASE, WITH LONG-TERM CURRENT USE OF INSULIN: Primary | ICD-10-CM

## 2024-06-20 PROCEDURE — 99213 OFFICE O/P EST LOW 20 MIN: CPT | Mod: 95,,, | Performed by: NURSE PRACTITIONER

## 2024-06-20 RX ORDER — INSULIN DEGLUDEC 100 U/ML
INJECTION, SOLUTION SUBCUTANEOUS
Start: 2024-06-20

## 2024-06-20 RX ORDER — ATORVASTATIN CALCIUM 20 MG/1
20 TABLET, FILM COATED ORAL DAILY
Qty: 90 TABLET | Refills: 3 | Status: SHIPPED | OUTPATIENT
Start: 2024-06-20 | End: 2025-06-20

## 2024-06-20 NOTE — PROGRESS NOTES
The patient location is: home  The chief complaint leading to consultation is: diabetes     Visit type: audiovisual    Face to Face time with patient: 12 mins  14 minutes of total time spent on the encounter, which includes face to face time and non-face to face time preparing to see the patient (eg, review of tests), Obtaining and/or reviewing separately obtained history, Documenting clinical information in the electronic or other health record, Independently interpreting results (not separately reported) and communicating results to the patient/family/caregiver, or Care coordination (not separately reported).     Each patient to whom he or she provides medical services by telemedicine is:  (1) informed of the relationship between the physician and patient and the respective role of any other health care provider with respect to management of the patient; and (2) notified that he or she may decline to receive medical services by telemedicine and may withdraw from such care at any time.    CC: This 63 y.o. male presents for management of diabetes  and chronic conditions pending review including HTN, HLP, CKD 3a, Obesity, DM PN, DANNY    HPI: He was diagnosed with T2DM in 2005. Has never been hospitalized r/t DM.  Family hx of DM: maybe dad?, sister's pre-diabetes  His wife passed away 2023 - Moved to [place] to be closer to his brother     No acute events since his last visit  A1C is up had some issues getting insulin and Mounjaro filled was off for a few weeks  Checking bg bid        Diet: Eats 2 Meals a day, snacks- none  Skips lunch typically   Exercise: purchased [employer] paddles but hasn't started yet  CURRENT DM MEDS: metformin 1000 mg bid, Tresiba 16 u qhs, farxiga 5 mg qam, Mounjaro 5 mg weekly (Tuesday)   Vial/pen:  Uses  pen  Glucometer type:   Freestyle      Standards of Care:  Eye exam:  5/2024 +DR-   [facility] 20/20    Retired        ROS:   Gen: Decreased appetite, + weight loss- weight  down to 226 lbs    Eyes: Denies visual disturbances  Resp: no SOB or LEYVA, no cough  Cardiac: No palpitations, chest pain   GI: No nausea or vomiting, diarrhea, constipation .  /GYN: 0-1+ nocturia, no burning or pain.   MS/Neuro: + numbness/ tingling in BLE;speech clear    PE:  GENERAL: Well developed, well nourished.  PSYCH: AAOx3, appropriate mood and affect, pleasant expression, conversant, appears relaxed, well groomed.   EYES: Conjunctiva, corneas clear  NECK: Supple, trachea midline    FOOT EXAMINATION: 2/23/2023  + foot deformity- left hammer toe + callus formation, +Onychomycosis,    Decreased hair growth present over toes/feet.   Protective sensation absent bilaterally with 10 gram monofilament and absent vibratory sensation bilaterally, +1 dorsalis pedis and posterior pulses noted.      Personally reviewed Past Medical, Surgical, Social History.    There were no vitals taken for this visit.     Personally reviewed the below labs:      Chemistry        Component Value Date/Time     05/30/2024 0747    K 4.6 05/30/2024 0747     05/30/2024 0747    CO2 28 05/30/2024 0747    BUN 23 05/30/2024 0747    CREATININE 1.4 05/30/2024 0747    GLU 86 05/30/2024 0747        Component Value Date/Time    CALCIUM 9.9 05/30/2024 0747    ALKPHOS 80 05/30/2024 0747    AST 23 05/30/2024 0747    ALT 23 05/30/2024 0747    BILITOT 0.9 05/30/2024 0747    ESTGFRAFRICA >60 07/09/2022 0942    EGFRNONAA 53 (A) 07/09/2022 0942        Lab Results   Component Value Date    TSH 1.307 05/30/2024       Recent Labs   Lab 05/30/24  0747   LDL Cholesterol 48.4 L   HDL 32 L   Cholesterol 100 L        Results for orders placed or performed in visit on 08/14/21   Vitamin D   Result Value Ref Range    Vit D, 25-Hydroxy 50 30 - 96 ng/mL     No results found for this or any previous visit.      Lab Results   Component Value Date    MICALBCREAT 7.5 05/30/2024       Hemoglobin A1C   Date Value Ref Range Status   05/30/2024 8.4 (H) 4.0 - 5.6  % Final     Comment:     ADA Screening Guidelines:  5.7-6.4%  Consistent with prediabetes  >or=6.5%  Consistent with diabetes    High levels of fetal hemoglobin interfere with the HbA1C  assay. Heterozygous hemoglobin variants (HbS, HgC, etc)do  not significantly interfere with this assay.   However, presence of multiple variants may affect accuracy.     02/08/2024 7.0 (H) 4.0 - 5.6 % Final     Comment:     ADA Screening Guidelines:  5.7-6.4%  Consistent with prediabetes  >or=6.5%  Consistent with diabetes    High levels of fetal hemoglobin interfere with the HbA1C  assay. Heterozygous hemoglobin variants (HbS, HgC, etc)do  not significantly interfere with this assay.   However, presence of multiple variants may affect accuracy.     08/08/2023 5.8 (H) 4.0 - 5.6 % Final     Comment:     ADA Screening Guidelines:  5.7-6.4%  Consistent with prediabetes  >or=6.5%  Consistent with diabetes    High levels of fetal hemoglobin interfere with the HbA1C  assay. Heterozygous hemoglobin variants (HbS, HgC, etc)do  not significantly interfere with this assay.   However, presence of multiple variants may affect accuracy.          ASSESSMENT and PLAN:    1. T2DM controlled w DM PN, CKD 3-    Decrease Tresiba to 10 units qhs, Continue metformin and farxiga , Mounjaro 5 mg weekly   Notify me for  issues  Check bg bid    2. HTN -   continue meds as previously prescribed and monitor.     3. HLP -   on statin therapy      4. Obesity- There is no height or weight on file to calculate BMI. Continue weight loss and resume exercise     5 DANNY- wears his cpap machine       Follow-up: in 3 months with A1C , lipid

## 2024-06-28 ENCOUNTER — TELEPHONE (OUTPATIENT)
Dept: ENDOCRINOLOGY | Facility: CLINIC | Age: 64
End: 2024-06-28
Payer: MEDICARE

## 2024-06-28 NOTE — TELEPHONE ENCOUNTER
[facility] from 01/01/2024-12/31/2024

## 2024-07-07 ENCOUNTER — PATIENT MESSAGE (OUTPATIENT)
Dept: UROLOGY | Facility: CLINIC | Age: 64
End: 2024-07-07
Payer: COMMERCIAL

## 2024-08-17 DIAGNOSIS — M54.10 DIABETIC RADICULOPATHY: ICD-10-CM

## 2024-08-17 DIAGNOSIS — E11.49 DIABETIC RADICULOPATHY: ICD-10-CM

## 2024-08-20 RX ORDER — METFORMIN HYDROCHLORIDE 1000 MG/1
1000 TABLET ORAL 2 TIMES DAILY WITH MEALS
Qty: 180 TABLET | Refills: 3 | Status: SHIPPED | OUTPATIENT
Start: 2024-08-20

## 2024-09-02 ENCOUNTER — PATIENT MESSAGE (OUTPATIENT)
Dept: ENDOCRINOLOGY | Facility: CLINIC | Age: 64
End: 2024-09-02
Payer: COMMERCIAL

## 2024-09-04 ENCOUNTER — LAB VISIT (OUTPATIENT)
Dept: LAB | Facility: HOSPITAL | Age: 64
End: 2024-09-04
Attending: NURSE PRACTITIONER
Payer: MEDICARE

## 2024-09-04 ENCOUNTER — TELEPHONE (OUTPATIENT)
Dept: ENDOCRINOLOGY | Facility: CLINIC | Age: 64
End: 2024-09-04
Payer: COMMERCIAL

## 2024-09-04 DIAGNOSIS — E86.0 DEHYDRATION: ICD-10-CM

## 2024-09-04 DIAGNOSIS — E86.0 DEHYDRATION: Primary | ICD-10-CM

## 2024-09-04 LAB
ANION GAP SERPL CALC-SCNC: 10 MMOL/L (ref 8–16)
BUN SERPL-MCNC: 14 MG/DL (ref 8–23)
CALCIUM SERPL-MCNC: 9.6 MG/DL (ref 8.7–10.5)
CHLORIDE SERPL-SCNC: 94 MMOL/L (ref 95–110)
CO2 SERPL-SCNC: 28 MMOL/L (ref 23–29)
CREAT SERPL-MCNC: 1 MG/DL (ref 0.5–1.4)
EST. GFR  (NO RACE VARIABLE): >60 ML/MIN/1.73 M^2
GLUCOSE SERPL-MCNC: 72 MG/DL (ref 70–110)
POTASSIUM SERPL-SCNC: 3.8 MMOL/L (ref 3.5–5.1)
SODIUM SERPL-SCNC: 132 MMOL/L (ref 136–145)

## 2024-09-04 PROCEDURE — 36415 COLL VENOUS BLD VENIPUNCTURE: CPT | Performed by: NURSE PRACTITIONER

## 2024-09-04 PROCEDURE — 80048 BASIC METABOLIC PNL TOTAL CA: CPT | Performed by: NURSE PRACTITIONER

## 2024-09-10 ENCOUNTER — TELEPHONE (OUTPATIENT)
Dept: DERMATOLOGY | Facility: CLINIC | Age: 64
End: 2024-09-10
Payer: COMMERCIAL

## 2024-09-10 NOTE — TELEPHONE ENCOUNTER
Rescheduled to Monday in orvc for 1pm. Directions given. Pt verbalized understanding   ----- Message from Almaz Busby sent at 9/10/2024 11:25 AM CDT -----  Contact: Sarwat Fam is calling to speak to the nurse regarding the scheduled virtual appointment on tomorrow 09/11, please give him a call at [phone]    Thanks  LJ

## 2024-09-12 ENCOUNTER — PATIENT MESSAGE (OUTPATIENT)
Dept: ENDOCRINOLOGY | Facility: CLINIC | Age: 64
End: 2024-09-12
Payer: COMMERCIAL

## 2024-09-13 ENCOUNTER — TELEPHONE (OUTPATIENT)
Dept: DERMATOLOGY | Facility: CLINIC | Age: 64
End: 2024-09-13
Payer: COMMERCIAL

## 2024-09-13 NOTE — TELEPHONE ENCOUNTER
----- Message from Nasir Lindsey sent at 9/13/2024  8:44 AM CDT -----  Contact: [phone]  Type:  Needs Medical Advice    Who Called: Sarwat   Symptoms (please be specific): Rosacea    How long has patient had these symptoms:  n/a  Pharmacy name and phone #:    [id number] - [facility] [address] AT [address] [facility] & [address] [facility]  [address] [address]  Phone: [phone] Fax: [phone]     Would the patient rather a call back or a response via [facility]? Call Back   Best Call Back Number: [phone]   Additional Information: pt is needing to confirm if his upcoming visit is for 1pm or 9am.      Thanks KB

## 2024-09-16 ENCOUNTER — OFFICE VISIT (OUTPATIENT)
Dept: DERMATOLOGY | Facility: CLINIC | Age: 64
End: 2024-09-16
Payer: MEDICARE

## 2024-09-16 DIAGNOSIS — T14.8XXA EXCORIATION: ICD-10-CM

## 2024-09-16 DIAGNOSIS — L71.9 ACNE ROSACEA: Primary | ICD-10-CM

## 2024-09-16 DIAGNOSIS — R23.4 ESCHAR: ICD-10-CM

## 2024-09-16 PROCEDURE — 99999 PR PBB SHADOW E&M-EST. PATIENT-LVL III: CPT | Mod: PBBFAC,,, | Performed by: DERMATOLOGY

## 2024-09-16 PROCEDURE — 99213 OFFICE O/P EST LOW 20 MIN: CPT | Mod: PBBFAC,PO | Performed by: DERMATOLOGY

## 2024-09-16 PROCEDURE — 99214 OFFICE O/P EST MOD 30 MIN: CPT | Mod: S$PBB,,, | Performed by: DERMATOLOGY

## 2024-09-16 PROCEDURE — G2211 COMPLEX E/M VISIT ADD ON: HCPCS | Mod: S$PBB,,, | Performed by: DERMATOLOGY

## 2024-09-16 RX ORDER — DOXYCYCLINE 50 MG/1
CAPSULE ORAL
Qty: 90 CAPSULE | Refills: 3 | Status: SHIPPED | OUTPATIENT
Start: 2024-09-16

## 2024-09-16 RX ORDER — METRONIDAZOLE 7.5 MG/G
CREAM TOPICAL
Qty: 45 G | Refills: 11 | Status: SHIPPED | OUTPATIENT
Start: 2024-09-16
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
## 2024-09-30 ENCOUNTER — LAB VISIT (OUTPATIENT)
Dept: LAB | Facility: HOSPITAL | Age: 64
End: 2024-09-30
Attending: NURSE PRACTITIONER
Payer: MEDICARE

## 2024-09-30 DIAGNOSIS — Z79.4 TYPE 2 DIABETES MELLITUS WITH STAGE 3A CHRONIC KIDNEY DISEASE, WITH LONG-TERM CURRENT USE OF INSULIN: ICD-10-CM

## 2024-09-30 DIAGNOSIS — E11.22 TYPE 2 DIABETES MELLITUS WITH STAGE 3A CHRONIC KIDNEY DISEASE, WITH LONG-TERM CURRENT USE OF INSULIN: ICD-10-CM

## 2024-09-30 DIAGNOSIS — N18.31 TYPE 2 DIABETES MELLITUS WITH STAGE 3A CHRONIC KIDNEY DISEASE, WITH LONG-TERM CURRENT USE OF INSULIN: ICD-10-CM

## 2024-09-30 LAB
CHOLEST SERPL-MCNC: 114 MG/DL (ref 120–199)
CHOLEST/HDLC SERPL: 2.5 {RATIO} (ref 2–5)
ESTIMATED AVG GLUCOSE: 94 MG/DL (ref 68–131)
HBA1C MFR BLD: 4.9 % (ref 4–5.6)
HDLC SERPL-MCNC: 45 MG/DL (ref 40–75)
HDLC SERPL: 39.5 % (ref 20–50)
LDLC SERPL CALC-MCNC: 55.6 MG/DL (ref 63–159)
NONHDLC SERPL-MCNC: 69 MG/DL
TRIGL SERPL-MCNC: 67 MG/DL (ref 30–150)

## 2024-09-30 PROCEDURE — 80061 LIPID PANEL: CPT | Performed by: NURSE PRACTITIONER

## 2024-09-30 PROCEDURE — 83036 HEMOGLOBIN GLYCOSYLATED A1C: CPT | Performed by: NURSE PRACTITIONER

## 2024-09-30 PROCEDURE — 36415 COLL VENOUS BLD VENIPUNCTURE: CPT | Performed by: NURSE PRACTITIONER

## 2024-10-07 ENCOUNTER — OFFICE VISIT (OUTPATIENT)
Dept: ENDOCRINOLOGY | Facility: CLINIC | Age: 64
End: 2024-10-07
Payer: MEDICARE

## 2024-10-07 DIAGNOSIS — I15.2 HYPERTENSION ASSOCIATED WITH DIABETES: ICD-10-CM

## 2024-10-07 DIAGNOSIS — E11.69 HYPERLIPIDEMIA ASSOCIATED WITH TYPE 2 DIABETES MELLITUS: ICD-10-CM

## 2024-10-07 DIAGNOSIS — G47.33 OSA ON CPAP: ICD-10-CM

## 2024-10-07 DIAGNOSIS — E11.59 HYPERTENSION ASSOCIATED WITH DIABETES: ICD-10-CM

## 2024-10-07 DIAGNOSIS — E78.5 HYPERLIPIDEMIA ASSOCIATED WITH TYPE 2 DIABETES MELLITUS: ICD-10-CM

## 2024-10-07 DIAGNOSIS — E11.49 DIABETES MELLITUS TYPE 2 WITH NEUROLOGICAL MANIFESTATIONS: Primary | ICD-10-CM

## 2024-10-07 PROBLEM — Z79.4 TYPE 2 DIABETES MELLITUS WITH STAGE 3 CHRONIC KIDNEY DISEASE, WITH LONG-TERM CURRENT USE OF INSULIN: Status: RESOLVED | Noted: 2019-09-10 | Resolved: 2024-10-07

## 2024-10-07 PROBLEM — E11.22 TYPE 2 DIABETES MELLITUS WITH STAGE 3 CHRONIC KIDNEY DISEASE, WITH LONG-TERM CURRENT USE OF INSULIN: Status: RESOLVED | Noted: 2019-09-10 | Resolved: 2024-10-07

## 2024-10-07 PROBLEM — E66.811 CLASS 1 OBESITY DUE TO EXCESS CALORIES WITHOUT SERIOUS COMORBIDITY WITH BODY MASS INDEX (BMI) OF 33.0 TO 33.9 IN ADULT: Status: RESOLVED | Noted: 2023-02-23 | Resolved: 2024-10-07

## 2024-10-07 PROBLEM — E66.09 CLASS 1 OBESITY DUE TO EXCESS CALORIES WITHOUT SERIOUS COMORBIDITY WITH BODY MASS INDEX (BMI) OF 33.0 TO 33.9 IN ADULT: Status: RESOLVED | Noted: 2023-02-23 | Resolved: 2024-10-07

## 2024-10-07 PROBLEM — N18.30 TYPE 2 DIABETES MELLITUS WITH STAGE 3 CHRONIC KIDNEY DISEASE, WITH LONG-TERM CURRENT USE OF INSULIN: Status: RESOLVED | Noted: 2019-09-10 | Resolved: 2024-10-07

## 2024-10-07 PROCEDURE — 99213 OFFICE O/P EST LOW 20 MIN: CPT | Mod: 95,,, | Performed by: NURSE PRACTITIONER

## 2024-10-07 NOTE — PROGRESS NOTES
The patient location is: home  The chief complaint leading to consultation is: diabetes     Visit type: audiovisual    Face to Face time with patient: 12 mins  14 minutes of total time spent on the encounter, which includes face to face time and non-face to face time preparing to see the patient (eg, review of tests), Obtaining and/or reviewing separately obtained history, Documenting clinical information in the electronic or other health record, Independently interpreting results (not separately reported) and communicating results to the patient/family/caregiver, or Care coordination (not separately reported).     Each patient to whom he or she provides medical services by telemedicine is:  (1) informed of the relationship between the physician and patient and the respective role of any other health care provider with respect to management of the patient; and (2) notified that he or she may decline to receive medical services by telemedicine and may withdraw from such care at any time.    CC: This 63 y.o. male presents for management of diabetes  and chronic conditions pending review including HTN, HLP, CKD 3a, Obesity, DM PN, DANNY    HPI: He was diagnosed with T2DM in 2005. Has never been hospitalized r/t DM.  Family hx of DM: maybe dad?, sister's pre-diabetes  His wife passed away 2023 - Moved to [place] to be closer to his brother     No acute events since his last visit  He has not recently   Did so a few weeks ago and was 84 fasting   Denies hypoglycemia      Diet: Eats 2 Meals a day, snacks- [employer] animal crackers   Skips lunch typically   Exercise: walking and biking   CURRENT DM MEDS: metformin 1000 mg bid, Tresiba 10 u qhs, farxiga 5 mg qam, Mounjaro 5 mg weekly (Tuesday)   Vial/pen:  Uses  pen  Glucometer type:   Freestyle      Standards of Care:  Eye exam:  5/2024 +DR-   [facility]    Retired        ROS:   Gen: Decreased appetite, + weight loss- weight down to 194 lbs    Eyes:  Denies visual disturbances  Resp: no SOB or LEYVA, no cough  Cardiac: No palpitations, chest pain   GI: No nausea or vomiting, diarrhea, constipation .  /GYN: 1-2+ nocturia, no burning or pain.   MS/Neuro: + tingling in BLE;speech clear    PE:  GENERAL: Well developed, well nourished.  PSYCH: AAOx3, appropriate mood and affect, pleasant expression, conversant, appears relaxed, well groomed.   EYES: Conjunctiva, corneas clear  NECK: Supple, trachea midline    FOOT EXAMINATION: 2/23/2023  + foot deformity- left hammer toe + callus formation, +Onychomycosis,    Decreased hair growth present over toes/feet.   Protective sensation absent bilaterally with 10 gram monofilament and absent vibratory sensation bilaterally, +1 dorsalis pedis and posterior pulses noted.      Personally reviewed Past Medical, Surgical, Social History.    There were no vitals taken for this visit.     Personally reviewed the below labs:      Chemistry        Component Value Date/Time     (L) 09/04/2024 1401    K 3.8 09/04/2024 1401    CL 94 (L) 09/04/2024 1401    CO2 28 09/04/2024 1401    BUN 14 09/04/2024 1401    CREATININE 1.0 09/04/2024 1401    GLU 72 09/04/2024 1401        Component Value Date/Time    CALCIUM 9.6 09/04/2024 1401    ALKPHOS 80 05/30/2024 0747    AST 23 05/30/2024 0747    ALT 23 05/30/2024 0747    BILITOT 0.9 05/30/2024 0747    ESTGFRAFRICA >60 07/09/2022 0942    EGFRNONAA 53 (A) 07/09/2022 0942        Lab Results   Component Value Date    TSH 1.307 05/30/2024       Recent Labs   Lab 09/30/24  0755   LDL Cholesterol 55.6 L   HDL 45   Cholesterol 114 L        Results for orders placed or performed in visit on 08/14/21   Vitamin D    Collection Time: 08/14/21 10:43 AM   Result Value Ref Range    Vit D, 25-Hydroxy 50 30 - 96 ng/mL     No results found for this or any previous visit.      Lab Results   Component Value Date    MICALBCREAT 7.5 05/30/2024       Hemoglobin A1C   Date Value Ref Range Status   09/30/2024 4.9 4.0 -  5.6 % Final     Comment:     ADA Screening Guidelines:  5.7-6.4%  Consistent with prediabetes  >or=6.5%  Consistent with diabetes    High levels of fetal hemoglobin interfere with the HbA1C  assay. Heterozygous hemoglobin variants (HbS, HgC, etc)do  not significantly interfere with this assay.   However, presence of multiple variants may affect accuracy.     09/12/2024 4.8 4.2 - 5.8 % Final   05/30/2024 8.4 (H) 4.0 - 5.6 % Final     Comment:     ADA Screening Guidelines:  5.7-6.4%  Consistent with prediabetes  >or=6.5%  Consistent with diabetes    High levels of fetal hemoglobin interfere with the HbA1C  assay. Heterozygous hemoglobin variants (HbS, HgC, etc)do  not significantly interfere with this assay.   However, presence of multiple variants may affect accuracy.     02/08/2024 7.0 (H) 4.0 - 5.6 % Final     Comment:     ADA Screening Guidelines:  5.7-6.4%  Consistent with prediabetes  >or=6.5%  Consistent with diabetes    High levels of fetal hemoglobin interfere with the HbA1C  assay. Heterozygous hemoglobin variants (HbS, HgC, etc)do  not significantly interfere with this assay.   However, presence of multiple variants may affect accuracy.          ASSESSMENT and PLAN:    1. T2DM controlled w DM PN    Stop tresiba  Continue metformin, farxiga, and  Mounjaro 5 mg weekly   Notify me for issues  Check bg a few times a week, stagger  Discussed caution with taking whey protein and Creatinine     2. HTN -   continue meds as previously prescribed and monitor.     3. HLP -   on statin therapy      4. Obesity- There is no height or weight on file to calculate BMI. Continue weight loss and exercise     5 DANNY- wears his cpap machine       Follow-up: in 6 months with A1C , CR- virtual

## 2024-10-15 ENCOUNTER — PATIENT MESSAGE (OUTPATIENT)
Dept: ENDOCRINOLOGY | Facility: CLINIC | Age: 64
End: 2024-10-15
Payer: COMMERCIAL

## 2024-10-15 DIAGNOSIS — Z79.4 TYPE 2 DIABETES MELLITUS WITH STAGE 3 CHRONIC KIDNEY DISEASE, WITH LONG-TERM CURRENT USE OF INSULIN, UNSPECIFIED WHETHER STAGE 3A OR 3B CKD: ICD-10-CM

## 2024-10-15 DIAGNOSIS — N18.30 TYPE 2 DIABETES MELLITUS WITH STAGE 3 CHRONIC KIDNEY DISEASE, WITH LONG-TERM CURRENT USE OF INSULIN, UNSPECIFIED WHETHER STAGE 3A OR 3B CKD: ICD-10-CM

## 2024-10-15 DIAGNOSIS — E11.22 TYPE 2 DIABETES MELLITUS WITH STAGE 3 CHRONIC KIDNEY DISEASE, WITH LONG-TERM CURRENT USE OF INSULIN, UNSPECIFIED WHETHER STAGE 3A OR 3B CKD: ICD-10-CM

## 2024-10-15 RX ORDER — TIRZEPATIDE 5 MG/.5ML
5 INJECTION, SOLUTION SUBCUTANEOUS
Qty: 4 PEN | Refills: 3 | Status: SHIPPED | OUTPATIENT
Start: 2024-10-15

## 2024-12-01 ENCOUNTER — PATIENT MESSAGE (OUTPATIENT)
Dept: ENDOCRINOLOGY | Facility: CLINIC | Age: 64
End: 2024-12-01
Payer: COMMERCIAL

## 2024-12-02 ENCOUNTER — TELEPHONE (OUTPATIENT)
Dept: ENDOCRINOLOGY | Facility: CLINIC | Age: 64
End: 2024-12-02
Payer: COMMERCIAL

## 2024-12-02 DIAGNOSIS — E11.49 DIABETES MELLITUS TYPE 2 WITH NEUROLOGICAL MANIFESTATIONS: Primary | ICD-10-CM

## 2024-12-02 RX ORDER — TIRZEPATIDE 2.5 MG/.5ML
2.5 INJECTION, SOLUTION SUBCUTANEOUS
Qty: 4 PEN | Refills: 6 | Status: SHIPPED | OUTPATIENT
Start: 2024-12-02 | End: 2024-12-03

## 2024-12-03 RX ORDER — TIRZEPATIDE 2.5 MG/.5ML
2.5 INJECTION, SOLUTION SUBCUTANEOUS
Qty: 4 PEN | Refills: 6 | Status: SHIPPED | OUTPATIENT
Start: 2024-12-03

## 2024-12-03 NOTE — TELEPHONE ENCOUNTER
"Cynthia is not letting me pend it. It is saying "The following medications are ordered in a more recent encounter, so you cannot change them here:      tirzepatide (MOUNJARO) 2.5 mg/0.5 mL PnIj "    He wants it to be sent to another pharmacy  "

## 2025-02-14 ENCOUNTER — PATIENT MESSAGE (OUTPATIENT)
Dept: ENDOCRINOLOGY | Facility: CLINIC | Age: 65
End: 2025-02-14
Payer: COMMERCIAL

## 2025-04-03 ENCOUNTER — LAB VISIT (OUTPATIENT)
Dept: LAB | Facility: HOSPITAL | Age: 65
End: 2025-04-03
Attending: NURSE PRACTITIONER
Payer: MEDICARE

## 2025-04-03 DIAGNOSIS — E11.49 DIABETES MELLITUS TYPE 2 WITH NEUROLOGICAL MANIFESTATIONS: ICD-10-CM

## 2025-04-03 DIAGNOSIS — R97.20 ELEVATED PSA: ICD-10-CM

## 2025-04-03 LAB
EAG (OHS): 123 MG/DL (ref 68–131)
HBA1C MFR BLD: 5.9 % (ref 4–5.6)
PSA SERPL-MCNC: 7.62 NG/ML

## 2025-04-03 PROCEDURE — 83036 HEMOGLOBIN GLYCOSYLATED A1C: CPT

## 2025-04-03 PROCEDURE — 36415 COLL VENOUS BLD VENIPUNCTURE: CPT

## 2025-04-03 PROCEDURE — 84153 ASSAY OF PSA TOTAL: CPT

## 2025-04-11 DIAGNOSIS — Z79.4 TYPE 2 DIABETES MELLITUS WITH STAGE 3A CHRONIC KIDNEY DISEASE, WITH LONG-TERM CURRENT USE OF INSULIN: ICD-10-CM

## 2025-04-11 DIAGNOSIS — E11.22 TYPE 2 DIABETES MELLITUS WITH STAGE 3A CHRONIC KIDNEY DISEASE, WITH LONG-TERM CURRENT USE OF INSULIN: ICD-10-CM

## 2025-04-11 DIAGNOSIS — N18.31 TYPE 2 DIABETES MELLITUS WITH STAGE 3A CHRONIC KIDNEY DISEASE, WITH LONG-TERM CURRENT USE OF INSULIN: ICD-10-CM

## 2025-04-11 RX ORDER — ATORVASTATIN CALCIUM 20 MG/1
TABLET, FILM COATED ORAL
Qty: 90 TABLET | Refills: 1 | Status: SHIPPED | OUTPATIENT
Start: 2025-04-11

## 2025-04-17 ENCOUNTER — OFFICE VISIT (OUTPATIENT)
Dept: ENDOCRINOLOGY | Facility: CLINIC | Age: 65
End: 2025-04-17
Payer: MEDICARE

## 2025-04-17 DIAGNOSIS — E78.5 HYPERLIPIDEMIA ASSOCIATED WITH TYPE 2 DIABETES MELLITUS: ICD-10-CM

## 2025-04-17 DIAGNOSIS — I15.2 HYPERTENSION ASSOCIATED WITH DIABETES: ICD-10-CM

## 2025-04-17 DIAGNOSIS — E11.69 HYPERLIPIDEMIA ASSOCIATED WITH TYPE 2 DIABETES MELLITUS: ICD-10-CM

## 2025-04-17 DIAGNOSIS — G47.33 OSA ON CPAP: ICD-10-CM

## 2025-04-17 DIAGNOSIS — E11.59 HYPERTENSION ASSOCIATED WITH DIABETES: ICD-10-CM

## 2025-04-17 DIAGNOSIS — E11.49 DIABETES MELLITUS TYPE 2 WITH NEUROLOGICAL MANIFESTATIONS: Primary | ICD-10-CM

## 2025-04-17 PROCEDURE — G2211 COMPLEX E/M VISIT ADD ON: HCPCS | Mod: 95,,, | Performed by: NURSE PRACTITIONER

## 2025-04-17 PROCEDURE — 98005 SYNCH AUDIO-VIDEO EST LOW 20: CPT | Mod: 95,,, | Performed by: NURSE PRACTITIONER

## 2025-04-17 NOTE — PROGRESS NOTES
The patient location is: home  The chief complaint leading to consultation is: diabetes     Visit type: audiovisual    Face to Face time with patient: 12 mins  14 minutes of total time spent on the encounter, which includes face to face time and non-face to face time preparing to see the patient (eg, review of tests), Obtaining and/or reviewing separately obtained history, Documenting clinical information in the electronic or other health record, Independently interpreting results (not separately reported) and communicating results to the patient/family/caregiver, or Care coordination (not separately reported).     Each patient to whom he or she provides medical services by telemedicine is:  (1) informed of the relationship between the physician and patient and the respective role of any other health care provider with respect to management of the patient; and (2) notified that he or she may decline to receive medical services by telemedicine and may withdraw from such care at any time.    CC: This 64 y.o. male presents for management of diabetes  and chronic conditions pending review including HTN, HLP, CKD 3a, Obesity, DM PN, DANNY    HPI: He was diagnosed with T2DM in 2005. Has never been hospitalized r/t DM.  Family hx of DM: maybe dad?, sister's pre-diabetes  His wife passed away 2023 - Moved to [place] to be closer to his brother     No acute events since his last visit  Bg 120 this am, had ice cream last night     Denies hypoglycemia      Diet: Eats 3 Meals a day, snacks- [employer] animal crackers   Exercise: walking and biking   CURRENT DM MEDS: metformin 1000 mg bid, farxiga 5 mg qam, Mounjaro 2.5 mg weekly (Tuesday)   Vial/pen:  Uses  pen  Glucometer type:   Freestyle      Standards of Care:  Eye exam:  7/2024 +DR-   [facility]    Retired        ROS:   Gen: Decreased appetite, + weight loss 12 lbs (182 on his scale this am)  Eyes: Denies visual disturbances  Resp: no SOB or LEYVA, no  cough  Cardiac: No palpitations, chest pain   GI: No nausea or vomiting, diarrhea, constipation .  /GYN: 1-2+ nocturia, no burning or pain.   MS/Neuro: + tingling in BLE;speech clear    PE:  GENERAL: Well developed, well nourished.  PSYCH: AAOx3, appropriate mood and affect, pleasant expression, conversant, appears relaxed, well groomed.   EYES: Conjunctiva, corneas clear  NECK: Supple, trachea midline         Personally reviewed Past Medical, Surgical, Social History.    There were no vitals taken for this visit.     Personally reviewed the below labs:      Chemistry        Component Value Date/Time     (L) 09/04/2024 1401    K 3.8 09/04/2024 1401    CL 94 (L) 09/04/2024 1401    CO2 28 09/04/2024 1401    BUN 14 09/04/2024 1401    CREATININE 1.0 09/04/2024 1401    GLU 72 09/04/2024 1401        Component Value Date/Time    CALCIUM 9.6 09/04/2024 1401    ALKPHOS 80 05/30/2024 0747    AST 23 05/30/2024 0747    ALT 23 05/30/2024 0747    BILITOT 0.9 05/30/2024 0747    ESTGFRAFRICA >60 07/09/2022 0942    EGFRNONAA 53 (A) 07/09/2022 0942        Lab Results   Component Value Date    TSH 0.99 10/18/2024       Recent Labs   Lab 09/30/24  0755   LDL Cholesterol 55.6 L   HDL 45   Cholesterol 114 L        Results for orders placed or performed in visit on 08/14/21   Vitamin D    Collection Time: 08/14/21 10:43 AM   Result Value Ref Range    Vit D, 25-Hydroxy 50 30 - 96 ng/mL     No results found for this or any previous visit.      Lab Results   Component Value Date    MICALBCREAT  04/03/2025      Comment:      UNABLE TO CALCULATE       Hemoglobin A1C   Date Value Ref Range Status   09/30/2024 4.9 4.0 - 5.6 % Final     Comment:     ADA Screening Guidelines:  5.7-6.4%  Consistent with prediabetes  >or=6.5%  Consistent with diabetes    High levels of fetal hemoglobin interfere with the HbA1C  assay. Heterozygous hemoglobin variants (HbS, HgC, etc)do  not significantly interfere with this assay.   However, presence of  multiple variants may affect accuracy.     09/12/2024 4.8 4.2 - 5.8 % Final   05/30/2024 8.4 (H) 4.0 - 5.6 % Final     Comment:     ADA Screening Guidelines:  5.7-6.4%  Consistent with prediabetes  >or=6.5%  Consistent with diabetes    High levels of fetal hemoglobin interfere with the HbA1C  assay. Heterozygous hemoglobin variants (HbS, HgC, etc)do  not significantly interfere with this assay.   However, presence of multiple variants may affect accuracy.     02/08/2024 7.0 (H) 4.0 - 5.6 % Final     Comment:     ADA Screening Guidelines:  5.7-6.4%  Consistent with prediabetes  >or=6.5%  Consistent with diabetes    High levels of fetal hemoglobin interfere with the HbA1C  assay. Heterozygous hemoglobin variants (HbS, HgC, etc)do  not significantly interfere with this assay.   However, presence of multiple variants may affect accuracy.       Hemoglobin A1c   Date Value Ref Range Status   04/03/2025 5.9 (H) 4.0 - 5.6 % Final     Comment:     ADA Screening Guidelines:  5.7-6.4%  Consistent with prediabetes  >=6.5%  Consistent with diabetes    High levels of fetal hemoglobin interfere with the HbA1C  assay. Heterozygous hemoglobin variants (HbS, HgC, etc)do  not significantly interfere with this assay.   However, presence of multiple variants may affect accuracy.        ASSESSMENT and PLAN:    1. T2DM controlled w DM PN    Continue metformin, farxiga, and  Mounjaro 2.5 mg weekly   Check bg a few times a week, stagger    2. HTN -   continue meds as previously prescribed and monitor.     3. HLP -   on statin therapy      4 DANNY- wears his cpap machine    5. Elevated PSA- f/u w urology        Follow-up: in 6 months with A1C ,CMP, lipid- virtual ok

## 2025-05-19 ENCOUNTER — OFFICE VISIT (OUTPATIENT)
Dept: DERMATOLOGY | Facility: CLINIC | Age: 65
End: 2025-05-19
Payer: MEDICARE

## 2025-05-19 DIAGNOSIS — L71.9 ACNE ROSACEA: Primary | ICD-10-CM

## 2025-05-19 DIAGNOSIS — L85.3 XEROSIS CUTIS: ICD-10-CM

## 2025-05-19 DIAGNOSIS — R20.8 PAIN OF SKIN: ICD-10-CM

## 2025-05-19 DIAGNOSIS — L71.9 ROSACEA: ICD-10-CM

## 2025-05-19 DIAGNOSIS — L57.0 ACTINIC KERATOSES: ICD-10-CM

## 2025-05-19 PROCEDURE — 17000 DESTRUCT PREMALG LESION: CPT | Mod: PBBFAC | Performed by: DERMATOLOGY

## 2025-05-19 PROCEDURE — 99214 OFFICE O/P EST MOD 30 MIN: CPT | Mod: 25,S$PBB,, | Performed by: DERMATOLOGY

## 2025-05-19 PROCEDURE — 17000 DESTRUCT PREMALG LESION: CPT | Mod: S$PBB,,, | Performed by: DERMATOLOGY

## 2025-05-19 PROCEDURE — 99213 OFFICE O/P EST LOW 20 MIN: CPT | Mod: PBBFAC | Performed by: DERMATOLOGY

## 2025-05-19 PROCEDURE — 99999 PR PBB SHADOW E&M-EST. PATIENT-LVL III: CPT | Mod: PBBFAC,,, | Performed by: DERMATOLOGY

## 2025-05-19 RX ORDER — LIDOCAINE 50 MG/G
OINTMENT TOPICAL
Qty: 50 G | Refills: 1 | Status: SHIPPED | OUTPATIENT
Start: 2025-05-19

## 2025-05-19 RX ORDER — METRONIDAZOLE 7.5 MG/G
CREAM TOPICAL
Qty: 45 G | Refills: 11 | Status: SHIPPED | OUTPATIENT
Start: 2025-05-19

## 2025-05-19 RX ORDER — DOXYCYCLINE 50 MG/1
CAPSULE ORAL
Qty: 90 CAPSULE | Refills: 3 | Status: SHIPPED | OUTPATIENT
Start: 2025-05-19

## 2025-05-19 NOTE — PROGRESS NOTES
Subjective:      Patient ID:  Sarwat Colunga is a 64 y.o. male who presents for   Chief Complaint   Patient presents with    Rosacea     Hx of rosacea and prurigo, last seen on 9/16/24.  He c/o lesion of the left forearm and left lower leg, + raised and scaly.  Spontaneously improved.     Hx of rosacea,  on doxy with marked improvement and metrocream qD.  Denies allergic reactions to doxy. Denies current breakouts.  + flares when off doxy.      Prior tx: metrogel, laser treatment (improvement for years), doxy, emgel      Rosacea        Review of Systems   Constitutional:  Negative for fever and chills.   Gastrointestinal:  Negative for nausea and vomiting.   Skin:  Positive for activity-related sunscreen use. Negative for daily sunscreen use and recent sunburn.   Hematologic/Lymphatic: Does not bruise/bleed easily.       Objective:   Physical Exam   Constitutional: He appears well-developed and well-nourished. No distress.   Neurological: He is alert and oriented to person, place, and time. He is not disoriented.   Psychiatric: He has a normal mood and affect.   Skin:   Areas Examined (abnormalities noted in diagram):   Head / Face Inspection Performed  Neck Inspection Performed  RUE Inspected  LUE Inspection Performed  Nails and Digits Inspection Performed                 Diagram Legend     Surgical scar with no sign of skin cancer recurrence      Open and closed comedones      Inflammatory papules and pustules     Assessment / Plan:        Acne rosacea  Rosacea  -     doxycycline (MONODOX) 50 MG Cap; Take once daily with food. May cause upset stomach.  Dispense: 90 capsule; Refill: 3  -     metronidazole 0.75% (METROCREAM) 0.75 % Crea; AAA face bid for rosacea/redness  Dispense: 45 g; Refill: 11  -     will continue above meds. Side effect profile of doxy reviewed including increased sun sensitivity and upset stomach.    Xerosis cutis  Of the left forearm, will give samples of ceraVe SA lotion. The patient  acknowledged understanding.     Pain of skin  -     LIDOcaine (XYLOCAINE) 5 % Oint ointment; AAA TID prn pain. Numbing ointment.  Dispense: 50 g; Refill: 1  -     patient endorses dropping an object on the skin and wishes to have a topical numbing cream.  We will send above medication.    Actinic keratoses  Cryosurgery Procedure Note    The patient is informed of the precancerous quality and need for treatment of these lesions. After risks, benefits and alternatives explained, including blistering, pain, hyper- and hypopigmentation, patient verbally consents to cryotherapy to precancerous lesions. Liquid nitrogen cryosurgery is applied to the 1 actinic keratoses, as detailed in the physical exam, to produce a freeze injury. The patient is aware that blisters may form and is instructed on wound care with gentle cleansing and use of vaseline ointment to keep moist until healed. The patient is supplied a handout on cryosurgery and is instructed to call if lesions do not completely resolve.             Follow up in about 1 year (around 5/19/2026).

## 2025-05-19 NOTE — ADDENDUM NOTE
Addended by: ABHI KILGORE on: 5/19/2025 04:36 PM     Modules accepted: Orders, Level of Service     negative

## 2025-05-19 NOTE — PATIENT INSTRUCTIONS

## 2025-06-20 DIAGNOSIS — E11.49 DIABETIC RADICULOPATHY: ICD-10-CM

## 2025-06-20 DIAGNOSIS — M54.10 DIABETIC RADICULOPATHY: ICD-10-CM

## 2025-06-20 RX ORDER — METFORMIN HYDROCHLORIDE 1000 MG/1
1000 TABLET ORAL 2 TIMES DAILY WITH MEALS
Qty: 180 TABLET | Refills: 3 | Status: SHIPPED | OUTPATIENT
Start: 2025-06-20

## 2025-07-03 DIAGNOSIS — N18.31 TYPE 2 DIABETES MELLITUS WITH STAGE 3A CHRONIC KIDNEY DISEASE, WITH LONG-TERM CURRENT USE OF INSULIN: ICD-10-CM

## 2025-07-03 DIAGNOSIS — E11.22 TYPE 2 DIABETES MELLITUS WITH STAGE 3A CHRONIC KIDNEY DISEASE, WITH LONG-TERM CURRENT USE OF INSULIN: ICD-10-CM

## 2025-07-03 DIAGNOSIS — Z79.4 TYPE 2 DIABETES MELLITUS WITH STAGE 3A CHRONIC KIDNEY DISEASE, WITH LONG-TERM CURRENT USE OF INSULIN: ICD-10-CM

## 2025-07-03 RX ORDER — DAPAGLIFLOZIN 5 MG/1
5 TABLET, FILM COATED ORAL DAILY
Qty: 90 TABLET | Refills: 3 | Status: SHIPPED | OUTPATIENT
Start: 2025-07-03

## 2025-07-04 ENCOUNTER — PATIENT MESSAGE (OUTPATIENT)
Dept: DERMATOLOGY | Facility: CLINIC | Age: 65
End: 2025-07-04
Payer: MEDICARE

## 2025-07-04 DIAGNOSIS — E11.49 DIABETES MELLITUS TYPE 2 WITH NEUROLOGICAL MANIFESTATIONS: ICD-10-CM

## 2025-07-07 RX ORDER — TIRZEPATIDE 2.5 MG/.5ML
2.5 INJECTION, SOLUTION SUBCUTANEOUS
Qty: 4 PEN | Refills: 6 | Status: SHIPPED | OUTPATIENT
Start: 2025-07-07

## 2025-07-09 ENCOUNTER — PATIENT MESSAGE (OUTPATIENT)
Dept: ENDOCRINOLOGY | Facility: CLINIC | Age: 65
End: 2025-07-09
Payer: MEDICARE

## 2025-07-09 DIAGNOSIS — E11.22 TYPE 2 DIABETES MELLITUS WITH STAGE 3A CHRONIC KIDNEY DISEASE, WITH LONG-TERM CURRENT USE OF INSULIN: ICD-10-CM

## 2025-07-09 DIAGNOSIS — Z79.4 TYPE 2 DIABETES MELLITUS WITH STAGE 3A CHRONIC KIDNEY DISEASE, WITH LONG-TERM CURRENT USE OF INSULIN: ICD-10-CM

## 2025-07-09 DIAGNOSIS — N18.31 TYPE 2 DIABETES MELLITUS WITH STAGE 3A CHRONIC KIDNEY DISEASE, WITH LONG-TERM CURRENT USE OF INSULIN: ICD-10-CM

## 2025-07-10 DIAGNOSIS — L71.9 ROSACEA: Primary | ICD-10-CM

## 2025-07-10 DIAGNOSIS — L71.9 ACNE ROSACEA: ICD-10-CM

## 2025-07-10 RX ORDER — DAPAGLIFLOZIN 5 MG/1
5 TABLET, FILM COATED ORAL DAILY
Qty: 90 TABLET | Refills: 3 | Status: SHIPPED | OUTPATIENT
Start: 2025-07-10

## 2025-07-10 RX ORDER — DOXYCYCLINE 100 MG/1
CAPSULE ORAL
Qty: 30 CAPSULE | Refills: 1 | Status: SHIPPED | OUTPATIENT
Start: 2025-07-10

## 2025-08-04 ENCOUNTER — OFFICE VISIT (OUTPATIENT)
Dept: DERMATOLOGY | Facility: CLINIC | Age: 65
End: 2025-08-04
Payer: MEDICARE

## 2025-08-04 DIAGNOSIS — L82.1 SEBORRHEIC KERATOSIS: ICD-10-CM

## 2025-08-04 DIAGNOSIS — L71.9 ROSACEA: Primary | ICD-10-CM

## 2025-08-04 DIAGNOSIS — L57.0 ACTINIC KERATOSES: ICD-10-CM

## 2025-08-04 PROCEDURE — 99213 OFFICE O/P EST LOW 20 MIN: CPT | Mod: 25,S$PBB,, | Performed by: DERMATOLOGY

## 2025-08-04 PROCEDURE — 99999 PR PBB SHADOW E&M-EST. PATIENT-LVL III: CPT | Mod: PBBFAC,,, | Performed by: DERMATOLOGY

## 2025-08-04 PROCEDURE — 17000 DESTRUCT PREMALG LESION: CPT | Mod: PBBFAC | Performed by: DERMATOLOGY

## 2025-08-04 PROCEDURE — 17000 DESTRUCT PREMALG LESION: CPT | Mod: S$PBB,,, | Performed by: DERMATOLOGY

## 2025-08-04 PROCEDURE — 99213 OFFICE O/P EST LOW 20 MIN: CPT | Mod: PBBFAC,25 | Performed by: DERMATOLOGY

## 2025-08-04 NOTE — PATIENT INSTRUCTIONS

## 2025-08-04 NOTE — PROGRESS NOTES
Subjective:      Patient ID:  Sarwat Colunga is a 64 y.o. male who presents for   Chief Complaint   Patient presents with    Spot     Raised on back     Hx of rosacea and prurigo, last seen on 5/19/25.  He c/o lesion of the back, + pruritus. + raised and scaly. No tx tried.    Hx of rosacea, on doxy 50 mg qD and metrocream qD.  Denies allergic reactions to doxy. Denies current breakouts.  + flares when off doxy.      Prior tx: metrogel, laser treatment (improvement for years), doxy 50 - 100 mg, emgel, metrocream      Spot    Rosacea        Review of Systems   Constitutional:  Negative for fever and chills.   Gastrointestinal:  Negative for nausea and vomiting.   Skin:  Positive for activity-related sunscreen use. Negative for daily sunscreen use and recent sunburn.   Hematologic/Lymphatic: Does not bruise/bleed easily.       Objective:   Physical Exam   Constitutional: He appears well-developed and well-nourished. No distress.   Neurological: He is alert and oriented to person, place, and time. He is not disoriented.   Psychiatric: He has a normal mood and affect.   Skin:   Areas Examined (abnormalities noted in diagram):   Head / Face Inspection Performed  Neck Inspection Performed  RUE Inspected  LUE Inspection Performed  Nails and Digits Inspection Performed                 Diagram Legend     Surgical scar with no sign of skin cancer recurrence      Open and closed comedones      Inflammatory papules and pustules     Assessment / Plan:          Rosacea  Improved, continue doxy 50 mg qD and metrocream qD. The patient acknowledged understanding.     Side effect profile of doxy reviewed including increased sun sensitivity and upset stomach.    Seborrheic keratosis  Reassurance given. Discussed diagnosis and that lesions are benign.  AAD handout given.       Actinic keratoses  Cryosurgery Procedure Note    The patient is informed of the precancerous quality and need for treatment of these lesions. After risks,  benefits and alternatives explained, including blistering, pain, hyper- and hypopigmentation, patient verbally consents to cryotherapy to precancerous lesions. Liquid nitrogen cryosurgery is applied to the 1 actinic keratoses, as detailed in the physical exam, to produce a freeze injury. The patient is aware that blisters may form and is instructed on wound care with gentle cleansing and use of vaseline ointment to keep moist until healed. The patient is supplied a handout on cryosurgery and is instructed to call if lesions do not completely resolve.             Follow up in about 1 year (around 8/4/2026).

## (undated) DEVICE — GLOVE PROTEXIS PI SYN SURG 7

## (undated) DEVICE — SEE L#120831

## (undated) DEVICE — Device

## (undated) DEVICE — WATER STERILE INJ 500ML BAG

## (undated) DEVICE — SCRUB 10% POVIDONE IODINE 4OZ

## (undated) DEVICE — DRAPE MINOR PROCEDURE

## (undated) DEVICE — SPONGE GAUZE 16PLY 4X4

## (undated) DEVICE — SHEET DRAPE MEDIUM

## (undated) DEVICE — GAUZE SPONGE BULKEE 6X6.75IN

## (undated) DEVICE — GLOVE SURG ULTRA TOUCH 7

## (undated) DEVICE — SET IRR URLGY 2LINE UNIV SPIKE

## (undated) DEVICE — SOL IRR NACL .9% 3000ML

## (undated) DEVICE — TRAY DRY SPONGE SCRUB W FOAM

## (undated) DEVICE — SEE L#95700

## (undated) DEVICE — SET CYSTO IRRIGATION UNIV SPIK

## (undated) DEVICE — JELLY LUBRICATING STERILE 5 GR

## (undated) DEVICE — SOL PVP-I SCRUB 7.5% 4OZ

## (undated) DEVICE — SEE MEDLINE ITEM 157185

## (undated) DEVICE — GOWN X-LG STERILE BACK

## (undated) DEVICE — CATH COUDE 20F 5CC STAT LOC

## (undated) DEVICE — SEE MEDLINE ITEM 152651

## (undated) DEVICE — JELLY LUBRICANT STERILE 4 OZ